# Patient Record
Sex: MALE | Race: OTHER | HISPANIC OR LATINO
[De-identification: names, ages, dates, MRNs, and addresses within clinical notes are randomized per-mention and may not be internally consistent; named-entity substitution may affect disease eponyms.]

---

## 2020-01-29 ENCOUNTER — APPOINTMENT (OUTPATIENT)
Dept: ORTHOPEDIC SURGERY | Facility: CLINIC | Age: 80
End: 2020-01-29
Payer: MEDICARE

## 2020-01-29 ENCOUNTER — APPOINTMENT (OUTPATIENT)
Dept: ORTHOPEDIC SURGERY | Facility: CLINIC | Age: 80
End: 2020-01-29

## 2020-01-29 VITALS
BODY MASS INDEX: 29.35 KG/M2 | HEIGHT: 67 IN | DIASTOLIC BLOOD PRESSURE: 72 MMHG | WEIGHT: 187 LBS | SYSTOLIC BLOOD PRESSURE: 159 MMHG | HEART RATE: 59 BPM | OXYGEN SATURATION: 98 %

## 2020-01-29 DIAGNOSIS — Z86.79 PERSONAL HISTORY OF OTHER DISEASES OF THE CIRCULATORY SYSTEM: ICD-10-CM

## 2020-01-29 DIAGNOSIS — Z86.69 PERSONAL HISTORY OF OTHER DISEASES OF THE NERVOUS SYSTEM AND SENSE ORGANS: ICD-10-CM

## 2020-01-29 DIAGNOSIS — Z96.652 PRESENCE OF LEFT ARTIFICIAL KNEE JOINT: ICD-10-CM

## 2020-01-29 DIAGNOSIS — Z72.3 LACK OF PHYSICAL EXERCISE: ICD-10-CM

## 2020-01-29 DIAGNOSIS — Z83.3 FAMILY HISTORY OF DIABETES MELLITUS: ICD-10-CM

## 2020-01-29 DIAGNOSIS — Z86.39 PERSONAL HISTORY OF OTHER ENDOCRINE, NUTRITIONAL AND METABOLIC DISEASE: ICD-10-CM

## 2020-01-29 PROCEDURE — 99204 OFFICE O/P NEW MOD 45 MIN: CPT | Mod: 25

## 2020-01-29 PROCEDURE — 20610 DRAIN/INJ JOINT/BURSA W/O US: CPT | Mod: RT

## 2020-01-29 RX ORDER — LOSARTAN POTASSIUM 100 MG/1
TABLET, FILM COATED ORAL
Refills: 0 | Status: ACTIVE | COMMUNITY

## 2020-01-29 RX ORDER — ASCORBIC ACID 500 MG
TABLET ORAL
Refills: 0 | Status: ACTIVE | COMMUNITY

## 2020-01-29 RX ORDER — METFORMIN HYDROCHLORIDE 625 MG/1
TABLET ORAL
Refills: 0 | Status: ACTIVE | COMMUNITY

## 2020-01-29 RX ORDER — AMLODIPINE BESYLATE 5 MG/1
TABLET ORAL
Refills: 0 | Status: ACTIVE | COMMUNITY

## 2020-01-29 RX ORDER — UBIDECARENONE/VIT E ACET 100MG-5
CAPSULE ORAL
Refills: 0 | Status: ACTIVE | COMMUNITY

## 2020-01-29 RX ORDER — FERROUS SULFATE 325(65) MG
325 (65 FE) TABLET ORAL
Refills: 0 | Status: ACTIVE | COMMUNITY

## 2020-01-29 RX ORDER — METOPROLOL TARTRATE 75 MG/1
TABLET, FILM COATED ORAL
Refills: 0 | Status: ACTIVE | COMMUNITY

## 2020-01-29 RX ORDER — OMEPRAZOLE 20 MG/1
TABLET, DELAYED RELEASE ORAL
Refills: 0 | Status: ACTIVE | COMMUNITY

## 2020-01-29 NOTE — CONSULT LETTER
[Dear  ___] : Dear  [unfilled], [Consult Letter:] : I had the pleasure of evaluating your patient, [unfilled]. [Please see my note below.] : Please see my note below. [Consult Closing:] : Thank you very much for allowing me to participate in the care of this patient.  If you have any questions, please do not hesitate to contact me. [Sincerely,] : Sincerely, [FreeTextEntry3] : Nathan Arrington MD\par Orthopaedic Surgery - Adult Reconstruction\par [facility] [place]\par [address] [place] [address]\par p. [phone]\par f. [phone]\par

## 2020-01-29 NOTE — PROCEDURE
[Aspiration] : Aspiration [Right] : of the right [Knee Joint] : knee joint [Diagnostic] : Diagnostic [Patient] : patient [Alcohol] : Alcohol [Betadine] : Betadine [Ethyl Chloride Spray] : ethyl chloride spray was used as a topical anesthetic [Lateral] : lateral [18] : an 18-gauge [___ mL Fluid] : [unfilled] mL of [Cloudy] : cloudy [Yellow] : yellow [Bandage Applied] : a bandage [Culture] : culture [Cell Count] : cell count [Gram Stain] : gram stain [Crystal Analysis] : crystal analysis [Tolerated Well] : The patient tolerated the procedure well [None] : none

## 2020-01-29 NOTE — HISTORY OF PRESENT ILLNESS
[Worsening] : worsening [___ yrs] : [unfilled] year(s) ago [Standing] : standing [Constant] : ~He/She~ states the symptoms seem to be constant [Bending] : worsened by bending [Sitting] : worsened by sitting [Walking] : worsened by walking [None] : No relieving factors are noted [de-identified] : 78y/o male presenting with pain and swelling of right total knee replacement since 1/5/20. TKA were performed by Dr. Diaz (R 2002, L 2006) and were previously extremely well-functioning. He feels that the symptoms arose as a result of sleeping in an uncomfortable position for several nights while visiting his ailing mother-in-law at a hospital around that time. The right knee pain was concurrent with new right lateral hip pain and right shoulder pain. Whereas he has been accustomed to walking unlimited distances without any assistive device, he has been using a cane these past few days. Pain is activity-related and he denies any pain at rest. When it occurs, he rates the pain as 10/10 intensity, localized, sharp in nature, alleviated by Aleve and rest, and aggravated by ambulation and knee flexion. He denies any recent illness or infection. No fever/chills at home. He has no complaints regarding the left side.  [Acetaminophen] : not relieved by acetaminophen [NSAIDs] : not relieved by nonsteroidal anti-inflammatory drugs [Physical Therapy] : not relieved by physical therapy

## 2020-01-29 NOTE — PHYSICAL EXAM
[de-identified] : General appearance: well nourished and hydrated, pleasant, alert and oriented x 3, cooperative.  \par HEENT: normocephalic, EOM intact, nasal septum midline, oral cavity clear, external auditory canal clear.  \par Cardiovascular: no lower leg edema, no varicosities, dorsalis pedis pulses palpable and symmetric.  \par Lymphatics: no palpable lymphadenopathy, no lymphedema.  \par Neurologic: sensation is normal, no muscle weakness in upper or lower extremities, patella tendon reflexes present and symmetric.  \par Dermatologic: skin moist, warm, no rash.  \par Spine: cervical spine with normal lordosis and painless range of motion, thoracic spine with normal kyphosis and painless range of motion, lumbosacral spine with normal lordosis and painless range of motion.  No tenderness to palpation along midline spine and paraspinal musculature.  Sacroiliac joints nontender bilaterally. Negative SLR and crossed SLR tests bilaterally.\par Gait: antalgic right.\par \par Left knee:\par - Inspection: negative swelling, ecchymosis, and erythema.  \par - Wounds: healed midline incision.\par - Alignment: normal.  \par - Palpation: no specific tenderness on palpation.  \par - ROM active: 0-125, no pain on extremes of motion\par - Ligamentous laxity: between 5-10mm A/P translation on flexion drawers, stable to varus stress, stable to valgus stress.  \par - Popliteal angle: 70 degrees\par - Muscle Test: 5/5 quad strength.  \par \par Right knee:\par - Inspection: moderate effusion, negative for swelling, ecchymosis, and erythema.  \par - Wounds: healed midline incision.\par - Alignment: normal.  \par - Palpation: circumferential tenderness on palpation.  \par - ROM active: 0-125, pain on both extremes of motion\par - Ligamentous laxity: between 5-10mm A/P translation on flexion drawers, stable to varus stress, stable to valgus stress.  \par - Popliteal angle: 70 degrees\par - Muscle Test: 5/5 quad strength.   [de-identified] : 3 views of the bilateral knees (weightbearing AP, weightbearing lateral, and Sunrise) were obtained today and interpreted by me.\par \par There is a cemented PS Nexgen with a resurfaced patella present in the left knee. Normal alignment in the coronal and sagittal planes. The components appear to be well fixed without radiolucent lines or any change in position as compared to prior available exams (dating back to 2006). No radiographic evidence of osteolysis, loosening, or fracture. The patella sits at appropriate height and tracks centrally.\par \par There is a cemented PS Nexgen with a resurfaced patella present in the right knee. Normal alignment in the coronal and sagittal planes. The components appear to be well fixed without radiolucent lines or any change in position as compared to prior available exams (dating back to 2006). No radiographic evidence of osteolysis, loosening, or fracture. The patella sits at appropriate height and tracks centrally.

## 2020-01-29 NOTE — DISCUSSION/SUMMARY
[de-identified] : 80y/o male with 3.5 weeks of right knee pain and swelling in setting of previously well-functioning right TKA; also with stable well-functioning left TKA\par - Periprosthetic infection must be ruled out. Right knee synovial labs were taken today. Also sending for serum labs.\par - CT noncontrast right knee to assess for osteolysis\par - Callback with results of above.

## 2020-02-03 LAB
B PERT IGG+IGM PNL SER: ABNORMAL
BASOPHILS # BLD AUTO: 0.05 K/UL
BASOPHILS NFR BLD AUTO: 0.4 %
COLOR FLD: YELLOW
CRP SERPL-MCNC: 2.16 MG/DL
DEPRECATED D DIMER PPP IA-ACNC: 276 NG/ML DDU
EOSINOPHIL # BLD AUTO: 0.36 K/UL
EOSINOPHIL # FLD MANUAL: 0 %
EOSINOPHIL NFR BLD AUTO: 3.1 %
ERYTHROCYTE [SEDIMENTATION RATE] IN BLOOD BY WESTERGREN METHOD: 100 MM/HR
FLUID INTAKE SUBSTANCE CLASS: NORMAL
HCT VFR BLD CALC: 39.5 %
HGB BLD-MCNC: 12.2 G/DL
IMM GRANULOCYTES NFR BLD AUTO: 0.3 %
LYMPHOCYTES # BLD AUTO: 1.63 K/UL
LYMPHOCYTES # FLD MANUAL: 29 %
LYMPHOCYTES NFR BLD AUTO: 13.8 %
MAN DIFF?: NORMAL
MCHC RBC-ENTMCNC: 30.6 PG
MCHC RBC-ENTMCNC: 30.9 GM/DL
MCV RBC AUTO: 99 FL
MESOTHL CELL NFR FLD: 0 %
MONOCYTES # BLD AUTO: 0.91 K/UL
MONOCYTES NFR BLD AUTO: 7.7 %
MONOS+MACROS NFR FLD MANUAL: 68 %
NEUTROPHILS # BLD AUTO: 8.81 K/UL
NEUTROPHILS NFR BLD AUTO: 74.7 %
NEUTS SEG # FLD MANUAL: 3 %
NRBC # FLD: 0
PLATELET # BLD AUTO: 318 K/UL
RBC # BLD: 3.99 M/UL
RBC # FLD MANUAL: 3000 /UL
RBC # FLD: 12.4 %
SYCRY CLARITY: ABNORMAL
SYCRY COLOR: YELLOW
SYCRY ID: NORMAL
SYCRY TUBE: NORMAL
TOTAL CELLS COUNTED FLD: 727 /UL
TUBE TYPE: NORMAL
UNIDENT CELLS NFR FLD MANUAL: 0 %
VARIANT LYMPHS # FLD MANUAL: 0 %
WBC # FLD AUTO: 11.8 K/UL

## 2020-02-13 LAB — BACTERIA FLD CULT: NORMAL

## 2020-02-17 ENCOUNTER — FORM ENCOUNTER (OUTPATIENT)
Age: 80
End: 2020-02-17

## 2020-02-18 ENCOUNTER — OUTPATIENT (OUTPATIENT)
Dept: OUTPATIENT SERVICES | Facility: HOSPITAL | Age: 80
LOS: 1 days | End: 2020-02-18
Payer: MEDICARE

## 2020-02-18 DIAGNOSIS — Z01.818 ENCOUNTER FOR OTHER PREPROCEDURAL EXAMINATION: ICD-10-CM

## 2020-02-18 LAB
ALBUMIN SERPL ELPH-MCNC: 4.4 G/DL — SIGNIFICANT CHANGE UP (ref 3.3–5)
ALP SERPL-CCNC: 66 U/L — SIGNIFICANT CHANGE UP (ref 40–120)
ALT FLD-CCNC: 10 U/L — SIGNIFICANT CHANGE UP (ref 10–45)
ANION GAP SERPL CALC-SCNC: 13 MMOL/L — SIGNIFICANT CHANGE UP (ref 5–17)
APPEARANCE UR: CLEAR — SIGNIFICANT CHANGE UP
APTT BLD: 31.7 SEC — SIGNIFICANT CHANGE UP (ref 27.5–36.3)
AST SERPL-CCNC: 12 U/L — SIGNIFICANT CHANGE UP (ref 10–40)
BASOPHILS # BLD AUTO: 0.06 K/UL — SIGNIFICANT CHANGE UP (ref 0–0.2)
BASOPHILS NFR BLD AUTO: 0.6 % — SIGNIFICANT CHANGE UP (ref 0–2)
BILIRUB SERPL-MCNC: 0.2 MG/DL — SIGNIFICANT CHANGE UP (ref 0.2–1.2)
BILIRUB UR-MCNC: NEGATIVE — SIGNIFICANT CHANGE UP
BUN SERPL-MCNC: 23 MG/DL — SIGNIFICANT CHANGE UP (ref 7–23)
CALCIUM SERPL-MCNC: 9.6 MG/DL — SIGNIFICANT CHANGE UP (ref 8.4–10.5)
CHLORIDE SERPL-SCNC: 101 MMOL/L — SIGNIFICANT CHANGE UP (ref 96–108)
CO2 SERPL-SCNC: 25 MMOL/L — SIGNIFICANT CHANGE UP (ref 22–31)
COLOR SPEC: YELLOW — SIGNIFICANT CHANGE UP
CREAT SERPL-MCNC: 0.98 MG/DL — SIGNIFICANT CHANGE UP (ref 0.5–1.3)
DIFF PNL FLD: NEGATIVE — SIGNIFICANT CHANGE UP
EOSINOPHIL # BLD AUTO: 0.18 K/UL — SIGNIFICANT CHANGE UP (ref 0–0.5)
EOSINOPHIL NFR BLD AUTO: 1.8 % — SIGNIFICANT CHANGE UP (ref 0–6)
GLUCOSE SERPL-MCNC: 128 MG/DL — HIGH (ref 70–99)
GLUCOSE UR QL: NEGATIVE — SIGNIFICANT CHANGE UP
HBA1C BLD-MCNC: 7.1 % — HIGH (ref 4–5.6)
HCT VFR BLD CALC: 36.8 % — LOW (ref 39–50)
HGB BLD-MCNC: 11.6 G/DL — LOW (ref 13–17)
IMM GRANULOCYTES NFR BLD AUTO: 0.4 % — SIGNIFICANT CHANGE UP (ref 0–1.5)
INR BLD: 1 — SIGNIFICANT CHANGE UP (ref 0.88–1.16)
KETONES UR-MCNC: NEGATIVE — SIGNIFICANT CHANGE UP
LEUKOCYTE ESTERASE UR-ACNC: NEGATIVE — SIGNIFICANT CHANGE UP
LYMPHOCYTES # BLD AUTO: 1.71 K/UL — SIGNIFICANT CHANGE UP (ref 1–3.3)
LYMPHOCYTES # BLD AUTO: 16.8 % — SIGNIFICANT CHANGE UP (ref 13–44)
MCHC RBC-ENTMCNC: 30.9 PG — SIGNIFICANT CHANGE UP (ref 27–34)
MCHC RBC-ENTMCNC: 31.5 GM/DL — LOW (ref 32–36)
MCV RBC AUTO: 98.1 FL — SIGNIFICANT CHANGE UP (ref 80–100)
MONOCYTES # BLD AUTO: 0.66 K/UL — SIGNIFICANT CHANGE UP (ref 0–0.9)
MONOCYTES NFR BLD AUTO: 6.5 % — SIGNIFICANT CHANGE UP (ref 2–14)
NEUTROPHILS # BLD AUTO: 7.51 K/UL — HIGH (ref 1.8–7.4)
NEUTROPHILS NFR BLD AUTO: 73.9 % — SIGNIFICANT CHANGE UP (ref 43–77)
NITRITE UR-MCNC: NEGATIVE — SIGNIFICANT CHANGE UP
NRBC # BLD: 0 /100 WBCS — SIGNIFICANT CHANGE UP (ref 0–0)
PH UR: 5.5 — SIGNIFICANT CHANGE UP (ref 5–8)
PLATELET # BLD AUTO: 322 K/UL — SIGNIFICANT CHANGE UP (ref 150–400)
POTASSIUM SERPL-MCNC: 5.2 MMOL/L — SIGNIFICANT CHANGE UP (ref 3.5–5.3)
POTASSIUM SERPL-SCNC: 5.2 MMOL/L — SIGNIFICANT CHANGE UP (ref 3.5–5.3)
PROT SERPL-MCNC: 6.7 G/DL — SIGNIFICANT CHANGE UP (ref 6–8.3)
PROT UR-MCNC: NEGATIVE MG/DL — SIGNIFICANT CHANGE UP
PROTHROM AB SERPL-ACNC: 11.4 SEC — SIGNIFICANT CHANGE UP (ref 10–12.9)
RBC # BLD: 3.75 M/UL — LOW (ref 4.2–5.8)
RBC # FLD: 12 % — SIGNIFICANT CHANGE UP (ref 10.3–14.5)
SODIUM SERPL-SCNC: 139 MMOL/L — SIGNIFICANT CHANGE UP (ref 135–145)
SP GR SPEC: 1.01 — SIGNIFICANT CHANGE UP (ref 1–1.03)
UROBILINOGEN FLD QL: 0.2 E.U./DL — SIGNIFICANT CHANGE UP
WBC # BLD: 10.16 K/UL — SIGNIFICANT CHANGE UP (ref 3.8–10.5)
WBC # FLD AUTO: 10.16 K/UL — SIGNIFICANT CHANGE UP (ref 3.8–10.5)

## 2020-02-18 PROCEDURE — 80053 COMPREHEN METABOLIC PANEL: CPT

## 2020-02-18 PROCEDURE — 85610 PROTHROMBIN TIME: CPT

## 2020-02-18 PROCEDURE — 93005 ELECTROCARDIOGRAM TRACING: CPT

## 2020-02-18 PROCEDURE — 93010 ELECTROCARDIOGRAM REPORT: CPT

## 2020-02-18 PROCEDURE — 87086 URINE CULTURE/COLONY COUNT: CPT

## 2020-02-18 PROCEDURE — 71046 X-RAY EXAM CHEST 2 VIEWS: CPT | Mod: 26

## 2020-02-18 PROCEDURE — 85025 COMPLETE CBC W/AUTO DIFF WBC: CPT

## 2020-02-18 PROCEDURE — 71046 X-RAY EXAM CHEST 2 VIEWS: CPT

## 2020-02-18 PROCEDURE — 85730 THROMBOPLASTIN TIME PARTIAL: CPT

## 2020-02-18 PROCEDURE — 81003 URINALYSIS AUTO W/O SCOPE: CPT

## 2020-02-18 PROCEDURE — 83036 HEMOGLOBIN GLYCOSYLATED A1C: CPT

## 2020-02-19 LAB
CULTURE RESULTS: SIGNIFICANT CHANGE UP
SPECIMEN SOURCE: SIGNIFICANT CHANGE UP

## 2020-02-26 ENCOUNTER — OUTPATIENT (OUTPATIENT)
Dept: OUTPATIENT SERVICES | Facility: HOSPITAL | Age: 80
LOS: 1 days | End: 2020-02-26
Payer: COMMERCIAL

## 2020-02-26 DIAGNOSIS — Z22.321 CARRIER OR SUSPECTED CARRIER OF METHICILLIN SUSCEPTIBLE STAPHYLOCOCCUS AUREUS: ICD-10-CM

## 2020-02-26 PROCEDURE — 87641 MR-STAPH DNA AMP PROBE: CPT

## 2020-02-28 RX ORDER — MUPIROCIN 20 MG/G
1 OINTMENT TOPICAL
Qty: 1 | Refills: 0
Start: 2020-02-28 | End: 2020-03-03

## 2020-03-03 LAB — FUNGUS FLD CULT: NORMAL

## 2020-03-05 ENCOUNTER — TRANSCRIPTION ENCOUNTER (OUTPATIENT)
Age: 80
End: 2020-03-05

## 2020-03-05 VITALS
DIASTOLIC BLOOD PRESSURE: 61 MMHG | RESPIRATION RATE: 16 BRPM | OXYGEN SATURATION: 97 % | HEIGHT: 66.5 IN | SYSTOLIC BLOOD PRESSURE: 135 MMHG | TEMPERATURE: 98 F | WEIGHT: 186.29 LBS | HEART RATE: 70 BPM

## 2020-03-05 NOTE — PATIENT PROFILE ADULT - FUNCTIONAL SCREEN CURRENT LEVEL: SWALLOWING (IF SCORE 2 OR MORE FOR ANY ITEM, CONSULT REHAB SERVICES), MLM)
Received report from ALE Glavan. Care assumed of Level 2 infant on room air. Will continue to monitor.   0 = swallows foods/liquids without difficulty

## 2020-03-06 ENCOUNTER — INPATIENT (INPATIENT)
Facility: HOSPITAL | Age: 80
LOS: 2 days | Discharge: HOME HEALTH | DRG: 465 | End: 2020-03-09
Attending: ORTHOPAEDIC SURGERY | Admitting: ORTHOPAEDIC SURGERY
Payer: MEDICARE

## 2020-03-06 ENCOUNTER — APPOINTMENT (OUTPATIENT)
Dept: ORTHOPEDIC SURGERY | Facility: HOSPITAL | Age: 80
End: 2020-03-06

## 2020-03-06 ENCOUNTER — RESULT REVIEW (OUTPATIENT)
Age: 80
End: 2020-03-06

## 2020-03-06 DIAGNOSIS — E78.5 HYPERLIPIDEMIA, UNSPECIFIED: ICD-10-CM

## 2020-03-06 DIAGNOSIS — Z98.890 OTHER SPECIFIED POSTPROCEDURAL STATES: Chronic | ICD-10-CM

## 2020-03-06 DIAGNOSIS — Z96.653 PRESENCE OF ARTIFICIAL KNEE JOINT, BILATERAL: Chronic | ICD-10-CM

## 2020-03-06 DIAGNOSIS — M25.561 PAIN IN RIGHT KNEE: ICD-10-CM

## 2020-03-06 DIAGNOSIS — E11.9 TYPE 2 DIABETES MELLITUS WITHOUT COMPLICATIONS: ICD-10-CM

## 2020-03-06 DIAGNOSIS — D64.9 ANEMIA, UNSPECIFIED: ICD-10-CM

## 2020-03-06 DIAGNOSIS — I25.10 ATHEROSCLEROTIC HEART DISEASE OF NATIVE CORONARY ARTERY WITHOUT ANGINA PECTORIS: Chronic | ICD-10-CM

## 2020-03-06 DIAGNOSIS — I25.10 ATHEROSCLEROTIC HEART DISEASE OF NATIVE CORONARY ARTERY WITHOUT ANGINA PECTORIS: ICD-10-CM

## 2020-03-06 DIAGNOSIS — K42.9 UMBILICAL HERNIA WITHOUT OBSTRUCTION OR GANGRENE: Chronic | ICD-10-CM

## 2020-03-06 DIAGNOSIS — I10 ESSENTIAL (PRIMARY) HYPERTENSION: ICD-10-CM

## 2020-03-06 PROCEDURE — 88305 TISSUE EXAM BY PATHOLOGIST: CPT | Mod: 26

## 2020-03-06 PROCEDURE — 73560 X-RAY EXAM OF KNEE 1 OR 2: CPT | Mod: 26,RT

## 2020-03-06 PROCEDURE — 27486 REVISE/REPLACE KNEE JOINT: CPT | Mod: 52,RT

## 2020-03-06 PROCEDURE — 88331 PATH CONSLTJ SURG 1 BLK 1SPC: CPT | Mod: 26

## 2020-03-06 RX ORDER — ACETAMINOPHEN 500 MG
975 TABLET ORAL EVERY 8 HOURS
Refills: 0 | Status: DISCONTINUED | OUTPATIENT
Start: 2020-03-06 | End: 2020-03-09

## 2020-03-06 RX ORDER — ATORVASTATIN CALCIUM 80 MG/1
20 TABLET, FILM COATED ORAL AT BEDTIME
Refills: 0 | Status: DISCONTINUED | OUTPATIENT
Start: 2020-03-07 | End: 2020-03-09

## 2020-03-06 RX ORDER — ACETAMINOPHEN 500 MG
1000 TABLET ORAL ONCE
Refills: 0 | Status: COMPLETED | OUTPATIENT
Start: 2020-03-06 | End: 2020-03-06

## 2020-03-06 RX ORDER — OXYCODONE HYDROCHLORIDE 5 MG/1
5 TABLET ORAL EVERY 4 HOURS
Refills: 0 | Status: DISCONTINUED | OUTPATIENT
Start: 2020-03-06 | End: 2020-03-09

## 2020-03-06 RX ORDER — AMLODIPINE BESYLATE 2.5 MG/1
2.5 TABLET ORAL DAILY
Refills: 0 | Status: DISCONTINUED | OUTPATIENT
Start: 2020-03-07 | End: 2020-03-09

## 2020-03-06 RX ORDER — ASPIRIN/CALCIUM CARB/MAGNESIUM 324 MG
81 TABLET ORAL
Refills: 0 | Status: DISCONTINUED | OUTPATIENT
Start: 2020-03-06 | End: 2020-03-09

## 2020-03-06 RX ORDER — MORPHINE SULFATE 50 MG/1
4 CAPSULE, EXTENDED RELEASE ORAL EVERY 4 HOURS
Refills: 0 | Status: DISCONTINUED | OUTPATIENT
Start: 2020-03-06 | End: 2020-03-09

## 2020-03-06 RX ORDER — APREPITANT 80 MG/1
40 CAPSULE ORAL ONCE
Refills: 0 | Status: COMPLETED | OUTPATIENT
Start: 2020-03-06 | End: 2020-03-06

## 2020-03-06 RX ORDER — SODIUM CHLORIDE 9 MG/ML
1000 INJECTION, SOLUTION INTRAVENOUS
Refills: 0 | Status: DISCONTINUED | OUTPATIENT
Start: 2020-03-06 | End: 2020-03-09

## 2020-03-06 RX ORDER — TRAMADOL HYDROCHLORIDE 50 MG/1
50 TABLET ORAL EVERY 4 HOURS
Refills: 0 | Status: DISCONTINUED | OUTPATIENT
Start: 2020-03-06 | End: 2020-03-09

## 2020-03-06 RX ORDER — CELECOXIB 200 MG/1
400 CAPSULE ORAL ONCE
Refills: 0 | Status: COMPLETED | OUTPATIENT
Start: 2020-03-06 | End: 2020-03-06

## 2020-03-06 RX ORDER — CEFAZOLIN SODIUM 1 G
2000 VIAL (EA) INJECTION EVERY 8 HOURS
Refills: 0 | Status: COMPLETED | OUTPATIENT
Start: 2020-03-07 | End: 2020-03-07

## 2020-03-06 RX ORDER — POVIDONE-IODINE 5 %
1 AEROSOL (ML) TOPICAL ONCE
Refills: 0 | Status: COMPLETED | OUTPATIENT
Start: 2020-03-06 | End: 2020-03-06

## 2020-03-06 RX ORDER — MORPHINE SULFATE 50 MG/1
4 CAPSULE, EXTENDED RELEASE ORAL
Refills: 0 | Status: DISCONTINUED | OUTPATIENT
Start: 2020-03-06 | End: 2020-03-09

## 2020-03-06 RX ORDER — DEXTROSE 50 % IN WATER 50 %
25 SYRINGE (ML) INTRAVENOUS ONCE
Refills: 0 | Status: DISCONTINUED | OUTPATIENT
Start: 2020-03-06 | End: 2020-03-09

## 2020-03-06 RX ORDER — PANTOPRAZOLE 40 MG/1
40 TABLET, DELAYED RELEASE ORAL DAILY
Qty: 30 | Refills: 1 | Status: ACTIVE | COMMUNITY
Start: 2020-03-06 | End: 1900-01-01

## 2020-03-06 RX ORDER — FERROUS SULFATE 325(65) MG
325 TABLET ORAL THREE TIMES A DAY
Refills: 0 | Status: DISCONTINUED | OUTPATIENT
Start: 2020-03-07 | End: 2020-03-09

## 2020-03-06 RX ORDER — POLYETHYLENE GLYCOL 3350 17 G/17G
17 POWDER, FOR SOLUTION ORAL DAILY
Refills: 0 | Status: DISCONTINUED | OUTPATIENT
Start: 2020-03-06 | End: 2020-03-09

## 2020-03-06 RX ORDER — DEXTROSE 50 % IN WATER 50 %
15 SYRINGE (ML) INTRAVENOUS ONCE
Refills: 0 | Status: DISCONTINUED | OUTPATIENT
Start: 2020-03-06 | End: 2020-03-09

## 2020-03-06 RX ORDER — KETOROLAC TROMETHAMINE 30 MG/ML
15 SYRINGE (ML) INJECTION EVERY 6 HOURS
Refills: 0 | Status: DISCONTINUED | OUTPATIENT
Start: 2020-03-06 | End: 2020-03-08

## 2020-03-06 RX ORDER — PANTOPRAZOLE SODIUM 20 MG/1
40 TABLET, DELAYED RELEASE ORAL
Refills: 0 | Status: DISCONTINUED | OUTPATIENT
Start: 2020-03-07 | End: 2020-03-09

## 2020-03-06 RX ORDER — LOSARTAN POTASSIUM 100 MG/1
100 TABLET, FILM COATED ORAL DAILY
Refills: 0 | Status: DISCONTINUED | OUTPATIENT
Start: 2020-03-07 | End: 2020-03-09

## 2020-03-06 RX ORDER — CLOPIDOGREL BISULFATE 75 MG/1
75 TABLET, FILM COATED ORAL DAILY
Refills: 0 | Status: DISCONTINUED | OUTPATIENT
Start: 2020-03-07 | End: 2020-03-09

## 2020-03-06 RX ORDER — CELECOXIB 200 MG/1
200 CAPSULE ORAL
Refills: 0 | Status: DISCONTINUED | OUTPATIENT
Start: 2020-03-08 | End: 2020-03-09

## 2020-03-06 RX ORDER — SENNA PLUS 8.6 MG/1
2 TABLET ORAL AT BEDTIME
Refills: 0 | Status: DISCONTINUED | OUTPATIENT
Start: 2020-03-06 | End: 2020-03-09

## 2020-03-06 RX ORDER — TAMSULOSIN HYDROCHLORIDE 0.4 MG/1
0.4 CAPSULE ORAL AT BEDTIME
Refills: 0 | Status: DISCONTINUED | OUTPATIENT
Start: 2020-03-07 | End: 2020-03-09

## 2020-03-06 RX ORDER — HYDROMORPHONE HYDROCHLORIDE 2 MG/ML
0.5 INJECTION INTRAMUSCULAR; INTRAVENOUS; SUBCUTANEOUS
Refills: 0 | Status: DISCONTINUED | OUTPATIENT
Start: 2020-03-06 | End: 2020-03-09

## 2020-03-06 RX ORDER — CHLORHEXIDINE GLUCONATE 213 G/1000ML
1 SOLUTION TOPICAL ONCE
Refills: 0 | Status: COMPLETED | OUTPATIENT
Start: 2020-03-06 | End: 2020-03-06

## 2020-03-06 RX ORDER — GLUCAGON INJECTION, SOLUTION 0.5 MG/.1ML
1 INJECTION, SOLUTION SUBCUTANEOUS ONCE
Refills: 0 | Status: DISCONTINUED | OUTPATIENT
Start: 2020-03-06 | End: 2020-03-09

## 2020-03-06 RX ORDER — INSULIN LISPRO 100/ML
VIAL (ML) SUBCUTANEOUS
Refills: 0 | Status: DISCONTINUED | OUTPATIENT
Start: 2020-03-06 | End: 2020-03-09

## 2020-03-06 RX ORDER — DEXTROSE 50 % IN WATER 50 %
12.5 SYRINGE (ML) INTRAVENOUS ONCE
Refills: 0 | Status: DISCONTINUED | OUTPATIENT
Start: 2020-03-06 | End: 2020-03-09

## 2020-03-06 RX ORDER — ONDANSETRON 8 MG/1
4 TABLET, FILM COATED ORAL EVERY 4 HOURS
Refills: 0 | Status: DISCONTINUED | OUTPATIENT
Start: 2020-03-06 | End: 2020-03-09

## 2020-03-06 RX ORDER — INSULIN LISPRO 100/ML
VIAL (ML) SUBCUTANEOUS AT BEDTIME
Refills: 0 | Status: DISCONTINUED | OUTPATIENT
Start: 2020-03-06 | End: 2020-03-09

## 2020-03-06 RX ORDER — BUPIVACAINE 13.3 MG/ML
20 INJECTION, SUSPENSION, LIPOSOMAL INFILTRATION ONCE
Refills: 0 | Status: DISCONTINUED | OUTPATIENT
Start: 2020-03-06 | End: 2020-03-09

## 2020-03-06 RX ORDER — MAGNESIUM HYDROXIDE 400 MG/1
30 TABLET, CHEWABLE ORAL DAILY
Refills: 0 | Status: DISCONTINUED | OUTPATIENT
Start: 2020-03-06 | End: 2020-03-09

## 2020-03-06 RX ADMIN — Medication 1000 MILLIGRAM(S): at 13:40

## 2020-03-06 RX ADMIN — CELECOXIB 400 MILLIGRAM(S): 200 CAPSULE ORAL at 13:41

## 2020-03-06 RX ADMIN — APREPITANT 40 MILLIGRAM(S): 80 CAPSULE ORAL at 13:41

## 2020-03-06 RX ADMIN — CHLORHEXIDINE GLUCONATE 1 APPLICATION(S): 213 SOLUTION TOPICAL at 12:30

## 2020-03-06 RX ADMIN — Medication 1000 MILLIGRAM(S): at 13:41

## 2020-03-06 RX ADMIN — Medication 975 MILLIGRAM(S): at 23:05

## 2020-03-06 RX ADMIN — Medication 1 APPLICATION(S): at 13:44

## 2020-03-06 RX ADMIN — CELECOXIB 400 MILLIGRAM(S): 200 CAPSULE ORAL at 13:39

## 2020-03-06 NOTE — BRIEF OPERATIVE NOTE - NSICDXBRIEFPREOP_GEN_ALL_CORE_FT
PRE-OP DIAGNOSIS:  Traumatic hematoma of right knee 06-Mar-2020 17:01:55  Flynn Manning PRE-OP DIAGNOSIS:  Recurrent pain of right knee 06-Mar-2020 17:03:44  Flynn Manning

## 2020-03-06 NOTE — H&P ADULT - NSHPPHYSICALEXAM_GEN_ALL_CORE
MSK: + decreased ROM 2/2 pain, right knee       Remainder of exam per medical clearance note MSK: + decreased ROM 2/2 pain, right knee   Skin warm and well perfused, no visible wounds/erythema/ecchymoses  Right TKR and Left TKR incisions well healed   EHL/FHL/TA/GS 5/5 motor strength bilaterally   SLT in tact to distal bilateral lower extremities   DP/PT pulses 2+      Remainder of exam per medical clearance note

## 2020-03-06 NOTE — BRIEF OPERATIVE NOTE - NSICDXBRIEFPOSTOP_GEN_ALL_CORE_FT
POST-OP DIAGNOSIS:  Traumatic hematoma of right knee 06-Mar-2020 17:02:12  Flynn Manning POST-OP DIAGNOSIS:  Right knee pain 06-Mar-2020 17:03:59  Flynn Manning

## 2020-03-06 NOTE — H&P ADULT - NSHPOUTPATIENTPROVIDERS_GEN_ALL_CORE
Medical clearance Medical clearance per Dr. Anders; Cardiac clearance per Dr. Chavez, Heme clearance per Dr. Perez

## 2020-03-06 NOTE — H&P ADULT - NSHPLABSRESULTS_GEN_ALL_CORE
Preop CBC, BMP, PT/PTT/INR, UA - WNL per medical clearance  Hgb A1C 7.1  Preop EKG - NSR - WNL per medical clearance   Preop CXR - WNL per medical clearance   Nasal swab + MSSA - Preop CBC, BMP, PT/PTT/INR, UA - WNL per medical clearance  Hgb A1C 7.1  Preop EKG - NSR - WNL per medical clearance   Preop CXR - WNL per medical clearance   ECHO EF 65% 2/29 reviewed by cardiac clearance   Nasal swab + MSSA -3 M DOS as patient completed 4 days treatment with mupirocin

## 2020-03-06 NOTE — BRIEF OPERATIVE NOTE - NSICDXBRIEFPROCEDURE_GEN_ALL_CORE_FT
PROCEDURES:  Evacuation, hematoma, knee 06-Mar-2020 17:01:37 Left knee Flynn Manning PROCEDURES:  Total knee revision 06-Mar-2020 17:03:08 right knee Flynn Manning PROCEDURES:  Incision and drainage of right knee with polyethylene liner exchange 06-Mar-2020 19:37:58  Maximo Joseph  Total knee revision 06-Mar-2020 17:03:08 right knee Flynn Manning  Evacuation, hematoma, knee 06-Mar-2020 17:01:37 Left knee Flynn Manning

## 2020-03-06 NOTE — H&P ADULT - NSICDXPASTSURGICALHX_GEN_ALL_CORE_FT
PAST SURGICAL HISTORY:  CAD (coronary artery disease) stents placement    H/O total knee replacement, bilateral     Umbilical hernia Umbilical and inguinal hernia

## 2020-03-06 NOTE — H&P ADULT - NSICDXPASTMEDICALHX_GEN_ALL_CORE_FT
PAST MEDICAL HISTORY:  Anemia     CAD (coronary artery disease)     DM (diabetes mellitus)     Hyperlipidemia     Hypertension

## 2020-03-06 NOTE — H&P ADULT - HISTORY OF PRESENT ILLNESS
79M c/o right knee pain x     Present for elective revision right total knee replacement 79M c/o right knee pain x 2 months. Pt has hx of right TKR in 2002. Pt denies preceding trauma/injury. Pt states his right knee pain is localized. Pt takes ibuprofen, tylenol, or aleve as needed for pain control. Pt ambulates with a cane since January. Pt denies DVT hx. Pt takes ASA/Plavix due to hx of stents x 6 - last dose plavix on Saturday 2/29, last dose ASA 81 yesterday. Pt denies CP, SOB, N/ V, tactile fevers, calf pain.     Present for elective revision right total knee replacement

## 2020-03-06 NOTE — H&P ADULT - PROBLEM SELECTOR PLAN 1
Admit to Orthopaedic Service.  Presents today for elective revision right total knee replacement   Pt medically stable and cleared for procedure today by  Admit to Orthopaedic Service.  Presents today for elective revision right total knee replacement   Pt medically stable and cleared for procedure today by Dr. Anders, Dr. Chavez. Dr. Perez

## 2020-03-07 ENCOUNTER — TRANSCRIPTION ENCOUNTER (OUTPATIENT)
Age: 80
End: 2020-03-07

## 2020-03-07 LAB
ANION GAP SERPL CALC-SCNC: 9 MMOL/L — SIGNIFICANT CHANGE UP (ref 5–17)
BUN SERPL-MCNC: 18 MG/DL — SIGNIFICANT CHANGE UP (ref 7–23)
CALCIUM SERPL-MCNC: 9 MG/DL — SIGNIFICANT CHANGE UP (ref 8.4–10.5)
CHLORIDE SERPL-SCNC: 100 MMOL/L — SIGNIFICANT CHANGE UP (ref 96–108)
CO2 SERPL-SCNC: 29 MMOL/L — SIGNIFICANT CHANGE UP (ref 22–31)
CREAT SERPL-MCNC: 0.96 MG/DL — SIGNIFICANT CHANGE UP (ref 0.5–1.3)
GLUCOSE SERPL-MCNC: 127 MG/DL — HIGH (ref 70–99)
HCT VFR BLD CALC: 32.4 % — LOW (ref 39–50)
HGB BLD-MCNC: 10.4 G/DL — LOW (ref 13–17)
MCHC RBC-ENTMCNC: 30.7 PG — SIGNIFICANT CHANGE UP (ref 27–34)
MCHC RBC-ENTMCNC: 32.1 GM/DL — SIGNIFICANT CHANGE UP (ref 32–36)
MCV RBC AUTO: 95.6 FL — SIGNIFICANT CHANGE UP (ref 80–100)
NRBC # BLD: 0 /100 WBCS — SIGNIFICANT CHANGE UP (ref 0–0)
PLATELET # BLD AUTO: 263 K/UL — SIGNIFICANT CHANGE UP (ref 150–400)
POTASSIUM SERPL-MCNC: 5.1 MMOL/L — SIGNIFICANT CHANGE UP (ref 3.5–5.3)
POTASSIUM SERPL-SCNC: 5.1 MMOL/L — SIGNIFICANT CHANGE UP (ref 3.5–5.3)
RBC # BLD: 3.39 M/UL — LOW (ref 4.2–5.8)
RBC # FLD: 12 % — SIGNIFICANT CHANGE UP (ref 10.3–14.5)
SODIUM SERPL-SCNC: 138 MMOL/L — SIGNIFICANT CHANGE UP (ref 135–145)
WBC # BLD: 12.85 K/UL — HIGH (ref 3.8–10.5)
WBC # FLD AUTO: 12.85 K/UL — HIGH (ref 3.8–10.5)

## 2020-03-07 RX ADMIN — AMLODIPINE BESYLATE 2.5 MILLIGRAM(S): 2.5 TABLET ORAL at 06:02

## 2020-03-07 RX ADMIN — Medication 100 MILLIGRAM(S): at 00:46

## 2020-03-07 RX ADMIN — Medication 100 MILLIGRAM(S): at 12:19

## 2020-03-07 RX ADMIN — Medication 975 MILLIGRAM(S): at 06:04

## 2020-03-07 RX ADMIN — Medication 975 MILLIGRAM(S): at 13:34

## 2020-03-07 RX ADMIN — Medication 975 MILLIGRAM(S): at 00:17

## 2020-03-07 RX ADMIN — Medication 325 MILLIGRAM(S): at 13:04

## 2020-03-07 RX ADMIN — Medication 15 MILLIGRAM(S): at 00:18

## 2020-03-07 RX ADMIN — Medication 975 MILLIGRAM(S): at 07:04

## 2020-03-07 RX ADMIN — TAMSULOSIN HYDROCHLORIDE 0.4 MILLIGRAM(S): 0.4 CAPSULE ORAL at 21:11

## 2020-03-07 RX ADMIN — TRAMADOL HYDROCHLORIDE 50 MILLIGRAM(S): 50 TABLET ORAL at 22:29

## 2020-03-07 RX ADMIN — TRAMADOL HYDROCHLORIDE 50 MILLIGRAM(S): 50 TABLET ORAL at 12:50

## 2020-03-07 RX ADMIN — TRAMADOL HYDROCHLORIDE 50 MILLIGRAM(S): 50 TABLET ORAL at 12:20

## 2020-03-07 RX ADMIN — Medication 15 MILLIGRAM(S): at 23:33

## 2020-03-07 RX ADMIN — SODIUM CHLORIDE 120 MILLILITER(S): 9 INJECTION, SOLUTION INTRAVENOUS at 00:23

## 2020-03-07 RX ADMIN — CLOPIDOGREL BISULFATE 75 MILLIGRAM(S): 75 TABLET, FILM COATED ORAL at 12:19

## 2020-03-07 RX ADMIN — Medication 325 MILLIGRAM(S): at 06:02

## 2020-03-07 RX ADMIN — Medication 975 MILLIGRAM(S): at 13:04

## 2020-03-07 RX ADMIN — Medication 975 MILLIGRAM(S): at 21:14

## 2020-03-07 RX ADMIN — Medication 325 MILLIGRAM(S): at 21:11

## 2020-03-07 RX ADMIN — Medication 975 MILLIGRAM(S): at 21:11

## 2020-03-07 RX ADMIN — Medication 15 MILLIGRAM(S): at 23:24

## 2020-03-07 RX ADMIN — Medication 15 MILLIGRAM(S): at 06:04

## 2020-03-07 RX ADMIN — Medication 15 MILLIGRAM(S): at 00:46

## 2020-03-07 RX ADMIN — ATORVASTATIN CALCIUM 20 MILLIGRAM(S): 80 TABLET, FILM COATED ORAL at 21:11

## 2020-03-07 RX ADMIN — Medication 15 MILLIGRAM(S): at 12:49

## 2020-03-07 RX ADMIN — Medication 15 MILLIGRAM(S): at 07:04

## 2020-03-07 RX ADMIN — Medication 2: at 13:03

## 2020-03-07 RX ADMIN — Medication 81 MILLIGRAM(S): at 06:01

## 2020-03-07 RX ADMIN — Medication 15 MILLIGRAM(S): at 18:31

## 2020-03-07 RX ADMIN — PANTOPRAZOLE SODIUM 40 MILLIGRAM(S): 20 TABLET, DELAYED RELEASE ORAL at 06:01

## 2020-03-07 RX ADMIN — LOSARTAN POTASSIUM 100 MILLIGRAM(S): 100 TABLET, FILM COATED ORAL at 06:02

## 2020-03-07 RX ADMIN — TRAMADOL HYDROCHLORIDE 50 MILLIGRAM(S): 50 TABLET ORAL at 23:20

## 2020-03-07 RX ADMIN — TRAMADOL HYDROCHLORIDE 50 MILLIGRAM(S): 50 TABLET ORAL at 07:52

## 2020-03-07 RX ADMIN — Medication 15 MILLIGRAM(S): at 12:19

## 2020-03-07 RX ADMIN — Medication 15 MILLIGRAM(S): at 18:30

## 2020-03-07 RX ADMIN — Medication 81 MILLIGRAM(S): at 18:34

## 2020-03-07 NOTE — PHYSICAL THERAPY INITIAL EVALUATION ADULT - ACTIVE RANGE OF MOTION EXAMINATION, REHAB EVAL
R knee flexion/extension/bilateral upper extremity Active ROM was WFL (within functional limits)/bilateral  lower extremity Active ROM was WFL (within functional limits)/deficits as listed below

## 2020-03-07 NOTE — PROGRESS NOTE ADULT - SUBJECTIVE AND OBJECTIVE BOX
Ortho Note    Pt comfortable without complaints, pain controlled  Denies CP, SOB, N/V, numbness/tingling     Vital Signs Last 24 Hrs  T(C): 36.3 (03-07-20 @ 08:49), Max: 36.3 (03-07-20 @ 08:49)  T(F): 97.4 (03-07-20 @ 08:49), Max: 97.4 (03-07-20 @ 08:49)  HR: 52 (03-07-20 @ 08:49) (52 - 52)  BP: 156/91 (03-07-20 @ 08:49) (145/70 - 156/91)  BP(mean): --  RR: 18 (03-07-20 @ 08:49) (14 - 18)  SpO2: 96% (03-07-20 @ 08:49) (96% - 96%)      VSS  General: A&Ox3, NAD  RLE: Aquacell DSG C/D/I  Pulses: Foot WWP; DP pulse 2+; Cap refill < 2 sec  Sensation: SILT distally and symmetric to contralateral extremity  Motor: TA/EHL/FHL/GS 5/5 and symmetric to contralateral extremity                          10.4   12.85 )-----------( 263      ( 07 Mar 2020 07:30 )             32.4     07 Mar 2020 07:30    138    |  100    |  18     ----------------------------<  127    5.1     |  29     |  0.96     Ca    9.0        07 Mar 2020 07:30

## 2020-03-07 NOTE — DISCHARGE NOTE NURSING/CASE MANAGEMENT/SOCIAL WORK - PATIENT PORTAL LINK FT
You can access the FollowMyHealth Patient Portal offered by [facility] by registering at the following website: [email or web address]. By joining [employer]’s FollowMyHealth portal, you will also be able to view your health information using other applications (apps) compatible with our system.

## 2020-03-07 NOTE — PHYSICAL THERAPY INITIAL EVALUATION ADULT - PERTINENT HX OF CURRENT PROBLEM, REHAB EVAL
79M c/o right knee pain x 2 months. Pt has hx of right TKR in 2002. Pt denies preceding trauma/injury.

## 2020-03-07 NOTE — PROGRESS NOTE ADULT - SUBJECTIVE AND OBJECTIVE BOX
Ortho Post Op Check    Procedure: R TKA revision   Surgeon: Oh    Pt comfortable without complaints, pain controlled  Denies CP, SOB, N/V, numbness/tingling     Vital Signs Last 24 Hrs  T(C): 36.3 (03-07-20 @ 05:29), Max: 36.3 (03-07-20 @ 05:29)  T(F): 97.4 (03-07-20 @ 05:29), Max: 97.4 (03-07-20 @ 05:29)  HR: 50 (03-07-20 @ 05:29) (50 - 50)  BP: 145/70 (03-07-20 @ 05:56) (145/70 - 153/80)  BP(mean): --  RR: 14 (03-07-20 @ 05:56) (14 - 14)  SpO2: 96% (03-07-20 @ 05:56) (96% - 96%)  AVSS    General: Pt Alert and oriented, NAD  R knee DSG C/D/I  Pulses: 2+ DP/PT  Sensation intact s/s/s/p/dp/t and symmetric   Motor: EHL/FHL/TA/GS 5/5 and symmetric   Toes WWP      Post-op X-Ray: Hardware in place with good alignment     A/P: 79yMale POD#0 s/p revision R TKA - poly exchange and synovectomy   - Stable  - Pain Control  - DVT ppx: ASA + plavix  - Post op abx: ancef periop  - PT, WBS: WBAT  - f/u cx  - dispo pending PT eval     Ortho Pager [phone]

## 2020-03-07 NOTE — PHYSICAL THERAPY INITIAL EVALUATION ADULT - GAIT DEVIATIONS NOTED, PT EVAL
decreased weight-shifting ability/decreased step length/decreased stride length/decreased velocity of limb motion/decreased gait speed

## 2020-03-07 NOTE — PHYSICAL THERAPY INITIAL EVALUATION ADULT - ADDITIONAL COMMENTS
Patient lives in elevator apartment, no steps to enter (ramp). Patient denies home health assistance or history of falls.

## 2020-03-07 NOTE — PROGRESS NOTE ADULT - ASSESSMENT
A/P: 79yMale s/p R TKA poly exchange and synovectomy  - Stable  - Pain/Nausea Control adequate  - Home meds  - AM labs unremarkable  - DVT ppx: ASA 81 BID, Plavix 75 QD  - WBS: WBAT RLE  - PT: pending eval  - Will f/u OR cultures -- no need for extended ABx coverage at this time      Ortho Pager [phone]

## 2020-03-08 LAB
ANION GAP SERPL CALC-SCNC: 9 MMOL/L — SIGNIFICANT CHANGE UP (ref 5–17)
BUN SERPL-MCNC: 26 MG/DL — HIGH (ref 7–23)
CALCIUM SERPL-MCNC: 8.8 MG/DL — SIGNIFICANT CHANGE UP (ref 8.4–10.5)
CHLORIDE SERPL-SCNC: 99 MMOL/L — SIGNIFICANT CHANGE UP (ref 96–108)
CO2 SERPL-SCNC: 27 MMOL/L — SIGNIFICANT CHANGE UP (ref 22–31)
CREAT SERPL-MCNC: 1.18 MG/DL — SIGNIFICANT CHANGE UP (ref 0.5–1.3)
GLUCOSE SERPL-MCNC: 157 MG/DL — HIGH (ref 70–99)
HCT VFR BLD CALC: 29.7 % — LOW (ref 39–50)
HGB BLD-MCNC: 9.6 G/DL — LOW (ref 13–17)
MCHC RBC-ENTMCNC: 30.8 PG — SIGNIFICANT CHANGE UP (ref 27–34)
MCHC RBC-ENTMCNC: 32.3 GM/DL — SIGNIFICANT CHANGE UP (ref 32–36)
MCV RBC AUTO: 95.2 FL — SIGNIFICANT CHANGE UP (ref 80–100)
NRBC # BLD: 0 /100 WBCS — SIGNIFICANT CHANGE UP (ref 0–0)
PLATELET # BLD AUTO: 249 K/UL — SIGNIFICANT CHANGE UP (ref 150–400)
POTASSIUM SERPL-MCNC: 4.8 MMOL/L — SIGNIFICANT CHANGE UP (ref 3.5–5.3)
POTASSIUM SERPL-SCNC: 4.8 MMOL/L — SIGNIFICANT CHANGE UP (ref 3.5–5.3)
RBC # BLD: 3.12 M/UL — LOW (ref 4.2–5.8)
RBC # FLD: 12 % — SIGNIFICANT CHANGE UP (ref 10.3–14.5)
SODIUM SERPL-SCNC: 135 MMOL/L — SIGNIFICANT CHANGE UP (ref 135–145)
WBC # BLD: 8.93 K/UL — SIGNIFICANT CHANGE UP (ref 3.8–10.5)
WBC # FLD AUTO: 8.93 K/UL — SIGNIFICANT CHANGE UP (ref 3.8–10.5)

## 2020-03-08 RX ADMIN — Medication 975 MILLIGRAM(S): at 13:16

## 2020-03-08 RX ADMIN — Medication 975 MILLIGRAM(S): at 06:53

## 2020-03-08 RX ADMIN — CELECOXIB 200 MILLIGRAM(S): 200 CAPSULE ORAL at 18:30

## 2020-03-08 RX ADMIN — CELECOXIB 200 MILLIGRAM(S): 200 CAPSULE ORAL at 06:48

## 2020-03-08 RX ADMIN — LOSARTAN POTASSIUM 100 MILLIGRAM(S): 100 TABLET, FILM COATED ORAL at 06:48

## 2020-03-08 RX ADMIN — Medication 325 MILLIGRAM(S): at 21:53

## 2020-03-08 RX ADMIN — Medication 15 MILLIGRAM(S): at 06:53

## 2020-03-08 RX ADMIN — Medication 975 MILLIGRAM(S): at 13:46

## 2020-03-08 RX ADMIN — OXYCODONE HYDROCHLORIDE 5 MILLIGRAM(S): 5 TABLET ORAL at 18:04

## 2020-03-08 RX ADMIN — Medication 81 MILLIGRAM(S): at 18:02

## 2020-03-08 RX ADMIN — OXYCODONE HYDROCHLORIDE 5 MILLIGRAM(S): 5 TABLET ORAL at 08:10

## 2020-03-08 RX ADMIN — Medication 15 MILLIGRAM(S): at 06:48

## 2020-03-08 RX ADMIN — CELECOXIB 200 MILLIGRAM(S): 200 CAPSULE ORAL at 06:53

## 2020-03-08 RX ADMIN — Medication 2: at 18:02

## 2020-03-08 RX ADMIN — CLOPIDOGREL BISULFATE 75 MILLIGRAM(S): 75 TABLET, FILM COATED ORAL at 12:14

## 2020-03-08 RX ADMIN — ATORVASTATIN CALCIUM 20 MILLIGRAM(S): 80 TABLET, FILM COATED ORAL at 21:53

## 2020-03-08 RX ADMIN — OXYCODONE HYDROCHLORIDE 5 MILLIGRAM(S): 5 TABLET ORAL at 13:56

## 2020-03-08 RX ADMIN — Medication 2: at 07:44

## 2020-03-08 RX ADMIN — PANTOPRAZOLE SODIUM 40 MILLIGRAM(S): 20 TABLET, DELAYED RELEASE ORAL at 06:49

## 2020-03-08 RX ADMIN — AMLODIPINE BESYLATE 2.5 MILLIGRAM(S): 2.5 TABLET ORAL at 06:48

## 2020-03-08 RX ADMIN — Medication 975 MILLIGRAM(S): at 21:53

## 2020-03-08 RX ADMIN — Medication 325 MILLIGRAM(S): at 13:16

## 2020-03-08 RX ADMIN — OXYCODONE HYDROCHLORIDE 5 MILLIGRAM(S): 5 TABLET ORAL at 13:23

## 2020-03-08 RX ADMIN — OXYCODONE HYDROCHLORIDE 5 MILLIGRAM(S): 5 TABLET ORAL at 18:34

## 2020-03-08 RX ADMIN — Medication 81 MILLIGRAM(S): at 06:49

## 2020-03-08 RX ADMIN — Medication 975 MILLIGRAM(S): at 06:48

## 2020-03-08 RX ADMIN — OXYCODONE HYDROCHLORIDE 5 MILLIGRAM(S): 5 TABLET ORAL at 00:14

## 2020-03-08 RX ADMIN — OXYCODONE HYDROCHLORIDE 5 MILLIGRAM(S): 5 TABLET ORAL at 00:54

## 2020-03-08 RX ADMIN — OXYCODONE HYDROCHLORIDE 5 MILLIGRAM(S): 5 TABLET ORAL at 07:44

## 2020-03-08 RX ADMIN — Medication 325 MILLIGRAM(S): at 06:48

## 2020-03-08 RX ADMIN — TAMSULOSIN HYDROCHLORIDE 0.4 MILLIGRAM(S): 0.4 CAPSULE ORAL at 21:53

## 2020-03-08 RX ADMIN — CELECOXIB 200 MILLIGRAM(S): 200 CAPSULE ORAL at 18:02

## 2020-03-08 NOTE — PROGRESS NOTE ADULT - ASSESSMENT
79y m s/p R TKA poly exchange + synovectomy 3/6 POD2     -PT WBAT  -DM/ISS  -Pain control  -Cultures - NGTD  -DVT PPX: ASA/Plavix (CAD, stents)   -Dispo Planning: Home

## 2020-03-09 ENCOUNTER — TRANSCRIPTION ENCOUNTER (OUTPATIENT)
Age: 80
End: 2020-03-09

## 2020-03-09 VITALS
SYSTOLIC BLOOD PRESSURE: 143 MMHG | DIASTOLIC BLOOD PRESSURE: 71 MMHG | RESPIRATION RATE: 16 BRPM | TEMPERATURE: 98 F | OXYGEN SATURATION: 97 % | HEART RATE: 78 BPM

## 2020-03-09 RX ORDER — ASPIRIN/CALCIUM CARB/MAGNESIUM 324 MG
1 TABLET ORAL
Qty: 60 | Refills: 0
Start: 2020-03-09

## 2020-03-09 RX ORDER — OXYCODONE HYDROCHLORIDE 5 MG/1
1 TABLET ORAL
Qty: 30 | Refills: 0
Start: 2020-03-09

## 2020-03-09 RX ORDER — SENNA PLUS 8.6 MG/1
2 TABLET ORAL
Qty: 0 | Refills: 0 | DISCHARGE
Start: 2020-03-09

## 2020-03-09 RX ORDER — ASPIRIN/CALCIUM CARB/MAGNESIUM 324 MG
1 TABLET ORAL
Qty: 0 | Refills: 0 | DISCHARGE

## 2020-03-09 RX ORDER — FERROUS SULFATE 325(65) MG
1 TABLET ORAL
Qty: 0 | Refills: 0 | DISCHARGE

## 2020-03-09 RX ADMIN — PANTOPRAZOLE SODIUM 40 MILLIGRAM(S): 20 TABLET, DELAYED RELEASE ORAL at 05:55

## 2020-03-09 RX ADMIN — Medication 975 MILLIGRAM(S): at 05:55

## 2020-03-09 RX ADMIN — LOSARTAN POTASSIUM 100 MILLIGRAM(S): 100 TABLET, FILM COATED ORAL at 05:55

## 2020-03-09 RX ADMIN — Medication 975 MILLIGRAM(S): at 12:14

## 2020-03-09 RX ADMIN — OXYCODONE HYDROCHLORIDE 5 MILLIGRAM(S): 5 TABLET ORAL at 06:37

## 2020-03-09 RX ADMIN — Medication 975 MILLIGRAM(S): at 06:03

## 2020-03-09 RX ADMIN — Medication 81 MILLIGRAM(S): at 05:55

## 2020-03-09 RX ADMIN — Medication 325 MILLIGRAM(S): at 12:13

## 2020-03-09 RX ADMIN — CELECOXIB 200 MILLIGRAM(S): 200 CAPSULE ORAL at 05:55

## 2020-03-09 RX ADMIN — Medication 2: at 07:41

## 2020-03-09 RX ADMIN — OXYCODONE HYDROCHLORIDE 5 MILLIGRAM(S): 5 TABLET ORAL at 05:55

## 2020-03-09 RX ADMIN — CLOPIDOGREL BISULFATE 75 MILLIGRAM(S): 75 TABLET, FILM COATED ORAL at 12:13

## 2020-03-09 RX ADMIN — Medication 975 MILLIGRAM(S): at 13:00

## 2020-03-09 RX ADMIN — Medication 2: at 12:13

## 2020-03-09 RX ADMIN — CELECOXIB 200 MILLIGRAM(S): 200 CAPSULE ORAL at 06:03

## 2020-03-09 RX ADMIN — AMLODIPINE BESYLATE 2.5 MILLIGRAM(S): 2.5 TABLET ORAL at 05:55

## 2020-03-09 RX ADMIN — Medication 325 MILLIGRAM(S): at 05:55

## 2020-03-09 NOTE — DISCHARGE NOTE PROVIDER - NSDCHHNEEDSERVICE_GEN_ALL_CORE
Medication teaching and assessment/Rehabilitation services/Teaching and training/Observation and assessment

## 2020-03-09 NOTE — PROGRESS NOTE ADULT - SUBJECTIVE AND OBJECTIVE BOX
S: No events overnight.  Pt doing well this AM.  Wants to home today.    O:   Vital Signs Last 24 Hrs  T(C): 36.4 (09 Mar 2020 08:30), Max: 36.6 (08 Mar 2020 17:00)  T(F): 97.5 (09 Mar 2020 08:30), Max: 97.8 (08 Mar 2020 17:00)  HR: 78 (09 Mar 2020 08:30) (60 - 81)  BP: 143/71 (09 Mar 2020 08:30) (123/58 - 158/71)  BP(mean): --  RR: 16 (09 Mar 2020 08:30) (15 - 17)  SpO2: 97% (09 Mar 2020 08:30) (95% - 98%)      PE:  RLE DSG CDI  SILT  Motor EHL/FHL/TA/GS intact  Ext WWP                          9.6    8.93  )-----------( 249      ( 08 Mar 2020 06:53 )             29.7       79y m s/p R TKA poly exchange + synovectomy 3/6   -Pain control  -PT WBAT  -DM/ISS  -Cultures - NGTD  -DVT PPX: ASA/Plavix (CAD, stents)   -Dispo Planning: Home today

## 2020-03-09 NOTE — DISCHARGE NOTE PROVIDER - NSDCCPTREATMENT_GEN_ALL_CORE_FT
PRINCIPAL PROCEDURE  Procedure: Total knee revision  Findings and Treatment: right knee      SECONDARY PROCEDURE  Procedure: Total knee revision  Findings and Treatment: right knee

## 2020-03-09 NOTE — DISCHARGE NOTE PROVIDER - NSDCFUADDINST_GEN_ALL_CORE_FT
No strenuous activity, heavy lifting, driving or returning to work until cleared by MD.  You may shower - dressing is water-resistant, no soaking in bathtubs.  Remove dressing after post op day 5-7, then leave incision open to air. Keep incision clean and dry.  Try to have regular bowel movements, take stool softener or laxative if necessary.  May take Pepcid or Zantac for upset stomach.  May take Aleve or Naproxen instead of Meloxicam.  Swelling may travel all the way down leg to foot, this is normal and will subside in a few weeks.  Call to schedule an appt with Dr. Arrington for follow up, if you have staples or sutures they will be removed in office.  Contact your doctor if you experience: fever greater than 101.5, chills, chest pain, difficulty breathing, redness or excessive drainage around the incision, other concerns.

## 2020-03-09 NOTE — DISCHARGE NOTE PROVIDER - NSDCCPCAREPLAN_GEN_ALL_CORE_FT
PRINCIPAL DISCHARGE DIAGNOSIS  Diagnosis: Right knee pain  Assessment and Plan of Treatment: Right knee pain

## 2020-03-09 NOTE — DISCHARGE NOTE PROVIDER - HOSPITAL COURSE
Admit    OR for revision right TKA    Periop Antibx Ancef for 24 hours    DVT ppx ASA 81 BID, Plavix daily     PT, OOB, WBAT - cleared for discharge home     Pain mgt

## 2020-03-09 NOTE — DISCHARGE NOTE PROVIDER - NSDCMRMEDTOKEN_GEN_ALL_CORE_FT
ascorbic acid: unknown dose  aspirin 81 mg oral tablet: 1 tab(s) orally once a day  Co Q-10 100 mg oral capsule: 1 cap(s) orally once a day  ferrous sulfate 325 mg (65 mg elemental iron) oral tablet: 1 tab(s) orally 3 times a day  Flomax 0.4 mg oral capsule: 1 cap(s) orally once a day  Lantus: 35 unit(s) subcutaneous  Lipitor 20 mg oral tablet: 1 tab(s) orally once a day  losartan 100 mg oral tablet: 1 tab(s) orally once a day  metFORMIN 500 mg oral tablet: 1 tab(s) orally 2 times a day  mupirocin 2% topical ointment: Apply topically to both nostrils 2 times a day for 5 days  Norvasc 2.5 mg oral tablet: 1 tab(s) orally once a day  omeprazole 20 mg oral delayed release capsule: 1 cap(s) orally once a day  Plavix 75 mg oral tablet: 1 tab(s) orally once a day  Trulicity Pen 0.75 mg/0.5 mL subcutaneous solution:   Vitamin B-100 oral tablet: 1 tab(s) orally once a day  Vitamin D3 5000 intl units (125 mcg)/mL oral liquid: 1 milliliter(s) orally once a day ascorbic acid: unknown dose  aspirin 81 mg oral tablet: 1 tab(s) orally 2 times a day   Co Q-10 100 mg oral capsule: 1 cap(s) orally once a day  Flomax 0.4 mg oral capsule: 1 cap(s) orally once a day  Lantus: 35 unit(s) subcutaneous  Lipitor 20 mg oral tablet: 1 tab(s) orally once a day  losartan 100 mg oral tablet: 1 tab(s) orally once a day  metFORMIN 500 mg oral tablet: 1 tab(s) orally 2 times a day  Norvasc 2.5 mg oral tablet: 1 tab(s) orally once a day  omeprazole 20 mg oral delayed release capsule: 1 cap(s) orally once a day  oxyCODONE 5 mg oral tablet: 1 tab(s) orally every 4 hours, As Needed -Severe Pain (7 - 10) - for severe pain MDD:6  Plavix 75 mg oral tablet: 1 tab(s) orally once a day  senna oral tablet: 2 tab(s) orally once a day (at bedtime), As needed, Constipation  Trulicity Pen 0.75 mg/0.5 mL subcutaneous solution:   Vitamin B-100 oral tablet: 1 tab(s) orally once a day  Vitamin D3 5000 intl units (125 mcg)/mL oral liquid: 1 milliliter(s) orally once a day ascorbic acid: unknown dose  Co Q-10 100 mg oral capsule: 1 cap(s) orally once a day  Flomax 0.4 mg oral capsule: 1 cap(s) orally once a day  Lantus: 35 unit(s) subcutaneous  Lipitor 20 mg oral tablet: 1 tab(s) orally once a day  losartan 100 mg oral tablet: 1 tab(s) orally once a day  metFORMIN 500 mg oral tablet: 1 tab(s) orally 2 times a day  Norvasc 2.5 mg oral tablet: 1 tab(s) orally once a day  omeprazole 20 mg oral delayed release capsule: 1 cap(s) orally once a day  Plavix 75 mg oral tablet: 1 tab(s) orally once a day  Trulicity Pen 0.75 mg/0.5 mL subcutaneous solution:   Vitamin B-100 oral tablet: 1 tab(s) orally once a day  Vitamin D3 5000 intl units (125 mcg)/mL oral liquid: 1 milliliter(s) orally once a day

## 2020-03-09 NOTE — DISCHARGE NOTE PROVIDER - CARE PROVIDER_API CALL
Nathan Arrington)  Orthopedics  [address] [place] [address]  Phone: [phone]  Fax: [phone]  Follow Up Time: 2 weeks

## 2020-03-10 PROBLEM — E11.9 TYPE 2 DIABETES MELLITUS WITHOUT COMPLICATIONS: Chronic | Status: ACTIVE | Noted: 2020-03-06

## 2020-03-10 PROBLEM — I10 ESSENTIAL (PRIMARY) HYPERTENSION: Chronic | Status: ACTIVE | Noted: 2020-03-06

## 2020-03-10 PROBLEM — D64.9 ANEMIA, UNSPECIFIED: Chronic | Status: ACTIVE | Noted: 2020-03-06

## 2020-03-10 PROBLEM — E78.5 HYPERLIPIDEMIA, UNSPECIFIED: Chronic | Status: ACTIVE | Noted: 2020-03-06

## 2020-03-13 DIAGNOSIS — T84.062A WEAR OF ARTICULAR BEARING SURFACE OF INTERNAL PROSTHETIC RIGHT KNEE JOINT, INITIAL ENCOUNTER: ICD-10-CM

## 2020-03-13 DIAGNOSIS — Z79.02 LONG TERM (CURRENT) USE OF ANTITHROMBOTICS/ANTIPLATELETS: ICD-10-CM

## 2020-03-13 DIAGNOSIS — I25.10 ATHEROSCLEROTIC HEART DISEASE OF NATIVE CORONARY ARTERY WITHOUT ANGINA PECTORIS: ICD-10-CM
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
## 2020-03-13 DIAGNOSIS — E11.42 TYPE 2 DIABETES MELLITUS WITH DIABETIC POLYNEUROPATHY: ICD-10-CM

## 2020-03-13 DIAGNOSIS — I10 ESSENTIAL (PRIMARY) HYPERTENSION: ICD-10-CM

## 2020-03-13 DIAGNOSIS — Z79.4 LONG TERM (CURRENT) USE OF INSULIN: ICD-10-CM

## 2020-03-13 DIAGNOSIS — D64.9 ANEMIA, UNSPECIFIED: ICD-10-CM

## 2020-03-13 DIAGNOSIS — Z79.82 LONG TERM (CURRENT) USE OF ASPIRIN: ICD-10-CM

## 2020-03-13 DIAGNOSIS — N40.0 BENIGN PROSTATIC HYPERPLASIA WITHOUT LOWER URINARY TRACT SYMPTOMS: ICD-10-CM

## 2020-03-13 DIAGNOSIS — Z95.5 PRESENCE OF CORONARY ANGIOPLASTY IMPLANT AND GRAFT: ICD-10-CM

## 2020-03-13 DIAGNOSIS — E78.5 HYPERLIPIDEMIA, UNSPECIFIED: ICD-10-CM
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
## 2020-03-13 DIAGNOSIS — T84.84XA PAIN DUE TO INTERNAL ORTHOPEDIC PROSTHETIC DEVICES, IMPLANTS AND GRAFTS, INITIAL ENCOUNTER: ICD-10-CM

## 2020-03-13 DIAGNOSIS — M65.9 SYNOVITIS AND TENOSYNOVITIS, UNSPECIFIED: ICD-10-CM

## 2020-03-13 DIAGNOSIS — Z87.891 PERSONAL HISTORY OF NICOTINE DEPENDENCE: ICD-10-CM

## 2020-03-13 DIAGNOSIS — K21.9 GASTRO-ESOPHAGEAL REFLUX DISEASE WITHOUT ESOPHAGITIS: ICD-10-CM

## 2020-03-13 DIAGNOSIS — E66.9 OBESITY, UNSPECIFIED: ICD-10-CM

## 2020-03-13 PROCEDURE — 86850 RBC ANTIBODY SCREEN: CPT

## 2020-03-13 PROCEDURE — 88305 TISSUE EXAM BY PATHOLOGIST: CPT

## 2020-03-13 PROCEDURE — 87205 SMEAR GRAM STAIN: CPT

## 2020-03-13 PROCEDURE — 73560 X-RAY EXAM OF KNEE 1 OR 2: CPT

## 2020-03-13 PROCEDURE — 36415 COLL VENOUS BLD VENIPUNCTURE: CPT

## 2020-03-13 PROCEDURE — 88331 PATH CONSLTJ SURG 1 BLK 1SPC: CPT

## 2020-03-13 PROCEDURE — 85027 COMPLETE CBC AUTOMATED: CPT

## 2020-03-13 PROCEDURE — 97116 GAIT TRAINING THERAPY: CPT

## 2020-03-13 PROCEDURE — 82962 GLUCOSE BLOOD TEST: CPT

## 2020-03-13 PROCEDURE — C1776: CPT

## 2020-03-13 PROCEDURE — 87070 CULTURE OTHR SPECIMN AEROBIC: CPT

## 2020-03-13 PROCEDURE — 87075 CULTR BACTERIA EXCEPT BLOOD: CPT

## 2020-03-13 PROCEDURE — 80048 BASIC METABOLIC PNL TOTAL CA: CPT

## 2020-03-13 PROCEDURE — 97530 THERAPEUTIC ACTIVITIES: CPT

## 2020-03-13 PROCEDURE — 86901 BLOOD TYPING SEROLOGIC RH(D): CPT

## 2020-03-13 PROCEDURE — 87102 FUNGUS ISOLATION CULTURE: CPT

## 2020-03-19 ENCOUNTER — APPOINTMENT (OUTPATIENT)
Dept: ORTHOPEDIC SURGERY | Facility: CLINIC | Age: 80
End: 2020-03-19
Payer: MEDICARE

## 2020-03-19 VITALS — OXYGEN SATURATION: 97 % | HEART RATE: 77 BPM | SYSTOLIC BLOOD PRESSURE: 120 MMHG | DIASTOLIC BLOOD PRESSURE: 60 MMHG

## 2020-03-19 PROCEDURE — 99024 POSTOP FOLLOW-UP VISIT: CPT

## 2020-03-19 NOTE — HISTORY OF PRESENT ILLNESS
[de-identified] : First post op s/p right knee synovectomy and polyethylene exchange, surgical date 03/06/2020 [de-identified] : Doing well at home. Takes 1-2 oxycodone daily, mostly for nighttime use. No incision problems. Walking with a cane. Doing home PT. [de-identified] : Incision well healed without drainage/fluctuance. No erythema. ROM 0-100, stable V/V/A/P.  [de-identified] : No new imaging. OR cultures negative. [de-identified] : 78y/o male 2 wk s/p right knee synovectomy and poly exchange for poly wear synovitis, doing well [de-identified] : Cont PT/HEP & daily ambulation, discouraged from outpt PT due to COVID-19\par Wean cane as tolerated\par Oxycodone refilled, precautions reviewed\par RTC 4wk with new R knee XRs

## 2020-04-05 ENCOUNTER — TRANSCRIPTION ENCOUNTER (OUTPATIENT)
Age: 80
End: 2020-04-05

## 2020-04-06 ENCOUNTER — OUTPATIENT (OUTPATIENT)
Dept: OUTPATIENT SERVICES | Facility: HOSPITAL | Age: 80
LOS: 1 days | End: 2020-04-06
Payer: MEDICARE

## 2020-04-06 ENCOUNTER — TRANSCRIPTION ENCOUNTER (OUTPATIENT)
Age: 80
End: 2020-04-06

## 2020-04-06 ENCOUNTER — RESULT REVIEW (OUTPATIENT)
Age: 80
End: 2020-04-06

## 2020-04-06 ENCOUNTER — APPOINTMENT (OUTPATIENT)
Dept: ORTHOPEDIC SURGERY | Facility: CLINIC | Age: 80
End: 2020-04-06
Payer: MEDICARE

## 2020-04-06 ENCOUNTER — INPATIENT (INPATIENT)
Facility: HOSPITAL | Age: 80
LOS: 6 days | Discharge: HOME HEALTH | DRG: 856 | End: 2020-04-13
Attending: STUDENT IN AN ORGANIZED HEALTH CARE EDUCATION/TRAINING PROGRAM | Admitting: STUDENT IN AN ORGANIZED HEALTH CARE EDUCATION/TRAINING PROGRAM
Payer: MEDICARE

## 2020-04-06 VITALS
WEIGHT: 187 LBS | BODY MASS INDEX: 29.35 KG/M2 | SYSTOLIC BLOOD PRESSURE: 110 MMHG | DIASTOLIC BLOOD PRESSURE: 60 MMHG | OXYGEN SATURATION: 97 % | HEIGHT: 67 IN | HEART RATE: 80 BPM

## 2020-04-06 VITALS
SYSTOLIC BLOOD PRESSURE: 146 MMHG | TEMPERATURE: 98 F | WEIGHT: 179.9 LBS | DIASTOLIC BLOOD PRESSURE: 76 MMHG | RESPIRATION RATE: 18 BRPM | HEART RATE: 77 BPM | HEIGHT: 66 IN | OXYGEN SATURATION: 97 %

## 2020-04-06 DIAGNOSIS — T81.49XA INFECTION FOLLOWING A PROCEDURE, OTHER SURGICAL SITE, INITIAL ENCOUNTER: ICD-10-CM

## 2020-04-06 DIAGNOSIS — I25.10 ATHEROSCLEROTIC HEART DISEASE OF NATIVE CORONARY ARTERY WITHOUT ANGINA PECTORIS: Chronic | ICD-10-CM

## 2020-04-06 DIAGNOSIS — Z96.653 PRESENCE OF ARTIFICIAL KNEE JOINT, BILATERAL: Chronic | ICD-10-CM

## 2020-04-06 DIAGNOSIS — N40.0 BENIGN PROSTATIC HYPERPLASIA WITHOUT LOWER URINARY TRACT SYMPTOMS: ICD-10-CM

## 2020-04-06 DIAGNOSIS — R63.8 OTHER SYMPTOMS AND SIGNS CONCERNING FOOD AND FLUID INTAKE: ICD-10-CM

## 2020-04-06 DIAGNOSIS — K42.9 UMBILICAL HERNIA WITHOUT OBSTRUCTION OR GANGRENE: Chronic | ICD-10-CM

## 2020-04-06 DIAGNOSIS — Z98.890 OTHER SPECIFIED POSTPROCEDURAL STATES: Chronic | ICD-10-CM

## 2020-04-06 DIAGNOSIS — I25.10 ATHEROSCLEROTIC HEART DISEASE OF NATIVE CORONARY ARTERY WITHOUT ANGINA PECTORIS: ICD-10-CM

## 2020-04-06 DIAGNOSIS — E11.9 TYPE 2 DIABETES MELLITUS WITHOUT COMPLICATIONS: ICD-10-CM

## 2020-04-06 DIAGNOSIS — I10 ESSENTIAL (PRIMARY) HYPERTENSION: ICD-10-CM

## 2020-04-06 DIAGNOSIS — E78.5 HYPERLIPIDEMIA, UNSPECIFIED: ICD-10-CM

## 2020-04-06 DIAGNOSIS — U07.1 COVID-19: ICD-10-CM

## 2020-04-06 LAB
ALBUMIN SERPL ELPH-MCNC: 4.2 G/DL — SIGNIFICANT CHANGE UP (ref 3.3–5)
ALP SERPL-CCNC: 68 U/L — SIGNIFICANT CHANGE UP (ref 40–120)
ALT FLD-CCNC: 11 U/L — SIGNIFICANT CHANGE UP (ref 10–45)
ANION GAP SERPL CALC-SCNC: 13 MMOL/L — SIGNIFICANT CHANGE UP (ref 5–17)
APPEARANCE UR: CLEAR — SIGNIFICANT CHANGE UP
APTT BLD: 28.4 SEC — SIGNIFICANT CHANGE UP (ref 27.5–36.3)
AST SERPL-CCNC: 16 U/L — SIGNIFICANT CHANGE UP (ref 10–40)
BASOPHILS # BLD AUTO: 0.04 K/UL — SIGNIFICANT CHANGE UP (ref 0–0.2)
BASOPHILS NFR BLD AUTO: 0.6 % — SIGNIFICANT CHANGE UP (ref 0–2)
BILIRUB SERPL-MCNC: 0.3 MG/DL — SIGNIFICANT CHANGE UP (ref 0.2–1.2)
BILIRUB UR-MCNC: NEGATIVE — SIGNIFICANT CHANGE UP
BUN SERPL-MCNC: 17 MG/DL — SIGNIFICANT CHANGE UP (ref 7–23)
CALCIUM SERPL-MCNC: 9.4 MG/DL — SIGNIFICANT CHANGE UP (ref 8.4–10.5)
CHLORIDE SERPL-SCNC: 98 MMOL/L — SIGNIFICANT CHANGE UP (ref 96–108)
CO2 SERPL-SCNC: 21 MMOL/L — LOW (ref 22–31)
COLOR SPEC: YELLOW — SIGNIFICANT CHANGE UP
CREAT SERPL-MCNC: 0.82 MG/DL — SIGNIFICANT CHANGE UP (ref 0.5–1.3)
CRP SERPL-MCNC: 0.41 MG/DL — HIGH (ref 0–0.4)
D DIMER BLD IA.RAPID-MCNC: 824 NG/ML DDU — HIGH
DIFF PNL FLD: NEGATIVE — SIGNIFICANT CHANGE UP
EOSINOPHIL # BLD AUTO: 0.66 K/UL — HIGH (ref 0–0.5)
EOSINOPHIL NFR BLD AUTO: 9.3 % — HIGH (ref 0–6)
ERYTHROCYTE [SEDIMENTATION RATE] IN BLOOD: 55 MM/HR — HIGH
FERRITIN SERPL-MCNC: 305 NG/ML — SIGNIFICANT CHANGE UP (ref 30–400)
GLUCOSE BLDC GLUCOMTR-MCNC: 113 MG/DL — HIGH (ref 70–99)
GLUCOSE BLDC GLUCOMTR-MCNC: 99 MG/DL — SIGNIFICANT CHANGE UP (ref 70–99)
GLUCOSE SERPL-MCNC: 108 MG/DL — HIGH (ref 70–99)
GLUCOSE UR QL: NEGATIVE — SIGNIFICANT CHANGE UP
HCT VFR BLD CALC: 32.3 % — LOW (ref 39–50)
HGB BLD-MCNC: 10.4 G/DL — LOW (ref 13–17)
IMM GRANULOCYTES NFR BLD AUTO: 0.3 % — SIGNIFICANT CHANGE UP (ref 0–1.5)
INR BLD: 1.01 — SIGNIFICANT CHANGE UP (ref 0.88–1.16)
KETONES UR-MCNC: NEGATIVE — SIGNIFICANT CHANGE UP
LEUKOCYTE ESTERASE UR-ACNC: NEGATIVE — SIGNIFICANT CHANGE UP
LYMPHOCYTES # BLD AUTO: 1.93 K/UL — SIGNIFICANT CHANGE UP (ref 1–3.3)
LYMPHOCYTES # BLD AUTO: 27.2 % — SIGNIFICANT CHANGE UP (ref 13–44)
MCHC RBC-ENTMCNC: 30.1 PG — SIGNIFICANT CHANGE UP (ref 27–34)
MCHC RBC-ENTMCNC: 32.2 GM/DL — SIGNIFICANT CHANGE UP (ref 32–36)
MCV RBC AUTO: 93.6 FL — SIGNIFICANT CHANGE UP (ref 80–100)
MONOCYTES # BLD AUTO: 0.77 K/UL — SIGNIFICANT CHANGE UP (ref 0–0.9)
MONOCYTES NFR BLD AUTO: 10.9 % — SIGNIFICANT CHANGE UP (ref 2–14)
NEUTROPHILS # BLD AUTO: 3.67 K/UL — SIGNIFICANT CHANGE UP (ref 1.8–7.4)
NEUTROPHILS NFR BLD AUTO: 51.7 % — SIGNIFICANT CHANGE UP (ref 43–77)
NITRITE UR-MCNC: NEGATIVE — SIGNIFICANT CHANGE UP
NRBC # BLD: 0 /100 WBCS — SIGNIFICANT CHANGE UP (ref 0–0)
PH UR: 6 — SIGNIFICANT CHANGE UP (ref 5–8)
PLATELET # BLD AUTO: 256 K/UL — SIGNIFICANT CHANGE UP (ref 150–400)
POTASSIUM SERPL-MCNC: 4.3 MMOL/L — SIGNIFICANT CHANGE UP (ref 3.5–5.3)
POTASSIUM SERPL-SCNC: 4.3 MMOL/L — SIGNIFICANT CHANGE UP (ref 3.5–5.3)
PROT SERPL-MCNC: 7.1 G/DL — SIGNIFICANT CHANGE UP (ref 6–8.3)
PROT UR-MCNC: NEGATIVE MG/DL — SIGNIFICANT CHANGE UP
PROTHROM AB SERPL-ACNC: 11.5 SEC — SIGNIFICANT CHANGE UP (ref 10–12.9)
RBC # BLD: 3.45 M/UL — LOW (ref 4.2–5.8)
RBC # FLD: 13.1 % — SIGNIFICANT CHANGE UP (ref 10.3–14.5)
SARS-COV-2 RNA SPEC QL NAA+PROBE: DETECTED
SODIUM SERPL-SCNC: 132 MMOL/L — LOW (ref 135–145)
SP GR SPEC: 1.01 — SIGNIFICANT CHANGE UP (ref 1–1.03)
UROBILINOGEN FLD QL: 0.2 E.U./DL — SIGNIFICANT CHANGE UP
WBC # BLD: 7.09 K/UL — SIGNIFICANT CHANGE UP (ref 3.8–10.5)
WBC # FLD AUTO: 7.09 K/UL — SIGNIFICANT CHANGE UP (ref 3.8–10.5)

## 2020-04-06 PROCEDURE — 93010 ELECTROCARDIOGRAM REPORT: CPT

## 2020-04-06 PROCEDURE — 99233 SBSQ HOSP IP/OBS HIGH 50: CPT | Mod: GC

## 2020-04-06 PROCEDURE — 99223 1ST HOSP IP/OBS HIGH 75: CPT

## 2020-04-06 PROCEDURE — 71046 X-RAY EXAM CHEST 2 VIEWS: CPT | Mod: 26

## 2020-04-06 PROCEDURE — 99024 POSTOP FOLLOW-UP VISIT: CPT

## 2020-04-06 PROCEDURE — 99285 EMERGENCY DEPT VISIT HI MDM: CPT

## 2020-04-06 PROCEDURE — 73562 X-RAY EXAM OF KNEE 3: CPT | Mod: 26,RT

## 2020-04-06 PROCEDURE — 73560 X-RAY EXAM OF KNEE 1 OR 2: CPT | Mod: 26,59,RT

## 2020-04-06 RX ORDER — PANTOPRAZOLE SODIUM 20 MG/1
40 TABLET, DELAYED RELEASE ORAL
Refills: 0 | Status: DISCONTINUED | OUTPATIENT
Start: 2020-04-06 | End: 2020-04-07

## 2020-04-06 RX ORDER — SODIUM CHLORIDE 9 MG/ML
1000 INJECTION, SOLUTION INTRAVENOUS
Refills: 0 | Status: DISCONTINUED | OUTPATIENT
Start: 2020-04-06 | End: 2020-04-07

## 2020-04-06 RX ORDER — HYDROMORPHONE HYDROCHLORIDE 2 MG/ML
0.5 INJECTION INTRAMUSCULAR; INTRAVENOUS; SUBCUTANEOUS EVERY 4 HOURS
Refills: 0 | Status: DISCONTINUED | OUTPATIENT
Start: 2020-04-06 | End: 2020-04-07

## 2020-04-06 RX ORDER — DEXTROSE 50 % IN WATER 50 %
25 SYRINGE (ML) INTRAVENOUS ONCE
Refills: 0 | Status: DISCONTINUED | OUTPATIENT
Start: 2020-04-06 | End: 2020-04-07

## 2020-04-06 RX ORDER — LOSARTAN POTASSIUM 100 MG/1
100 TABLET, FILM COATED ORAL DAILY
Refills: 0 | Status: DISCONTINUED | OUTPATIENT
Start: 2020-04-06 | End: 2020-04-06

## 2020-04-06 RX ORDER — DEXTROSE 50 % IN WATER 50 %
15 SYRINGE (ML) INTRAVENOUS ONCE
Refills: 0 | Status: DISCONTINUED | OUTPATIENT
Start: 2020-04-06 | End: 2020-04-07

## 2020-04-06 RX ORDER — ACETAMINOPHEN 500 MG
650 TABLET ORAL EVERY 6 HOURS
Refills: 0 | Status: DISCONTINUED | OUTPATIENT
Start: 2020-04-06 | End: 2020-04-07

## 2020-04-06 RX ORDER — HYDRALAZINE HCL 50 MG
10 TABLET ORAL ONCE
Refills: 0 | Status: COMPLETED | OUTPATIENT
Start: 2020-04-06 | End: 2020-04-06

## 2020-04-06 RX ORDER — POVIDONE-IODINE 5 %
1 AEROSOL (ML) TOPICAL ONCE
Refills: 0 | Status: DISCONTINUED | OUTPATIENT
Start: 2020-04-06 | End: 2020-04-07

## 2020-04-06 RX ORDER — AMLODIPINE BESYLATE 2.5 MG/1
5 TABLET ORAL AT BEDTIME
Refills: 0 | Status: DISCONTINUED | OUTPATIENT
Start: 2020-04-06 | End: 2020-04-07

## 2020-04-06 RX ORDER — HYDROXYCHLOROQUINE SULFATE 200 MG
400 TABLET ORAL EVERY 12 HOURS
Refills: 0 | Status: DISCONTINUED | OUTPATIENT
Start: 2020-04-06 | End: 2020-04-06

## 2020-04-06 RX ORDER — TAMSULOSIN HYDROCHLORIDE 0.4 MG/1
0.4 CAPSULE ORAL AT BEDTIME
Refills: 0 | Status: DISCONTINUED | OUTPATIENT
Start: 2020-04-06 | End: 2020-04-07

## 2020-04-06 RX ORDER — INSULIN LISPRO 100/ML
VIAL (ML) SUBCUTANEOUS
Refills: 0 | Status: DISCONTINUED | OUTPATIENT
Start: 2020-04-06 | End: 2020-04-07

## 2020-04-06 RX ORDER — AZITHROMYCIN 500 MG/1
250 TABLET, FILM COATED ORAL EVERY 24 HOURS
Refills: 0 | Status: DISCONTINUED | OUTPATIENT
Start: 2020-04-06 | End: 2020-04-06

## 2020-04-06 RX ORDER — AMLODIPINE BESYLATE 2.5 MG/1
10 TABLET ORAL EVERY 24 HOURS
Refills: 0 | Status: DISCONTINUED | OUTPATIENT
Start: 2020-04-06 | End: 2020-04-06

## 2020-04-06 RX ORDER — AMLODIPINE BESYLATE 2.5 MG/1
2.5 TABLET ORAL DAILY
Refills: 0 | Status: DISCONTINUED | OUTPATIENT
Start: 2020-04-06 | End: 2020-04-06

## 2020-04-06 RX ORDER — DEXTROSE 50 % IN WATER 50 %
12.5 SYRINGE (ML) INTRAVENOUS ONCE
Refills: 0 | Status: DISCONTINUED | OUTPATIENT
Start: 2020-04-06 | End: 2020-04-07

## 2020-04-06 RX ORDER — ATORVASTATIN CALCIUM 80 MG/1
20 TABLET, FILM COATED ORAL AT BEDTIME
Refills: 0 | Status: DISCONTINUED | OUTPATIENT
Start: 2020-04-06 | End: 2020-04-07

## 2020-04-06 RX ORDER — HYDROXYCHLOROQUINE SULFATE 200 MG
TABLET ORAL
Refills: 0 | Status: DISCONTINUED | OUTPATIENT
Start: 2020-04-06 | End: 2020-04-06

## 2020-04-06 RX ORDER — OXYCODONE HYDROCHLORIDE 5 MG/1
5 TABLET ORAL EVERY 4 HOURS
Refills: 0 | Status: DISCONTINUED | OUTPATIENT
Start: 2020-04-06 | End: 2020-04-07

## 2020-04-06 RX ORDER — OXYCODONE HYDROCHLORIDE 5 MG/1
10 TABLET ORAL EVERY 4 HOURS
Refills: 0 | Status: DISCONTINUED | OUTPATIENT
Start: 2020-04-06 | End: 2020-04-07

## 2020-04-06 RX ORDER — GLUCAGON INJECTION, SOLUTION 0.5 MG/.1ML
1 INJECTION, SOLUTION SUBCUTANEOUS ONCE
Refills: 0 | Status: DISCONTINUED | OUTPATIENT
Start: 2020-04-06 | End: 2020-04-07

## 2020-04-06 RX ORDER — INSULIN GLARGINE 100 [IU]/ML
17 INJECTION, SOLUTION SUBCUTANEOUS AT BEDTIME
Refills: 0 | Status: DISCONTINUED | OUTPATIENT
Start: 2020-04-06 | End: 2020-04-07

## 2020-04-06 RX ORDER — AMLODIPINE BESYLATE 2.5 MG/1
5 TABLET ORAL ONCE
Refills: 0 | Status: COMPLETED | OUTPATIENT
Start: 2020-04-06 | End: 2020-04-06

## 2020-04-06 RX ADMIN — INSULIN GLARGINE 17 UNIT(S): 100 INJECTION, SOLUTION SUBCUTANEOUS at 22:24

## 2020-04-06 RX ADMIN — Medication 650 MILLIGRAM(S): at 22:21

## 2020-04-06 RX ADMIN — Medication 400 MILLIGRAM(S): at 18:42

## 2020-04-06 RX ADMIN — Medication 10 MILLIGRAM(S): at 15:31

## 2020-04-06 RX ADMIN — AZITHROMYCIN 250 MILLIGRAM(S): 500 TABLET, FILM COATED ORAL at 22:52

## 2020-04-06 RX ADMIN — TAMSULOSIN HYDROCHLORIDE 0.4 MILLIGRAM(S): 0.4 CAPSULE ORAL at 22:01

## 2020-04-06 RX ADMIN — ATORVASTATIN CALCIUM 20 MILLIGRAM(S): 80 TABLET, FILM COATED ORAL at 22:01

## 2020-04-06 RX ADMIN — AMLODIPINE BESYLATE 2.5 MILLIGRAM(S): 2.5 TABLET ORAL at 15:30

## 2020-04-06 RX ADMIN — LOSARTAN POTASSIUM 100 MILLIGRAM(S): 100 TABLET, FILM COATED ORAL at 15:30

## 2020-04-06 RX ADMIN — AMLODIPINE BESYLATE 5 MILLIGRAM(S): 2.5 TABLET ORAL at 23:58

## 2020-04-06 NOTE — CONSULT NOTE ADULT - ASSESSMENT
80 yo male with hx CAD, DM, R TKR 2002, revision 3/6/20, now p/w wound dehiscence and drainage from incision c/f SSI.  - plan for OR tomorrow; to f/u findings and culture data  - once cultures obtained in OR, can start empiric vancomycin 1.25g IV q24h (assuming normal CrCl) + ceftriaxone 2g IV q24h  - to f/u baseline labs including CBC, CMP, CRP    ID Team 2 will follow

## 2020-04-06 NOTE — PROGRESS NOTE ADULT - PROBLEM SELECTOR PLAN 6
PMH of CAD holding home plavix for OR. PMH of CAD holding home plavix as per preop medicine consult assessment.

## 2020-04-06 NOTE — DISCHARGE NOTE PROVIDER - NSDCMRMEDTOKEN_GEN_ALL_CORE_FT
ascorbic acid: unknown dose  Co Q-10 100 mg oral capsule: 1 cap(s) orally once a day  Flomax 0.4 mg oral capsule: 1 cap(s) orally once a day  Lantus: 35 unit(s) subcutaneous  Lipitor 20 mg oral tablet: 1 tab(s) orally once a day  losartan 100 mg oral tablet: 1 tab(s) orally once a day  metFORMIN 500 mg oral tablet: 1 tab(s) orally 2 times a day  Norvasc 2.5 mg oral tablet: 1 tab(s) orally once a day  omeprazole 20 mg oral delayed release capsule: 1 cap(s) orally once a day  Plavix 75 mg oral tablet: 1 tab(s) orally once a day  Trulicity Pen 0.75 mg/0.5 mL subcutaneous solution:   Vitamin B-100 oral tablet: 1 tab(s) orally once a day  Vitamin D3 5000 intl units (125 mcg)/mL oral liquid: 1 milliliter(s) orally once a day ascorbic acid: unknown dose  ceFAZolin 2 g intravenous injection: 2 gram(s) intravenously every 8 hours until 5/18/20  Co Q-10 100 mg oral capsule: 1 cap(s) orally once a day  Flomax 0.4 mg oral capsule: 1 cap(s) orally once a day  Lantus: 35 unit(s) subcutaneous  Lipitor 20 mg oral tablet: 1 tab(s) orally once a day  losartan 100 mg oral tablet: 1 tab(s) orally once a day  metFORMIN 500 mg oral tablet: 1 tab(s) orally 2 times a day  NaCl 0.9% PICC saline flush 10ml after each use: 1 flush 3 times a day after each use  Norvasc 2.5 mg oral tablet: 1 tab(s) orally once a day  omeprazole 20 mg oral delayed release capsule: 1 cap(s) orally once a day  Plavix 75 mg oral tablet: 1 tab(s) orally once a day  Trulicity Pen 0.75 mg/0.5 mL subcutaneous solution:   Vitamin B-100 oral tablet: 1 tab(s) orally once a day  Vitamin D3 5000 intl units (125 mcg)/mL oral liquid: 1 milliliter(s) orally once a day  Weekly CBC, CMP, ESR, CRP to be faxed to [phone] Dr. Lorenzana: 1 application intravenous once a week ascorbic acid: unknown dose  ceFAZolin 2 g intravenous injection: 2 gram(s) intravenously every 8 hours until 5/18/20  Co Q-10 100 mg oral capsule: 1 cap(s) orally once a day  Flomax 0.4 mg oral capsule: 1 cap(s) orally once a day  Lantus: 35 unit(s) subcutaneous  Lipitor 20 mg oral tablet: 1 tab(s) orally once a day  losartan 100 mg oral tablet: 1 tab(s) orally once a day  metFORMIN 500 mg oral tablet: 1 tab(s) orally 2 times a day  NaCl 0.9% PICC saline flush 10ml after each use: 1 flush 3 times a day after each use  Norvasc 2.5 mg oral tablet: 1 tab(s) orally once a day  omeprazole 20 mg oral delayed release capsule: 1 cap(s) orally once a day  Plavix 75 mg oral tablet: 1 tab(s) orally once a day  Trulicity Pen 0.75 mg/0.5 mL subcutaneous solution:   Vitamin B-100 oral tablet: 1 tab(s) orally once a day  Vitamin D3 5000 intl units (125 mcg)/mL oral liquid: 1 milliliter(s) orally once a day  Weekly CBC, CMP, ESR, CRP to be faxed to [phone] Dr. Lorenzana: 1 application intravenous once a week ascorbic acid: unknown dose  ceFAZolin 2 g intravenous injection: 2 gram(s) intravenously every 8 hours until 5/18/20  Co Q-10 100 mg oral capsule: 1 cap(s) orally once a day  Flomax 0.4 mg oral capsule: 1 cap(s) orally once a day  Lantus: 35 unit(s) subcutaneous  Lipitor 20 mg oral tablet: 1 tab(s) orally once a day  losartan 100 mg oral tablet: 1 tab(s) orally once a day  metFORMIN 500 mg oral tablet: 1 tab(s) orally 2 times a day  NaCl 0.9% PICC saline flush 10ml after each use: 1 flush 3 times a day after each use  Norvasc 2.5 mg oral tablet: 1 tab(s) orally once a day  omeprazole 20 mg oral delayed release capsule: 1 cap(s) orally once a day  Plavix 75 mg oral tablet: 1 tab(s) orally once a day  Trulicity Pen 0.75 mg/0.5 mL subcutaneous solution:   Vitamin B-100 oral tablet: 1 tab(s) orally once a day  Vitamin D3 5000 intl units (125 mcg)/mL oral liquid: 1 milliliter(s) orally once a day  Weekly CBC, CMP, ESR, CRP to be faxed to [phone] Dr. Lorenzana: 1 application intravenous once a week acetaminophen 325 mg oral tablet: 2 tab(s) orally every 8 hours   ascorbic acid: unknown dose  ceFAZolin 2 g intravenous injection: 2 gram(s) intravenously every 8 hours until 5/18/20  Co Q-10 100 mg oral capsule: 1 cap(s) orally once a day  Flomax 0.4 mg oral capsule: 1 cap(s) orally once a day  Lantus: 35 unit(s) subcutaneous  Lipitor 20 mg oral tablet: 1 tab(s) orally once a day  losartan 100 mg oral tablet: 1 tab(s) orally once a day  metFORMIN 500 mg oral tablet: 1 tab(s) orally 2 times a day  NaCl 0.9% PICC saline flush 10ml after each use: 1 flush 3 times a day after each use  Norvasc 2.5 mg oral tablet: 1 tab(s) orally once a day  omeprazole 20 mg oral delayed release capsule: 1 cap(s) orally once a day  Plavix 75 mg oral tablet: 1 tab(s) orally once a day  Trulicity Pen 0.75 mg/0.5 mL subcutaneous solution:   Vitamin B-100 oral tablet: 1 tab(s) orally once a day  Vitamin D3 5000 intl units (125 mcg)/mL oral liquid: 1 milliliter(s) orally once a day  Weekly CBC, CMP, ESR, CRP to be faxed to [phone] Dr. Lorenzana: 1 application intravenous once a week acetaminophen 325 mg oral tablet: 2 tab(s) orally every 8 hours   ascorbic acid: unknown dose  ceFAZolin 2 g intravenous injection: 2 gram(s) intravenously every 8 hours until 5/18/20  Co Q-10 100 mg oral capsule: 1 cap(s) orally once a day  Flomax 0.4 mg oral capsule: 1 cap(s) orally once a day  Lantus: 35 unit(s) subcutaneous  Lipitor 20 mg oral tablet: 1 tab(s) orally once a day  losartan 100 mg oral tablet: 1 tab(s) orally once a day  metFORMIN 500 mg oral tablet: 1 tab(s) orally 2 times a day  NaCl 0.9% PICC saline flush 10ml after each use: 1 flush 3 times a day after each use  Norvasc 2.5 mg oral tablet: 1 tab(s) orally once a day  omeprazole 20 mg oral delayed release capsule: 1 cap(s) orally once a day  Plavix 75 mg oral tablet: 1 tab(s) orally once a day  Trulicity Pen 0.75 mg/0.5 mL subcutaneous solution:   Vitamin B-100 oral tablet: 1 tab(s) orally once a day  Vitamin D3 5000 intl units (125 mcg)/mL oral liquid: 1 milliliter(s) orally once a day  Weekly CBC, CMP, ESR, CRP to be faxed to [phone] Dr. Lorenzana: 1 application intravenous once a week acetaminophen 325 mg oral tablet: 2 tab(s) orally every 8 hours   ascorbic acid: unknown dose  ceFAZolin 2 g intravenous injection: 2 gram(s) intravenously every 8 hours until 5/18/20  clopidogrel 75 mg oral tablet: 1 tab(s) orally once a day  Co Q-10 100 mg oral capsule: 1 cap(s) orally once a day  Flomax 0.4 mg oral capsule: 1 cap(s) orally once a day  Lantus: 35 unit(s) subcutaneous  Lipitor 20 mg oral tablet: 1 tab(s) orally once a day  losartan 100 mg oral tablet: 1 tab(s) orally once a day  metFORMIN 500 mg oral tablet: 1 tab(s) orally 2 times a day  NaCl 0.9% PICC saline flush 10ml after each use: 1 flush 3 times a day after each use  Norvasc 2.5 mg oral tablet: 1 tab(s) orally once a day  omeprazole 20 mg oral delayed release capsule: 1 cap(s) orally once a day  Trulicity Pen 0.75 mg/0.5 mL subcutaneous solution:   Vitamin B-100 oral tablet: 1 tab(s) orally once a day  Vitamin D3 5000 intl units (125 mcg)/mL oral liquid: 1 milliliter(s) orally once a day  Weekly CBC, CMP, ESR, CRP to be faxed to [phone] Dr. Lorenzana: 1 application intravenous once a week acetaminophen 325 mg oral tablet: 2 tab(s) orally every 8 hours   ascorbic acid: unknown dose  ceFAZolin 2 g intravenous injection: 2 gram(s) intravenously every 8 hours until 5/18/20  clopidogrel 75 mg oral tablet: 1 tab(s) orally once a day  Co Q-10 100 mg oral capsule: 1 cap(s) orally once a day  Doculase 100 mg oral capsule: 1 cap(s) orally 2 times a day   Flomax 0.4 mg oral capsule: 1 cap(s) orally once a day  Lantus: 35 unit(s) subcutaneous  Lipitor 20 mg oral tablet: 1 tab(s) orally once a day  losartan 100 mg oral tablet: 1 tab(s) orally once a day  metFORMIN 500 mg oral tablet: 1 tab(s) orally 2 times a day  NaCl 0.9% PICC saline flush 10ml after each use: 1 flush 3 times a day after each use  Norvasc 2.5 mg oral tablet: 1 tab(s) orally once a day  omeprazole 20 mg oral delayed release capsule: 1 cap(s) orally once a day  oxycodone-acetaminophen 5 mg-325 mg oral tablet: 1 tab(s) orally every 6 hours  -for moderate pain MDD:4 tabs   oxycodone-acetaminophen 5 mg-325 mg oral tablet: 1 tab(s) orally every 6 hours  -for moderate pain MDD:4 tabs   rifAMPin 300 mg oral capsule: 2 cap(s) orally once a day   Trulicity Pen 0.75 mg/0.5 mL subcutaneous solution:   Vitamin B-100 oral tablet: 1 tab(s) orally once a day  Vitamin D3 5000 intl units (125 mcg)/mL oral liquid: 1 milliliter(s) orally once a day  Weekly CBC, CMP, ESR, CRP to be faxed to [phone] Dr. Lorenzana: 1 application intravenous once a week

## 2020-04-06 NOTE — H&P ADULT - NSHPPHYSICALEXAM_GEN_ALL_CORE
Physical Exam:  General: Resting comfortably in bed. AAOx3. NAD.  Extremities:        LLE: No gross deformity. SILT L2-S1 distribution, symmetric. TA/EHL/FHL/GS motor intact. WWP, DP 2+       RLE: Incision from distal thigh to proximal lower leg with 2-3 cm of dehiscence at proximal aspect. Minimal amount of purulent drainage expressible. Surrounding erythema present but localized to surrounding skin edges. Knee ROM 5- 100. SILT L2-S1 distribution, symmetric. TA/EHL/FHL/GS motor intact. WWP, DP 2+ Physical Exam:  General: Resting comfortably in bed. AAOx3. NAD.  Extremities:        LLE: No gross deformity. SILT L2-S1 distribution, symmetric. TA/EHL/FHL/GS motor intact. WWP, DP 2+       RLE: Incision from distal thigh to proximal lower leg with 4cm of dehiscence at proximal aspect. Minimal amount of purulent drainage expressible. Surrounding erythema present but localized to surrounding skin edges. Knee ROM 0 - 110. SILT L2-S1 distribution, symmetric. TA/EHL/FHL/GS motor intact. WWP, DP 2+

## 2020-04-06 NOTE — ED PROVIDER NOTE - ATTENDING CONTRIBUTION TO CARE
79 post op 1 mo from TKR revision, increased pain and swelling.  No fever, HD stable, well appearing.  Good ROM.  Ortho consulted and rec admit drainage.

## 2020-04-06 NOTE — CONSULT NOTE ADULT - SUBJECTIVE AND OBJECTIVE BOX
Consult note:     Pt is 79M PMH CAD s/p stents 6-7 years ago, DM II, HTN, HLD, L TKA (2006) R TKA (2002, followed by revision with poly exchange on 3/6/20 for knee pain) who presents with one week of purulent incision site drainage and wound dehiscence. Patient recalls one transient fever last week and states he developed a dry cough two days ago without sinus congestion or diarrhea. Medicine service consulted for pre-op clearance for I/D +/- poly exchange of R TKA.     Patient denyies any recent chest pain, dyspnea on exertion or at rest, palpitations, lightheadedness, dizziness. He has a remote smoking history (1/2 ppd for 20 years), quit thirty years ago, denies lingering cough or lung disease. Patient walks one mile regularly with his wife, without becoming dyspnic (METS >4). Follows with a cardiologist (Dr. Valeriy Loyd) and an endocrinologist (Dr. Steve Anders). No recent changes to medications. 12 system ROS negative.     VITAL SIGNS:  Vital Signs Last 24 Hrs  T(C): 36.8 (06 Apr 2020 12:49), Max: 36.8 (06 Apr 2020 12:49)  T(F): 98.3 (06 Apr 2020 12:49), Max: 98.3 (06 Apr 2020 12:49)  HR: 76 (06 Apr 2020 15:30) (74 - 77)  BP: 198/99 (06 Apr 2020 15:30) (146/76 - 219/98)  RR: 18 (06 Apr 2020 12:49) (18 - 18)  SpO2: 97% (06 Apr 2020 12:49) (97% - 97%)    PHYSICAL EXAM:  General: WDWN male in NAD  HEENT: NC/AT; PERRL, anicteric sclera; MMM  Neck: supple, no JVD  Cardiovascular: +S1/S2; RRR  Respiratory: CTA B/L; no W/R/R  Gastrointestinal: soft, NT/ND; +BSx4 normoactive  Extremities: WWP; no edema, clubbing or cyanosis, Ace wrap on right knee c/d/i  Vascular: 2+ radial, DP/PT pulses B/L  Neurological: AAOx3; no focal deficits    MEDICATIONS  (STANDING):  amLODIPine Tablet 2.5 milliGRAM(s) Oral daily  atorvastatin 20 milliGRAM(s) Oral at bedtime  dextrose 5%. 1000 milliLiter(s) (50 mL/Hr) IV Continuous <Continuous>  dextrose 50% Injectable 12.5 Gram(s) IV Push once  dextrose 50% Injectable 25 Gram(s) IV Push once  dextrose 50% Injectable 25 Gram(s) IV Push once  insulin glargine Injectable (LANTUS) 17 Unit(s) SubCutaneous at bedtime  insulin lispro (HumaLOG) corrective regimen sliding scale   SubCutaneous Before meals and at bedtime  lactated ringers. 1000 milliLiter(s) (100 mL/Hr) IV Continuous <Continuous>  losartan 100 milliGRAM(s) Oral daily  pantoprazole    Tablet 40 milliGRAM(s) Oral before breakfast  povidone iodine 5% Nasal Swab 1 Application(s) Both Nostrils once  tamsulosin 0.4 milliGRAM(s) Oral at bedtime    MEDICATIONS  (PRN):  dextrose 40% Gel 15 Gram(s) Oral once PRN Blood Glucose LESS THAN 70 milliGRAM(s)/deciliter  glucagon  Injectable 1 milliGRAM(s) IntraMuscular once PRN Glucose LESS THAN 70 milligrams/deciliter  HYDROmorphone  Injectable 0.5 milliGRAM(s) IV Push every 4 hours PRN Breakthrough  oxyCODONE    IR 10 milliGRAM(s) Oral every 4 hours PRN Severe Pain (7 - 10)  oxyCODONE    IR 5 milliGRAM(s) Oral every 4 hours PRN Moderate Pain (4 - 6)      ALLERGIES:  No Known Allergies or intolerances    LABS:                        10.4   7.09  )-----------( 256      ( 06 Apr 2020 13:31 )             32.3     04-06    132<L>  |  98  |  17  ----------------------------<  108<H>  4.3   |  21<L>  |  0.82    Ca    9.4      06 Apr 2020 13:31    TPro  7.1  /  Alb  4.2  /  TBili  0.3  /  DBili  x   /  AST  16  /  ALT  11  /  AlkPhos  68  04-06    PT/INR - ( 06 Apr 2020 13:31 )   PT: 11.5 sec;   INR: 1.01     PTT - ( 06 Apr 2020 13:31 )  PTT:28.4 sec    CAPILLARY BLOOD GLUCOSE  POCT Blood Glucose.: 99 mg/dL (06 Apr 2020 15:23)    RADIOLOGY & ADDITIONAL TESTS: Reviewed.  No EKG, CXR indicated. Consult note:     Pt is 79M PMH CAD s/p stents 6-7 years ago, DM II, HTN, HLD, L TKA (2006) R TKA (2002, followed by revision with poly exchange on 3/6/20 for knee pain) who presents with one week of purulent incision site drainage and wound dehiscence. Patient recalls one transient fever last week and states he developed a dry cough two days ago without sinus congestion or diarrhea. Medicine service consulted for pre-op clearance for I/D +/- poly exchange of R TKA.     Patient denyies any recent chest pain, dyspnea on exertion or at rest, palpitations, lightheadedness, dizziness. He has a remote smoking history (1/2 ppd for 20 years), quit thirty years ago, denies lingering cough or lung disease. Patient walks one mile regularly with his wife, without becoming dyspnic (METS >4). Follows with a cardiologist (Dr. Valeriy Loyd) and an endocrinologist (Dr. Steve Anders). No recent changes to medications. 12 system ROS negative.     PMHx: as above  FHx:  noncontributory  PSurge Hx: TKA L, R with revision, cardiac stents x 6, umbilical hernia repair  Meds: ascorbic acid, Co-Q10, flomax 0.4mg qD, Lantus 35 qHS, lipitor 20mg qD, losartan 100mg qD, metformin 1000mg BID, norvasc 2.5mg qD, omeprazole 20mg qD, plavix 75mg qD, trulicity, yadira B, yadira D  Allergies: NKDA  SocialHx: smoked for 20 years 1/2 ppd, quit 30 years ago. Minimal EtOH use, no recreational drug use. Lives with wife, stays active.   ROS: as above    VITAL SIGNS:  Vital Signs Last 24 Hrs  T(C): 36.8 (06 Apr 2020 12:49), Max: 36.8 (06 Apr 2020 12:49)  T(F): 98.3 (06 Apr 2020 12:49), Max: 98.3 (06 Apr 2020 12:49)  HR: 76 (06 Apr 2020 15:30) (74 - 77)  BP: 198/99 (06 Apr 2020 15:30) (146/76 - 219/98)  RR: 18 (06 Apr 2020 12:49) (18 - 18)  SpO2: 97% (06 Apr 2020 12:49) (97% - 97%)    PHYSICAL EXAM:  General: WDWN male in NAD  HEENT: NC/AT; PERRL, anicteric sclera; MMM  Neck: supple, no JVD  Cardiovascular: +S1/S2; RRR  Respiratory: CTA B/L; no W/R/R  Gastrointestinal: soft, NT/ND; +BSx4 normoactive  Extremities: WWP; no edema, clubbing or cyanosis, Ace wrap on right knee c/d/i  Vascular: 2+ radial, DP/PT pulses B/L  Neurological: AAOx3; no focal deficits    MEDICATIONS  (STANDING):  amLODIPine Tablet 2.5 milliGRAM(s) Oral daily  atorvastatin 20 milliGRAM(s) Oral at bedtime  dextrose 5%. 1000 milliLiter(s) (50 mL/Hr) IV Continuous <Continuous>  dextrose 50% Injectable 12.5 Gram(s) IV Push once  dextrose 50% Injectable 25 Gram(s) IV Push once  dextrose 50% Injectable 25 Gram(s) IV Push once  insulin glargine Injectable (LANTUS) 17 Unit(s) SubCutaneous at bedtime  insulin lispro (HumaLOG) corrective regimen sliding scale   SubCutaneous Before meals and at bedtime  lactated ringers. 1000 milliLiter(s) (100 mL/Hr) IV Continuous <Continuous>  losartan 100 milliGRAM(s) Oral daily  pantoprazole    Tablet 40 milliGRAM(s) Oral before breakfast  povidone iodine 5% Nasal Swab 1 Application(s) Both Nostrils once  tamsulosin 0.4 milliGRAM(s) Oral at bedtime    MEDICATIONS  (PRN):  dextrose 40% Gel 15 Gram(s) Oral once PRN Blood Glucose LESS THAN 70 milliGRAM(s)/deciliter  glucagon  Injectable 1 milliGRAM(s) IntraMuscular once PRN Glucose LESS THAN 70 milligrams/deciliter  HYDROmorphone  Injectable 0.5 milliGRAM(s) IV Push every 4 hours PRN Breakthrough  oxyCODONE    IR 10 milliGRAM(s) Oral every 4 hours PRN Severe Pain (7 - 10)  oxyCODONE    IR 5 milliGRAM(s) Oral every 4 hours PRN Moderate Pain (4 - 6)      ALLERGIES:  No Known Allergies or intolerances    LABS:                        10.4   7.09  )-----------( 256      ( 06 Apr 2020 13:31 )             32.3     04-06    132<L>  |  98  |  17  ----------------------------<  108<H>  4.3   |  21<L>  |  0.82    Ca    9.4      06 Apr 2020 13:31    TPro  7.1  /  Alb  4.2  /  TBili  0.3  /  DBili  x   /  AST  16  /  ALT  11  /  AlkPhos  68  04-06    PT/INR - ( 06 Apr 2020 13:31 )   PT: 11.5 sec;   INR: 1.01     PTT - ( 06 Apr 2020 13:31 )  PTT:28.4 sec    CAPILLARY BLOOD GLUCOSE  POCT Blood Glucose.: 99 mg/dL (06 Apr 2020 15:23)    RADIOLOGY & ADDITIONAL TESTS: Reviewed.  No EKG, CXR indicated.

## 2020-04-06 NOTE — PROGRESS NOTE ADULT - SUBJECTIVE AND OBJECTIVE BOX
TRANSFER NOTE ORTHO TO COVID REGIONAL:    79M PMH CAD s/p stents 6-7 years ago, DM II, HTN, HLD, L TKA () R TKA (, followed by revision with poly exchange on 3/6/20 for knee pain) who presents with one week of purulent incision site drainage and wound dehiscence. Patient is planned for OR 3/7 for  I/D +/- poly exchange of R TKA. Patient incidentally also found to have dry cough on exam, found to be covid positive and transferred to medicine team. He endorses likely 2 days of dry cough with 1 possible fever last week (but denies any sore throat,  ageusia, WOB/BRUNO, chills, abd pain, diarrhea, or urinary symptoms). He lives at home with his wife and mother in law who have been self-quarantining. Wife has had a couple of days of stomach upset, ageusia and anosmia, but otherwise no other symptoms. He denies any known covid positive exposures.     SUBJECTIVE / INTERVAL HPI: Patient seen and examined at bedside. Patient seen at bedside at 10PM endorsing feeling well except for slight pain or Right knee an intermittent dry cough at this time.     VITAL SIGNS:  Vital Signs Last 24 Hrs  T(C): 36.8 (2020 12:49), Max: 36.8 (2020 12:49)  T(F): 98.3 (2020 12:49), Max: 98.3 (2020 12:49)  HR: 59 (2020 18:45) (59 - 77)  BP: 155/73 (2020 18:45) (146/76 - 219/98)  BP(mean): --  RR: 18 (2020 12:49) (18 - 18)  SpO2: 97% (2020 12:49) (97% - 97%)    PHYSICAL EXAM:  General: Male looking younger than stated age, well appearing in NAD   HEENT: NC/AT;MMM  Neck: supple  Cardiovascular: +S1/S2, RRR  Respiratory: CTA B/L on RA   Gastrointestinal: soft, NT/ND; +BSx4  Extremities: WWP; no edema; Right knee within ace bandages (underneath knee midline scar with slight erythema and mild clear drainage from site)  Vascular: 2+ radial pulses B/L  Neurological: AAOx3    MEDICATIONS:  MEDICATIONS  (STANDING):  amLODIPine   Tablet 2.5 milliGRAM(s) Oral daily  atorvastatin 20 milliGRAM(s) Oral at bedtime  dextrose 5%. 1000 milliLiter(s) (50 mL/Hr) IV Continuous <Continuous>  dextrose 50% Injectable 12.5 Gram(s) IV Push once  dextrose 50% Injectable 25 Gram(s) IV Push once  dextrose 50% Injectable 25 Gram(s) IV Push once  hydroxychloroquine   Oral   hydroxychloroquine 400 milliGRAM(s) Oral every 12 hours  insulin glargine Injectable (LANTUS) 17 Unit(s) SubCutaneous at bedtime  insulin lispro (HumaLOG) corrective regimen sliding scale   SubCutaneous Before meals and at bedtime  lactated ringers. 1000 milliLiter(s) (100 mL/Hr) IV Continuous <Continuous>  pantoprazole    Tablet 40 milliGRAM(s) Oral before breakfast  povidone iodine 5% Nasal Swab 1 Application(s) Both Nostrils once  tamsulosin 0.4 milliGRAM(s) Oral at bedtime    MEDICATIONS  (PRN):  acetaminophen   Tablet .. 650 milliGRAM(s) Oral every 6 hours PRN Mild Pain (1 - 3)  dextrose 40% Gel 15 Gram(s) Oral once PRN Blood Glucose LESS THAN 70 milliGRAM(s)/deciliter  glucagon  Injectable 1 milliGRAM(s) IntraMuscular once PRN Glucose LESS THAN 70 milligrams/deciliter  HYDROmorphone  Injectable 0.5 milliGRAM(s) IV Push every 4 hours PRN Breakthrough  oxyCODONE    IR 10 milliGRAM(s) Oral every 4 hours PRN Severe Pain (7 - 10)  oxyCODONE    IR 5 milliGRAM(s) Oral every 4 hours PRN Moderate Pain (4 - 6)      ALLERGIES:  Allergies    No Known Allergies    Intolerances        LABS:                        10.4   7.09  )-----------( 256      ( 2020 13:31 )             32.3     04-06    132<L>  |  98  |  17  ----------------------------<  108<H>  4.3   |  21<L>  |  0.82    Ca    9.4      2020 13:31    TPro  7.1  /  Alb  4.2  /  TBili  0.3  /  DBili  x   /  AST  16  /  ALT  11  /  AlkPhos  68  04-06    PT/INR - ( 2020 13:31 )   PT: 11.5 sec;   INR: 1.01          PTT - ( 2020 13:31 )  PTT:28.4 sec  Urinalysis Basic - ( 2020 19:32 )    Color: Yellow / Appearance: Clear / S.010 / pH: x  Gluc: x / Ketone: NEGATIVE  / Bili: Negative / Urobili: 0.2 E.U./dL   Blood: x / Protein: NEGATIVE mg/dL / Nitrite: NEGATIVE   Leuk Esterase: NEGATIVE / RBC: x / WBC x   Sq Epi: x / Non Sq Epi: x / Bacteria: x      CAPILLARY BLOOD GLUCOSE      POCT Blood Glucose.: 113 mg/dL (2020 21:48)      RADIOLOGY & ADDITIONAL TESTS: Reviewed.

## 2020-04-06 NOTE — H&P ADULT - ASSESSMENT
79M PMH CAD s/p stents, DM, HTN, HLD w/ R knee wound infection s/p poly exchange in March 2020, plan for OR 4/7 for I+D, poss poly exchange.  - Pain control  - NPO/IVF  - Hold anticoagulation for OR  - Labs  - PT/INR, T+S  - F/u ESR, CRP  - CXR  - EKG  - UA  - Medical clearance (med consult)  - F/u COVID testing  - MRSA nasal swab  - Discussed with attending

## 2020-04-06 NOTE — CONSULT NOTE ADULT - ASSESSMENT
Pt is 79M PMH CAD s/p stents 6-7 years ago, DM II, HTN, HLD, L TKA (2006) R TKA (2002, followed by revision with poly exchange on 3/6/20 for knee pain) who presents for I/D of R TKA s/p polyexchange on 3/6.     #pre-operative clearance  Low risk procedure, METS >4, NSQIP with cardiac complication risk 0.2%, RCRI: 2 (IDDM, CAD) = 10.1%   Patient is low risk for low risk procedure. No need for additional imaging.    -Please hold patient's losartan 100mg qD prior to surgery  -continue with lipitor 20mg qD  -hold metformin 1000mg BID while in the hospital  -can continue with omeprazole  -can hold plavix (stenting > 1 year ago)  -Can give patient's qHS dose of Lantus 35, NPO at MN    #Hyponatremia  Mildly decreased, receiving LR, follow up in AM. Expect improvement to normal range. Pt is 79M PMH CAD s/p stents 6-7 years ago, DM II, HTN, HLD, L TKA (2006) R TKA (2002, followed by revision with poly exchange on 3/6/20 for knee pain) who presents for I/D of R TKA s/p polyexchange on 3/6.     #pre-operative clearance  Low risk procedure, METS >4, NSQIP with cardiac complication risk 0.2%, RCRI: 2 (IDDM, CAD) = 10.1%   Patient is low risk for low risk procedure. No need for additional imaging.    -Please hold patient's losartan 100mg qD prior to surgery  -continue with lipitor 20mg qD  -hold metformin 1000mg BID while in the hospital  -can continue with omeprazole  -can hold plavix (stenting > 1 year ago)  -Can give half patient's home qHS dose of Lantus 35, add SSI, hold any premeal insulin, NPO at MN    #Hyponatremia  Mildly decreased, receiving LR, follow up in AM. Expect improvement to normal range.

## 2020-04-06 NOTE — PROGRESS NOTE ADULT - PROBLEM SELECTOR PLAN 8
F: LR 100cc/h as per Ortho for OR  E: Replete for K<4 Mag<2  N: NPO   A: None for OR  Dispo: Covid regional

## 2020-04-06 NOTE — DISCHARGE NOTE PROVIDER - NSDCFUSCHEDAPPT_GEN_ALL_CORE_FT
ISAK SOLOMON ; 04/21/2020 ; [facility] [address] ISAK SOLOMON ; 04/27/2020 ; [facility] [address]

## 2020-04-06 NOTE — DISCHARGE NOTE PROVIDER - NSDCCPCAREPLAN_GEN_ALL_CORE_FT
PRINCIPAL DISCHARGE DIAGNOSIS  Diagnosis: Postoperative wound infection  Assessment and Plan of Treatment: PRINCIPAL DISCHARGE DIAGNOSIS  Diagnosis: Coronavirus infection  Assessment and Plan of Treatment: You have tested POSITIVE for the novel coronavirus (COVID-19). Please continue to rest and drink plenty of fluids.   Upon discharge, you must self-quarantine for 14 days, or until the Department of Health contacts you. Please wear a face mask if you are around other individuals. Try to avoid contact with house members, family, and friends for the duration of this quarantine. Please follow up with your primary care physician within 2-3 weeks of your discharge from the hospital. Please take all medications as prescribed. If you experience any worsening or recurrence of your symptoms, particularly worsening or high fever, shortness of breathe, extreme fatigue, or bloody cough please call 9-1-1 immediately or report to the nearest Emergency Department. If you have any questions or concerns, please do not hesitate to call the hospital at [phone].        SECONDARY DISCHARGE DIAGNOSES  Diagnosis: S/P knee surgery  Assessment and Plan of Treatment: PRINCIPAL DISCHARGE DIAGNOSIS  Diagnosis: Coronavirus infection  Assessment and Plan of Treatment: You have tested POSITIVE for the novel coronavirus (COVID-19). Please continue to rest and drink plenty of fluids.   Upon discharge, you must self-quarantine for 14 days, or until the Department of Health contacts you. Please wear a face mask if you are around other individuals. Try to avoid contact with house members, family, and friends for the duration of this quarantine. Please follow up with your primary care physician within 2-3 weeks of your discharge from the hospital. Please take all medications as prescribed. If you experience any worsening or recurrence of your symptoms, particularly worsening or high fever, shortness of breathe, extreme fatigue, or bloody cough please call 9-1-1 immediately or report to the nearest Emergency Department. If you have any questions or concerns, please do not hesitate to call the hospital at [phone].        SECONDARY DISCHARGE DIAGNOSES  Diagnosis: S/P knee surgery  Assessment and Plan of Treatment: You underwent a surgical procedure for knee pain. Please follow-up with your orthopedic surgeon in 2 weeks

## 2020-04-06 NOTE — DISCHARGE NOTE PROVIDER - HOSPITAL COURSE
Admitted 4/6/20 with R knee wound infection following I+D, poly exchange 3/6/20    Infectious Disease consult    Medicine consult for preop clearance    Surgeries: 4/7/20-     Christine-operative antibiotics    Pain control    DVT prophylaxis    OOB/Physical Therapy Pt is 79M PMH CAD s/p stents 6-7 years ago, DM, HTN, HLD now presenting with R knee surgical site infection. Pt had R TKA done in 2002, followed by revision with poly exchange last month 3/6/20 for knee pain. Pt states that incision initially had some yellow crusting but gradually became more painful and opened up slightly and has been draining some yellowish fluid. Pt denies fever, chills, SOB. Denies any numbness or tingling in R leg and has been able to continue walking on affected leg. Patient underwent a repeat polyp exchange with washout, I&D of right knee on 4/7. On admission patient reported minor dry cough for several days, found to be COVID (+). Cough has susbsided, patient asymptomatic otherwise and therefore was not treated for COVID.

## 2020-04-06 NOTE — CONSULT NOTE ADULT - SUBJECTIVE AND OBJECTIVE BOX
HPI: 78 yo male with hx CAD, DM, R TKR 2002, revision 3/6/20, now p/w wound dehiscence and drainage from incision. Denies fever or chills. Has been doing PT and has been able to bear weight on extremity.       PAST MEDICAL & SURGICAL HISTORY:  Anemia  Hyperlipidemia  CAD (coronary artery disease)  DM (diabetes mellitus)  Hypertension  H/O shoulder surgery: Right  Umbilical hernia: Umbilical and inguinal hernia  H/O total knee replacement, bilateral  CAD (coronary artery disease): stents placement        REVIEW OF SYSTEMS:    General:	 no weakness; no fevers, no chills  Skin/Breast: no rash  Respiratory and Thorax: no SOB, no cough  Cardiovascular:	No chest pain  Gastrointestinal:	 no nausea, vomiting , diarrhea  Genitourinary:	no dysuria, no difficulty urinating, no hematuria  Musculoskeletal:	no weakness, no joint swelling/pain  Neurological:	no focal weakness/numbness  Endocrine:	no polyuria, no polydipsia      ANTIBIOTICS:  MEDICATIONS  (STANDING):    MEDICATIONS  (PRN):      Allergies    No Known Allergies    Intolerances        SOCIAL HISTORY:    FAMILY HISTORY:      Vital Signs Last 24 Hrs  T(C): 36.8 (06 Apr 2020 12:49), Max: 36.8 (06 Apr 2020 12:49)  T(F): 98.3 (06 Apr 2020 12:49), Max: 98.3 (06 Apr 2020 12:49)  HR: 77 (06 Apr 2020 12:49) (77 - 77)  BP: 146/76 (06 Apr 2020 12:49) (146/76 - 146/76)  BP(mean): --  RR: 18 (06 Apr 2020 12:49) (18 - 18)  SpO2: 97% (06 Apr 2020 12:49) (97% - 97%)    PHYSICAL EXAM:  Constitutional:Well-developed, well nourished  Eyes:DANA, EOMI  Ear/Nose/Throat: no oral lesion, no sinus tenderness on percussion	  Neck:no JVD, no lymphadenopathy, supple  Respiratory: CTA kris  Cardiovascular: S1S2 RRR, no murmurs  Gastrointestinal:soft, (+) BS, no HSM  Extremities: R knee incision with small amount of drainage and surrounding erythema   Vascular: DP Pulse:	right normal; left normal            LABS:                        10.4   7.09  )-----------( 256      ( 06 Apr 2020 13:31 )             32.3     04-06    132<L>  |  98  |  17  ----------------------------<  108<H>  4.3   |  21<L>  |  0.82    Ca    9.4      06 Apr 2020 13:31    TPro  7.1  /  Alb  4.2  /  TBili  0.3  /  DBili  x   /  AST  16  /  ALT  11  /  AlkPhos  68  04-06    PT/INR - ( 06 Apr 2020 13:31 )   PT: 11.5 sec;   INR: 1.01          PTT - ( 06 Apr 2020 13:31 )  PTT:28.4 sec      MICROBIOLOGY: pending   RADIOLOGY & ADDITIONAL STUDIES: reviewed

## 2020-04-06 NOTE — PROGRESS NOTE ADULT - PROBLEM SELECTOR PLAN 1
R TKA (2002, followed by revision with poly exchange on 3/6/20 for knee pain) who presents with one week of purulent incision site drainage and wound dehiscence, with plan for OR 3/7 for  I/D +/- poly exchange of R TKA.   - C/w ortho recs  - C/w pain control   - C/w ID recs- f/u OR cultures then empiric Vanc 1.25qd/ CFTX 2g qd R TKA (2002, followed by revision with poly exchange on 3/6/20 for knee pain) who presents with one week of purulent incision site drainage and wound dehiscence, with plan for OR 3/7 for  I/D +/- poly exchange of R TKA.   - C/w ortho recs  - C/w pain control   - C/w ID recs- f/u OR cultures. Empiric Vanc 1.25qd/ CFTX 2g qd AFTER operative cultures obtained.

## 2020-04-06 NOTE — PROGRESS NOTE ADULT - PROBLEM SELECTOR PLAN 2
Pt with dry cough and possibly 1 episode of fever, currently afebrile, slight elevation of inflammatory markers, CXR found to be covid positive.   - Isolation precautions; contact and isolation  - Monitor O2 saturation, monitor for respiratory distress  - nasal cannula as needed, avoid HFNC/BiPAP- RA   - started hydroxychloroquine 400mg IV BID x2 doses, then 200mg BID for 4 days  - started azithromycin 250mg QDx5 days   - EKG qd   - Daily inflammatory markers Pt with dry cough and possibly 1 episode of fever, currently afebrile, slight elevation of inflammatory markers, CXR found to be covid positive.   - Isolation precautions; contact and isolation  - Holding continuing treatment for now as CXR clear, on RA breathing comfortably    - Daily inflammatory markers

## 2020-04-06 NOTE — H&P ADULT - ATTENDING COMMENTS
Patient who underwent right knee synovectomy, debridement, and tibial polyethylene exchange by me on 3/6/20 for polyethylene wear with synovitis and osteolysis. He has had a week of progressive proximal wound breakdown and presented to me today with marck dehiscence and purulence. He does admit to having had a dry cough for the past one or two days. Otherwise no fever/chills at home.     Impression/plan as above. In addition, upon admission screening, COVID-19 is positive. Will still plan to move forward with operative debridement, possible polyethylene exchange, and revision wound closure tomorrow morning - in COVID-19 isolation OR. Likely for ongoing monitoring postoperatively to ensure no respiratory decompensation. Patient has been informed and I left a voice message for his wife.

## 2020-04-06 NOTE — ED ADULT NURSE NOTE - PSH
CAD (coronary artery disease)  stents placement  H/O shoulder surgery  Right  H/O total knee replacement, bilateral    Umbilical hernia  Umbilical and inguinal hernia

## 2020-04-06 NOTE — ED ADULT TRIAGE NOTE - RESPIRATORY RATE (BREATHS/MIN)
Refill requested for:       Amlodipine 5 mg - 10/7/16 #90 +1  Loratadine 10 mg - 12/2/16 #20 +0 (Zyrtec)    Last office visit:     2/7/17      Please advise on refills.   
18

## 2020-04-06 NOTE — ED ADULT NURSE NOTE - NSIMPLEMENTINTERV_GEN_ALL_ED
Implemented All Fall Risk Interventions:  Lucinda to call system. Call bell, personal items and telephone within reach. Instruct patient to call for assistance. Room bathroom lighting operational. Non-slip footwear when patient is off stretcher. Physically safe environment: no spills, clutter or unnecessary equipment. Stretcher in lowest position, wheels locked, appropriate side rails in place. Provide visual cue, wrist band, yellow gown, etc. Monitor gait and stability. Monitor for mental status changes and reorient to person, place, and time. Review medications for side effects contributing to fall risk. Reinforce activity limits and safety measures with patient and family.

## 2020-04-06 NOTE — PROGRESS NOTE ADULT - ASSESSMENT
79M PMH CAD s/p stents 6-7 years ago, DM II, HTN, HLD, L TKA (2006) R TKA (2002, followed by revision with poly exchange on 3/6/20 for knee pain) who presents with one week of purulent incision site drainage and wound dehiscence, with plan for OR 3/7 for  I/D +/- poly exchange of R TKA with course s/b incidental dry cough, found to be covid positive, admitted to [facility].

## 2020-04-06 NOTE — CONSULT NOTE ADULT - ATTENDING COMMENTS
The patient is a 79 year old man with history of CAD s/p PCI with stents, DMII on Insulin, HTN, BPH, Remote Left TKA, Remote Right TKA s/p recent revision on 3/6/2020 who presents with surgical site infection with wound dehiscence.    PE:  Gen: NAD, AAOx3, comfortable  HEENT: conjunctival injection, MMM, no cervical OLIVER  CVS: S1, S2, no m/r/g  Lungs: CTA b/l, no wheezes/rhonchi/crackles  Ab: Soft, NT/ND  MSK: Right suprapatellar surgical scar with dehiscence and mild serous discharge  Ext: 2+ pedal pulses, no MATILDE  Neuro: CNII-XII grossly intact, no focal deficits    A/P  1)Preoperative Assessment/Clearance  2)HTN  3) Hyponatremia - likely hypovolemic  4) COVID +  5) CAD s/p PCI  6) DMII  7)BPH    -Patient is low risk for low-intermediate risk procedure.  No indication for advanced cardiac workup at this point, though COVID+ may preclude perioperative planning  -METS>4, RCRI (risk factors = 2) - 6.6% estimated rate of MI/Pulmonary Edema/VFib/Cardiac Arrest/Complete Heart Block, NSQIP 0.2% cardiac complication, 7.4% risk serious complication  -Resume home BP meds except for Losartan  -Continue IV fluid hydration.  Check UA.  Trend BMP  -Continue supportive care for COVID.  Will have to assess perisurgical plan given positive COVID test  -OK to hold ASA/Plavix.  Continue statin  -POCT Glucose checks.  Consider 0.5 TDD Basal Bolus insulin at bedtime if plan for procedure in AM.  Start ISS  -Hold metformin  -Continue Flomax  -Patient is medically optimized to proceed with surgery barring decision for OR given COVID+ testing

## 2020-04-06 NOTE — ED ADULT NURSE NOTE - CHPI ED NUR SYMPTOMS NEG
no abrasion/no deformity/no fever/no back pain/no bruising/no numbness/no tingling/no stiffness/no weakness/no difficulty bearing weight

## 2020-04-06 NOTE — H&P ADULT - HISTORY OF PRESENT ILLNESS
Pt is 79M PMH CAD s/p stents 6-7 years ago, DM, HTN, HLD now presenting with R knee surgical site infection. Pt had R TKA done in 2002, followed by revision with poly exchange last month 3/6/20 for knee pain. Pt states that incision initially had some yellow crusting but gradually became more painful and opened up slightly and has been draining some yellowish fluid. Pt denies fever, chills, SOB. Denies any numbness or tingling in R leg and has been able to continue walking on affected leg

## 2020-04-06 NOTE — ED PROVIDER NOTE - CLINICAL SUMMARY MEDICAL DECISION MAKING FREE TEXT BOX
78 y/o male here in the ED c/o increasing pain to the right knee with swelling and difficulty ambulating. In addition reports of dry cough. Discussed with ortho. Plan for pre-op and OR tomorrow. Pt otherwise well-appearing without signs of sepsis. Will r/o covid. Labs wnml. CXR negative and Xray of right knee unchanged from previous. Offered tylenol for pain, however pt refused. Will cont. to monitor and plan for admission.

## 2020-04-06 NOTE — ED PROVIDER NOTE - CARE PLAN
Principal Discharge DX:	Knee pain Principal Discharge DX:	Coronavirus infection  Secondary Diagnosis:	Knee pain

## 2020-04-06 NOTE — HISTORY OF PRESENT ILLNESS
[de-identified] : Second post op s/p right knee synovectomy and polyethylene exchange, surgical date 03/06/2020 [de-identified] : Reports that the superior aspect of the incision began to dehisce and drain serous, then purulent, fluid. He first noticed this on 3/31/20. He did not want to seek medical treatment so self-treated with gauze dressings and mupirocin ointment. He added PO Keflex that he obtained from his wife for three doses yesterday. This morning he finally decided to seek medical attention so made an appointment to come in. No fever/chills at home, no cough or respiratory distress. He has been keeping himself at home for COVID precautions and is not aware of any positive COVID contacts within the last month. He is still able to ambulate and doesn't have significant weightbearing pain, only pain directly at the dehisced area. Reports that his sugars have been well controlled at home and that he has been taking all of his regular medications as usual. [de-identified] : Midline incision dehisced at the proximal 4cm with wet fibrinous bed, purulent drainage noted with spindle-shaped surrounding erythema and induration. Distal incision well healed without fluctuance or erythema. Tender about indurated area proximally. ROM 0-110, ligamentously stable varus/valgus and anterior/posterior. [de-identified] : XRs demonstrate stable position of the components from prior exam with no evidence of loosening, fracture, or additional osteolysis. [de-identified] : 78y/o male with 1 week of superior wound dehiscence and superficial infection, now about 4 weeks s/p synovectomy and polyethylene exchange for polyethylene wear and synovitis [de-identified] : Admit for surgical debridement, joint aspiration, possible repeat polyethylene exchange, revision wound closure tomorrow\par Hold further antibiotics until operative cultures\par Importance of timely communication was stressed to him. He verbalized understanding that he ought to have called as soon as he noted wound issues.

## 2020-04-06 NOTE — ED ADULT NURSE NOTE - OBJECTIVE STATEMENT
79y M, A&ox3, presents to ed s/p right knee surgery last month. PT reports "I got infection to my knee" Pt sent in for evaluation and possible admission for infection to right knee. PT denies fevers nor chills, no cp, no sob, no n/v/d. PT reports mild paint to right knee, with some painful ROM.

## 2020-04-06 NOTE — PROGRESS NOTE ADULT - PROBLEM SELECTOR PLAN 5
PMH of IDDM holding metformin, and truliicity.  - 1/2 home lantus dose to 17 U in setting of NPO status   - C/w mISS and FSG monitoring PMH of IDDM holding metformin, and trulicity.  - 1/2 home lantus dose to 17 U in setting of NPO status   - C/w mISS and FSG monitoring

## 2020-04-06 NOTE — ED PROVIDER NOTE - OBJECTIVE STATEMENT
78 y/o male with a PMHx of HTN, HLD, CAD, DM, Anemia with a total right knee replacement in 2002 with a revision last month is present in the ER c/o right knee pain since Saturday. Pt reports pain and swelling located on the right knee. He self-medicated with keflex x4 pills without improvement. Pain is constant and increases with standing/ambulating causing it more difficulty to walk. In addition pt reports of having a mild dry cough that started yesterday. He denies the following: fever, chills, sick contacts, HA, dizziness, sob, abdominal pain, urinary symptoms, sore throat.

## 2020-04-06 NOTE — H&P ADULT - NSHPLABSRESULTS_GEN_ALL_CORE
10.4   7.09  )-----------( 256      ( 06 Apr 2020 13:31 )             32.3     04-06    132<L>  |  98  |  17  ----------------------------<  108<H>  4.3   |  21<L>  |  0.82    Ca    9.4      06 Apr 2020 13:31    TPro  7.1  /  Alb  4.2  /  TBili  0.3  /  DBili  x   /  AST  16  /  ALT  11  /  AlkPhos  68  04-06

## 2020-04-06 NOTE — DISCHARGE NOTE PROVIDER - NSDCFUADDINST_GEN_ALL_CORE_FT
- Please follow up with your surgeon in the office in 1-2 weeks. If you have staples or sutures they will be removed in office.  -Weight-bearing as tolerated on R leg  -No strenuous activity, heavy lifting, driving or returning to work until cleared by MD.  -Keep all dressings clean, dry, and intact.   -Pain medication: take as prescribed  -Try to have regular bowel movements, take stool softener or laxative if necessary.  -May take Pepcid or Zantac for upset stomach.  - Wound care:  - Please notify doctor or return to ER for fever, chills, increasing pain, excessive bleeding or drainage, redness or swelling at incision site, chest pain, shortness of breath

## 2020-04-06 NOTE — DISCHARGE NOTE PROVIDER - CARE PROVIDER_API CALL
Nathan Arrington)  Orthopedics  [address] [facility] [place] [address]  Phone: [phone]  Fax: [phone]  Follow Up Time:

## 2020-04-07 DIAGNOSIS — M00.9 PYOGENIC ARTHRITIS, UNSPECIFIED: ICD-10-CM

## 2020-04-07 DIAGNOSIS — E87.1 HYPO-OSMOLALITY AND HYPONATREMIA: ICD-10-CM

## 2020-04-07 DIAGNOSIS — U07.1 COVID-19: ICD-10-CM

## 2020-04-07 LAB
ALBUMIN SERPL ELPH-MCNC: 3.9 G/DL — SIGNIFICANT CHANGE UP (ref 3.3–5)
ALP SERPL-CCNC: 64 U/L — SIGNIFICANT CHANGE UP (ref 40–120)
ALT FLD-CCNC: 10 U/L — SIGNIFICANT CHANGE UP (ref 10–45)
ANION GAP SERPL CALC-SCNC: 11 MMOL/L — SIGNIFICANT CHANGE UP (ref 5–17)
AST SERPL-CCNC: 14 U/L — SIGNIFICANT CHANGE UP (ref 10–40)
BILIRUB SERPL-MCNC: 0.3 MG/DL — SIGNIFICANT CHANGE UP (ref 0.2–1.2)
BUN SERPL-MCNC: 17 MG/DL — SIGNIFICANT CHANGE UP (ref 7–23)
CALCIUM SERPL-MCNC: 9.6 MG/DL — SIGNIFICANT CHANGE UP (ref 8.4–10.5)
CHLORIDE SERPL-SCNC: 104 MMOL/L — SIGNIFICANT CHANGE UP (ref 96–108)
CO2 SERPL-SCNC: 25 MMOL/L — SIGNIFICANT CHANGE UP (ref 22–31)
CREAT SERPL-MCNC: 0.91 MG/DL — SIGNIFICANT CHANGE UP (ref 0.5–1.3)
CRP SERPL-MCNC: 0.38 MG/DL — SIGNIFICANT CHANGE UP (ref 0–0.4)
D DIMER BLD IA.RAPID-MCNC: 824 NG/ML DDU — HIGH
FERRITIN SERPL-MCNC: 302 NG/ML — SIGNIFICANT CHANGE UP (ref 30–400)
GLUCOSE BLDC GLUCOMTR-MCNC: 102 MG/DL — HIGH (ref 70–99)
GLUCOSE BLDC GLUCOMTR-MCNC: 187 MG/DL — HIGH (ref 70–99)
GLUCOSE BLDC GLUCOMTR-MCNC: 86 MG/DL — SIGNIFICANT CHANGE UP (ref 70–99)
GLUCOSE SERPL-MCNC: 78 MG/DL — SIGNIFICANT CHANGE UP (ref 70–99)
GRAM STN FLD: SIGNIFICANT CHANGE UP
POTASSIUM SERPL-MCNC: 4.5 MMOL/L — SIGNIFICANT CHANGE UP (ref 3.5–5.3)
POTASSIUM SERPL-SCNC: 4.5 MMOL/L — SIGNIFICANT CHANGE UP (ref 3.5–5.3)
PROT SERPL-MCNC: 7.1 G/DL — SIGNIFICANT CHANGE UP (ref 6–8.3)
SODIUM SERPL-SCNC: 140 MMOL/L — SIGNIFICANT CHANGE UP (ref 135–145)
SPECIMEN SOURCE: SIGNIFICANT CHANGE UP

## 2020-04-07 PROCEDURE — 99232 SBSQ HOSP IP/OBS MODERATE 35: CPT

## 2020-04-07 PROCEDURE — 73560 X-RAY EXAM OF KNEE 1 OR 2: CPT | Mod: 26,RT

## 2020-04-07 PROCEDURE — 27486 REVISE/REPLACE KNEE JOINT: CPT | Mod: 78,52,RT

## 2020-04-07 PROCEDURE — 99232 SBSQ HOSP IP/OBS MODERATE 35: CPT | Mod: CS,GC

## 2020-04-07 RX ORDER — SODIUM CHLORIDE 9 MG/ML
1000 INJECTION, SOLUTION INTRAVENOUS
Refills: 0 | Status: DISCONTINUED | OUTPATIENT
Start: 2020-04-07 | End: 2020-04-13

## 2020-04-07 RX ORDER — SENNA PLUS 8.6 MG/1
2 TABLET ORAL AT BEDTIME
Refills: 0 | Status: DISCONTINUED | OUTPATIENT
Start: 2020-04-07 | End: 2020-04-10

## 2020-04-07 RX ORDER — DEXTROSE 50 % IN WATER 50 %
15 SYRINGE (ML) INTRAVENOUS ONCE
Refills: 0 | Status: DISCONTINUED | OUTPATIENT
Start: 2020-04-07 | End: 2020-04-13

## 2020-04-07 RX ORDER — DEXTROSE 50 % IN WATER 50 %
12.5 SYRINGE (ML) INTRAVENOUS ONCE
Refills: 0 | Status: DISCONTINUED | OUTPATIENT
Start: 2020-04-07 | End: 2020-04-13

## 2020-04-07 RX ORDER — ACETAMINOPHEN 500 MG
975 TABLET ORAL EVERY 8 HOURS
Refills: 0 | Status: DISCONTINUED | OUTPATIENT
Start: 2020-04-07 | End: 2020-04-13

## 2020-04-07 RX ORDER — BUPIVACAINE 13.3 MG/ML
20 INJECTION, SUSPENSION, LIPOSOMAL INFILTRATION ONCE
Refills: 0 | Status: DISCONTINUED | OUTPATIENT
Start: 2020-04-07 | End: 2020-04-07

## 2020-04-07 RX ORDER — OXYCODONE HYDROCHLORIDE 5 MG/1
5 TABLET ORAL EVERY 4 HOURS
Refills: 0 | Status: DISCONTINUED | OUTPATIENT
Start: 2020-04-07 | End: 2020-04-08

## 2020-04-07 RX ORDER — INSULIN LISPRO 100/ML
VIAL (ML) SUBCUTANEOUS
Refills: 0 | Status: DISCONTINUED | OUTPATIENT
Start: 2020-04-07 | End: 2020-04-13

## 2020-04-07 RX ORDER — GLUCAGON INJECTION, SOLUTION 0.5 MG/.1ML
1 INJECTION, SOLUTION SUBCUTANEOUS ONCE
Refills: 0 | Status: DISCONTINUED | OUTPATIENT
Start: 2020-04-07 | End: 2020-04-13

## 2020-04-07 RX ORDER — HYDROMORPHONE HYDROCHLORIDE 2 MG/ML
0.5 INJECTION INTRAMUSCULAR; INTRAVENOUS; SUBCUTANEOUS EVERY 4 HOURS
Refills: 0 | Status: DISCONTINUED | OUTPATIENT
Start: 2020-04-07 | End: 2020-04-10

## 2020-04-07 RX ORDER — INSULIN GLARGINE 100 [IU]/ML
17 INJECTION, SOLUTION SUBCUTANEOUS AT BEDTIME
Refills: 0 | Status: DISCONTINUED | OUTPATIENT
Start: 2020-04-07 | End: 2020-04-13

## 2020-04-07 RX ORDER — ONDANSETRON 8 MG/1
4 TABLET, FILM COATED ORAL EVERY 6 HOURS
Refills: 0 | Status: DISCONTINUED | OUTPATIENT
Start: 2020-04-07 | End: 2020-04-13

## 2020-04-07 RX ORDER — CEFTRIAXONE 500 MG/1
2000 INJECTION, POWDER, FOR SOLUTION INTRAMUSCULAR; INTRAVENOUS ONCE
Refills: 0 | Status: DISCONTINUED | OUTPATIENT
Start: 2020-04-07 | End: 2020-04-07

## 2020-04-07 RX ORDER — HYDROMORPHONE HYDROCHLORIDE 2 MG/ML
0.5 INJECTION INTRAMUSCULAR; INTRAVENOUS; SUBCUTANEOUS
Refills: 0 | Status: DISCONTINUED | OUTPATIENT
Start: 2020-04-07 | End: 2020-04-08

## 2020-04-07 RX ORDER — CEFAZOLIN SODIUM 1 G
2000 VIAL (EA) INJECTION EVERY 8 HOURS
Refills: 0 | Status: COMPLETED | OUTPATIENT
Start: 2020-04-07 | End: 2020-04-08

## 2020-04-07 RX ORDER — VANCOMYCIN HCL 1 G
1250 VIAL (EA) INTRAVENOUS EVERY 24 HOURS
Refills: 0 | Status: DISCONTINUED | OUTPATIENT
Start: 2020-04-08 | End: 2020-04-08

## 2020-04-07 RX ORDER — DEXTROSE 50 % IN WATER 50 %
25 SYRINGE (ML) INTRAVENOUS ONCE
Refills: 0 | Status: DISCONTINUED | OUTPATIENT
Start: 2020-04-07 | End: 2020-04-13

## 2020-04-07 RX ORDER — FAMOTIDINE 10 MG/ML
20 INJECTION INTRAVENOUS
Refills: 0 | Status: DISCONTINUED | OUTPATIENT
Start: 2020-04-07 | End: 2020-04-07

## 2020-04-07 RX ORDER — POLYETHYLENE GLYCOL 3350 17 G/17G
17 POWDER, FOR SOLUTION ORAL DAILY
Refills: 0 | Status: DISCONTINUED | OUTPATIENT
Start: 2020-04-07 | End: 2020-04-10

## 2020-04-07 RX ORDER — OXYCODONE HYDROCHLORIDE 5 MG/1
10 TABLET ORAL EVERY 4 HOURS
Refills: 0 | Status: DISCONTINUED | OUTPATIENT
Start: 2020-04-07 | End: 2020-04-08

## 2020-04-07 RX ORDER — CEFTRIAXONE 500 MG/1
2000 INJECTION, POWDER, FOR SOLUTION INTRAMUSCULAR; INTRAVENOUS EVERY 24 HOURS
Refills: 0 | Status: DISCONTINUED | OUTPATIENT
Start: 2020-04-08 | End: 2020-04-08

## 2020-04-07 RX ORDER — SODIUM CHLORIDE 9 MG/ML
1000 INJECTION, SOLUTION INTRAVENOUS
Refills: 0 | Status: DISCONTINUED | OUTPATIENT
Start: 2020-04-07 | End: 2020-04-07

## 2020-04-07 RX ORDER — MAGNESIUM HYDROXIDE 400 MG/1
30 TABLET, CHEWABLE ORAL DAILY
Refills: 0 | Status: DISCONTINUED | OUTPATIENT
Start: 2020-04-07 | End: 2020-04-13

## 2020-04-07 RX ADMIN — HYDROMORPHONE HYDROCHLORIDE 0.5 MILLIGRAM(S): 2 INJECTION INTRAMUSCULAR; INTRAVENOUS; SUBCUTANEOUS at 18:55

## 2020-04-07 RX ADMIN — Medication 975 MILLIGRAM(S): at 13:40

## 2020-04-07 RX ADMIN — Medication 100 MILLIGRAM(S): at 16:11

## 2020-04-07 RX ADMIN — SODIUM CHLORIDE 100 MILLILITER(S): 9 INJECTION, SOLUTION INTRAVENOUS at 00:00

## 2020-04-07 RX ADMIN — Medication 975 MILLIGRAM(S): at 22:51

## 2020-04-07 RX ADMIN — INSULIN GLARGINE 17 UNIT(S): 100 INJECTION, SOLUTION SUBCUTANEOUS at 22:51

## 2020-04-07 RX ADMIN — OXYCODONE HYDROCHLORIDE 10 MILLIGRAM(S): 5 TABLET ORAL at 21:48

## 2020-04-07 RX ADMIN — Medication 2: at 22:45

## 2020-04-07 NOTE — PROGRESS NOTE ADULT - ASSESSMENT
80 yo male with hx CAD, DM, R TKR 2002, revision 3/6/20, now p/w wound dehiscence and drainage from incision c/f SSI. Also found to be COVID-19+.  - given absence of hypoxia, pneumonia, elevated inflammatory markers, may defer treatment with hydroxychloroquine at this time  - to f/u OR findings as well as gram stain and culture results  - once cultures obtained in OR, can start empiric vancomycin 1.25g IV q24h + ceftriaxone 2g IV q24h    ID Team 2 will follow

## 2020-04-07 NOTE — PROGRESS NOTE ADULT - SUBJECTIVE AND OBJECTIVE BOX
INTERVAL HPI/OVERNIGHT EVENTS: No fevers. Found to be COVID-19+. In OR for R knee exploration/debridement.    ROS: UTO      ANTIBIOTICS/RELEVANT:    MEDICATIONS  (STANDING):    MEDICATIONS  (PRN):        Vital Signs Last 24 Hrs  T(C): 36.9 (2020 07:13), Max: 37 (2020 22:46)  T(F): 98.5 (2020 07:13), Max: 98.6 (2020 22:46)  HR: 65 (2020 07:13) (59 - 80)  BP: 126/78 (2020 07:13) (126/78 - 219/98)  BP(mean): --  RR: 20 (2020 07:13) (18 - 20)  SpO2: 98% (2020 07:13) (97% - 99%)    PHYSICAL EXAM:      LABS:                        10.4   7.09  )-----------( 256      ( 2020 13:31 )             32.3     04-07    140  |  104  |  17  ----------------------------<  78  4.5   |  25  |  0.91    Ca    9.6      2020 09:25    TPro  7.1  /  Alb  3.9  /  TBili  0.3  /  DBili  x   /  AST  14  /  ALT  10  /  AlkPhos  64  04-07    PT/INR - ( 2020 13:31 )   PT: 11.5 sec;   INR: 1.01          PTT - ( 2020 13:31 )  PTT:28.4 sec  Urinalysis Basic - ( 2020 19:32 )    Color: Yellow / Appearance: Clear / S.010 / pH: x  Gluc: x / Ketone: NEGATIVE  / Bili: Negative / Urobili: 0.2 E.U./dL   Blood: x / Protein: NEGATIVE mg/dL / Nitrite: NEGATIVE   Leuk Esterase: NEGATIVE / RBC: x / WBC x   Sq Epi: x / Non Sq Epi: x / Bacteria: x        MICROBIOLOGY: pending    RADIOLOGY & ADDITIONAL STUDIES: reviewed

## 2020-04-07 NOTE — PROGRESS NOTE ADULT - ASSESSMENT
Pt is 79M PMH CAD s/p stents 6-7 years ago, DM II, HTN, HLD, L TKA (2006) R TKA (2002, followed by revision with poly exchange on 3/6/20 for knee pain) who presents for I/D of R TKA s/p polyexchange on 3/6.

## 2020-04-07 NOTE — PROGRESS NOTE ADULT - PROBLEM SELECTOR PLAN 1
#pre-operative clearance  Low risk procedure, METS >4, NSQIP with cardiac complication risk 0.2%, RCRI: 2 (IDDM, CAD) = 10.1%   Patient is low risk for low risk procedure. No need for additional imaging.    -OR with orthopedics for Right knee washout today 4/7

## 2020-04-07 NOTE — PROGRESS NOTE ADULT - PROBLEM SELECTOR PLAN 5
-On ISS  -holding metformin 1000mg BID while in the hospital  -Will give half patient's home qHS dose of Lantus 35, add SSI, holding any premeal insulin, NPO at MN

## 2020-04-07 NOTE — PROGRESS NOTE ADULT - PROBLEM SELECTOR PLAN 4
-On room air, satting 97%  -CRP and Ferritin normal, D-Dimer mildly elevated to 824  -Not on therapy  -Will trend markers daily

## 2020-04-07 NOTE — PROGRESS NOTE ADULT - SUBJECTIVE AND OBJECTIVE BOX
Ortho Post Op Check    Procedure: R Knee I&D w poly exchange  Surgeon: Oh    Pt comfortable without complaints, pain controlled  Denies CP, SOB, N/V, numbness/tingling     Vital Signs Last 24 Hrs  T(C): 36.9 (04-07-20 @ 15:28), Max: 37 (04-07-20 @ 13:00)  T(F): 98.4 (04-07-20 @ 15:28), Max: 98.6 (04-07-20 @ 13:00)  HR: 74 (04-07-20 @ 15:28) (74 - 77)  BP: 121/71 (04-07-20 @ 15:28) (121/71 - 124/74)  BP(mean): --  RR: 14 (04-07-20 @ 15:28) (14 - 18)  SpO2: 98% (04-07-20 @ 15:28) (98% - 100%)    AVSS  General: Pt Alert and oriented, NAD  RLE: KI in place; Ace wrap DSG C/D/I; Prevena holding suction  Pulses: Foot WWP; DP pulse 2+; Cap refill < 2 sec  Sensation: SILT and symmetric to contralateral extremity  Motor: EHL/FHL/TA/GS 5/5 and symmetric to contralateral extremity                          10.4   7.09  )-----------( 256      ( 06 Apr 2020 13:31 )             32.3     07 Apr 2020 09:25    140    |  104    |  17     ----------------------------<  78     4.5     |  25     |  0.91       TPro  7.1    /  Alb  3.9    /  TBili  0.3    /  DBili  x      /  AST  14     /  ALT  10     /  AlkPhos  64     07 Apr 2020 09:25      Post-op X-Ray: R TKA w components in place

## 2020-04-07 NOTE — BRIEF OPERATIVE NOTE - NSICDXBRIEFPROCEDURE_GEN_ALL_CORE_FT
PROCEDURES:  Replacement of polyethylene liner of right knee 07-Apr-2020 11:52:05  Harry Donnelly  Arthroscopic irrigation of right knee 07-Apr-2020 11:51:51  Harry Donnelly

## 2020-04-07 NOTE — PROGRESS NOTE ADULT - ASSESSMENT
A/P: 79yMale s/p I&D R TKA w poly exchange  - Stable  - Pain Control  - DVT ppx: starting home ASA/Plavix POD1  - Post op abx: Vanc/Zosyn  - f/u intra-op cultures  - WBS: WBAT RLE  - PT: pending PT eval    Ortho Pager [phone]

## 2020-04-07 NOTE — PROGRESS NOTE ADULT - SUBJECTIVE AND OBJECTIVE BOX
OVERNIGHT EVENTS: SIMONE    SUBJECTIVE: Patient seen and examined at the bedside in full PPE. Resting in bed comfortably on room air @98%    Vital Signs Last 12 Hrs  T(F): 98.5 (20 @ 07:13), Max: 98.6 (20 @ 22:46)  HR: 65 (20 @ 07:13) (64 - 80)  BP: 126/78 (20 @ 07:13) (126/78 - 137/69)  BP(mean): --  RR: 20 (20 @ 07:13) (18 - 20)  SpO2: 98% (20 @ 07:13) (97% - 99%)  I&O's Summary      PHYSICAL EXAM:  General: In no acute distress, resting comfortably in bed  HEENT: NCAT, PERRL, EOMI, MMM  Neck: No JVD  Respiratory: Clear to auscultation bilaterally with no wheezes, rales, or rhonchi  Cardiovascular: RRR, normal S1 and S2  Vascular: 2+ radial and DP pulses  Abdomen: Soft, NT/ND.  Extremities: Right knee: incision with dehiscence of wound with purulent drainage and surrounding erythema and warmth. Warm and well perfused.  Skin: No gross skin abnormalities or rashes  Neuro: AAOx3. Strength and sensation intact throughout.    LABS:                        10.4   7.09  )-----------( 256      ( 2020 13:31 )             32.3     04-06    132<L>  |  98  |  17  ----------------------------<  108<H>  4.3   |  21<L>  |  0.82    Ca    9.4      2020 13:31    TPro  7.1  /  Alb  4.2  /  TBili  0.3  /  DBili  x   /  AST  16  /  ALT  11  /  AlkPhos  68  04-06    PT/INR - ( 2020 13:31 )   PT: 11.5 sec;   INR: 1.01          PTT - ( 2020 13:31 )  PTT:28.4 sec  Urinalysis Basic - ( 2020 19:32 )    Color: Yellow / Appearance: Clear / S.010 / pH: x  Gluc: x / Ketone: NEGATIVE  / Bili: Negative / Urobili: 0.2 E.U./dL   Blood: x / Protein: NEGATIVE mg/dL / Nitrite: NEGATIVE   Leuk Esterase: NEGATIVE / RBC: x / WBC x   Sq Epi: x / Non Sq Epi: x / Bacteria: x        RADIOLOGY & ADDITIONAL TESTS: Reviewed.    MEDICATIONS  (STANDING):  amLODIPine   Tablet 5 milliGRAM(s) Oral at bedtime  atorvastatin 20 milliGRAM(s) Oral at bedtime  BUpivacaine liposome 1.3% Injectable (no eMAR) 20 milliLiter(s) Local Injection once  dextrose 5%. 1000 milliLiter(s) (50 mL/Hr) IV Continuous <Continuous>  dextrose 50% Injectable 12.5 Gram(s) IV Push once  dextrose 50% Injectable 25 Gram(s) IV Push once  dextrose 50% Injectable 25 Gram(s) IV Push once  famotidine    Tablet 20 milliGRAM(s) Oral two times a day  insulin glargine Injectable (LANTUS) 17 Unit(s) SubCutaneous at bedtime  insulin lispro (HumaLOG) corrective regimen sliding scale   SubCutaneous Before meals and at bedtime  lactated ringers. 1000 milliLiter(s) (100 mL/Hr) IV Continuous <Continuous>  pantoprazole    Tablet 40 milliGRAM(s) Oral before breakfast  povidone iodine 5% Nasal Swab 1 Application(s) Both Nostrils once  tamsulosin 0.4 milliGRAM(s) Oral at bedtime    MEDICATIONS  (PRN):  acetaminophen   Tablet .. 650 milliGRAM(s) Oral every 6 hours PRN Mild Pain (1 - 3)  dextrose 40% Gel 15 Gram(s) Oral once PRN Blood Glucose LESS THAN 70 milliGRAM(s)/deciliter  glucagon  Injectable 1 milliGRAM(s) IntraMuscular once PRN Glucose LESS THAN 70 milligrams/deciliter  HYDROmorphone  Injectable 0.5 milliGRAM(s) IV Push every 4 hours PRN Breakthrough  oxyCODONE    IR 10 milliGRAM(s) Oral every 4 hours PRN Severe Pain (7 - 10)  oxyCODONE    IR 5 milliGRAM(s) Oral every 4 hours PRN Moderate Pain (4 - 6)      Allergies    No Known Allergies    Intolerances

## 2020-04-08 DIAGNOSIS — R33.9 RETENTION OF URINE, UNSPECIFIED: ICD-10-CM

## 2020-04-08 LAB
ALBUMIN SERPL ELPH-MCNC: 3.5 G/DL — SIGNIFICANT CHANGE UP (ref 3.3–5)
ALP SERPL-CCNC: 56 U/L — SIGNIFICANT CHANGE UP (ref 40–120)
ALT FLD-CCNC: 8 U/L — LOW (ref 10–45)
ANION GAP SERPL CALC-SCNC: 13 MMOL/L — SIGNIFICANT CHANGE UP (ref 5–17)
AST SERPL-CCNC: 15 U/L — SIGNIFICANT CHANGE UP (ref 10–40)
BASOPHILS # BLD AUTO: 0.02 K/UL — SIGNIFICANT CHANGE UP (ref 0–0.2)
BASOPHILS NFR BLD AUTO: 0.2 % — SIGNIFICANT CHANGE UP (ref 0–2)
BILIRUB SERPL-MCNC: 0.2 MG/DL — SIGNIFICANT CHANGE UP (ref 0.2–1.2)
BUN SERPL-MCNC: 18 MG/DL — SIGNIFICANT CHANGE UP (ref 7–23)
CALCIUM SERPL-MCNC: 8.9 MG/DL — SIGNIFICANT CHANGE UP (ref 8.4–10.5)
CHLORIDE SERPL-SCNC: 101 MMOL/L — SIGNIFICANT CHANGE UP (ref 96–108)
CO2 SERPL-SCNC: 25 MMOL/L — SIGNIFICANT CHANGE UP (ref 22–31)
CREAT SERPL-MCNC: 1.13 MG/DL — SIGNIFICANT CHANGE UP (ref 0.5–1.3)
CRP SERPL-MCNC: 6.15 MG/DL — HIGH (ref 0–0.4)
EOSINOPHIL # BLD AUTO: 1 K/UL — HIGH (ref 0–0.5)
EOSINOPHIL NFR BLD AUTO: 12.2 % — HIGH (ref 0–6)
ERYTHROCYTE [SEDIMENTATION RATE] IN BLOOD: 45 MM/HR — HIGH
GLUCOSE BLDC GLUCOMTR-MCNC: 139 MG/DL — HIGH (ref 70–99)
GLUCOSE BLDC GLUCOMTR-MCNC: 163 MG/DL — HIGH (ref 70–99)
GLUCOSE BLDC GLUCOMTR-MCNC: 174 MG/DL — HIGH (ref 70–99)
GLUCOSE BLDC GLUCOMTR-MCNC: 206 MG/DL — HIGH (ref 70–99)
GLUCOSE SERPL-MCNC: 108 MG/DL — HIGH (ref 70–99)
HCT VFR BLD CALC: 28.7 % — LOW (ref 39–50)
HGB BLD-MCNC: 9.1 G/DL — LOW (ref 13–17)
IMM GRANULOCYTES NFR BLD AUTO: 0.4 % — SIGNIFICANT CHANGE UP (ref 0–1.5)
LYMPHOCYTES # BLD AUTO: 1.01 K/UL — SIGNIFICANT CHANGE UP (ref 1–3.3)
LYMPHOCYTES # BLD AUTO: 12.4 % — LOW (ref 13–44)
MAGNESIUM SERPL-MCNC: 1.8 MG/DL — SIGNIFICANT CHANGE UP (ref 1.6–2.6)
MCHC RBC-ENTMCNC: 30.2 PG — SIGNIFICANT CHANGE UP (ref 27–34)
MCHC RBC-ENTMCNC: 31.7 GM/DL — LOW (ref 32–36)
MCV RBC AUTO: 95.3 FL — SIGNIFICANT CHANGE UP (ref 80–100)
MONOCYTES # BLD AUTO: 0.93 K/UL — HIGH (ref 0–0.9)
MONOCYTES NFR BLD AUTO: 11.4 % — SIGNIFICANT CHANGE UP (ref 2–14)
NEUTROPHILS # BLD AUTO: 5.18 K/UL — SIGNIFICANT CHANGE UP (ref 1.8–7.4)
NEUTROPHILS NFR BLD AUTO: 63.4 % — SIGNIFICANT CHANGE UP (ref 43–77)
NRBC # BLD: 0 /100 WBCS — SIGNIFICANT CHANGE UP (ref 0–0)
PHOSPHATE SERPL-MCNC: 4 MG/DL — SIGNIFICANT CHANGE UP (ref 2.5–4.5)
PLATELET # BLD AUTO: 234 K/UL — SIGNIFICANT CHANGE UP (ref 150–400)
POTASSIUM SERPL-MCNC: 4.2 MMOL/L — SIGNIFICANT CHANGE UP (ref 3.5–5.3)
POTASSIUM SERPL-SCNC: 4.2 MMOL/L — SIGNIFICANT CHANGE UP (ref 3.5–5.3)
PROT SERPL-MCNC: 6.2 G/DL — SIGNIFICANT CHANGE UP (ref 6–8.3)
RBC # BLD: 3.01 M/UL — LOW (ref 4.2–5.8)
RBC # FLD: 13.3 % — SIGNIFICANT CHANGE UP (ref 10.3–14.5)
SODIUM SERPL-SCNC: 139 MMOL/L — SIGNIFICANT CHANGE UP (ref 135–145)
WBC # BLD: 8.17 K/UL — SIGNIFICANT CHANGE UP (ref 3.8–10.5)
WBC # FLD AUTO: 8.17 K/UL — SIGNIFICANT CHANGE UP (ref 3.8–10.5)

## 2020-04-08 PROCEDURE — 99232 SBSQ HOSP IP/OBS MODERATE 35: CPT | Mod: CS,GC

## 2020-04-08 PROCEDURE — 99232 SBSQ HOSP IP/OBS MODERATE 35: CPT

## 2020-04-08 PROCEDURE — 71045 X-RAY EXAM CHEST 1 VIEW: CPT | Mod: 26

## 2020-04-08 RX ORDER — CEFAZOLIN SODIUM 1 G
2 VIAL (EA) INJECTION
Qty: 252 | Refills: 0
Start: 2020-04-08 | End: 2020-05-19

## 2020-04-08 RX ORDER — CEFAZOLIN SODIUM 1 G
2000 VIAL (EA) INJECTION EVERY 8 HOURS
Refills: 0 | Status: DISCONTINUED | OUTPATIENT
Start: 2020-04-08 | End: 2020-04-13

## 2020-04-08 RX ORDER — TAMSULOSIN HYDROCHLORIDE 0.4 MG/1
0.4 CAPSULE ORAL ONCE
Refills: 0 | Status: COMPLETED | OUTPATIENT
Start: 2020-04-08 | End: 2020-04-08

## 2020-04-08 RX ORDER — SODIUM CHLORIDE 9 MG/ML
10 INJECTION INTRAMUSCULAR; INTRAVENOUS; SUBCUTANEOUS
Refills: 0 | Status: DISCONTINUED | OUTPATIENT
Start: 2020-04-08 | End: 2020-04-13

## 2020-04-08 RX ORDER — ACETAMINOPHEN 500 MG
650 TABLET ORAL ONCE
Refills: 0 | Status: DISCONTINUED | OUTPATIENT
Start: 2020-04-08 | End: 2020-04-08

## 2020-04-08 RX ORDER — ASPIRIN/CALCIUM CARB/MAGNESIUM 324 MG
81 TABLET ORAL
Refills: 0 | Status: DISCONTINUED | OUTPATIENT
Start: 2020-04-08 | End: 2020-04-09

## 2020-04-08 RX ORDER — HYDROMORPHONE HYDROCHLORIDE 2 MG/ML
2 INJECTION INTRAMUSCULAR; INTRAVENOUS; SUBCUTANEOUS EVERY 6 HOURS
Refills: 0 | Status: DISCONTINUED | OUTPATIENT
Start: 2020-04-08 | End: 2020-04-13

## 2020-04-08 RX ORDER — TAMSULOSIN HYDROCHLORIDE 0.4 MG/1
0.4 CAPSULE ORAL EVERY 12 HOURS
Refills: 0 | Status: DISCONTINUED | OUTPATIENT
Start: 2020-04-08 | End: 2020-04-13

## 2020-04-08 RX ORDER — CLOPIDOGREL BISULFATE 75 MG/1
75 TABLET, FILM COATED ORAL DAILY
Refills: 0 | Status: DISCONTINUED | OUTPATIENT
Start: 2020-04-08 | End: 2020-04-13

## 2020-04-08 RX ORDER — MAGNESIUM OXIDE 400 MG ORAL TABLET 241.3 MG
400 TABLET ORAL ONCE
Refills: 0 | Status: COMPLETED | OUTPATIENT
Start: 2020-04-08 | End: 2020-04-08

## 2020-04-08 RX ORDER — CHLORHEXIDINE GLUCONATE 213 G/1000ML
1 SOLUTION TOPICAL
Refills: 0 | Status: DISCONTINUED | OUTPATIENT
Start: 2020-04-08 | End: 2020-04-13

## 2020-04-08 RX ADMIN — Medication 100 MILLIGRAM(S): at 22:49

## 2020-04-08 RX ADMIN — Medication 100 MILLIGRAM(S): at 13:25

## 2020-04-08 RX ADMIN — Medication 81 MILLIGRAM(S): at 19:01

## 2020-04-08 RX ADMIN — ONDANSETRON 4 MILLIGRAM(S): 8 TABLET, FILM COATED ORAL at 13:34

## 2020-04-08 RX ADMIN — POLYETHYLENE GLYCOL 3350 17 GRAM(S): 17 POWDER, FOR SOLUTION ORAL at 10:47

## 2020-04-08 RX ADMIN — Medication 975 MILLIGRAM(S): at 13:28

## 2020-04-08 RX ADMIN — CLOPIDOGREL BISULFATE 75 MILLIGRAM(S): 75 TABLET, FILM COATED ORAL at 10:47

## 2020-04-08 RX ADMIN — CEFTRIAXONE 100 MILLIGRAM(S): 500 INJECTION, POWDER, FOR SOLUTION INTRAMUSCULAR; INTRAVENOUS at 06:12

## 2020-04-08 RX ADMIN — Medication 4: at 09:25

## 2020-04-08 RX ADMIN — TAMSULOSIN HYDROCHLORIDE 0.4 MILLIGRAM(S): 0.4 CAPSULE ORAL at 04:37

## 2020-04-08 RX ADMIN — Medication 166.67 MILLIGRAM(S): at 09:25

## 2020-04-08 RX ADMIN — Medication 2: at 22:48

## 2020-04-08 RX ADMIN — Medication 975 MILLIGRAM(S): at 06:12

## 2020-04-08 RX ADMIN — INSULIN GLARGINE 17 UNIT(S): 100 INJECTION, SOLUTION SUBCUTANEOUS at 22:48

## 2020-04-08 RX ADMIN — Medication 100 MILLIGRAM(S): at 00:05

## 2020-04-08 RX ADMIN — Medication 2: at 13:26

## 2020-04-08 RX ADMIN — MAGNESIUM OXIDE 400 MG ORAL TABLET 400 MILLIGRAM(S): 241.3 TABLET ORAL at 13:26

## 2020-04-08 RX ADMIN — TAMSULOSIN HYDROCHLORIDE 0.4 MILLIGRAM(S): 0.4 CAPSULE ORAL at 19:01

## 2020-04-08 RX ADMIN — Medication 975 MILLIGRAM(S): at 22:49

## 2020-04-08 NOTE — PHYSICAL THERAPY INITIAL EVALUATION ADULT - CRITERIA FOR SKILLED THERAPEUTIC INTERVENTIONS
impairments found/risk reduction/prevention/therapy frequency/anticipated discharge recommendation/functional limitations in following categories/rehab potential

## 2020-04-08 NOTE — PHYSICAL THERAPY INITIAL EVALUATION ADULT - ADDITIONAL COMMENTS
pt lives w/ his wife (a retired RN) in an elevator access apt building w/ no stairs to enter. States that he owns a cane and RW from previous surgeries. States that he was independent in all ADLs prior to this admission

## 2020-04-08 NOTE — PHYSICAL THERAPY INITIAL EVALUATION ADULT - IMPAIRMENTS FOUND, PT EVAL
aerobic capacity/endurance/integumentary integrity/muscle strength/posture/gross motor/gait, locomotion, and balance/joint integrity and mobility

## 2020-04-08 NOTE — PROCEDURE NOTE - NSPROCDETAILS_GEN_ALL_CORE
location identified, draped/prepped, sterile technique used/sterile technique, catheter placed/supine position/ultrasound guidance/ultrasound assessment/sterile dressing applied

## 2020-04-08 NOTE — PHYSICAL THERAPY INITIAL EVALUATION ADULT - MD ORDER
Replacement of polyethylene liner of right knee 07-Apr-2020 11:52:05  Harry Donnelly  Arthroscopic irrigation of right knee 07-Apr-2020 11:51:51  Harry Donnelly.

## 2020-04-08 NOTE — PHYSICAL THERAPY INITIAL EVALUATION ADULT - GAIT DEVIATIONS NOTED, PT EVAL
decreased weight-shifting ability/decreased step length/increased time in double stance/decreased ana

## 2020-04-08 NOTE — CONSULT NOTE ADULT - SUBJECTIVE AND OBJECTIVE BOX
V[facility] Service Consult Note    [facility] - [facility]OBLEM Dx:  Urinary retention: Urinary retention  COVID-19 virus detected: COVID-19 virus detected  Hyponatremia: Hyponatremia  Joint infection: Joint infection  Nutrition, metabolism, and development symptoms: Nutrition, metabolism, and development symptoms  BPH (benign prostatic hyperplasia): BPH (benign prostatic hyperplasia)  CAD (coronary artery disease): CAD (coronary artery disease)  DM (diabetes mellitus): DM (diabetes mellitus)  Hyperlipidemia: Hyperlipidemia  Hypertension: Hypertension  Pneumonia due to 2019 novel coronavirus: Pneumonia due to 2019 novel coronavirus  Surgical site infection: Surgical site infection             Diagnosis: knee infection    Indications for Vascular Access (Check all that apply)  [x  ]  Antibiotic Therapy       Antibiotic Prescribed:  ancef x 6 weeks                                                          [  ]  IV Hydration  [  ]  Total Parenteral Nutrition  [  ]  Chemotherapy  [  ]  Difficult Venous Access  [  ]  CVP monitoring  [  ]  Medications with high potential for tissue necrosis on extravasation  [  ]  Other    Screening (Check all that apply)  Previous Radiation to chest  [  ] Yes      [ x ]  No  Breast Cancer                          [  ] Left     [  ]  Right    [ x ]  No  Lymph Node Dissection         [  ] Left     [  ]  Right    [ x ]  No  Pacemaker or ICD                   [  ] Left     [  ]  Right    [x  ]  No  Upper Extremity DVT             [  ] Left     [  ]  Right    [x  ]  No  Chronic Kidney Disease         [  ]  Yes     [ x ]  No  Hemodialysis                           [  ]  Yes     [x  ]  No  AV Fistula/ Graft                     [  ]  Left    [  ]  Right    [x  ]  No  Temp>101F in past 24 H       [  ]  Yes     [ x ]  No  H/O PICC/Midline                   [  ]  Yes     [x  ]  No    Lab data:                        9.1    8.17  )-----------( 234      ( 08 Apr 2020 08:26 )             28.7     04-08    139  |  101  |  18  ----------------------------<  108<H>  4.2   |  25  |  1.13    Ca    8.9      08 Apr 2020 08:26  Phos  4.0     04-08  Mg     1.8     04-08    TPro  6.2  /  Alb  3.5  /  TBili  0.2  /  DBili  x   /  AST  15  /  ALT  8<L>  /  AlkPhos  56  04-08    PT/INR - ( 06 Apr 2020 13:31 )   PT: 11.5 sec;   INR: 1.01          PTT - ( 06 Apr 2020 13:31 )  PTT:28.4 sec  Culture Results:   Testing in progress (04-07 @ 22:24)  Culture Results:   Testing in progress (04-07 @ 22:22)  Culture Results:   Testing in progress (04-07 @ 22:20)  Culture Results:   Testing in progress (04-07 @ 22:17)  Culture Results:   Rare Staphylococcus aureus  Susceptibility to follow. (04-07 @ 12:17)  Culture Results:   Rare Staphylococcus aureus  Susceptibility to follow. (04-07 @ 12:03)  Culture Results:   Rare Staphylococcus aureus  Susceptibility to follow. (04-07 @ 11:57)  Culture Results:   Rare Staphylococcus aureus  Susceptibility to follow. (04-07 @ 11:50)          I have reviewed the chart, interviewed and examined the patient and determined that this patient:  [ x ] Is a candidate for a PICC line  [  ] Is a candidate for a Midline  [  ] Is not a candidate for vascular access device (reason)    Lumens:    [ x ] Single  [  ] Double

## 2020-04-08 NOTE — CONSULT NOTE ADULT - ASSESSMENT
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     Problem/Plan - 1:  ·  Problem: Joint infection.  Plan: #s/pR Knee I&D with polymer exchange 4/7/20  -Pain control  -F/u OR joint fluid cx  -F/u Ortho recs  -Continue Vanco and Ceftriaxone.     Problem/Plan - 2:  ·  Problem: Hyponatremia.  Plan: Mildly decreased, received LR  -Repeat Na 140.     Problem/Plan - 3:  ·  Problem: Hypertension.  Plan: Holding patient's losartan 100mg qD prior to surgery  Increased home norvasc from 2.5mg to 5mg.     Problem/Plan - 4:  ·  Problem: COVID-19 virus detected.  Plan: -On room air, satting 97%  -CRP and Ferritin normal, D-Dimer mildly elevated to 824  -No therapy indicated   -No need to trend markers.     Problem/Plan - 5:  ·  Problem: DM (diabetes mellitus).  Plan: -On ISS  -holding metformin 1000mg BID while in the hospital  -Will give half patient's home qHS dose of Lantus 35, will trend FS  -On moderate ISS.     Problem/Plan - 6:  Problem: Hyperlipidemia. Plan: -continue with lipitor 20mg qD.    Problem/Plan - 7:  ·  Problem: Urinary retention.  Plan: -s/p straight cath  -follow up Trial of void.

## 2020-04-08 NOTE — PROGRESS NOTE ADULT - PROBLEM SELECTOR PLAN 1
#s/pR Knee I&D with polymer exchange 4/7/20  -Pain control  -F/u OR joint fluid cx  -f/u PT eval  -F/u Ortho recs  -Continue Vanco and Ceftriaxone

## 2020-04-08 NOTE — PROGRESS NOTE ADULT - SUBJECTIVE AND OBJECTIVE BOX
OVERNIGHT EVENTS: retaining, so straight cathed and 1L removed. Given tamsulosin 0.4mg x1 (patient states he takes 0.4 mg BID) and tylenol for fever. plan to send cultures in AM.     SUBJECTIVE: Patient seen and examined at the bedside in full PPE. Sitting in bed comfortably, NAD, on 3L    Vital Signs Last 12 Hrs  T(F): 100.6 (20 @ 04:48), Max: 100.6 (20 @ 04:48)  HR: 93 (20 @ 04:48) (82 - 93)  BP: 136/67 (20 @ 04:48) (128/86 - 146/73)  BP(mean): --  RR: 19 (20 @ 04:48) (16 - 19)  SpO2: 98% (20 @ 04:48) (94% - 98%)  I&O's Summary    2020 07:01  -  2020 07:00  --------------------------------------------------------  IN: 0 mL / OUT: 1350 mL / NET: -1350 mL      PHYSICAL EXAM:  General: In no acute distress, resting comfortably in bed  Respiratory: No respiratory distress, no using accessory muscles  Cardiovascular: RRR, normal S1 and S2  Abdomen: Soft, NT/ND.  Extremities: Right knee: in immobilizer, dressing C/D/I, 2+DP/PT pulses  Neuro: AAOx3. Strength and sensation intact throughout.    LABS:                        10.4   7.09  )-----------( 256      ( 2020 13:31 )             32.3     04-07    140  |  104  |  17  ----------------------------<  78  4.5   |  25  |  0.91    Ca    9.6      2020 09:25    TPro  7.1  /  Alb  3.9  /  TBili  0.3  /  DBili  x   /  AST  14  /  ALT  10  /  AlkPhos  64  04-07    PT/INR - ( 2020 13:31 )   PT: 11.5 sec;   INR: 1.01          PTT - ( 2020 13:31 )  PTT:28.4 sec  Urinalysis Basic - ( 2020 19:32 )    Color: Yellow / Appearance: Clear / S.010 / pH: x  Gluc: x / Ketone: NEGATIVE  / Bili: Negative / Urobili: 0.2 E.U./dL   Blood: x / Protein: NEGATIVE mg/dL / Nitrite: NEGATIVE   Leuk Esterase: NEGATIVE / RBC: x / WBC x   Sq Epi: x / Non Sq Epi: x / Bacteria: x        RADIOLOGY & ADDITIONAL TESTS: Reviewed.    MEDICATIONS  (STANDING):  acetaminophen   Tablet .. 975 milliGRAM(s) Oral every 8 hours  aspirin enteric coated 81 milliGRAM(s) Oral two times a day  cefTRIAXone   IVPB 2000 milliGRAM(s) IV Intermittent every 24 hours  clopidogrel Tablet 75 milliGRAM(s) Oral daily  dextrose 5%. 1000 milliLiter(s) (50 mL/Hr) IV Continuous <Continuous>  dextrose 50% Injectable 12.5 Gram(s) IV Push once  dextrose 50% Injectable 25 Gram(s) IV Push once  dextrose 50% Injectable 25 Gram(s) IV Push once  insulin glargine Injectable (LANTUS) 17 Unit(s) SubCutaneous at bedtime  insulin lispro (HumaLOG) corrective regimen sliding scale   SubCutaneous Before meals and at bedtime  polyethylene glycol 3350 17 Gram(s) Oral daily  tamsulosin 0.4 milliGRAM(s) Oral every 12 hours  vancomycin  IVPB 1250 milliGRAM(s) IV Intermittent every 24 hours    MEDICATIONS  (PRN):  acetaminophen   Tablet .. 650 milliGRAM(s) Oral once PRN Temp greater or equal to 38C (100.4F)  aluminum hydroxide/magnesium hydroxide/simethicone Suspension 30 milliLiter(s) Oral four times a day PRN Indigestion  dextrose 40% Gel 15 Gram(s) Oral once PRN Blood Glucose LESS THAN 70 milliGRAM(s)/deciliter  glucagon  Injectable 1 milliGRAM(s) IntraMuscular once PRN Glucose LESS THAN 70 milligrams/deciliter  HYDROmorphone  Injectable 0.5 milliGRAM(s) IV Push every 4 hours PRN breakthrough pain  HYDROmorphone  Injectable 0.5 milliGRAM(s) IV Push every 15 minutes PRN Severe Pain (7 - 10)  magnesium hydroxide Suspension 30 milliLiter(s) Oral daily PRN Constipation  ondansetron Injectable 4 milliGRAM(s) IV Push every 6 hours PRN Nausea and/or Vomiting  oxyCODONE    IR 5 milliGRAM(s) Oral every 4 hours PRN Moderate Pain (4 - 6)  oxyCODONE    IR 10 milliGRAM(s) Oral every 4 hours PRN Severe Pain (7 - 10)  senna 2 Tablet(s) Oral at bedtime PRN Constipation      Allergies    No Known Allergies    Intolerances

## 2020-04-08 NOTE — PROGRESS NOTE ADULT - SUBJECTIVE AND OBJECTIVE BOX
INTERVAL HPI/OVERNIGHT EVENTS: Denies R knee pain. Dry cough. Denies SOB.    CONSTITUTIONAL:  Negative fever or chills, feels well, good appetite  EYES:  Negative  blurry vision or double vision  CARDIOVASCULAR:  Negative for chest pain or palpitations  RESPIRATORY:  Negative for cough, wheezing, or SOB   GASTROINTESTINAL:  Negative for nausea, vomiting, diarrhea, constipation, or abdominal pain  GENITOURINARY:  Negative frequency, urgency or dysuria  NEUROLOGIC:  No headache, confusion, dizziness, lightheadedness      ANTIBIOTICS/RELEVANT:    MEDICATIONS  (STANDING):  acetaminophen   Tablet .. 975 milliGRAM(s) Oral every 8 hours  aspirin enteric coated 81 milliGRAM(s) Oral two times a day  cefTRIAXone   IVPB 2000 milliGRAM(s) IV Intermittent every 24 hours  clopidogrel Tablet 75 milliGRAM(s) Oral daily  dextrose 5%. 1000 milliLiter(s) (50 mL/Hr) IV Continuous <Continuous>  dextrose 50% Injectable 12.5 Gram(s) IV Push once  dextrose 50% Injectable 25 Gram(s) IV Push once  dextrose 50% Injectable 25 Gram(s) IV Push once  insulin glargine Injectable (LANTUS) 17 Unit(s) SubCutaneous at bedtime  insulin lispro (HumaLOG) corrective regimen sliding scale   SubCutaneous Before meals and at bedtime  magnesium oxide 400 milliGRAM(s) Oral once  polyethylene glycol 3350 17 Gram(s) Oral daily  tamsulosin 0.4 milliGRAM(s) Oral every 12 hours  vancomycin  IVPB 1250 milliGRAM(s) IV Intermittent every 24 hours    MEDICATIONS  (PRN):  acetaminophen   Tablet .. 650 milliGRAM(s) Oral once PRN Temp greater or equal to 38C (100.4F)  aluminum hydroxide/magnesium hydroxide/simethicone Suspension 30 milliLiter(s) Oral four times a day PRN Indigestion  dextrose 40% Gel 15 Gram(s) Oral once PRN Blood Glucose LESS THAN 70 milliGRAM(s)/deciliter  glucagon  Injectable 1 milliGRAM(s) IntraMuscular once PRN Glucose LESS THAN 70 milligrams/deciliter  HYDROmorphone  Injectable 0.5 milliGRAM(s) IV Push every 4 hours PRN breakthrough pain  HYDROmorphone  Injectable 0.5 milliGRAM(s) IV Push every 15 minutes PRN Severe Pain (7 - 10)  magnesium hydroxide Suspension 30 milliLiter(s) Oral daily PRN Constipation  ondansetron Injectable 4 milliGRAM(s) IV Push every 6 hours PRN Nausea and/or Vomiting  oxyCODONE    IR 5 milliGRAM(s) Oral every 4 hours PRN Moderate Pain (4 - 6)  oxyCODONE    IR 10 milliGRAM(s) Oral every 4 hours PRN Severe Pain (7 - 10)  senna 2 Tablet(s) Oral at bedtime PRN Constipation        Vital Signs Last 24 Hrs  T(C): 38.1 (2020 04:48), Max: 38.1 (2020 04:48)  T(F): 100.6 (2020 04:48), Max: 100.6 (2020 04:48)  HR: 93 (2020 04:48) (74 - 93)  BP: 136/67 (2020 04:48) (121/71 - 146/73)  BP(mean): --  RR: 19 (2020 04:48) (14 - 19)  SpO2: 98% (2020 04:48) (94% - 100%)    PHYSICAL EXAM:  Constitutional:Well-developed, well nourished  Eyes:DANA, EOMI  Ear/Nose/Throat: no oral lesion, no sinus tenderness on percussion	  Neck:no JVD, no lymphadenopathy, supple  Respiratory: CTA kris  Cardiovascular: S1S2 RRR, no murmurs  Gastrointestinal:soft, (+) BS, no HSM  Extremities:no e/e/c; RLE dressed  Vascular: DP Pulse:	right normal; left normal      LABS:                        9.1    8.17  )-----------( 234      ( 2020 08:26 )             28.7     04-08    139  |  101  |  18  ----------------------------<  108<H>  4.2   |  25  |  1.13    Ca    8.9      2020 08:26  Phos  4.0     04-08  Mg     1.8     04-08    TPro  6.2  /  Alb  3.5  /  TBili  0.2  /  DBili  x   /  AST  15  /  ALT  8<L>  /  AlkPhos  56  04-08    PT/INR - ( 2020 13:31 )   PT: 11.5 sec;   INR: 1.01          PTT - ( 2020 13:31 )  PTT:28.4 sec  Urinalysis Basic - ( 2020 19:32 )    Color: Yellow / Appearance: Clear / S.010 / pH: x  Gluc: x / Ketone: NEGATIVE  / Bili: Negative / Urobili: 0.2 E.U./dL   Blood: x / Protein: NEGATIVE mg/dL / Nitrite: NEGATIVE   Leuk Esterase: NEGATIVE / RBC: x / WBC x   Sq Epi: x / Non Sq Epi: x / Bacteria: x        MICROBIOLOGY: Culture - Body Fluid with Gram Stain (20 @ 12:17)    Gram Stain:   No organisms seen  Few WBC's    Specimen Source: .Body Fluid 1-right knee synovium fluid culture    Culture Results:   Rare Staphylococcus aureus  Susceptibility to follow.        RADIOLOGY & ADDITIONAL STUDIES: reviewed

## 2020-04-08 NOTE — PHYSICAL THERAPY INITIAL EVALUATION ADULT - GENERAL OBSERVATIONS, REHAB EVAL
pt received/returned semi-supine in bed +IV, +R knee immobilizer and vac C/D/I, +LLE SCD, c/o R knee pain, SpO2 >97% throughout on room air

## 2020-04-08 NOTE — PROGRESS NOTE ADULT - PROBLEM SELECTOR PLAN 5
-On ISS  -holding metformin 1000mg BID while in the hospital  -Will give half patient's home qHS dose of Lantus 35, will trend FS  -On moderate ISS

## 2020-04-08 NOTE — PROGRESS NOTE ADULT - ASSESSMENT
78 yo male with hx CAD, DM, R TKR 2002, revision 3/6/20, now p/w wound dehiscence and drainage from incision c/f SSI s/p washout 4/7 with purulence noted in joint space and concern for hardware involvement--preliminary OR cultures growing MSSA. Also found to be COVID-19+.  - d/c vancomycin and ceftriaxone and start cefazolin 2g IV q8h + rifampin 600mg PO q24h (for biofilm penetration); anticipate 6 week course of above through 5/18 followed by transition to chronic suppressive therapy with PO cephalexin X 3 mos  - PICC  - weekly CBC, CMP, ESR, CRP to be faxed to [phone]  - check Strongyloides Ab given unexplained peripheral eosinophilia  - re: COVID-19 currently with minimal symptoms; if develops significant hypoxia (< 94%/RA) can commence COVID bundle    Will arrange post hospital f/u with me in 3 weeks    ID Team 2

## 2020-04-08 NOTE — PHYSICAL THERAPY INITIAL EVALUATION ADULT - PLANNED THERAPY INTERVENTIONS, PT EVAL
balance training/transfer training/gait training/postural re-education/strengthening/bed mobility training

## 2020-04-08 NOTE — CONSULT NOTE ADULT - SUBJECTIVE AND OBJECTIVE BOX
Patient is a 79y old  Male who presents with a chief complaint of R knee postop infection (2020 08:33)       HPI:  Pt is 79M PMH CAD s/p stents 6-7 years ago, DM, HTN, HLD now presenting with R knee surgical site infection. Pt had R TKA done in , followed by revision with poly exchange last month 3/6/20 for knee pain. Pt states that incision initially had some yellow crusting but gradually became more painful and opened up slightly and has been draining some yellowish fluid. Pt denies fever, chills, SOB. Denies any numbness or tingling in R leg and has been able to continue walking on affected leg (2020 14:28)      PAST MEDICAL & SURGICAL HISTORY:  Anemia  Hyperlipidemia  CAD (coronary artery disease)  DM (diabetes mellitus)  Hypertension  H/O shoulder surgery: Right  Umbilical hernia: Umbilical and inguinal hernia  H/O total knee replacement, bilateral  CAD (coronary artery disease): stents placement      MEDICATIONS  (STANDING):  acetaminophen   Tablet .. 975 milliGRAM(s) Oral every 8 hours  aspirin enteric coated 81 milliGRAM(s) Oral two times a day  cefTRIAXone   IVPB 2000 milliGRAM(s) IV Intermittent every 24 hours  clopidogrel Tablet 75 milliGRAM(s) Oral daily  dextrose 5%. 1000 milliLiter(s) (50 mL/Hr) IV Continuous <Continuous>  dextrose 50% Injectable 12.5 Gram(s) IV Push once  dextrose 50% Injectable 25 Gram(s) IV Push once  dextrose 50% Injectable 25 Gram(s) IV Push once  insulin glargine Injectable (LANTUS) 17 Unit(s) SubCutaneous at bedtime  insulin lispro (HumaLOG) corrective regimen sliding scale   SubCutaneous Before meals and at bedtime  polyethylene glycol 3350 17 Gram(s) Oral daily  tamsulosin 0.4 milliGRAM(s) Oral every 12 hours  vancomycin  IVPB 1250 milliGRAM(s) IV Intermittent every 24 hours    MEDICATIONS  (PRN):  acetaminophen   Tablet .. 650 milliGRAM(s) Oral once PRN Temp greater or equal to 38C (100.4F)  aluminum hydroxide/magnesium hydroxide/simethicone Suspension 30 milliLiter(s) Oral four times a day PRN Indigestion  dextrose 40% Gel 15 Gram(s) Oral once PRN Blood Glucose LESS THAN 70 milliGRAM(s)/deciliter  glucagon  Injectable 1 milliGRAM(s) IntraMuscular once PRN Glucose LESS THAN 70 milligrams/deciliter  HYDROmorphone  Injectable 0.5 milliGRAM(s) IV Push every 4 hours PRN breakthrough pain  HYDROmorphone  Injectable 0.5 milliGRAM(s) IV Push every 15 minutes PRN Severe Pain (7 - 10)  magnesium hydroxide Suspension 30 milliLiter(s) Oral daily PRN Constipation  ondansetron Injectable 4 milliGRAM(s) IV Push every 6 hours PRN Nausea and/or Vomiting  oxyCODONE    IR 5 milliGRAM(s) Oral every 4 hours PRN Moderate Pain (4 - 6)  oxyCODONE    IR 10 milliGRAM(s) Oral every 4 hours PRN Severe Pain (7 - 10)  senna 2 Tablet(s) Oral at bedtime PRN Constipation      FAMILY HISTORY:      CBC Full  -  ( 2020 08:26 )  WBC Count : 8.17 K/uL  RBC Count : 3.01 M/uL  Hemoglobin : 9.1 g/dL  Hematocrit : 28.7 %  Platelet Count - Automated : 234 K/uL  Mean Cell Volume : 95.3 fl  Mean Cell Hemoglobin : 30.2 pg  Mean Cell Hemoglobin Concentration : 31.7 gm/dL  Auto Neutrophil # : 5.18 K/uL  Auto Lymphocyte # : 1.01 K/uL  Auto Monocyte # : 0.93 K/uL  Auto Eosinophil # : 1.00 K/uL  Auto Basophil # : 0.02 K/uL  Auto Neutrophil % : 63.4 %  Auto Lymphocyte % : 12.4 %  Auto Monocyte % : 11.4 %  Auto Eosinophil % : 12.2 %  Auto Basophil % : 0.2 %          140  |  104  |  17  ----------------------------<  78  4.5   |  25  |  0.91    Ca    9.6      2020 09:25    TPro  7.1  /  Alb  3.9  /  TBili  0.3  /  DBili  x   /  AST  14  /  ALT  10  /  AlkPhos  64  04-07      Urinalysis Basic - ( 2020 19:32 )    Color: Yellow / Appearance: Clear / S.010 / pH: x  Gluc: x / Ketone: NEGATIVE  / Bili: Negative / Urobili: 0.2 E.U./dL   Blood: x / Protein: NEGATIVE mg/dL / Nitrite: NEGATIVE   Leuk Esterase: NEGATIVE / RBC: x / WBC x   Sq Epi: x / Non Sq Epi: x / Bacteria: x          Radiology:    < from: Xray Knee 1 or 2 Views, Right (20 @ 11:45) >    EXAM:  XR KNEE-RIGHT-1 OR 2 VIEWS                          PROCEDURE DATE:  2020          INTERPRETATION:  INDICATION: post op    2 views of the right knee have been submitted. A prior study dated 2020 is retrieved for correlation. Gas is now present in the soft tissues. There are anterior cutaneous staples. Overlying drain is present. No fracture is identified.      IMPRESSION: Right total knee arthroplasty with evidence of recent manipulation.          Vital Signs Last 24 Hrs  T(C): 38.1 (2020 04:48), Max: 38.1 (2020 04:48)  T(F): 100.6 (2020 04:48), Max: 100.6 (2020 04:48)  HR: 93 (2020 04:48) (74 - 93)  BP: 136/67 (2020 04:48) (121/71 - 146/73)  BP(mean): --  RR: 19 (2020 04:48) (14 - 19)  SpO2: 98% (2020 04:48) (94% - 100%)    REVIEW OF SYSTEMS:    CONSTITUTIONAL: No fever, weight loss, or fatigue  EYES: No eye pain, visual disturbances, or discharge  ENMT:  No difficulty hearing, tinnitus, vertigo; No sinus or throat pain  NECK: No pain or stiffness  BREASTS: No pain, masses, or nipple discharge  RESPIRATORY: No cough, wheezing, chills or hemoptysis; No shortness of breath  CARDIOVASCULAR: No chest pain, palpitations, dizziness, or leg swelling  GASTROINTESTINAL: No abdominal or epigastric pain. No nausea, vomiting, or hematemesis; No diarrhea or constipation. No melena or hematochezia.  GENITOURINARY: No dysuria, frequency, hematuria, or incontinence  NEUROLOGICAL: No headaches, memory loss, loss of strength, numbness, or tremors  SKIN: No itching, burning, rashes, or lesions   LYMPH NODES: No enlarged glands  ENDOCRINE: No heat or cold intolerance; No hair loss  MUSCULOSKELETAL: right knee pain  PSYCHIATRIC: No depression, anxiety, mood swings, or difficulty sleeping  HEME/LYMPH: No easy bruising, or bleeding gums  ALLERGY AND IMMUNOLOGIC: No hives or eczema  VASCULAR: no swelling, erythema        PE : on Covid isolation, per protocol PE refer to 2020 progress note    General: In no acute distress, resting comfortably in bed  Respiratory: No respiratory distress, no using accessory muscles  Cardiovascular: RRR, normal S1 and S2  Abdomen: Soft, NT/ND.  Extremities: Right knee: in immobilizer, dressing C/D/I, 2+DP/PT pulses  Neuro: AAOx3. Strength and sensation intact throughout.  Physical Exam: on COVID isolation, per protocol refer to PE on 2020      PM&R Impression:    1)  s/p I&D R TKA w poly exchange  2) weight bearing as tolerated    Plan:    1) Physical therapy focusing on therapeutic exercises, bed mobility/transfer out of bed evaluation, progressive ambulation with assistive devices prn.    2) Anticipated Disposition Plan/Recs: pending functional progress

## 2020-04-08 NOTE — PROGRESS NOTE ADULT - SUBJECTIVE AND OBJECTIVE BOX
Ortho Progress Note    Pt comfortable without complaints, pain controlled. Lowgrade fever to 100.6F this AM    Denies CP, SOB, N/V, numbness/tingling     Vital Signs Last 24 Hrs  T(C): 38.1 (08 Apr 2020 04:48), Max: 38.1 (08 Apr 2020 04:48)  T(F): 100.6 (08 Apr 2020 04:48), Max: 100.6 (08 Apr 2020 04:48)  HR: 93 (08 Apr 2020 04:48) (65 - 93)  BP: 136/67 (08 Apr 2020 04:48) (121/71 - 146/73)  BP(mean): --  RR: 19 (08 Apr 2020 04:48) (14 - 20)  SpO2: 98% (08 Apr 2020 04:48) (94% - 100%)    AVSS  General: Pt Alert and oriented, NAD  RLE: KI in place; Ace wrap DSG C/D/I; Prevena holding suction  Pulses: Foot WWP; DP pulse 2+; Cap refill < 2 sec  Sensation: SILT and symmetric to contralateral extremity  Motor: EHL/FHL/TA/GS 5/5 and symmetric to contralateral extremity                            10.4   7.09  )-----------( 256      ( 06 Apr 2020 13:31 )             32.3     Culture - Body Fluid with Gram Stain (04.07.20 @ 12:17)    Gram Stain:   No organisms seen  Few WBC's    Specimen Source: .Body Fluid 1-right knee synovium fluid culture    Culture - Tissue with Gram Stain (04.07.20 @ 12:03)    Gram Stain:   No organisms seen  No WBC's seen.    Specimen Source: .Tissue Right Knee Subcutaneous Tissue Ortho Progress Note    Pt comfortable without complaints, pain controlled. Lowgrade fever to 100.6F this AM    Denies CP, SOB, N/V, numbness/tingling     Vital Signs Last 24 Hrs  T(C): 38.1 (08 Apr 2020 04:48), Max: 38.1 (08 Apr 2020 04:48)  T(F): 100.6 (08 Apr 2020 04:48), Max: 100.6 (08 Apr 2020 04:48)  HR: 93 (08 Apr 2020 04:48) (65 - 93)  BP: 136/67 (08 Apr 2020 04:48) (121/71 - 146/73)  BP(mean): --  RR: 19 (08 Apr 2020 04:48) (14 - 20)  SpO2: 98% (08 Apr 2020 04:48) (94% - 100%)    AVSS  General: Pt Alert and oriented, NAD  RLE: KI in place; Ace wrap DSG C/D/I; Prevena holding suction  Pulses: Foot WWP; DP pulse 2+; Cap refill < 2 sec  Sensation: SILT and symmetric to contralateral extremity  Motor: EHL/FHL/TA/GS 5/5 and symmetric to contralateral extremity                            10.4   7.09  )-----------( 256      ( 06 Apr 2020 13:31 )             32.3     Culture - Body Fluid with Gram Stain (04.07.20 @ 12:17)    Gram Stain:   No organisms seen  Few WBC's    Specimen Source: .Body Fluid 1-right knee synovium fluid culture    Culture - Tissue with Gram Stain (04.07.20 @ 12:03)    Gram Stain:   No organisms seen  No WBC's seen.    Specimen Source: .Tissue Right Knee Subcutaneous Tissue    ATTENDING ADDENDUM: right knee synovial tissue and fluid cultures are now growing MSSA

## 2020-04-08 NOTE — PROGRESS NOTE ADULT - PROBLEM/PLAN-2
Visit with patient to build rapport with . Patient is calm. Receptive to  presence. Encouraged and assured patient of our continued care.     Salina Wood,  Staff   C: [phone]  /  [email or web address] DISPLAY PLAN FREE TEXT

## 2020-04-08 NOTE — PROGRESS NOTE ADULT - ASSESSMENT
A/P: 79yMale s/p I&D R TKA w poly exchange  - Stable  - Pain Control  - DVT ppx: home ASA/Plavix  - Post op abx: Vanc/Ceftriaxone per ID recs  - f/u intra-op cultures  - WBS: WBAT RLE  - PT  - Dispo: pending PT clearance and final ID plan    Ortho Pager [phone] A/P: 79y Male s/p I&D R TKA w poly exchange for acute periprosthetic joint infection; also newly diagnosed with COVID-19  - Pain Control  - DVT ppx: home ASA/Plavix  - Ongoing definitive antibiotic management: ceftriaxone 2g IV q8hr and rifampin 600mg PO daily x 6 weeks, continue rifampin x 3 additional months as per ID  - Place PICC line  - Obtain Stringyloides ab as per ID  - f/u final intra-op cultures  - WBS: WBAT RLE  - Maintain knee immobilizer x 2 weeks, ok to ambulate in immobilizer with assistive device  - OOBTC, advance as tolerated  - PT for mobilization  - Dispo: pending PT clearance, PICC, home infusion services    Ortho Pager [phone]

## 2020-04-08 NOTE — PROGRESS NOTE ADULT - PROBLEM SELECTOR PLAN 4
-On room air, satting 97%  -CRP and Ferritin normal, D-Dimer mildly elevated to 824  -No therapy indicated   -No need to trend markers

## 2020-04-09 LAB
-  CEFAZOLIN: SIGNIFICANT CHANGE UP
-  CLINDAMYCIN: SIGNIFICANT CHANGE UP
-  ERYTHROMYCIN: SIGNIFICANT CHANGE UP
-  LINEZOLID: SIGNIFICANT CHANGE UP
-  OXACILLIN: SIGNIFICANT CHANGE UP
-  RIFAMPIN: SIGNIFICANT CHANGE UP
-  TRIMETHOPRIM/SULFAMETHOXAZOLE: SIGNIFICANT CHANGE UP
-  VANCOMYCIN: SIGNIFICANT CHANGE UP
ANION GAP SERPL CALC-SCNC: 13 MMOL/L — SIGNIFICANT CHANGE UP (ref 5–17)
BUN SERPL-MCNC: 15 MG/DL — SIGNIFICANT CHANGE UP (ref 7–23)
CALCIUM SERPL-MCNC: 9.8 MG/DL — SIGNIFICANT CHANGE UP (ref 8.4–10.5)
CHLORIDE SERPL-SCNC: 103 MMOL/L — SIGNIFICANT CHANGE UP (ref 96–108)
CO2 SERPL-SCNC: 25 MMOL/L — SIGNIFICANT CHANGE UP (ref 22–31)
CREAT SERPL-MCNC: 1.03 MG/DL — SIGNIFICANT CHANGE UP (ref 0.5–1.3)
CRP SERPL-MCNC: 16.8 MG/DL — HIGH (ref 0–0.4)
CULTURE RESULTS: SIGNIFICANT CHANGE UP
ERYTHROCYTE [SEDIMENTATION RATE] IN BLOOD: 104 MM/HR — HIGH
GLUCOSE BLDC GLUCOMTR-MCNC: 128 MG/DL — HIGH (ref 70–99)
GLUCOSE BLDC GLUCOMTR-MCNC: 136 MG/DL — HIGH (ref 70–99)
GLUCOSE BLDC GLUCOMTR-MCNC: 137 MG/DL — HIGH (ref 70–99)
GLUCOSE BLDC GLUCOMTR-MCNC: 292 MG/DL — HIGH (ref 70–99)
GLUCOSE SERPL-MCNC: 144 MG/DL — HIGH (ref 70–99)
HCT VFR BLD CALC: 30.9 % — LOW (ref 39–50)
HGB BLD-MCNC: 9.7 G/DL — LOW (ref 13–17)
MAGNESIUM SERPL-MCNC: 2.2 MG/DL — SIGNIFICANT CHANGE UP (ref 1.6–2.6)
MCHC RBC-ENTMCNC: 30 PG — SIGNIFICANT CHANGE UP (ref 27–34)
MCHC RBC-ENTMCNC: 31.4 GM/DL — LOW (ref 32–36)
MCV RBC AUTO: 95.7 FL — SIGNIFICANT CHANGE UP (ref 80–100)
METHOD TYPE: SIGNIFICANT CHANGE UP
NRBC # BLD: 0 /100 WBCS — SIGNIFICANT CHANGE UP (ref 0–0)
ORGANISM # SPEC MICROSCOPIC CNT: SIGNIFICANT CHANGE UP
PHOSPHATE SERPL-MCNC: 3.3 MG/DL — SIGNIFICANT CHANGE UP (ref 2.5–4.5)
PLATELET # BLD AUTO: 265 K/UL — SIGNIFICANT CHANGE UP (ref 150–400)
POTASSIUM SERPL-MCNC: 4.5 MMOL/L — SIGNIFICANT CHANGE UP (ref 3.5–5.3)
POTASSIUM SERPL-SCNC: 4.5 MMOL/L — SIGNIFICANT CHANGE UP (ref 3.5–5.3)
RBC # BLD: 3.23 M/UL — LOW (ref 4.2–5.8)
RBC # FLD: 13.3 % — SIGNIFICANT CHANGE UP (ref 10.3–14.5)
SODIUM SERPL-SCNC: 141 MMOL/L — SIGNIFICANT CHANGE UP (ref 135–145)
SPECIMEN SOURCE: SIGNIFICANT CHANGE UP
WBC # BLD: 10.72 K/UL — HIGH (ref 3.8–10.5)
WBC # FLD AUTO: 10.72 K/UL — HIGH (ref 3.8–10.5)

## 2020-04-09 PROCEDURE — 99232 SBSQ HOSP IP/OBS MODERATE 35: CPT | Mod: CS,GC

## 2020-04-09 RX ORDER — LANOLIN ALCOHOL/MO/W.PET/CERES
5 CREAM (GRAM) TOPICAL AT BEDTIME
Refills: 0 | Status: DISCONTINUED | OUTPATIENT
Start: 2020-04-09 | End: 2020-04-13

## 2020-04-09 RX ORDER — AMLODIPINE BESYLATE 2.5 MG/1
5 TABLET ORAL AT BEDTIME
Refills: 0 | Status: DISCONTINUED | OUTPATIENT
Start: 2020-04-09 | End: 2020-04-11

## 2020-04-09 RX ORDER — LOSARTAN POTASSIUM 100 MG/1
100 TABLET, FILM COATED ORAL DAILY
Refills: 0 | Status: DISCONTINUED | OUTPATIENT
Start: 2020-04-09 | End: 2020-04-13

## 2020-04-09 RX ORDER — ENOXAPARIN SODIUM 100 MG/ML
40 INJECTION SUBCUTANEOUS EVERY 24 HOURS
Refills: 0 | Status: DISCONTINUED | OUTPATIENT
Start: 2020-04-09 | End: 2020-04-13

## 2020-04-09 RX ORDER — AMLODIPINE BESYLATE 2.5 MG/1
5 TABLET ORAL ONCE
Refills: 0 | Status: DISCONTINUED | OUTPATIENT
Start: 2020-04-09 | End: 2020-04-09

## 2020-04-09 RX ORDER — ATORVASTATIN CALCIUM 80 MG/1
20 TABLET, FILM COATED ORAL AT BEDTIME
Refills: 0 | Status: DISCONTINUED | OUTPATIENT
Start: 2020-04-09 | End: 2020-04-13

## 2020-04-09 RX ADMIN — Medication 81 MILLIGRAM(S): at 06:32

## 2020-04-09 RX ADMIN — Medication 100 MILLIGRAM(S): at 22:08

## 2020-04-09 RX ADMIN — POLYETHYLENE GLYCOL 3350 17 GRAM(S): 17 POWDER, FOR SOLUTION ORAL at 12:47

## 2020-04-09 RX ADMIN — CLOPIDOGREL BISULFATE 75 MILLIGRAM(S): 75 TABLET, FILM COATED ORAL at 12:49

## 2020-04-09 RX ADMIN — Medication 100 MILLIGRAM(S): at 13:14

## 2020-04-09 RX ADMIN — AMLODIPINE BESYLATE 5 MILLIGRAM(S): 2.5 TABLET ORAL at 22:08

## 2020-04-09 RX ADMIN — LOSARTAN POTASSIUM 100 MILLIGRAM(S): 100 TABLET, FILM COATED ORAL at 18:15

## 2020-04-09 RX ADMIN — ATORVASTATIN CALCIUM 20 MILLIGRAM(S): 80 TABLET, FILM COATED ORAL at 22:08

## 2020-04-09 RX ADMIN — Medication 975 MILLIGRAM(S): at 12:48

## 2020-04-09 RX ADMIN — Medication 975 MILLIGRAM(S): at 06:32

## 2020-04-09 RX ADMIN — INSULIN GLARGINE 17 UNIT(S): 100 INJECTION, SOLUTION SUBCUTANEOUS at 22:08

## 2020-04-09 RX ADMIN — TAMSULOSIN HYDROCHLORIDE 0.4 MILLIGRAM(S): 0.4 CAPSULE ORAL at 06:32

## 2020-04-09 RX ADMIN — ENOXAPARIN SODIUM 40 MILLIGRAM(S): 100 INJECTION SUBCUTANEOUS at 13:14

## 2020-04-09 RX ADMIN — TAMSULOSIN HYDROCHLORIDE 0.4 MILLIGRAM(S): 0.4 CAPSULE ORAL at 17:36

## 2020-04-09 RX ADMIN — Medication 5 MILLIGRAM(S): at 22:11

## 2020-04-09 RX ADMIN — Medication 100 MILLIGRAM(S): at 06:32

## 2020-04-09 RX ADMIN — Medication 6: at 22:09

## 2020-04-09 RX ADMIN — CHLORHEXIDINE GLUCONATE 1 APPLICATION(S): 213 SOLUTION TOPICAL at 06:32

## 2020-04-09 RX ADMIN — Medication 975 MILLIGRAM(S): at 22:07

## 2020-04-09 NOTE — PROGRESS NOTE ADULT - PROBLEM SELECTOR PLAN 1
#s/pR Knee I&D with polymer exchange 4/7/20  -Pain control  -F/u OR joint fluid cx  -f/u PT eval  -F/u Ortho recs  -Continue Vanco and Ceftriaxone #s/p R Knee I&D with polymer exchange 4/7/20  -F/u Ortho and ID recs  -F/u OR joint fluid cx, still in process  -Slight increase in WBC, ESR and CRP today likely 2/2 to recent procedure and not COVID. Will continue to monitor, trend fevers, trend labs  -Continue cefazolin IV and Rifampin PO for 6 weeks total followed by suppressive therapy w/ PO cephalexin x3 months  -PICC placed 4/8  -Pain controlled w/ PRN dialudid  -DVT ppx: home ASA/Plavix per Ortho, will check if heparin subq or Lovenox ok  -F/u PT eval/recs, keep knee immobilizer 2 weeks, ok to ambulate in immobilizer w/ assistive device  -Dispo: CAP unit while pending home PT and home infusion clearance. Antibiotics as above and weekly blood work to Dr Lorenzana

## 2020-04-09 NOTE — PROGRESS NOTE ADULT - PROBLEM SELECTOR PLAN 4
-On room air, satting 97%  -CRP and Ferritin normal, D-Dimer mildly elevated to 824  -No therapy indicated   -No need to trend markers -On moderate ISS with overall fingersticks stable, goal <180s  -Pt on half home qHS dose of Lantus 35, will trend FS and increase as needed  -Holding home metformin while in the hospital

## 2020-04-09 NOTE — PROGRESS NOTE ADULT - ASSESSMENT
Pt is 79M w/ PMH CAD s/p stents 6-7 years ago, T2DM, HTN, HLD, L TKA (2006) and R TKA (2002, followed by revision with poly exchange on 3/6/20 for knee pain) who presented for I/D of R TKA s/p polyexchange on 3/6. Pt is 79M w/ PMH CAD s/p stents 6-7 years ago, T2DM, HTN, HLD, L TKA (2006) and R TKA (2002, followed by revision with poly exchange on 3/6/20 for knee pain) who presented for I/D of R TKA s/p polyexchange on 3/6. I/D was completed on 4/7 by Ortho. Pt also with +COVID.      HOSPITAL  COURSE:  4/7: s/p R knee polymer liner exchange and knee washout with Ortho, Got Vanc and ceftriaxone in OR. Full weight bearing PT ordered, inflam marker neg except DDimer 824, EKG Qtc 386 ms, lantus 17 units qnight  O/N: retaining -> so straight cathed and 1L removed. Given tamsulosin 0.4mg x1 (patient states he takes 0.4 mg BID) and tylenol for fever. Plan to send cultures.   4/8: CBC stable - restarted on Plavix/ASA started per Ortho as dvt ppx. PICC placed R basilic, RX sent for IV home infusion, PT recs home no need, passed TOV voided 600cc  O/N: SIMONE  4/9: Home lipitor restarted. Voiding. Slight increase in WBC/ESR/CRP likely 2/2 knee infectious process. On RA stable w/ no pulmonary symptoms. Pt is 79M w/ PMH CAD s/p stents 6-7 years ago, T2DM, HTN, HLD, L TKA (2006) and R TKA (2002, followed by revision with poly exchange on 3/6/20 for knee pain) who presented for I/D of R TKA s/p polyexchange on 3/6. I/D was completed on 4/7 by Ortho. Pt also with +COVID.

## 2020-04-09 NOTE — PROGRESS NOTE ADULT - ASSESSMENT
A/P: 79y Male s/p I&D R TKA w poly exchange for acute periprosthetic joint infection; also newly diagnosed with COVID-19  - Pain Control  - DVT ppx: home ASA/Plavix  - Ongoing definitive antibiotic management: ceftriaxone 2g IV q8hr and rifampin 600mg PO daily x 6 weeks, continue rifampin x 3 additional months as per ID  - f/u Strongyloides ab as per ID  - f/u final intra-op cultures  - WBS: WBAT RLE  - Maintain knee immobilizer x 2 weeks, ok to ambulate in immobilizer with assistive device  - Dispo: pending PT clearance, home infusion services    Ortho Pager [phone] A/P: 79y Male s/p I&D R TKA w poly exchange for acute periprosthetic joint infection; also newly diagnosed with COVID-19  - Pain Control  - DVT ppx: home ASA/Plavix  - Ongoing definitive antibiotic management: cefazolin IV and rifampin PO x 6 weeks, continue cephalexin x 3 additional months as per ID  - f/u Strongyloides ab as per ID  - f/u final intra-op cultures  - WBS: WBAT RLE  - Maintain knee immobilizer x 2 weeks, ok to ambulate in immobilizer with assistive device  - Dispo: pending PT clearance, home infusion services    Ortho Pager [phone]

## 2020-04-09 NOTE — CHART NOTE - NSCHARTNOTEFT_GEN_A_CORE
Admitting Diagnosis:   Patient is a 79y old  Male who presents with a chief complaint of R knee postop infection (09 Apr 2020 09:06)      Consult: Yes [   ]  No [ x  ]    Reason for Initial Nutrition Assessment:LOS      PAST MEDICAL & SURGICAL HISTORY:  Anemia  Hyperlipidemia  CAD (coronary artery disease)  DM (diabetes mellitus)  Hypertension  H/O shoulder surgery: Right  Umbilical hernia: Umbilical and inguinal hernia  H/O total knee replacement, bilateral  CAD (coronary artery disease): stents placement      Current Nutrition Order :C-CHO no snack    PO Intake: Excellent (%) [ x  ]  Good (50-75%) [   ]  Fair (25-50%) [   ]  Poor (<25%) [   ]    GI Issues: denied    Pain: some but bare able    Skin Integrity: surgical incision    Labs:   04-09    141  |  103  |  15  ----------------------------<  144<H>  4.5   |  25  |  1.03    Ca    9.8      09 Apr 2020 07:37  Phos  3.3     04-09  Mg     2.2     04-09    TPro  6.2  /  Alb  3.5  /  TBili  0.2  /  DBili  x   /  AST  15  /  ALT  8<L>  /  AlkPhos  56  04-08    CAPILLARY BLOOD GLUCOSE      POCT Blood Glucose.: 137 mg/dL (09 Apr 2020 08:14)  POCT Blood Glucose.: 174 mg/dL (08 Apr 2020 22:27)  POCT Blood Glucose.: 139 mg/dL (08 Apr 2020 18:04)  POCT Blood Glucose.: 163 mg/dL (08 Apr 2020 12:52)    Nutritionally Pertinent Lab Values:    Medications:  MEDICATIONS  (STANDING):  acetaminophen   Tablet .. 975 milliGRAM(s) Oral every 8 hours  aspirin enteric coated 81 milliGRAM(s) Oral two times a day  atorvastatin 20 milliGRAM(s) Oral at bedtime  ceFAZolin   IVPB 2000 milliGRAM(s) IV Intermittent every 8 hours  chlorhexidine 2% Cloths 1 Application(s) Topical <User Schedule>  clopidogrel Tablet 75 milliGRAM(s) Oral daily  dextrose 5%. 1000 milliLiter(s) (50 mL/Hr) IV Continuous <Continuous>  dextrose 50% Injectable 12.5 Gram(s) IV Push once  dextrose 50% Injectable 25 Gram(s) IV Push once  dextrose 50% Injectable 25 Gram(s) IV Push once  insulin glargine Injectable (LANTUS) 17 Unit(s) SubCutaneous at bedtime  insulin lispro (HumaLOG) corrective regimen sliding scale   SubCutaneous Before meals and at bedtime  polyethylene glycol 3350 17 Gram(s) Oral daily  rifAMPin 600 milliGRAM(s) Oral daily  tamsulosin 0.4 milliGRAM(s) Oral every 12 hours    MEDICATIONS  (PRN):  aluminum hydroxide/magnesium hydroxide/simethicone Suspension 30 milliLiter(s) Oral four times a day PRN Indigestion  bisacodyl Suppository 10 milliGRAM(s) Rectal daily PRN If no bowel movement by postoperative day #2  dextrose 40% Gel 15 Gram(s) Oral once PRN Blood Glucose LESS THAN 70 milliGRAM(s)/deciliter  glucagon  Injectable 1 milliGRAM(s) IntraMuscular once PRN Glucose LESS THAN 70 milligrams/deciliter  HYDROmorphone   Tablet 2 milliGRAM(s) Oral every 6 hours PRN Severe Pain (7 - 10)  HYDROmorphone  Injectable 0.5 milliGRAM(s) IV Push every 4 hours PRN breakthrough pain  magnesium hydroxide Suspension 30 milliLiter(s) Oral daily PRN Constipation  ondansetron Injectable 4 milliGRAM(s) IV Push every 6 hours PRN Nausea and/or Vomiting  senna 2 Tablet(s) Oral at bedtime PRN Constipation  sodium chloride 0.9% lock flush 10 milliLiter(s) IV Push every 1 hour PRN Pre/post blood products, medications, blood draw, and to maintain line patency      Admitted Anthropometrics:    Weight:73kg  Daily     Daily no updated weights     Weight Change: no weight changes    Nutrition Focused Physical Exam: Completed [   ]  Unable to complete [ x  ]Unable to complete due to isolation  Muscle Wasting:  Subcutaneous Fat Wasting:    Estimated energy needs: Based on ABW of 73kg(due to between % of IBW)[id number]:1825-2190kcal and 1.2-1.4gmprotein:88-102gm(increased needs due to COVID-19+ and surgical demands.Fluids per team  Subjective: 80y/o male with history of CAD s/p stents ,Type 2 DM, HTN, HLD ,Left TKA /Right TKA who presents for  replacement of polyethylene liner of right knee/S/P Poly exchange 3/6.Patient reported he was eating fine and had no specific issues. Anxious to go home. RD spoke with patient on phone. Patient aware of C-CHO and salt restrictions and generally follows similar diet at home. Question consistent compliance due to noted HGBA1C of 7.0     Nutrition Diagnosis :increased nutrient needs r/t increased demand for protein and nutrients AEB: COVID and Surgical demands    [  ] No active nutrition diagnosis at this time  [  ] Current medical condition precludes nutrition intervention    Goal: Meet 80% of needs consistently    Recommendations Add DASH restriction to diet order due to history of HTN    Education: completed and reviewed with patient    Risk Level: High [   ] Moderate [ x  ] Low [   ]

## 2020-04-09 NOTE — PROGRESS NOTE ADULT - SUBJECTIVE AND OBJECTIVE BOX
Physical Medicine and Rehabilitation Progress Note:    Patient is a 79y old  Male who presents with a chief complaint of R knee postop infection (09 Apr 2020 09:06)      HPI:  Pt is 79M PMH CAD s/p stents 6-7 years ago, DM, HTN, HLD now presenting with R knee surgical site infection. Pt had R TKA done in 2002, followed by revision with poly exchange last month 3/6/20 for knee pain. Pt states that incision initially had some yellow crusting but gradually became more painful and opened up slightly and has been draining some yellowish fluid. Pt denies fever, chills, SOB. Denies any numbness or tingling in R leg and has been able to continue walking on affected leg (06 Apr 2020 14:28)                            9.7    10.72 )-----------( 265      ( 09 Apr 2020 07:37 )             30.9       04-09    141  |  103  |  15  ----------------------------<  144<H>  4.5   |  25  |  1.03    Ca    9.8      09 Apr 2020 07:37  Phos  3.3     04-09  Mg     2.2     04-09    TPro  6.2  /  Alb  3.5  /  TBili  0.2  /  DBili  x   /  AST  15  /  ALT  8<L>  /  AlkPhos  56  04-08    Vital Signs Last 24 Hrs  T(C): 36.8 (09 Apr 2020 11:54), Max: 37.2 (08 Apr 2020 18:31)  T(F): 98.3 (09 Apr 2020 11:54), Max: 99 (08 Apr 2020 18:31)  HR: 85 (09 Apr 2020 11:54) (82 - 95)  BP: 148/80 (09 Apr 2020 11:54) (133/88 - 154/79)  BP(mean): --  RR: 20 (09 Apr 2020 11:54) (20 - 20)  SpO2: 97% (09 Apr 2020 11:54) (94% - 98%)    MEDICATIONS  (STANDING):  acetaminophen   Tablet .. 975 milliGRAM(s) Oral every 8 hours  atorvastatin 20 milliGRAM(s) Oral at bedtime  ceFAZolin   IVPB 2000 milliGRAM(s) IV Intermittent every 8 hours  chlorhexidine 2% Cloths 1 Application(s) Topical <User Schedule>  clopidogrel Tablet 75 milliGRAM(s) Oral daily  dextrose 5%. 1000 milliLiter(s) (50 mL/Hr) IV Continuous <Continuous>  dextrose 50% Injectable 12.5 Gram(s) IV Push once  dextrose 50% Injectable 25 Gram(s) IV Push once  dextrose 50% Injectable 25 Gram(s) IV Push once  enoxaparin Injectable 40 milliGRAM(s) SubCutaneous every 24 hours  insulin glargine Injectable (LANTUS) 17 Unit(s) SubCutaneous at bedtime  insulin lispro (HumaLOG) corrective regimen sliding scale   SubCutaneous Before meals and at bedtime  polyethylene glycol 3350 17 Gram(s) Oral daily  rifAMPin 600 milliGRAM(s) Oral daily  tamsulosin 0.4 milliGRAM(s) Oral every 12 hours    MEDICATIONS  (PRN):  aluminum hydroxide/magnesium hydroxide/simethicone Suspension 30 milliLiter(s) Oral four times a day PRN Indigestion  bisacodyl Suppository 10 milliGRAM(s) Rectal daily PRN If no bowel movement by postoperative day #2  dextrose 40% Gel 15 Gram(s) Oral once PRN Blood Glucose LESS THAN 70 milliGRAM(s)/deciliter  glucagon  Injectable 1 milliGRAM(s) IntraMuscular once PRN Glucose LESS THAN 70 milligrams/deciliter  HYDROmorphone   Tablet 2 milliGRAM(s) Oral every 6 hours PRN Severe Pain (7 - 10)  HYDROmorphone  Injectable 0.5 milliGRAM(s) IV Push every 4 hours PRN breakthrough pain  magnesium hydroxide Suspension 30 milliLiter(s) Oral daily PRN Constipation  ondansetron Injectable 4 milliGRAM(s) IV Push every 6 hours PRN Nausea and/or Vomiting  senna 2 Tablet(s) Oral at bedtime PRN Constipation  sodium chloride 0.9% lock flush 10 milliLiter(s) IV Push every 1 hour PRN Pre/post blood products, medications, blood draw, and to maintain line patency    Currently Undergoing Physical Therapy at bedside.    Functional Status Assessment:      Therapeutic Interventions      Bed Mobility  Bed Mobility Training Sit-to-Supine: independent  Bed Mobility Training Supine-to-Sit: independent  Bed Mobility Training Limitations: decreased ROM;  decreased ability to use legs for bridging/pushing    Sit-Stand Transfer Training  Transfer Training Sit-to-Stand Transfer: independent;  weight-bearing as tolerated   rolling walker  Transfer Training Stand-to-Sit Transfer: independent;  weight-bearing as tolerated   rolling walker  Sit-to-Stand Transfer Training Transfer Safety Analysis: decreased weight-shifting ability;  decreased ROM;  rolling walker    Gait Training  Gait Training: independent;  weight-bearing as tolerated   rolling walker;  75 feet  Gait Analysis: 3-point gait   decreased ana;  decreased step length;  decreased stride length;  decreased ROM;  75 feet;  rolling walker          PM&R Impression: as above    Current Disposition Plan Recommendations:  d/c home with no post discharge rehab needs

## 2020-04-09 NOTE — PROGRESS NOTE ADULT - PROBLEM SELECTOR PLAN 6
-continue with lipitor 20mg qD -s/p straight cath on 4/7. Passed trial of void.   -F/u I&Os, discussed with RN importance of documenting

## 2020-04-09 NOTE — PROGRESS NOTE ADULT - SUBJECTIVE AND OBJECTIVE BOX
Ortho Progress Note    Pt comfortable without complaints, pain controlled. PICC Placed yeterday, abx chaged to Ancef based on MSSA intra op cultures. Denies CP, SOB, N/V, numbness/tingling     Vital Signs Last 24 Hrs  T(C): 36.7 (09 Apr 2020 05:38), Max: 37.2 (08 Apr 2020 18:31)  T(F): 98 (09 Apr 2020 05:38), Max: 99 (08 Apr 2020 18:31)  HR: 82 (09 Apr 2020 05:38) (82 - 95)  BP: 154/79 (09 Apr 2020 05:38) (133/88 - 154/79)  BP(mean): --  RR: 20 (09 Apr 2020 05:38) (20 - 20)  SpO2: 98% (09 Apr 2020 05:38) (94% - 98%)    AVSS  General: Pt Alert and oriented, NAD  RLE: KI in place; DSG C/D/I; Prevena holding suction  Pulses: Foot WWP; DP pulse 2+; Cap refill < 2 sec  Sensation: SILT and symmetric to contralateral extremity  Motor: EHL/FHL/TA/GS 5/5 and symmetric to contralateral extremity      Culture - Blood (04.08.20 @ 10:57)    Specimen Source: .Blood Blood    Culture Results:   No growth at 12 hours    Culture - Body Fluid with Gram Stain (04.07.20 @ 12:17)    Gram Stain:   No organisms seen  Few WBC's    Specimen Source: .Body Fluid 1-right knee synovium fluid culture    Culture Results:   Rare Staphylococcus aureus  Susceptibility to follow.      Culture - Tissue with Gram Stain (04.07.20 @ 11:50)    Gram Stain:   No organisms seen  No WBC's seen.    Specimen Source: .Tissue Right Knee deep 1 (synovium tissue)    Culture Results:   Rare Staphylococcus aureus  Susceptibility to follow.

## 2020-04-09 NOTE — PROGRESS NOTE ADULT - PROBLEM SELECTOR PLAN 2
Mildly decreased, received LR  -Repeat Na 140 - +swab on 4/6. Repeat swab on 4/13 for home infusion therapy to start.   -Stable on RA, O2sat 94-98%  -Admission Ferritin normal, D-Dimer mildly elevated to 824 (no repeats). CRP this AM with slight increase to 16.8 from 6.15. See previous problem  -No therapy indicated

## 2020-04-09 NOTE — PROGRESS NOTE ADULT - SUBJECTIVE AND OBJECTIVE BOX
OVERNIGHT EVENTS: No acute events overnight.     SUBJECTIVE: Patient seen and examined at the bedside in full PPE. Laying in bed, appears comfortable in RA.     Vital Signs Last 12 Hrs  T(F): 98 (04-09-20 @ 05:38), Max: 98 (04-09-20 @ 05:38)  HR: 82 (04-09-20 @ 05:38) (82 - 82)  BP: 154/79 (04-09-20 @ 05:38) (154/79 - 154/79)  BP(mean): --  RR: 20 (04-09-20 @ 05:38) (20 - 20)  SpO2: 98% (04-09-20 @ 05:38) (98% - 98%)    PHYSICAL EXAM:  General: NAD, resting comfortably in bed  HEENT: NCAT  Neck: no JVD  Respiratory: CTA b/l, no WRR  Cardiovascular: normal S1/S2  Vascular: 2+ radial/DP pulses b/l  Abdomen: +BS x4 quadrants, soft, NTND  Extremities: WWP, no clubbing, no cyanosis, no edema  Neuro: AAOx3, no focal neurological deficits    LABS:                        9.7    10.72 )-----------( 265      ( 09 Apr 2020 07:37 )             30.9     04-09    141  |  103  |  15  ----------------------------<  144<H>  4.5   |  25  |  1.03    Ca    9.8      09 Apr 2020 07:37  Phos  3.3     04-09  Mg     2.2     04-09    TPro  6.2  /  Alb  3.5  /  TBili  0.2  /  DBili  x   /  AST  15  /  ALT  8<L>  /  AlkPhos  56  04-08          RADIOLOGY & ADDITIONAL TESTS: Reviewed    MEDICATIONS  (STANDING):  acetaminophen   Tablet .. 975 milliGRAM(s) Oral every 8 hours  aspirin enteric coated 81 milliGRAM(s) Oral two times a day  ceFAZolin   IVPB 2000 milliGRAM(s) IV Intermittent every 8 hours  chlorhexidine 2% Cloths 1 Application(s) Topical <User Schedule>  clopidogrel Tablet 75 milliGRAM(s) Oral daily  dextrose 5%. 1000 milliLiter(s) (50 mL/Hr) IV Continuous <Continuous>  dextrose 50% Injectable 12.5 Gram(s) IV Push once  dextrose 50% Injectable 25 Gram(s) IV Push once  dextrose 50% Injectable 25 Gram(s) IV Push once  insulin glargine Injectable (LANTUS) 17 Unit(s) SubCutaneous at bedtime  insulin lispro (HumaLOG) corrective regimen sliding scale   SubCutaneous Before meals and at bedtime  polyethylene glycol 3350 17 Gram(s) Oral daily  rifAMPin 600 milliGRAM(s) Oral daily  tamsulosin 0.4 milliGRAM(s) Oral every 12 hours    MEDICATIONS  (PRN):  aluminum hydroxide/magnesium hydroxide/simethicone Suspension 30 milliLiter(s) Oral four times a day PRN Indigestion  bisacodyl Suppository 10 milliGRAM(s) Rectal daily PRN If no bowel movement by postoperative day #2  dextrose 40% Gel 15 Gram(s) Oral once PRN Blood Glucose LESS THAN 70 milliGRAM(s)/deciliter  glucagon  Injectable 1 milliGRAM(s) IntraMuscular once PRN Glucose LESS THAN 70 milligrams/deciliter  HYDROmorphone   Tablet 2 milliGRAM(s) Oral every 6 hours PRN Severe Pain (7 - 10)  HYDROmorphone  Injectable 0.5 milliGRAM(s) IV Push every 4 hours PRN breakthrough pain  magnesium hydroxide Suspension 30 milliLiter(s) Oral daily PRN Constipation  ondansetron Injectable 4 milliGRAM(s) IV Push every 6 hours PRN Nausea and/or Vomiting  senna 2 Tablet(s) Oral at bedtime PRN Constipation  sodium chloride 0.9% lock flush 10 milliLiter(s) IV Push every 1 hour PRN Pre/post blood products, medications, blood draw, and to maintain line patency OVERNIGHT EVENTS: No acute events overnight.     SUBJECTIVE: Patient seen and examined at the bedside in full PPE. Laying in bed, appears comfortable in RA. Denies any SOB, CP, cough or sputum/mucus production.     Vital Signs Last 12 Hrs  T(F): 98 (04-09-20 @ 05:38), Max: 98 (04-09-20 @ 05:38)  HR: 82 (04-09-20 @ 05:38) (82 - 82)  BP: 154/79 (04-09-20 @ 05:38) (154/79 - 154/79)  BP(mean): --  RR: 20 (04-09-20 @ 05:38) (20 - 20)  SpO2: 98% (04-09-20 @ 05:38) (98% - 98%)    PHYSICAL EXAM:  General: NAD, resting comfortably in bed  HEENT: NCAT  Neck: no JVD  Respiratory: CTA b/l, no WRR  Cardiovascular: normal S1/S2  Vascular: 2+ radial/DP pulses b/l  Abdomen: +BS x4 quadrants, soft, NTND  Extremities: WWP, no clubbing, no cyanosis, no edema  Neuro: AAOx3, no focal neurological deficits    LABS:                        9.7    10.72 )-----------( 265      ( 09 Apr 2020 07:37 )             30.9     04-09    141  |  103  |  15  ----------------------------<  144<H>  4.5   |  25  |  1.03    Ca    9.8      09 Apr 2020 07:37  Phos  3.3     04-09  Mg     2.2     04-09    TPro  6.2  /  Alb  3.5  /  TBili  0.2  /  DBili  x   /  AST  15  /  ALT  8<L>  /  AlkPhos  56  04-08          RADIOLOGY & ADDITIONAL TESTS: Reviewed    MEDICATIONS  (STANDING):  acetaminophen   Tablet .. 975 milliGRAM(s) Oral every 8 hours  aspirin enteric coated 81 milliGRAM(s) Oral two times a day  ceFAZolin   IVPB 2000 milliGRAM(s) IV Intermittent every 8 hours  chlorhexidine 2% Cloths 1 Application(s) Topical <User Schedule>  clopidogrel Tablet 75 milliGRAM(s) Oral daily  dextrose 5%. 1000 milliLiter(s) (50 mL/Hr) IV Continuous <Continuous>  dextrose 50% Injectable 12.5 Gram(s) IV Push once  dextrose 50% Injectable 25 Gram(s) IV Push once  dextrose 50% Injectable 25 Gram(s) IV Push once  insulin glargine Injectable (LANTUS) 17 Unit(s) SubCutaneous at bedtime  insulin lispro (HumaLOG) corrective regimen sliding scale   SubCutaneous Before meals and at bedtime  polyethylene glycol 3350 17 Gram(s) Oral daily  rifAMPin 600 milliGRAM(s) Oral daily  tamsulosin 0.4 milliGRAM(s) Oral every 12 hours    MEDICATIONS  (PRN):  aluminum hydroxide/magnesium hydroxide/simethicone Suspension 30 milliLiter(s) Oral four times a day PRN Indigestion  bisacodyl Suppository 10 milliGRAM(s) Rectal daily PRN If no bowel movement by postoperative day #2  dextrose 40% Gel 15 Gram(s) Oral once PRN Blood Glucose LESS THAN 70 milliGRAM(s)/deciliter  glucagon  Injectable 1 milliGRAM(s) IntraMuscular once PRN Glucose LESS THAN 70 milligrams/deciliter  HYDROmorphone   Tablet 2 milliGRAM(s) Oral every 6 hours PRN Severe Pain (7 - 10)  HYDROmorphone  Injectable 0.5 milliGRAM(s) IV Push every 4 hours PRN breakthrough pain  magnesium hydroxide Suspension 30 milliLiter(s) Oral daily PRN Constipation  ondansetron Injectable 4 milliGRAM(s) IV Push every 6 hours PRN Nausea and/or Vomiting  senna 2 Tablet(s) Oral at bedtime PRN Constipation  sodium chloride 0.9% lock flush 10 milliLiter(s) IV Push every 1 hour PRN Pre/post blood products, medications, blood draw, and to maintain line patency TRANSFER NOTE    HOSPITAL  COURSE:  4/7: s/p R knee polymer liner exchange and knee washout with Ortho, Got Vanc and ceftriaxone in OR. Full weight bearing PT ordered, inflam marker neg except DDimer 824, EKG Qtc 386 ms, lantus 17 units qnight  O/N: retaining -> so straight cathed and 1L removed. Given tamsulosin 0.4mg x1 (patient states he takes 0.4 mg BID) and tylenol for fever. Plan to send cultures.   4/8: CBC stable - restarted on Plavix/ASA started per Ortho as dvt ppx. PICC placed R basilic, RX sent for IV home infusion, PT recs home no need, passed TOV voided 600cc  O/N: SIMONE  4/9: Home lipitor restarted. Voiding. Slight increase in WBC/ESR/CRP likely 2/2 knee infectious process. On RA stable w/ no pulmonary symptoms.         OVERNIGHT EVENTS: No acute events overnight.     SUBJECTIVE: Patient seen and examined at the bedside in full PPE. Laying in bed, appears comfortable in RA. Denies any SOB, CP, cough or sputum/mucus production.     Vital Signs Last 12 Hrs  T(F): 98 (04-09-20 @ 05:38), Max: 98 (04-09-20 @ 05:38)  HR: 82 (04-09-20 @ 05:38) (82 - 82)  BP: 154/79 (04-09-20 @ 05:38) (154/79 - 154/79)  BP(mean): --  RR: 20 (04-09-20 @ 05:38) (20 - 20)  SpO2: 98% (04-09-20 @ 05:38) (98% - 98%)    PHYSICAL EXAM:  General: NAD, resting comfortably in bed  HEENT: NCAT  Neck: no JVD  Respiratory: CTA b/l, no WRR  Cardiovascular: normal S1/S2  Vascular: 2+ radial/DP pulses b/l  Abdomen: +BS x4 quadrants, soft, NTND  Extremities: WWP, no clubbing, no cyanosis, no edema  Neuro: AAOx3, no focal neurological deficits    LABS:                        9.7    10.72 )-----------( 265      ( 09 Apr 2020 07:37 )             30.9     04-09    141  |  103  |  15  ----------------------------<  144<H>  4.5   |  25  |  1.03    Ca    9.8      09 Apr 2020 07:37  Phos  3.3     04-09  Mg     2.2     04-09    TPro  6.2  /  Alb  3.5  /  TBili  0.2  /  DBili  x   /  AST  15  /  ALT  8<L>  /  AlkPhos  56  04-08          RADIOLOGY & ADDITIONAL TESTS: Reviewed    MEDICATIONS  (STANDING):  acetaminophen   Tablet .. 975 milliGRAM(s) Oral every 8 hours  aspirin enteric coated 81 milliGRAM(s) Oral two times a day  ceFAZolin   IVPB 2000 milliGRAM(s) IV Intermittent every 8 hours  chlorhexidine 2% Cloths 1 Application(s) Topical <User Schedule>  clopidogrel Tablet 75 milliGRAM(s) Oral daily  dextrose 5%. 1000 milliLiter(s) (50 mL/Hr) IV Continuous <Continuous>  dextrose 50% Injectable 12.5 Gram(s) IV Push once  dextrose 50% Injectable 25 Gram(s) IV Push once  dextrose 50% Injectable 25 Gram(s) IV Push once  insulin glargine Injectable (LANTUS) 17 Unit(s) SubCutaneous at bedtime  insulin lispro (HumaLOG) corrective regimen sliding scale   SubCutaneous Before meals and at bedtime  polyethylene glycol 3350 17 Gram(s) Oral daily  rifAMPin 600 milliGRAM(s) Oral daily  tamsulosin 0.4 milliGRAM(s) Oral every 12 hours    MEDICATIONS  (PRN):  aluminum hydroxide/magnesium hydroxide/simethicone Suspension 30 milliLiter(s) Oral four times a day PRN Indigestion  bisacodyl Suppository 10 milliGRAM(s) Rectal daily PRN If no bowel movement by postoperative day #2  dextrose 40% Gel 15 Gram(s) Oral once PRN Blood Glucose LESS THAN 70 milliGRAM(s)/deciliter  glucagon  Injectable 1 milliGRAM(s) IntraMuscular once PRN Glucose LESS THAN 70 milligrams/deciliter  HYDROmorphone   Tablet 2 milliGRAM(s) Oral every 6 hours PRN Severe Pain (7 - 10)  HYDROmorphone  Injectable 0.5 milliGRAM(s) IV Push every 4 hours PRN breakthrough pain  magnesium hydroxide Suspension 30 milliLiter(s) Oral daily PRN Constipation  ondansetron Injectable 4 milliGRAM(s) IV Push every 6 hours PRN Nausea and/or Vomiting  senna 2 Tablet(s) Oral at bedtime PRN Constipation  sodium chloride 0.9% lock flush 10 milliLiter(s) IV Push every 1 hour PRN Pre/post blood products, medications, blood draw, and to maintain line patency

## 2020-04-09 NOTE — PROGRESS NOTE ADULT - PROBLEM SELECTOR PLAN 3
Holding patient's losartan 100mg qD prior to surgery  Increased home norvasc from 2.5mg to 5mg -Currently holding patient's losartan and Norvasc  -BP has been stable 130-150s/60-80s w/ HR 80-90s

## 2020-04-09 NOTE — PROGRESS NOTE ADULT - ASSESSMENT
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 I/D was completed on 4/7 by Ortho. Pt also with +COVID.        Problem/Plan - 1:  ·  Problem: Joint infection.  Plan: #s/p R Knee I&D with polymer exchange 4/7/20  -F/u Ortho and ID recs  -F/u OR joint fluid cx, still in process  -Slight increase in WBC, ESR and CRP today likely 2/2 to recent procedure and not COVID. Will continue to monitor, trend fevers, trend labs  -Continue cefazolin IV and Rifampin PO for 6 weeks total followed by suppressive therapy w/ PO cephalexin x3 months  -PICC placed 4/8  -Pain controlled w/ PRN dialudid  -DVT ppx: home ASA/Plavix per Ortho, will check if heparin subq or Lovenox ok  -F/u PT eval/recs, keep knee immobilizer 2 weeks, ok to ambulate in immobilizer w/ assistive device  -Dispo: CAP unit while pending home PT and home infusion clearance. Antibiotics as above and weekly blood work to Dr Lorenzana.     Problem/Plan - 2:  ·  Problem: R/O COVID-19 virus detected.  Plan: - +swab on 4/6. Repeat swab on 4/13 for home infusion therapy to start.   -Stable on RA, O2sat 94-98%  -Admission Ferritin normal, D-Dimer mildly elevated to 824 (no repeats). CRP this AM with slight increase to 16.8 from 6.15. See previous problem  -No therapy indicated.     Problem/Plan - 3:  ·  Problem: Hypertension.  Plan: -Currently holding patient's losartan and Norvasc  -BP has been stable 130-150s/60-80s w/ HR 80-90s.     Problem/Plan - 4:  ·  Problem: DM (diabetes mellitus).  Plan: -On moderate ISS with overall fingersticks stable, goal <180s  -Pt on half home qHS dose of Lantus 35, will trend FS and increase as needed  -Holding home metformin while in the hospital.     Problem/Plan - 5:  ·  Problem: Hyperlipidemia.  Plan: -Continue home lipitor 20mg qD.     Problem/Plan - 6:  Problem: Urinary retention. Plan: -s/p straight cath on 4/7. Passed trial of void.   -F/u I&Os, discussed with RN importance of documenting.

## 2020-04-09 NOTE — PROGRESS NOTE ADULT - PROBLEM SELECTOR PLAN 5
-On ISS  -holding metformin 1000mg BID while in the hospital  -Will give half patient's home qHS dose of Lantus 35, will trend FS  -On moderate ISS -Continue home lipitor 20mg qD

## 2020-04-10 LAB
ALBUMIN SERPL ELPH-MCNC: 3.3 G/DL — SIGNIFICANT CHANGE UP (ref 3.3–5)
ALP SERPL-CCNC: 55 U/L — SIGNIFICANT CHANGE UP (ref 40–120)
ALT FLD-CCNC: 12 U/L — SIGNIFICANT CHANGE UP (ref 10–45)
ANION GAP SERPL CALC-SCNC: 11 MMOL/L — SIGNIFICANT CHANGE UP (ref 5–17)
AST SERPL-CCNC: 22 U/L — SIGNIFICANT CHANGE UP (ref 10–40)
BASOPHILS # BLD AUTO: 0.01 K/UL — SIGNIFICANT CHANGE UP (ref 0–0.2)
BASOPHILS NFR BLD AUTO: 0.1 % — SIGNIFICANT CHANGE UP (ref 0–2)
BILIRUB SERPL-MCNC: 0.4 MG/DL — SIGNIFICANT CHANGE UP (ref 0.2–1.2)
BUN SERPL-MCNC: 15 MG/DL — SIGNIFICANT CHANGE UP (ref 7–23)
CALCIUM SERPL-MCNC: 9 MG/DL — SIGNIFICANT CHANGE UP (ref 8.4–10.5)
CHLORIDE SERPL-SCNC: 103 MMOL/L — SIGNIFICANT CHANGE UP (ref 96–108)
CO2 SERPL-SCNC: 25 MMOL/L — SIGNIFICANT CHANGE UP (ref 22–31)
CREAT SERPL-MCNC: 0.86 MG/DL — SIGNIFICANT CHANGE UP (ref 0.5–1.3)
CRP SERPL-MCNC: 9.34 MG/DL — HIGH (ref 0–0.4)
EOSINOPHIL # BLD AUTO: 1.42 K/UL — HIGH (ref 0–0.5)
EOSINOPHIL NFR BLD AUTO: 17.1 % — HIGH (ref 0–6)
ERYTHROCYTE [SEDIMENTATION RATE] IN BLOOD: 88 MM/HR — HIGH
GLUCOSE BLDC GLUCOMTR-MCNC: 128 MG/DL — HIGH (ref 70–99)
GLUCOSE BLDC GLUCOMTR-MCNC: 137 MG/DL — HIGH (ref 70–99)
GLUCOSE BLDC GLUCOMTR-MCNC: 173 MG/DL — HIGH (ref 70–99)
GLUCOSE BLDC GLUCOMTR-MCNC: 99 MG/DL — SIGNIFICANT CHANGE UP (ref 70–99)
GLUCOSE SERPL-MCNC: 108 MG/DL — HIGH (ref 70–99)
HCT VFR BLD CALC: 24.3 % — LOW (ref 39–50)
HCT VFR BLD CALC: 27.8 % — LOW (ref 39–50)
HGB BLD-MCNC: 7.8 G/DL — LOW (ref 13–17)
HGB BLD-MCNC: 8.5 G/DL — LOW (ref 13–17)
IMM GRANULOCYTES NFR BLD AUTO: 0.6 % — SIGNIFICANT CHANGE UP (ref 0–1.5)
LYMPHOCYTES # BLD AUTO: 1.21 K/UL — SIGNIFICANT CHANGE UP (ref 1–3.3)
LYMPHOCYTES # BLD AUTO: 14.5 % — SIGNIFICANT CHANGE UP (ref 13–44)
MAGNESIUM SERPL-MCNC: 2 MG/DL — SIGNIFICANT CHANGE UP (ref 1.6–2.6)
MCHC RBC-ENTMCNC: 29.4 PG — SIGNIFICANT CHANGE UP (ref 27–34)
MCHC RBC-ENTMCNC: 30.4 PG — SIGNIFICANT CHANGE UP (ref 27–34)
MCHC RBC-ENTMCNC: 30.6 GM/DL — LOW (ref 32–36)
MCHC RBC-ENTMCNC: 32.1 GM/DL — SIGNIFICANT CHANGE UP (ref 32–36)
MCV RBC AUTO: 94.6 FL — SIGNIFICANT CHANGE UP (ref 80–100)
MCV RBC AUTO: 96.2 FL — SIGNIFICANT CHANGE UP (ref 80–100)
MONOCYTES # BLD AUTO: 0.86 K/UL — SIGNIFICANT CHANGE UP (ref 0–0.9)
MONOCYTES NFR BLD AUTO: 10.3 % — SIGNIFICANT CHANGE UP (ref 2–14)
NEUTROPHILS # BLD AUTO: 4.77 K/UL — SIGNIFICANT CHANGE UP (ref 1.8–7.4)
NEUTROPHILS NFR BLD AUTO: 57.4 % — SIGNIFICANT CHANGE UP (ref 43–77)
NRBC # BLD: 0 /100 WBCS — SIGNIFICANT CHANGE UP (ref 0–0)
NRBC # BLD: 0 /100 WBCS — SIGNIFICANT CHANGE UP (ref 0–0)
PHOSPHATE SERPL-MCNC: 3.2 MG/DL — SIGNIFICANT CHANGE UP (ref 2.5–4.5)
PLATELET # BLD AUTO: 219 K/UL — SIGNIFICANT CHANGE UP (ref 150–400)
PLATELET # BLD AUTO: 264 K/UL — SIGNIFICANT CHANGE UP (ref 150–400)
POTASSIUM SERPL-MCNC: 4.1 MMOL/L — SIGNIFICANT CHANGE UP (ref 3.5–5.3)
POTASSIUM SERPL-SCNC: 4.1 MMOL/L — SIGNIFICANT CHANGE UP (ref 3.5–5.3)
PROT SERPL-MCNC: 6.1 G/DL — SIGNIFICANT CHANGE UP (ref 6–8.3)
RBC # BLD: 2.57 M/UL — LOW (ref 4.2–5.8)
RBC # BLD: 2.89 M/UL — LOW (ref 4.2–5.8)
RBC # FLD: 13.2 % — SIGNIFICANT CHANGE UP (ref 10.3–14.5)
RBC # FLD: 13.2 % — SIGNIFICANT CHANGE UP (ref 10.3–14.5)
SODIUM SERPL-SCNC: 139 MMOL/L — SIGNIFICANT CHANGE UP (ref 135–145)
STRONGYLOIDES AB SER-ACNC: NEGATIVE — SIGNIFICANT CHANGE UP
WBC # BLD: 8.32 K/UL — SIGNIFICANT CHANGE UP (ref 3.8–10.5)
WBC # BLD: 8.46 K/UL — SIGNIFICANT CHANGE UP (ref 3.8–10.5)
WBC # FLD AUTO: 8.32 K/UL — SIGNIFICANT CHANGE UP (ref 3.8–10.5)
WBC # FLD AUTO: 8.46 K/UL — SIGNIFICANT CHANGE UP (ref 3.8–10.5)

## 2020-04-10 PROCEDURE — 99232 SBSQ HOSP IP/OBS MODERATE 35: CPT

## 2020-04-10 PROCEDURE — 99232 SBSQ HOSP IP/OBS MODERATE 35: CPT | Mod: CS

## 2020-04-10 PROCEDURE — 99232 SBSQ HOSP IP/OBS MODERATE 35: CPT | Mod: CS,GC

## 2020-04-10 RX ORDER — POLYETHYLENE GLYCOL 3350 17 G/17G
17 POWDER, FOR SOLUTION ORAL DAILY
Refills: 0 | Status: DISCONTINUED | OUTPATIENT
Start: 2020-04-10 | End: 2020-04-13

## 2020-04-10 RX ADMIN — INSULIN GLARGINE 17 UNIT(S): 100 INJECTION, SOLUTION SUBCUTANEOUS at 22:41

## 2020-04-10 RX ADMIN — ATORVASTATIN CALCIUM 20 MILLIGRAM(S): 80 TABLET, FILM COATED ORAL at 22:15

## 2020-04-10 RX ADMIN — Medication 100 MILLIGRAM(S): at 05:05

## 2020-04-10 RX ADMIN — TAMSULOSIN HYDROCHLORIDE 0.4 MILLIGRAM(S): 0.4 CAPSULE ORAL at 17:17

## 2020-04-10 RX ADMIN — AMLODIPINE BESYLATE 5 MILLIGRAM(S): 2.5 TABLET ORAL at 22:14

## 2020-04-10 RX ADMIN — LOSARTAN POTASSIUM 100 MILLIGRAM(S): 100 TABLET, FILM COATED ORAL at 05:03

## 2020-04-10 RX ADMIN — Medication 100 MILLIGRAM(S): at 22:14

## 2020-04-10 RX ADMIN — Medication 100 MILLIGRAM(S): at 12:10

## 2020-04-10 RX ADMIN — Medication 975 MILLIGRAM(S): at 12:09

## 2020-04-10 RX ADMIN — TAMSULOSIN HYDROCHLORIDE 0.4 MILLIGRAM(S): 0.4 CAPSULE ORAL at 05:04

## 2020-04-10 RX ADMIN — Medication 100 MILLIGRAM(S): at 22:16

## 2020-04-10 RX ADMIN — Medication 975 MILLIGRAM(S): at 22:15

## 2020-04-10 RX ADMIN — ENOXAPARIN SODIUM 40 MILLIGRAM(S): 100 INJECTION SUBCUTANEOUS at 12:11

## 2020-04-10 RX ADMIN — CLOPIDOGREL BISULFATE 75 MILLIGRAM(S): 75 TABLET, FILM COATED ORAL at 12:11

## 2020-04-10 RX ADMIN — Medication 2: at 22:41

## 2020-04-10 RX ADMIN — Medication 975 MILLIGRAM(S): at 05:04

## 2020-04-10 RX ADMIN — Medication 5 MILLIGRAM(S): at 22:16

## 2020-04-10 NOTE — PROGRESS NOTE ADULT - SUBJECTIVE AND OBJECTIVE BOX
SUBJECTIVE:  pt seen sitting at bedside, denies chest pain, shortness of breath, cough, fevers, chills; states right knee is uncomfortable but denies pain, numbness, tingling    MEDICATIONS  (STANDING):  acetaminophen   Tablet .. 975 milliGRAM(s) Oral every 8 hours  amLODIPine   Tablet 5 milliGRAM(s) Oral at bedtime  atorvastatin 20 milliGRAM(s) Oral at bedtime  ceFAZolin   IVPB 2000 milliGRAM(s) IV Intermittent every 8 hours  chlorhexidine 2% Cloths 1 Application(s) Topical <User Schedule>  clopidogrel Tablet 75 milliGRAM(s) Oral daily  dextrose 5%. 1000 milliLiter(s) (50 mL/Hr) IV Continuous <Continuous>  dextrose 50% Injectable 12.5 Gram(s) IV Push once  dextrose 50% Injectable 25 Gram(s) IV Push once  dextrose 50% Injectable 25 Gram(s) IV Push once  enoxaparin Injectable 40 milliGRAM(s) SubCutaneous every 24 hours  insulin glargine Injectable (LANTUS) 17 Unit(s) SubCutaneous at bedtime  insulin lispro (HumaLOG) corrective regimen sliding scale   SubCutaneous Before meals and at bedtime  losartan 100 milliGRAM(s) Oral daily  melatonin 5 milliGRAM(s) Oral at bedtime  polyethylene glycol 3350 17 Gram(s) Oral daily  rifAMPin 600 milliGRAM(s) Oral daily  tamsulosin 0.4 milliGRAM(s) Oral every 12 hours    MEDICATIONS  (PRN):  aluminum hydroxide/magnesium hydroxide/simethicone Suspension 30 milliLiter(s) Oral four times a day PRN Indigestion  bisacodyl Suppository 10 milliGRAM(s) Rectal daily PRN If no bowel movement by postoperative day #2  dextrose 40% Gel 15 Gram(s) Oral once PRN Blood Glucose LESS THAN 70 milliGRAM(s)/deciliter  glucagon  Injectable 1 milliGRAM(s) IntraMuscular once PRN Glucose LESS THAN 70 milligrams/deciliter  HYDROmorphone   Tablet 2 milliGRAM(s) Oral every 6 hours PRN Severe Pain (7 - 10)  magnesium hydroxide Suspension 30 milliLiter(s) Oral daily PRN Constipation  ondansetron Injectable 4 milliGRAM(s) IV Push every 6 hours PRN Nausea and/or Vomiting  senna 2 Tablet(s) Oral at bedtime PRN Constipation  sodium chloride 0.9% lock flush 10 milliLiter(s) IV Push every 1 hour PRN Pre/post blood products, medications, blood draw, and to maintain line patency      Vital Signs Last 24 Hrs  T(C): 36.7 (10 Apr 2020 09:12), Max: 37.1 (09 Apr 2020 17:33)  T(F): 98 (10 Apr 2020 09:12), Max: 98.7 (09 Apr 2020 17:33)  HR: 90 (10 Apr 2020 09:12) (79 - 92)  BP: 151/73 (10 Apr 2020 09:12) (144/70 - 191/83)  BP(mean): --  RR: 16 (10 Apr 2020 09:12) (16 - 20)  SpO2: 97% (10 Apr 2020 09:12) (97% - 98%)    Physical Exam:  General: NAD, resting comfortably in bed  Pulmonary: Nonlabored breathing, no respiratory distress  Cardiovascular: NSR  Abdominal: soft, NT/ND  Extremities: WWP, normal strength, right leg with brace and prevena vac  Neuro: A/O x 3, CNs II-XII grossly intact, normal motor/sensation  Pulses: palpable distal pulses    I&O's Summary    09 Apr 2020 07:01  -  10 Apr 2020 07:00  --------------------------------------------------------  IN: 0 mL / OUT: 750 mL / NET: -750 mL        LABS:                        7.8    8.32  )-----------( 219      ( 10 Apr 2020 07:02 )             24.3     04-10    139  |  103  |  15  ----------------------------<  108<H>  4.1   |  25  |  0.86    Ca    9.0      10 Apr 2020 06:57  Phos  3.2     04-10  Mg     2.0     04-10    TPro  6.1  /  Alb  3.3  /  TBili  0.4  /  DBili  x   /  AST  22  /  ALT  12  /  AlkPhos  55  04-10        CAPILLARY BLOOD GLUCOSE      POCT Blood Glucose.: 99 mg/dL (10 Apr 2020 07:44)  POCT Blood Glucose.: 292 mg/dL (09 Apr 2020 20:46)  POCT Blood Glucose.: 136 mg/dL (09 Apr 2020 17:00)  POCT Blood Glucose.: 128 mg/dL (09 Apr 2020 12:31)    LIVER FUNCTIONS - ( 10 Apr 2020 06:57 )  Alb: 3.3 g/dL / Pro: 6.1 g/dL / ALK PHOS: 55 U/L / ALT: 12 U/L / AST: 22 U/L / GGT: x             RADIOLOGY & ADDITIONAL STUDIES:

## 2020-04-10 NOTE — PROGRESS NOTE ADULT - SUBJECTIVE AND OBJECTIVE BOX
INTERVAL HPI/OVERNIGHT EVENTS: No fevers. Not requiring supplemental oxygen.    ROS: UTO      ANTIBIOTICS/RELEVANT:    MEDICATIONS  (STANDING):  acetaminophen   Tablet .. 975 milliGRAM(s) Oral every 8 hours  amLODIPine   Tablet 5 milliGRAM(s) Oral at bedtime  atorvastatin 20 milliGRAM(s) Oral at bedtime  ceFAZolin   IVPB 2000 milliGRAM(s) IV Intermittent every 8 hours  chlorhexidine 2% Cloths 1 Application(s) Topical <User Schedule>  clopidogrel Tablet 75 milliGRAM(s) Oral daily  dextrose 5%. 1000 milliLiter(s) (50 mL/Hr) IV Continuous <Continuous>  dextrose 50% Injectable 12.5 Gram(s) IV Push once  dextrose 50% Injectable 25 Gram(s) IV Push once  dextrose 50% Injectable 25 Gram(s) IV Push once  enoxaparin Injectable 40 milliGRAM(s) SubCutaneous every 24 hours  insulin glargine Injectable (LANTUS) 17 Unit(s) SubCutaneous at bedtime  insulin lispro (HumaLOG) corrective regimen sliding scale   SubCutaneous Before meals and at bedtime  losartan 100 milliGRAM(s) Oral daily  melatonin 5 milliGRAM(s) Oral at bedtime  polyethylene glycol 3350 17 Gram(s) Oral daily  rifAMPin 600 milliGRAM(s) Oral daily  tamsulosin 0.4 milliGRAM(s) Oral every 12 hours    MEDICATIONS  (PRN):  aluminum hydroxide/magnesium hydroxide/simethicone Suspension 30 milliLiter(s) Oral four times a day PRN Indigestion  bisacodyl Suppository 10 milliGRAM(s) Rectal daily PRN If no bowel movement by postoperative day #2  dextrose 40% Gel 15 Gram(s) Oral once PRN Blood Glucose LESS THAN 70 milliGRAM(s)/deciliter  glucagon  Injectable 1 milliGRAM(s) IntraMuscular once PRN Glucose LESS THAN 70 milligrams/deciliter  HYDROmorphone   Tablet 2 milliGRAM(s) Oral every 6 hours PRN Severe Pain (7 - 10)  magnesium hydroxide Suspension 30 milliLiter(s) Oral daily PRN Constipation  ondansetron Injectable 4 milliGRAM(s) IV Push every 6 hours PRN Nausea and/or Vomiting  senna 2 Tablet(s) Oral at bedtime PRN Constipation  sodium chloride 0.9% lock flush 10 milliLiter(s) IV Push every 1 hour PRN Pre/post blood products, medications, blood draw, and to maintain line patency        Vital Signs Last 24 Hrs  T(C): 36.7 (10 Apr 2020 09:12), Max: 37.1 (09 Apr 2020 17:33)  T(F): 98 (10 Apr 2020 09:12), Max: 98.7 (09 Apr 2020 17:33)  HR: 90 (10 Apr 2020 09:12) (79 - 92)  BP: 151/73 (10 Apr 2020 09:12) (144/70 - 191/83)  BP(mean): --  RR: 16 (10 Apr 2020 09:12) (16 - 18)  SpO2: 97% (10 Apr 2020 09:12) (97% - 98%)    PHYSICAL EXAM:      LABS:                        7.8    8.32  )-----------( 219      ( 10 Apr 2020 07:02 )             24.3     04-10    139  |  103  |  15  ----------------------------<  108<H>  4.1   |  25  |  0.86    Ca    9.0      10 Apr 2020 06:57  Phos  3.2     04-10  Mg     2.0     04-10    TPro  6.1  /  Alb  3.3  /  TBili  0.4  /  DBili  x   /  AST  22  /  ALT  12  /  AlkPhos  55  04-10          MICROBIOLOGY: reviewed    RADIOLOGY & ADDITIONAL STUDIES: reviewed

## 2020-04-10 NOTE — PROGRESS NOTE ADULT - ASSESSMENT
80 yo male with hx CAD, DM, R TKR 2002, revision 3/6/20, now p/w wound dehiscence and drainage from incision c/f SSI s/p washout 4/7 with purulence noted in joint space and concern for hardware involvement--OR cultures growing MSSA. Also found to be COVID-19+.  - continue cefazolin 2g IV q8h + rifampin 600mg PO q24h (for biofilm penetration); anticipate 6 week course of above through 5/18/20 followed by transition to chronic suppressive therapy with PO cephalexin X 3 mos  - weekly CBC, CMP, ESR, CRP to be faxed to [phone]  - f/u Strongyloides Ab given unexplained peripheral eosinophilia; would avoid giving corticosteroids until result available to decrease probability of hyperinfestation syndrome   - re: COVID-19 currently with minimal symptoms; if develops significant hypoxia (< 94%/RA) can commence COVID bundle    Will arrange post hospital f/u with me in 3 weeks    Please reconsult with ?    ID Team 2

## 2020-04-10 NOTE — PROGRESS NOTE ADULT - ASSESSMENT
A/P: 79y Male s/p I&D R TKA w poly exchange for acute periprosthetic joint infection; also newly diagnosed with COVID-19  - Pain Control  - DVT ppx: home ASA/Plavix  - Ongoing definitive antibiotic management: cefazolin IV and rifampin PO x 6 weeks, continue cephalexin x 3 additional months as per ID  - f/u Strongyloides ab as per ID  - f/u final intra-op cultures  - WBS: WBAT RLE  - Maintain knee immobilizer x 2 weeks, ok to ambulate in immobilizer with assistive device  - Dispo: pending home infusion services    Ortho Pager [phone]

## 2020-04-10 NOTE — PROGRESS NOTE ADULT - ASSESSMENT
Pt is 79M w/ PMH CAD s/p stents 6-7 years ago, T2DM, HTN, HLD, L TKA (2006) and R TKA (2002, followed by revision with poly exchange on 3/6/20 for knee pain) who presented for I/D of R TKA s/p polyexchange on 3/6. I/D was completed on 4/7 by Ortho. Pt also with +COVID.         Problem/Plan - 1:  ·  Problem: Joint infection.  Plan: #s/p R Knee I&D with polymer exchange 4/7/20  -appreciate Ortho and ID recs  -F/u OR joint fluid cx, MSSA intraop cultures  -WBC, Hgb downtrending. Will continue to monitor, trend fevers, trend labs; repeat labs this afternoon  -Continue cefazolin IV and Rifampin PO for 6 weeks total followed by suppressive therapy w/ PO cephalexin x3 months  -PICC placed 4/8 for home infusions  -Pain controlled w/ PRN dialudid PO  -DVT ppx: home ASA/Plavix per Ortho, lovenox on board so holding ASA until dc  -F/u PT eval/recs, keep knee immobilizer 2 weeks, ok to ambulate in immobilizer w/ walker  -Dispo: CAP unit while pending home PT and home infusion clearance. Antibiotics as above and weekly blood work to Dr Lorenzana.      Problem/Plan - 2:  ·  Problem: R/O COVID-19 virus detected.  Plan: - +swab on 4/6.  -Stable on RA, O2sat 94-98%  -Admission Ferritin normal, D-Dimer mildly elevated to 824 (no repeats). CRP downtrending; See previous problem  -No therapy indicated.      Problem/Plan - 3:  ·  Problem: Hypertension.  Plan: -restarted home losartan and amlodipine  -BP has been stable 130-150s/60-80s w/ HR 80-90s.      Problem/Plan - 4:  ·  Problem: DM (diabetes mellitus).  Plan: -On moderate ISS with overall fingersticks stable, goal <180s  -Pt on half home qHS dose of Lantus 35, will trend FS and increase as needed  -Holding home metformin while in the hospital.      Problem/Plan - 5:  ·  Problem: Hyperlipidemia.  Plan: -Continue home lipitor 20mg qD.      Problem/Plan - 6:  Problem: Urinary retention. Plan: -s/p straight cath on 4/7. Passed trial of void.   -F/u I&Os, discussed with RN importance of documenting.  -home flomax 0.4mg BID    dispo: home with home PT and infusions, must be 7 days since positive test; for dispo on Monday 4/13 Pt is 79M w/ PMH CAD s/p stents 6-7 years ago, T2DM, HTN, HLD, L TKA (2006) and R TKA (2002, followed by revision with poly exchange on 3/6/20 for knee pain) who presented for I/D of R TKA s/p polyexchange on 3/6. I/D was completed on 4/7 by Ortho. Pt also with +COVID.         Problem/Plan - 1:  ·  Problem: Joint infection.  Plan: #s/p R Knee I&D with polymer exchange 4/7/20  -appreciate Ortho and ID recs  -F/u OR joint fluid cx, MSSA intraop cultures  -WBC, Hgb downtrending. Will continue to monitor, trend fevers, trend labs; repeat labs this afternoon  -Continue cefazolin IV and Rifampin PO for 6 weeks total followed by suppressive therapy w/ PO cephalexin x3 months  -PICC placed 4/8 for home infusions  -Pain controlled w/ PRN dialudid PO  -DVT ppx: home ASA/Plavix per Ortho, lovenox on board so holding ASA until dc  -F/u PT eval/recs, keep knee immobilizer 2 weeks, ok to ambulate in immobilizer w/ walker  -Dispo: CAP unit while pending home PT and home infusion clearance. Antibiotics as above and weekly blood work to Dr Lorenzana.      Problem/Plan - 2:  ·  Problem: R/O COVID-19 virus detected.  Plan: - +swab on 4/6.  -Stable on RA, O2sat 94-98%  -Admission Ferritin normal, D-Dimer mildly elevated to 824 (no repeats). CRP downtrending; See previous problem  -No therapy indicated.      Problem/Plan - 3:  ·  Problem: Hypertension.  Plan: -restarted home losartan and amlodipine  -BP has been stable 130-150s/60-80s w/ HR 80-90s.      Problem/Plan - 4:  ·  Problem: DM (diabetes mellitus).  Plan: -On moderate ISS with overall fingersticks stable, goal <180s  -Pt on half home qHS dose of Lantus 35, will trend FS and increase as needed  -Holding home metformin while in the hospital.      Problem/Plan - 5:  ·  Problem: Hyperlipidemia.  Plan: -Continue home lipitor 20mg qD.      Problem/Plan - 6:  Problem: Urinary retention. Plan: -s/p straight cath on 4/7. Passed trial of void.   -F/u I&Os, discussed with RN importance of documenting.  -home flomax 0.4mg BID    dispo: home with home PT and infusions, must be 7 days since positive test; for dispo on Monday 4/13; transfer to [facility]

## 2020-04-10 NOTE — PROGRESS NOTE ADULT - SUBJECTIVE AND OBJECTIVE BOX
Ortho Progress Note    Pt comfortable without complaints, pain controlled. PICC in placed. On Ancef and Rifampin based on MSSA intra op cultures. Denies CP, SOB, N/V, numbness/tingling     Vital Signs Last 24 Hrs  T(C): 36.9 (10 Apr 2020 05:34), Max: 37.1 (09 Apr 2020 17:33)  T(F): 98.5 (10 Apr 2020 05:34), Max: 98.7 (09 Apr 2020 17:33)  HR: 79 (10 Apr 2020 05:34) (79 - 92)  BP: 153/74 (10 Apr 2020 05:34) (144/70 - 191/83)  BP(mean): --  RR: 16 (10 Apr 2020 05:34) (16 - 20)  SpO2: 98% (10 Apr 2020 05:34) (97% - 98%)    AVSS  General: Pt Alert and oriented, NAD  RLE: KI in place; DSG C/D/I; Prevena holding suction  Pulses: Foot WWP; DP pulse 2+; Cap refill < 2 sec  Sensation: SILT and symmetric to contralateral extremity  Motor: EHL/FHL/TA/GS 5/5 and symmetric to contralateral extremity        Culture - Body Fluid with Gram Stain (04.07.20 @ 12:17)    -  Linezolid: S 2    -  Oxacillin: S <=0.25    -  RIF- Rifampin: S <=1 Should not be used as monotherapy    -  Trimethoprim/Sulfamethoxazole: S <=0.5/9.5    -  Vancomycin: S 1    Gram Stain:   No organisms seen  Few WBC's    -  Cefazolin: S <=4    -  Clindamycin: S 0.5    -  Erythromycin: S <=0.25    Specimen Source: .Body Fluid 1-right knee synovium fluid culture    Culture Results:   Rare Staphylococcus aureus    Organism Identification: Staphylococcus aureus    Organism: Staphylococcus aureus    Method Type: RACHEL

## 2020-04-11 LAB
ALBUMIN SERPL ELPH-MCNC: 3.9 G/DL — SIGNIFICANT CHANGE UP (ref 3.3–5)
ALP SERPL-CCNC: 67 U/L — SIGNIFICANT CHANGE UP (ref 40–120)
ALT FLD-CCNC: 11 U/L — SIGNIFICANT CHANGE UP (ref 10–45)
ANION GAP SERPL CALC-SCNC: 11 MMOL/L — SIGNIFICANT CHANGE UP (ref 5–17)
AST SERPL-CCNC: 20 U/L — SIGNIFICANT CHANGE UP (ref 10–40)
BASOPHILS # BLD AUTO: 0.03 K/UL — SIGNIFICANT CHANGE UP (ref 0–0.2)
BASOPHILS NFR BLD AUTO: 0.3 % — SIGNIFICANT CHANGE UP (ref 0–2)
BILIRUB SERPL-MCNC: 0.4 MG/DL — SIGNIFICANT CHANGE UP (ref 0.2–1.2)
BUN SERPL-MCNC: 11 MG/DL — SIGNIFICANT CHANGE UP (ref 7–23)
CALCIUM SERPL-MCNC: 10 MG/DL — SIGNIFICANT CHANGE UP (ref 8.4–10.5)
CHLORIDE SERPL-SCNC: 98 MMOL/L — SIGNIFICANT CHANGE UP (ref 96–108)
CO2 SERPL-SCNC: 26 MMOL/L — SIGNIFICANT CHANGE UP (ref 22–31)
CREAT SERPL-MCNC: 0.92 MG/DL — SIGNIFICANT CHANGE UP (ref 0.5–1.3)
CRP SERPL-MCNC: 5.18 MG/DL — HIGH (ref 0–0.4)
EOSINOPHIL # BLD AUTO: 1.41 K/UL — HIGH (ref 0–0.5)
EOSINOPHIL NFR BLD AUTO: 15.1 % — HIGH (ref 0–6)
ERYTHROCYTE [SEDIMENTATION RATE] IN BLOOD: 84 MM/HR — HIGH
GLUCOSE BLDC GLUCOMTR-MCNC: 107 MG/DL — HIGH (ref 70–99)
GLUCOSE BLDC GLUCOMTR-MCNC: 128 MG/DL — HIGH (ref 70–99)
GLUCOSE BLDC GLUCOMTR-MCNC: 136 MG/DL — HIGH (ref 70–99)
GLUCOSE BLDC GLUCOMTR-MCNC: 139 MG/DL — HIGH (ref 70–99)
GLUCOSE SERPL-MCNC: 115 MG/DL — HIGH (ref 70–99)
HCT VFR BLD CALC: 29.7 % — LOW (ref 39–50)
HGB BLD-MCNC: 9.2 G/DL — LOW (ref 13–17)
IMM GRANULOCYTES NFR BLD AUTO: 0.4 % — SIGNIFICANT CHANGE UP (ref 0–1.5)
LYMPHOCYTES # BLD AUTO: 1.85 K/UL — SIGNIFICANT CHANGE UP (ref 1–3.3)
LYMPHOCYTES # BLD AUTO: 19.8 % — SIGNIFICANT CHANGE UP (ref 13–44)
MAGNESIUM SERPL-MCNC: 2.1 MG/DL — SIGNIFICANT CHANGE UP (ref 1.6–2.6)
MCHC RBC-ENTMCNC: 30 PG — SIGNIFICANT CHANGE UP (ref 27–34)
MCHC RBC-ENTMCNC: 31 GM/DL — LOW (ref 32–36)
MCV RBC AUTO: 96.7 FL — SIGNIFICANT CHANGE UP (ref 80–100)
MONOCYTES # BLD AUTO: 0.75 K/UL — SIGNIFICANT CHANGE UP (ref 0–0.9)
MONOCYTES NFR BLD AUTO: 8 % — SIGNIFICANT CHANGE UP (ref 2–14)
NEUTROPHILS # BLD AUTO: 5.25 K/UL — SIGNIFICANT CHANGE UP (ref 1.8–7.4)
NEUTROPHILS NFR BLD AUTO: 56.4 % — SIGNIFICANT CHANGE UP (ref 43–77)
NRBC # BLD: 0 /100 WBCS — SIGNIFICANT CHANGE UP (ref 0–0)
PHOSPHATE SERPL-MCNC: 3.1 MG/DL — SIGNIFICANT CHANGE UP (ref 2.5–4.5)
PLATELET # BLD AUTO: 299 K/UL — SIGNIFICANT CHANGE UP (ref 150–400)
POTASSIUM SERPL-MCNC: 4.3 MMOL/L — SIGNIFICANT CHANGE UP (ref 3.5–5.3)
POTASSIUM SERPL-SCNC: 4.3 MMOL/L — SIGNIFICANT CHANGE UP (ref 3.5–5.3)
PROT SERPL-MCNC: 7.1 G/DL — SIGNIFICANT CHANGE UP (ref 6–8.3)
RBC # BLD: 3.07 M/UL — LOW (ref 4.2–5.8)
RBC # FLD: 13.2 % — SIGNIFICANT CHANGE UP (ref 10.3–14.5)
SODIUM SERPL-SCNC: 135 MMOL/L — SIGNIFICANT CHANGE UP (ref 135–145)
WBC # BLD: 9.33 K/UL — SIGNIFICANT CHANGE UP (ref 3.8–10.5)
WBC # FLD AUTO: 9.33 K/UL — SIGNIFICANT CHANGE UP (ref 3.8–10.5)

## 2020-04-11 PROCEDURE — 99232 SBSQ HOSP IP/OBS MODERATE 35: CPT | Mod: GC

## 2020-04-11 RX ORDER — AMLODIPINE BESYLATE 2.5 MG/1
10 TABLET ORAL EVERY 24 HOURS
Refills: 0 | Status: DISCONTINUED | OUTPATIENT
Start: 2020-04-11 | End: 2020-04-13

## 2020-04-11 RX ADMIN — Medication 975 MILLIGRAM(S): at 06:42

## 2020-04-11 RX ADMIN — AMLODIPINE BESYLATE 10 MILLIGRAM(S): 2.5 TABLET ORAL at 14:04

## 2020-04-11 RX ADMIN — LOSARTAN POTASSIUM 100 MILLIGRAM(S): 100 TABLET, FILM COATED ORAL at 06:36

## 2020-04-11 RX ADMIN — Medication 975 MILLIGRAM(S): at 23:17

## 2020-04-11 RX ADMIN — Medication 100 MILLIGRAM(S): at 15:16

## 2020-04-11 RX ADMIN — INSULIN GLARGINE 17 UNIT(S): 100 INJECTION, SOLUTION SUBCUTANEOUS at 22:31

## 2020-04-11 RX ADMIN — CLOPIDOGREL BISULFATE 75 MILLIGRAM(S): 75 TABLET, FILM COATED ORAL at 14:04

## 2020-04-11 RX ADMIN — ATORVASTATIN CALCIUM 20 MILLIGRAM(S): 80 TABLET, FILM COATED ORAL at 23:17

## 2020-04-11 RX ADMIN — ENOXAPARIN SODIUM 40 MILLIGRAM(S): 100 INJECTION SUBCUTANEOUS at 14:04

## 2020-04-11 RX ADMIN — TAMSULOSIN HYDROCHLORIDE 0.4 MILLIGRAM(S): 0.4 CAPSULE ORAL at 06:35

## 2020-04-11 RX ADMIN — Medication 100 MILLIGRAM(S): at 14:07

## 2020-04-11 RX ADMIN — TAMSULOSIN HYDROCHLORIDE 0.4 MILLIGRAM(S): 0.4 CAPSULE ORAL at 18:53

## 2020-04-11 RX ADMIN — Medication 975 MILLIGRAM(S): at 14:05

## 2020-04-11 RX ADMIN — Medication 100 MILLIGRAM(S): at 23:17

## 2020-04-11 RX ADMIN — Medication 100 MILLIGRAM(S): at 06:36

## 2020-04-11 RX ADMIN — Medication 5 MILLIGRAM(S): at 23:18

## 2020-04-11 NOTE — PROGRESS NOTE ADULT - PROBLEM SELECTOR PLAN 4
-On moderate ISS with overall fingersticks stable, goal <180s  -Pt on half home qHS dose of Lantus 35, will trend FS and increase as needed  -Holding home metformin while in the hospital

## 2020-04-11 NOTE — PROGRESS NOTE ADULT - PROBLEM SELECTOR PLAN 6
-s/p straight cath on 4/7. Passed trial of void.   -F/u I&Os, discussed with RN importance of documenting

## 2020-04-11 NOTE — PROGRESS NOTE ADULT - SUBJECTIVE AND OBJECTIVE BOX
Medicine Progress Note    Patient is a 79y old  Male who presents with a chief complaint of R knee postop infection (11 Apr 2020 08:10) with Positive Covid 19 positve dry cough.       SUBJECTIVE / OVERNIGHT EVENTS: stable, no cough, no fevers, no SOB. Doing well on RA at 97%. Ambulating frequently.     ADDITIONAL REVIEW OF SYSTEMS:    MEDICATIONS  (STANDING):  acetaminophen   Tablet .. 975 milliGRAM(s) Oral every 8 hours  amLODIPine   Tablet 10 milliGRAM(s) Oral every 24 hours  atorvastatin 20 milliGRAM(s) Oral at bedtime  ceFAZolin   IVPB 2000 milliGRAM(s) IV Intermittent every 8 hours  chlorhexidine 2% Cloths 1 Application(s) Topical <User Schedule>  clopidogrel Tablet 75 milliGRAM(s) Oral daily  dextrose 5%. 1000 milliLiter(s) (50 mL/Hr) IV Continuous <Continuous>  dextrose 50% Injectable 12.5 Gram(s) IV Push once  dextrose 50% Injectable 25 Gram(s) IV Push once  dextrose 50% Injectable 25 Gram(s) IV Push once  enoxaparin Injectable 40 milliGRAM(s) SubCutaneous every 24 hours  insulin glargine Injectable (LANTUS) 17 Unit(s) SubCutaneous at bedtime  insulin lispro (HumaLOG) corrective regimen sliding scale   SubCutaneous Before meals and at bedtime  losartan 100 milliGRAM(s) Oral daily  melatonin 5 milliGRAM(s) Oral at bedtime  rifAMPin 600 milliGRAM(s) Oral daily  tamsulosin 0.4 milliGRAM(s) Oral every 12 hours    MEDICATIONS  (PRN):  aluminum hydroxide/magnesium hydroxide/simethicone Suspension 30 milliLiter(s) Oral four times a day PRN Indigestion  benzonatate 100 milliGRAM(s) Oral three times a day PRN Cough  bisacodyl Suppository 10 milliGRAM(s) Rectal daily PRN If no bowel movement by postoperative day #2  dextrose 40% Gel 15 Gram(s) Oral once PRN Blood Glucose LESS THAN 70 milliGRAM(s)/deciliter  glucagon  Injectable 1 milliGRAM(s) IntraMuscular once PRN Glucose LESS THAN 70 milligrams/deciliter  HYDROmorphone   Tablet 2 milliGRAM(s) Oral every 6 hours PRN Severe Pain (7 - 10)  magnesium hydroxide Suspension 30 milliLiter(s) Oral daily PRN Constipation  ondansetron Injectable 4 milliGRAM(s) IV Push every 6 hours PRN Nausea and/or Vomiting  polyethylene glycol 3350 17 Gram(s) Oral daily PRN Constipation  sodium chloride 0.9% lock flush 10 milliLiter(s) IV Push every 1 hour PRN Pre/post blood products, medications, blood draw, and to maintain line patency    CAPILLARY BLOOD GLUCOSE      POCT Blood Glucose.: 128 mg/dL (11 Apr 2020 12:56)  POCT Blood Glucose.: 107 mg/dL (11 Apr 2020 07:49)  POCT Blood Glucose.: 173 mg/dL (10 Apr 2020 22:12)  POCT Blood Glucose.: 128 mg/dL (10 Apr 2020 17:05)    I&O's Summary    10 Apr 2020 07:01  -  11 Apr 2020 07:00  --------------------------------------------------------  IN: 100 mL / OUT: 400 mL / NET: -300 mL        PHYSICAL EXAM:  Vital Signs Last 24 Hrs  T(C): 36.9 (11 Apr 2020 11:22), Max: 36.9 (11 Apr 2020 11:22)  T(F): 98.5 (11 Apr 2020 11:22), Max: 98.5 (11 Apr 2020 11:22)  HR: 81 (11 Apr 2020 11:22) (80 - 90)  BP: 152/68 (11 Apr 2020 11:22) (146/70 - 173/72)  BP(mean): --  RR: 16 (11 Apr 2020 11:22) (15 - 17)  SpO2: 98% (11 Apr 2020 11:22) (97% - 98%)  CONSTITUTIONAL: NAD, well-developed, well-groomed  ENMT: Moist oral mucosa, no pharyngeal injection or exudates; normal dentition  RESPIRATORY: Normal respiratory effort; lungs are clear to auscultation bilaterally  CARDIOVASCULAR: Regular rate and rhythm, normal S1 and S2, no murmur/rub/gallop; No lower extremity edema; Peripheral pulses are 2+ bilaterally  ABDOMEN: Nontender to palpation, normoactive bowel sounds, no rebound/guarding; No hepatosplenomegaly  PSYCH: A+O to person, place, and time; affect appropriate  NEUROLOGY: CN 2-12 are intact and symmetric; no gross sensory deficits   SKIN: No rashes; no palpable lesions    LABS:                        9.2    9.33  )-----------( 299      ( 11 Apr 2020 09:01 )             29.7     04-11    135  |  98  |  11  ----------------------------<  115<H>  4.3   |  26  |  0.92    Ca    10.0      11 Apr 2020 09:01  Phos  3.1     04-11  Mg     2.1     04-11    TPro  7.1  /  Alb  3.9  /  TBili  0.4  /  DBili  x   /  AST  20  /  ALT  11  /  AlkPhos  67  04-11              COVID-19 PCR: Detected (06 Apr 2020 15:23)      RADIOLOGY & ADDITIONAL TESTS:  Imaging from Last 24 Hours: n/a    Electrocardiogram/QTc Interval:    COORDINATION OF CARE:  Care Discussed with Consultants/Other Providers:

## 2020-04-11 NOTE — PROGRESS NOTE ADULT - PROBLEM SELECTOR PLAN 1
#s/p R Knee I&D with polymer exchange 4/7/20  -F/u Ortho and ID recs  -F/u OR joint fluid cx, still in process  -Slight increase in WBC, ESR and CRP today likely 2/2 to recent procedure and not COVID. Will continue to monitor, trend fevers, trend labs  -Continue cefazolin IV and Rifampin PO for 6 weeks total followed by suppressive therapy w/ PO cephalexin x3 months  -PICC placed 4/8  -Pain controlled w/ PRN dialudid  -DVT ppx: home ASA/Plavix per Ortho, will check if heparin subq or Lovenox ok  -F/u PT eval/recs, keep knee immobilizer 2 weeks, ok to ambulate in immobilizer w/ assistive device  -Dispo: CAP unit while pending home PT and home infusion clearance. Antibiotics as above and weekly blood work to Dr Lorenzana

## 2020-04-11 NOTE — PROGRESS NOTE ADULT - SUBJECTIVE AND OBJECTIVE BOX
Ortho Note    Pt comfortable without complaints, pain controlled  Denies CP, SOB, N/V, numbness/tingling     Vital Signs Last 24 Hrs  T(C): 36.7 (04-11-20 @ 05:33), Max: 36.7 (04-11-20 @ 05:33)  T(F): 98 (04-11-20 @ 05:33), Max: 98 (04-11-20 @ 05:33)  HR: 80 (04-11-20 @ 05:33) (80 - 80)  BP: 166/78 (04-11-20 @ 05:33) (166/78 - 166/78)  BP(mean): --  RR: 16 (04-11-20 @ 05:33) (16 - 16)  SpO2: 97% (04-11-20 @ 05:33) (97% - 97%)  AVSS    General: Pt Alert and oriented, NAD  R knee KI in place  Prevena DSG C/D/I, functional   Sensation intact s/s/sp/dp/t and symmetric   Motor: EHL/FHL/TA/GS 5/5 and symmetric   Toes WWP                          8.5    8.46  )-----------( 264      ( 10 Apr 2020 13:07 )             27.8   10 Apr 2020 06:57    139    |  103    |  15     ----------------------------<  108    4.1     |  25     |  0.86     Phos  3.2       10 Apr 2020 06:57  Mg     2.0       10 Apr 2020 06:57    TPro  6.1    /  Alb  3.3    /  TBili  0.4    /  DBili  x      /  AST  22     /  ALT  12     /  AlkPhos  55     10 Apr 2020 06:57      Culture - Blood (collected 08 Apr 2020 10:57)  Source: .Blood Blood  Preliminary Report (10 Apr 2020 11:01):    No growth at 2 days.    Culture - Blood (collected 08 Apr 2020 10:56)  Source: .Blood Blood  Preliminary Report (10 Apr 2020 11:01):    No growth at 2 days.      A/P: 79yMale s/p R TKA ID and poly exchange for acute PPJI, covid -19 + and +MSSA   - Stable  - PICC in place  - Appreciate ID recs. Cont cefazolin 2g IV q8h and rifampin 600mg PO q24h x 6wks  - Strongyloides Ab negative   - Pain Control  - DVT ppx: lovenox/plavix while in house and ASA/plavix upon discharged    - PT, WBS: WBAT in KI x2 wks  - dispo: Awaiting home PT and home infusion clearance     Ortho Pager [phone]

## 2020-04-11 NOTE — PROGRESS NOTE ADULT - ASSESSMENT
Pt is 79M w/ PMH CAD s/p stents 6-7 years ago, T2DM, HTN, HLD, L TKA (2006) and R TKA (2002, followed by revision with poly exchange on 3/6/20 for knee pain) who presented for I/D of R TKA s/p polyexchange on 3/6. I/D was completed on 4/7 by Ortho. Pt also with +COVID.

## 2020-04-11 NOTE — PROGRESS NOTE ADULT - PROBLEM SELECTOR PLAN 2
- +swab on 4/6. Repeat swab on 4/13 for home infusion therapy to start.   -Stable on RA, O2sat 94-98%  -Labs stable and trending down  -No therapy indicated

## 2020-04-12 LAB
ALBUMIN SERPL ELPH-MCNC: 3.5 G/DL — SIGNIFICANT CHANGE UP (ref 3.3–5)
ALP SERPL-CCNC: 61 U/L — SIGNIFICANT CHANGE UP (ref 40–120)
ALT FLD-CCNC: 12 U/L — SIGNIFICANT CHANGE UP (ref 10–45)
ANION GAP SERPL CALC-SCNC: 12 MMOL/L — SIGNIFICANT CHANGE UP (ref 5–17)
AST SERPL-CCNC: 23 U/L — SIGNIFICANT CHANGE UP (ref 10–40)
BASOPHILS # BLD AUTO: 0.04 K/UL — SIGNIFICANT CHANGE UP (ref 0–0.2)
BASOPHILS NFR BLD AUTO: 0.5 % — SIGNIFICANT CHANGE UP (ref 0–2)
BILIRUB SERPL-MCNC: 0.3 MG/DL — SIGNIFICANT CHANGE UP (ref 0.2–1.2)
BUN SERPL-MCNC: 10 MG/DL — SIGNIFICANT CHANGE UP (ref 7–23)
CALCIUM SERPL-MCNC: 9.4 MG/DL — SIGNIFICANT CHANGE UP (ref 8.4–10.5)
CHLORIDE SERPL-SCNC: 101 MMOL/L — SIGNIFICANT CHANGE UP (ref 96–108)
CO2 SERPL-SCNC: 24 MMOL/L — SIGNIFICANT CHANGE UP (ref 22–31)
CREAT SERPL-MCNC: 0.89 MG/DL — SIGNIFICANT CHANGE UP (ref 0.5–1.3)
CRP SERPL-MCNC: 2.41 MG/DL — HIGH (ref 0–0.4)
CULTURE RESULTS: NO GROWTH — SIGNIFICANT CHANGE UP
CULTURE RESULTS: NO GROWTH — SIGNIFICANT CHANGE UP
EOSINOPHIL # BLD AUTO: 1.27 K/UL — HIGH (ref 0–0.5)
EOSINOPHIL NFR BLD AUTO: 17 % — HIGH (ref 0–6)
ERYTHROCYTE [SEDIMENTATION RATE] IN BLOOD: 89 MM/HR — HIGH
GLUCOSE BLDC GLUCOMTR-MCNC: 114 MG/DL — HIGH (ref 70–99)
GLUCOSE BLDC GLUCOMTR-MCNC: 132 MG/DL — HIGH (ref 70–99)
GLUCOSE BLDC GLUCOMTR-MCNC: 146 MG/DL — HIGH (ref 70–99)
GLUCOSE BLDC GLUCOMTR-MCNC: 170 MG/DL — HIGH (ref 70–99)
GLUCOSE SERPL-MCNC: 105 MG/DL — HIGH (ref 70–99)
HCT VFR BLD CALC: 24.8 % — LOW (ref 39–50)
HGB BLD-MCNC: 7.8 G/DL — LOW (ref 13–17)
IMM GRANULOCYTES NFR BLD AUTO: 0.4 % — SIGNIFICANT CHANGE UP (ref 0–1.5)
LYMPHOCYTES # BLD AUTO: 1.5 K/UL — SIGNIFICANT CHANGE UP (ref 1–3.3)
LYMPHOCYTES # BLD AUTO: 20.1 % — SIGNIFICANT CHANGE UP (ref 13–44)
MAGNESIUM SERPL-MCNC: 1.9 MG/DL — SIGNIFICANT CHANGE UP (ref 1.6–2.6)
MCHC RBC-ENTMCNC: 30 PG — SIGNIFICANT CHANGE UP (ref 27–34)
MCHC RBC-ENTMCNC: 31.5 GM/DL — LOW (ref 32–36)
MCV RBC AUTO: 95.4 FL — SIGNIFICANT CHANGE UP (ref 80–100)
MONOCYTES # BLD AUTO: 0.69 K/UL — SIGNIFICANT CHANGE UP (ref 0–0.9)
MONOCYTES NFR BLD AUTO: 9.3 % — SIGNIFICANT CHANGE UP (ref 2–14)
NEUTROPHILS # BLD AUTO: 3.92 K/UL — SIGNIFICANT CHANGE UP (ref 1.8–7.4)
NEUTROPHILS NFR BLD AUTO: 52.7 % — SIGNIFICANT CHANGE UP (ref 43–77)
NRBC # BLD: 0 /100 WBCS — SIGNIFICANT CHANGE UP (ref 0–0)
PHOSPHATE SERPL-MCNC: 3.2 MG/DL — SIGNIFICANT CHANGE UP (ref 2.5–4.5)
PLATELET # BLD AUTO: 258 K/UL — SIGNIFICANT CHANGE UP (ref 150–400)
POTASSIUM SERPL-MCNC: 4.2 MMOL/L — SIGNIFICANT CHANGE UP (ref 3.5–5.3)
POTASSIUM SERPL-SCNC: 4.2 MMOL/L — SIGNIFICANT CHANGE UP (ref 3.5–5.3)
PROT SERPL-MCNC: 6.5 G/DL — SIGNIFICANT CHANGE UP (ref 6–8.3)
RBC # BLD: 2.6 M/UL — LOW (ref 4.2–5.8)
RBC # FLD: 13.1 % — SIGNIFICANT CHANGE UP (ref 10.3–14.5)
SODIUM SERPL-SCNC: 137 MMOL/L — SIGNIFICANT CHANGE UP (ref 135–145)
SPECIMEN SOURCE: SIGNIFICANT CHANGE UP
SPECIMEN SOURCE: SIGNIFICANT CHANGE UP
WBC # BLD: 7.45 K/UL — SIGNIFICANT CHANGE UP (ref 3.8–10.5)
WBC # FLD AUTO: 7.45 K/UL — SIGNIFICANT CHANGE UP (ref 3.8–10.5)

## 2020-04-12 RX ADMIN — ENOXAPARIN SODIUM 40 MILLIGRAM(S): 100 INJECTION SUBCUTANEOUS at 12:30

## 2020-04-12 RX ADMIN — Medication 975 MILLIGRAM(S): at 04:45

## 2020-04-12 RX ADMIN — ATORVASTATIN CALCIUM 20 MILLIGRAM(S): 80 TABLET, FILM COATED ORAL at 22:57

## 2020-04-12 RX ADMIN — TAMSULOSIN HYDROCHLORIDE 0.4 MILLIGRAM(S): 0.4 CAPSULE ORAL at 04:45

## 2020-04-12 RX ADMIN — LOSARTAN POTASSIUM 100 MILLIGRAM(S): 100 TABLET, FILM COATED ORAL at 04:46

## 2020-04-12 RX ADMIN — Medication 100 MILLIGRAM(S): at 23:00

## 2020-04-12 RX ADMIN — CLOPIDOGREL BISULFATE 75 MILLIGRAM(S): 75 TABLET, FILM COATED ORAL at 12:30

## 2020-04-12 RX ADMIN — CHLORHEXIDINE GLUCONATE 1 APPLICATION(S): 213 SOLUTION TOPICAL at 10:15

## 2020-04-12 RX ADMIN — TAMSULOSIN HYDROCHLORIDE 0.4 MILLIGRAM(S): 0.4 CAPSULE ORAL at 17:28

## 2020-04-12 RX ADMIN — Medication 5 MILLIGRAM(S): at 23:32

## 2020-04-12 RX ADMIN — Medication 975 MILLIGRAM(S): at 22:57

## 2020-04-12 RX ADMIN — Medication 975 MILLIGRAM(S): at 12:32

## 2020-04-12 RX ADMIN — Medication 2: at 23:22

## 2020-04-12 RX ADMIN — Medication 100 MILLIGRAM(S): at 12:43

## 2020-04-12 RX ADMIN — INSULIN GLARGINE 17 UNIT(S): 100 INJECTION, SOLUTION SUBCUTANEOUS at 23:27

## 2020-04-12 RX ADMIN — AMLODIPINE BESYLATE 10 MILLIGRAM(S): 2.5 TABLET ORAL at 12:32

## 2020-04-12 RX ADMIN — Medication 100 MILLIGRAM(S): at 04:45

## 2020-04-12 NOTE — PROGRESS NOTE ADULT - ASSESSMENT
Pt is 79M w/ PMH CAD s/p stents 6-7 years ago, T2DM, HTN, HLD, L TKA (2006) and R TKA (2002), followed by revision with poly exchange on 3/6/20 for knee pain) who presented for I/D of R TKA s/p polyexchange on 3/6. I/D was completed on 4/7 by Ortho. Pt also with +COVID. Patient is recovering well from orthopedic procedure with minimal pain or discomfort. Patient is saturating well on RA, remains on RMF until cleared for home services.

## 2020-04-12 NOTE — PROGRESS NOTE ADULT - SUBJECTIVE AND OBJECTIVE BOX
Ortho Note    Pt comfortable without complaints, pain controlled  Denies CP, SOB, N/V, numbness/tingling     Vital Signs Last 24 Hrs  T(C): 36.7 (12 Apr 2020 06:28), Max: 36.9 (11 Apr 2020 11:22)  T(F): 98 (12 Apr 2020 06:28), Max: 98.5 (11 Apr 2020 11:22)  HR: 78 (12 Apr 2020 06:28) (78 - 82)  BP: 160/78 (12 Apr 2020 06:28) (142/74 - 160/78)  BP(mean): --  RR: 16 (12 Apr 2020 06:28) (15 - 16)  SpO2: 96% (12 Apr 2020 06:28) (96% - 98%)    Afebrile; VSS  General: A&Ox3, NAD  RLE: Prevena DSG C/D/I holding suction; KI and Cryocuff in place  Pulses: Foot WWP; DP pulse 2+; Cap refill < 2 sec  Sensation: SILT distally and symmetric to contralateral extremity  Motor: TA/EHL/FHL/GS 5/5 and symmetric to contralateral extremity                          7.8    7.45  )-----------( 258      ( 12 Apr 2020 07:35 )             24.8     11 Apr 2020 09:01    135    |  98     |  11     ----------------------------<  115    4.3     |  26     |  0.92     Phos  3.1       11 Apr 2020 09:01  Mg     2.1       11 Apr 2020 09:01    TPro  7.1    /  Alb  3.9    /  TBili  0.4    /  DBili  x      /  AST  20     /  ALT  11     /  AlkPhos  67     11 Apr 2020 09:01

## 2020-04-12 NOTE — PROGRESS NOTE ADULT - PROBLEM SELECTOR PLAN 1
#s/p R Knee I&D with polymer exchange 4/7/20  -F/u Ortho and ID recs  -F/u OR joint fluid cx, still in process  -Slight increase in WBC, ESR and CRP today likely 2/2 to recent procedure and not COVID. Will continue to monitor, trend fevers, trend labs  -Continue cefazolin IV and Rifampin PO for 6 weeks total followed by suppressive therapy w/ PO cephalexin x3 months  -PICC placed 4/8  -Pain controlled w/ PRN dialudid  -DVT ppx: home Plavix and Lovenox per Ortho  -F/u PT eval/recs, keep knee immobilizer 2 weeks, ok to ambulate in immobilizer w/ assistive device  -pending home PT and home infusion clearance. Antibiotics as above and weekly blood work to Dr Lorenzana

## 2020-04-12 NOTE — PROGRESS NOTE ADULT - ASSESSMENT
A/P: 79yMale s/p R TKA c/b periprosthetic infection now s/p ID w spacer exchange  - Afebrile, VS Stable  - Pain/Nausea Control  - Home meds  - AM labs show Hgb drop to 7.8 -- continue to monitor, asymptomatic for now  - DVT ppx: LVX + Plavix  - WBS: WBAT RLE locked in KI x 2 weeks  - PT: home  - Cultures showing MSSA -- On Ancef  - Plan to d/c to home when home infusion set up  - Dispo as per primary team      Ortho Pager [phone]

## 2020-04-12 NOTE — PROGRESS NOTE ADULT - SUBJECTIVE AND OBJECTIVE BOX
Internal Medicine Adult Progress Note    Subjective Assessment: Patient seen and examined at bedside. Denies arthralgias/myalgias, fever, chills, light-headedness or dizziness, diarrhea. Reports mild dry cough which improves with the use of Tessalon Pearles. Saturating well on RA.   	  MEDICATIONS:  amLODIPine   Tablet 10 milliGRAM(s) Oral every 24 hours  losartan 100 milliGRAM(s) Oral daily  tamsulosin 0.4 milliGRAM(s) Oral every 12 hours    ceFAZolin   IVPB 2000 milliGRAM(s) IV Intermittent every 8 hours  rifAMPin 600 milliGRAM(s) Oral daily    benzonatate 100 milliGRAM(s) Oral three times a day PRN    acetaminophen   Tablet .. 975 milliGRAM(s) Oral every 8 hours  HYDROmorphone   Tablet 2 milliGRAM(s) Oral every 6 hours PRN  melatonin 5 milliGRAM(s) Oral at bedtime  ondansetron Injectable 4 milliGRAM(s) IV Push every 6 hours PRN    aluminum hydroxide/magnesium hydroxide/simethicone Suspension 30 milliLiter(s) Oral four times a day PRN  bisacodyl Suppository 10 milliGRAM(s) Rectal daily PRN  magnesium hydroxide Suspension 30 milliLiter(s) Oral daily PRN  polyethylene glycol 3350 17 Gram(s) Oral daily PRN    atorvastatin 20 milliGRAM(s) Oral at bedtime  dextrose 40% Gel 15 Gram(s) Oral once PRN  dextrose 50% Injectable 12.5 Gram(s) IV Push once  dextrose 50% Injectable 25 Gram(s) IV Push once  dextrose 50% Injectable 25 Gram(s) IV Push once  glucagon  Injectable 1 milliGRAM(s) IntraMuscular once PRN  insulin glargine Injectable (LANTUS) 17 Unit(s) SubCutaneous at bedtime  insulin lispro (HumaLOG) corrective regimen sliding scale   SubCutaneous Before meals and at bedtime    chlorhexidine 2% Cloths 1 Application(s) Topical <User Schedule>  clopidogrel Tablet 75 milliGRAM(s) Oral daily  dextrose 5%. 1000 milliLiter(s) IV Continuous <Continuous>  enoxaparin Injectable 40 milliGRAM(s) SubCutaneous every 24 hours  sodium chloride 0.9% lock flush 10 milliLiter(s) IV Push every 1 hour PRN      	    [PHYSICAL EXAM:  TELEMETRY:  T(C): 36.7 (04-12-20 @ 06:28), Max: 36.9 (04-11-20 @ 11:22)  HR: 78 (04-12-20 @ 06:28) (78 - 82)  BP: 160/78 (04-12-20 @ 06:28) (142/74 - 160/78)  RR: 16 (04-12-20 @ 06:28) (15 - 16)  SpO2: 96% (04-12-20 @ 06:28) (96% - 98%)  Wt(kg): --  I&O's Summary    11 Apr 2020 07:01  -  12 Apr 2020 07:00  --------------------------------------------------------  IN: 100 mL / OUT: 350 mL / NET: -250 mL                                       Appearance: Normal	  HEENT:   Normal oral mucosa, PERRL, EOMI	  Neck: Supple, + JVD/ - JVD; Carotid Bruit   Cardiovascular: Normal S1 S2, No JVD, No murmurs,   Respiratory: Lungs clear to auscultation/Decreased Breath Sounds/No Rales, Rhonchi, Wheezing	  Gastrointestinal:  Soft, Non-tender, + BS	  Skin: No rashes, No ecchymoses, No cyanosis  Extremities: Normal range of motion, No clubbing, cyanosis or edema  Vascular: Peripheral pulses palpable 2+ bilaterally  Neurologic: Non-focal  Psychiatry: A & O x 3, Mood & affect appropriate                            7.8    7.45  )-----------( 258      ( 12 Apr 2020 07:35 )             24.8     04-11    135  |  98  |  11  ----------------------------<  115<H>  4.3   |  26  |  0.92    Ca    10.0      11 Apr 2020 09:01  Phos  3.1     04-11  Mg     2.1     04-11    TPro  7.1  /  Alb  3.9  /  TBili  0.4  /  DBili  x   /  AST  20  /  ALT  11  /  AlkPhos  67  04-11        ASSESSMENT/PLAN: 	        DVT ppx: Enoxaprin 40 mg   Dispo: Internal Medicine Adult Progress Note    Subjective Assessment: Patient seen and examined at bedside. Denies arthralgias/myalgias, fever, chills, light-headedness or dizziness, diarrhea. Reports mild dry cough which improves with the use of Tessalon Pearles. Saturating well on RA.   	  MEDICATIONS:  amLODIPine   Tablet 10 milliGRAM(s) Oral every 24 hours  losartan 100 milliGRAM(s) Oral daily  tamsulosin 0.4 milliGRAM(s) Oral every 12 hours    ceFAZolin   IVPB 2000 milliGRAM(s) IV Intermittent every 8 hours  rifAMPin 600 milliGRAM(s) Oral daily    benzonatate 100 milliGRAM(s) Oral three times a day PRN    acetaminophen   Tablet .. 975 milliGRAM(s) Oral every 8 hours  HYDROmorphone   Tablet 2 milliGRAM(s) Oral every 6 hours PRN  melatonin 5 milliGRAM(s) Oral at bedtime  ondansetron Injectable 4 milliGRAM(s) IV Push every 6 hours PRN    aluminum hydroxide/magnesium hydroxide/simethicone Suspension 30 milliLiter(s) Oral four times a day PRN  bisacodyl Suppository 10 milliGRAM(s) Rectal daily PRN  magnesium hydroxide Suspension 30 milliLiter(s) Oral daily PRN  polyethylene glycol 3350 17 Gram(s) Oral daily PRN    atorvastatin 20 milliGRAM(s) Oral at bedtime  dextrose 40% Gel 15 Gram(s) Oral once PRN  dextrose 50% Injectable 12.5 Gram(s) IV Push once  dextrose 50% Injectable 25 Gram(s) IV Push once  dextrose 50% Injectable 25 Gram(s) IV Push once  glucagon  Injectable 1 milliGRAM(s) IntraMuscular once PRN  insulin glargine Injectable (LANTUS) 17 Unit(s) SubCutaneous at bedtime  insulin lispro (HumaLOG) corrective regimen sliding scale   SubCutaneous Before meals and at bedtime    chlorhexidine 2% Cloths 1 Application(s) Topical <User Schedule>  clopidogrel Tablet 75 milliGRAM(s) Oral daily  dextrose 5%. 1000 milliLiter(s) IV Continuous <Continuous>  enoxaparin Injectable 40 milliGRAM(s) SubCutaneous every 24 hours  sodium chloride 0.9% lock flush 10 milliLiter(s) IV Push every 1 hour PRN      	    [PHYSICAL EXAM:  TELEMETRY:  T(C): 36.7 (04-12-20 @ 06:28), Max: 36.9 (04-11-20 @ 11:22)  HR: 78 (04-12-20 @ 06:28) (78 - 82)  BP: 160/78 (04-12-20 @ 06:28) (142/74 - 160/78)  RR: 16 (04-12-20 @ 06:28) (15 - 16)  SpO2: 96% (04-12-20 @ 06:28) (96% - 98%)  Wt(kg): --  I&O's Summary    11 Apr 2020 07:01  -  12 Apr 2020 07:00  --------------------------------------------------------  IN: 100 mL / OUT: 350 mL / NET: -250 mL                                       Appearance: Normal	  HEENT:  EOMI	  Neck: - JVD  Cardiovascular: No stethoscope available   Respiratory: No accessory muscle use	  Gastrointestinal:  Soft, Non-tender	  Skin: No rashes, No ecchymoses, No cyanosis  Extremities: Normal range of motion, No clubbing, cyanosis or edema  Vascular: Peripheral pulses palpable 2+ bilaterally  Neurologic: Non-focal  Psychiatry: A & O x 3, Mood & affect appropriate                            7.8    7.45  )-----------( 258      ( 12 Apr 2020 07:35 )             24.8     04-11    135  |  98  |  11  ----------------------------<  115<H>  4.3   |  26  |  0.92    Ca    10.0      11 Apr 2020 09:01  Phos  3.1     04-11  Mg     2.1     04-11    TPro  7.1  /  Alb  3.9  /  TBili  0.4  /  DBili  x   /  AST  20  /  ALT  11  /  AlkPhos  67  04-11        ASSESSMENT/PLAN: 	        DVT ppx: Enoxaprin 40 mg   Dispo:

## 2020-04-13 ENCOUNTER — TRANSCRIPTION ENCOUNTER (OUTPATIENT)
Age: 80
End: 2020-04-13

## 2020-04-13 VITALS
OXYGEN SATURATION: 100 % | RESPIRATION RATE: 16 BRPM | TEMPERATURE: 98 F | SYSTOLIC BLOOD PRESSURE: 157 MMHG | HEART RATE: 78 BPM | DIASTOLIC BLOOD PRESSURE: 79 MMHG

## 2020-04-13 LAB
ALBUMIN SERPL ELPH-MCNC: 4 G/DL — SIGNIFICANT CHANGE UP (ref 3.3–5)
ALP SERPL-CCNC: 69 U/L — SIGNIFICANT CHANGE UP (ref 40–120)
ALT FLD-CCNC: 15 U/L — SIGNIFICANT CHANGE UP (ref 10–45)
ANION GAP SERPL CALC-SCNC: 14 MMOL/L — SIGNIFICANT CHANGE UP (ref 5–17)
AST SERPL-CCNC: 27 U/L — SIGNIFICANT CHANGE UP (ref 10–40)
BASOPHILS # BLD AUTO: 0.06 K/UL — SIGNIFICANT CHANGE UP (ref 0–0.2)
BASOPHILS NFR BLD AUTO: 0.6 % — SIGNIFICANT CHANGE UP (ref 0–2)
BILIRUB SERPL-MCNC: 0.3 MG/DL — SIGNIFICANT CHANGE UP (ref 0.2–1.2)
BUN SERPL-MCNC: 10 MG/DL — SIGNIFICANT CHANGE UP (ref 7–23)
CALCIUM SERPL-MCNC: 9.7 MG/DL — SIGNIFICANT CHANGE UP (ref 8.4–10.5)
CHLORIDE SERPL-SCNC: 97 MMOL/L — SIGNIFICANT CHANGE UP (ref 96–108)
CO2 SERPL-SCNC: 23 MMOL/L — SIGNIFICANT CHANGE UP (ref 22–31)
CREAT SERPL-MCNC: 0.84 MG/DL — SIGNIFICANT CHANGE UP (ref 0.5–1.3)
CRP SERPL-MCNC: 1.27 MG/DL — HIGH (ref 0–0.4)
D DIMER BLD IA.RAPID-MCNC: 926 NG/ML DDU — HIGH
EOSINOPHIL # BLD AUTO: 1.03 K/UL — HIGH (ref 0–0.5)
EOSINOPHIL NFR BLD AUTO: 11.1 % — HIGH (ref 0–6)
FERRITIN SERPL-MCNC: 528 NG/ML — HIGH (ref 30–400)
GLUCOSE BLDC GLUCOMTR-MCNC: 109 MG/DL — HIGH (ref 70–99)
GLUCOSE BLDC GLUCOMTR-MCNC: 135 MG/DL — HIGH (ref 70–99)
GLUCOSE SERPL-MCNC: 121 MG/DL — HIGH (ref 70–99)
HCT VFR BLD CALC: 29 % — LOW (ref 39–50)
HGB BLD-MCNC: 9.1 G/DL — LOW (ref 13–17)
IMM GRANULOCYTES NFR BLD AUTO: 0.6 % — SIGNIFICANT CHANGE UP (ref 0–1.5)
LYMPHOCYTES # BLD AUTO: 1.91 K/UL — SIGNIFICANT CHANGE UP (ref 1–3.3)
LYMPHOCYTES # BLD AUTO: 20.5 % — SIGNIFICANT CHANGE UP (ref 13–44)
MAGNESIUM SERPL-MCNC: 1.9 MG/DL — SIGNIFICANT CHANGE UP (ref 1.6–2.6)
MCHC RBC-ENTMCNC: 29.8 PG — SIGNIFICANT CHANGE UP (ref 27–34)
MCHC RBC-ENTMCNC: 31.4 GM/DL — LOW (ref 32–36)
MCV RBC AUTO: 95.1 FL — SIGNIFICANT CHANGE UP (ref 80–100)
MONOCYTES # BLD AUTO: 0.71 K/UL — SIGNIFICANT CHANGE UP (ref 0–0.9)
MONOCYTES NFR BLD AUTO: 7.6 % — SIGNIFICANT CHANGE UP (ref 2–14)
NEUTROPHILS # BLD AUTO: 5.53 K/UL — SIGNIFICANT CHANGE UP (ref 1.8–7.4)
NEUTROPHILS NFR BLD AUTO: 59.6 % — SIGNIFICANT CHANGE UP (ref 43–77)
NRBC # BLD: 0 /100 WBCS — SIGNIFICANT CHANGE UP (ref 0–0)
PLATELET # BLD AUTO: 335 K/UL — SIGNIFICANT CHANGE UP (ref 150–400)
POTASSIUM SERPL-MCNC: 4.1 MMOL/L — SIGNIFICANT CHANGE UP (ref 3.5–5.3)
POTASSIUM SERPL-SCNC: 4.1 MMOL/L — SIGNIFICANT CHANGE UP (ref 3.5–5.3)
PROT SERPL-MCNC: 7.5 G/DL — SIGNIFICANT CHANGE UP (ref 6–8.3)
RBC # BLD: 3.05 M/UL — LOW (ref 4.2–5.8)
RBC # FLD: 13.1 % — SIGNIFICANT CHANGE UP (ref 10.3–14.5)
SARS-COV-2 RNA SPEC QL NAA+PROBE: SIGNIFICANT CHANGE UP
SODIUM SERPL-SCNC: 134 MMOL/L — LOW (ref 135–145)
WBC # BLD: 9.3 K/UL — SIGNIFICANT CHANGE UP (ref 3.8–10.5)
WBC # FLD AUTO: 9.3 K/UL — SIGNIFICANT CHANGE UP (ref 3.8–10.5)

## 2020-04-13 PROCEDURE — 99238 HOSP IP/OBS DSCHRG MGMT 30/<: CPT | Mod: CS

## 2020-04-13 RX ORDER — ACETAMINOPHEN 500 MG
2 TABLET ORAL
Qty: 42 | Refills: 0
Start: 2020-04-13 | End: 2020-04-19

## 2020-04-13 RX ORDER — DOCUSATE SODIUM 100 MG
1 CAPSULE ORAL
Qty: 60 | Refills: 0
Start: 2020-04-13 | End: 2020-05-12

## 2020-04-13 RX ORDER — CLOPIDOGREL BISULFATE 75 MG/1
1 TABLET, FILM COATED ORAL
Qty: 0 | Refills: 0 | DISCHARGE

## 2020-04-13 RX ORDER — CLOPIDOGREL BISULFATE 75 MG/1
1 TABLET, FILM COATED ORAL
Qty: 0 | Refills: 0 | DISCHARGE
Start: 2020-04-13

## 2020-04-13 RX ADMIN — AMLODIPINE BESYLATE 10 MILLIGRAM(S): 2.5 TABLET ORAL at 12:47

## 2020-04-13 RX ADMIN — Medication 100 MILLIGRAM(S): at 12:48

## 2020-04-13 RX ADMIN — CHLORHEXIDINE GLUCONATE 1 APPLICATION(S): 213 SOLUTION TOPICAL at 07:58

## 2020-04-13 RX ADMIN — Medication 975 MILLIGRAM(S): at 12:47

## 2020-04-13 RX ADMIN — LOSARTAN POTASSIUM 100 MILLIGRAM(S): 100 TABLET, FILM COATED ORAL at 06:47

## 2020-04-13 RX ADMIN — TAMSULOSIN HYDROCHLORIDE 0.4 MILLIGRAM(S): 0.4 CAPSULE ORAL at 06:47

## 2020-04-13 RX ADMIN — Medication 975 MILLIGRAM(S): at 06:44

## 2020-04-13 RX ADMIN — Medication 100 MILLIGRAM(S): at 06:45

## 2020-04-13 RX ADMIN — ENOXAPARIN SODIUM 40 MILLIGRAM(S): 100 INJECTION SUBCUTANEOUS at 12:48

## 2020-04-13 RX ADMIN — CLOPIDOGREL BISULFATE 75 MILLIGRAM(S): 75 TABLET, FILM COATED ORAL at 12:47

## 2020-04-13 NOTE — PROGRESS NOTE ADULT - PROBLEM SELECTOR PLAN 1
#s/p R Knee I&D with polymer exchange 4/7/20  -F/u Ortho and ID recs  -F/u OR joint fluid cx, MSSA (+)  -Slight increase in WBC, ESR and CRP today likely 2/2 to recent procedure and not COVID. Will continue to monitor, trend fevers, trend labs  -Continue cefazolin IV and Rifampin PO for 6 weeks total followed by suppressive therapy w/ PO cephalexin x3 months  -PICC placed 4/8  -Pain controlled w/ PRN dialudid  -DVT ppx: home Plavix and Lovenox per Ortho  -F/u PT eval/recs, keep knee immobilizer 2 weeks, ok to ambulate in immobilizer w/ assistive device  -pending home PT and home infusion clearance. Antibiotics as above and weekly blood work to Dr Lorenzana

## 2020-04-13 NOTE — PROGRESS NOTE ADULT - ASSESSMENT
A/P: 79yMale s/p R TKA c/b periprosthetic infection now s/p ID w spacer exchange  - Afebrile, VS Stable  - Pain/Nausea Control  - Home meds  - f/u AM labs  - DVT ppx: LVX + Plavix  - WBS: WBAT RLE locked in KI x 2 weeks  - PT: home  - Cultures showing MSSA -- On Ancef  - Plan to d/c to home when home infusion set up  - Dispo as per primary team      Ortho Pager [phone]

## 2020-04-13 NOTE — DISCHARGE NOTE NURSING/CASE MANAGEMENT/SOCIAL WORK - NSSCNAMETXT_GEN_ALL_CORE
[facility] [place]   (Acceptance and SOC pending ) [facility]  [facility]

## 2020-04-13 NOTE — PROGRESS NOTE ADULT - ATTENDING COMMENTS
Patient examined by me this morning. States pain is very well controlled, mild occasional twinges but able to ambulate with minimal need of ambulatory aide. Tolerating KI well. Dry cough is about the same as prior, no chest discomfort, no dyspnea, no fevers in house over the weekend.    Right knee dressings were changed from Prevena to Aquacel. The staples are intact. The incision is well approximated without drainage. Appropriate postop swelling. Previously dehisced region at the superior aspect of the incision is demonstrating no evidence of necrosis or cellulitis. The knee is stable to varus/valgus stress in extension and relaxed extension; range of motion testing was deferred. Ankle and toe ROM is normal. Distal neurovascular status is preserved.    Labs reviewed; moderate postop anemia; continue to monitor while in-house but doubt any need for transfusion.    Impression: as above  Plan: as above. Patient can be transitioned to ASA 81mg daily + Plavix upon discharge. Knee immobilizer should be maintained until he sees me in the office. He has an appointment for 4/27/20 for staple removal. Antibiotics should be continued as per ID recommendations.     Please call me with any questions.     aNthan Oh  [phone]
I saw him this morning. Pain is improving and he has been able to ambulate around his room with the knee immobilizer and a walker. PICC line has been placed. The dressings are intact and the Prevena is holding suction. Distal neurovascular status is normal. He continues to have an occasional cough but does not endorse marck respiratory distress.    Tmax 100.6 yesterday 5am, SaO2 ~95-98 mostly on RA, other VSS    WBC count slightly elevated and ESR/CRP have uptrended since admission, which is not unexpected on POD 2 from this operation.    Plan as above. Regarding his disposition, I recommend keeping him in the hospital for two additional days to recheck inflammatory markers this Saturday and ensure downtrending prior to discharge; also to surveil for any worsening of respiratory symptoms. He should follow up with me on 4/16/20 for wound check and dressing change ([phone] for appointment).     Please call me with any questions.    Nathan Oh  [phone]
improved SUBHASH, restarted on home antihypertensives, ok if Cr increases by 30% tomorrow as this may be 2/2 restarting his ARB  s/p I&D for infected knee, MSSA + fluid cultures so on 6 weeks of abx tx afterwards will need chronic suppressive therapy
I discussed the case with Dr. Lorenzana from ID. Since the deep cultures are growing MSSA and the intraoperative findings were consistent with a deep periprosthetic infection, will continue to treat with definitive antibiotic management (see above). Stringyloides testing also to be done to assess for potential cause of eosinophilia. No further plans for surgical intervention at this time.
Overnight events, labs, and interval history discussed with NP/PA/Resident but not examined  #Post-operative state - ambulating in immobilizer  #R knee injection  #COVID - on RA, has been afebrile   #CAD - no chest pain  #DM2  #HTN  Dispo: anticipated dc Monday 4/13 once home infusion and home services set-up (needed 7d after +COVID test w no sxs)
Interval history, examination, labs reviewed and case discussed w resident/NP/PA but patient not examined.  Noted Hgb drop today. Would continue to trend as pt asymptomatic    Dispo: for home 4/13 once confirmed home infusion services confirmed

## 2020-04-13 NOTE — PROGRESS NOTE ADULT - REASON FOR ADMISSION
R knee postop infection
R knee postop infection, Covid 19.
R knee postop infection

## 2020-04-13 NOTE — PROGRESS NOTE ADULT - PROBLEM/PLAN-1
CHIEF COMPLAINT:    Chief Complaint   Patient presents with   • Post-op Follow-up     P.O. Exc. of Cyst from Left jaw and left back 7-19-19.       HISTORY OF PRESENT ILLNESS:    Keagan Devlin is a 29 y.o. male who underwent excision of cysts from the left jaw and left back on 7/19/2019.  He returns today for follow-up.  Both sites were epidermal inclusion cyst on pathology.  He has no complaints today.    EXAM:  Vitals:    07/26/19 1014   BP: 116/74   Pulse: 62   Temp: 97.8 °F (36.6 °C)         Well-healed incisions on left jaw and left lower back.  Sutures removed today.    ASSESSMENT:    Status post cyst excision    PLAN:    Overall appears to be well-healed.  Follow-up as needed.          This document has been electronically signed by Haider Bobo MD on July 26, 2019 10:26 AM       DISPLAY PLAN FREE TEXT

## 2020-04-13 NOTE — PROGRESS NOTE ADULT - SUBJECTIVE AND OBJECTIVE BOX
Ortho Note    Pt comfortable without complaints, pain controlled  Denies CP, SOB, N/V, numbness/tingling     Vital Signs Last 24 Hrs  T(C): 36.7 (13 Apr 2020 08:37), Max: 36.8 (12 Apr 2020 10:53)  T(F): 98.1 (13 Apr 2020 08:37), Max: 98.3 (12 Apr 2020 10:53)  HR: 72 (13 Apr 2020 08:37) (72 - 98)  BP: 167/73 (13 Apr 2020 08:37) (147/74 - 167/73)  BP(mean): --  RR: 18 (13 Apr 2020 08:37) (16 - 18)  SpO2: 99% (13 Apr 2020 08:37) (93% - 100%)    Afebrile; VSS  General: A&Ox3, NAD  RLE: Prevena DSG C/D/I holding suction; KI and Cryocuff in place  Pulses: Foot WWP; DP pulse 2+; Cap refill < 2 sec  Sensation: SILT distally and symmetric to contralateral extremity  Motor: TA/EHL/FHL/GS 5/5 and symmetric to contralateral extremity                            7.8    7.45  )-----------( 258      ( 12 Apr 2020 07:35 )             24.8     04-12    137  |  101  |  10  ----------------------------<  105<H>  4.2   |  24  |  0.89    Ca    9.4      12 Apr 2020 07:35  Phos  3.2     04-12  Mg     1.9     04-12    TPro  6.5  /  Alb  3.5  /  TBili  0.3  /  DBili  x   /  AST  23  /  ALT  12  /  AlkPhos  61  04-12

## 2020-04-17 DIAGNOSIS — U07.1 COVID-19: ICD-10-CM

## 2020-04-17 DIAGNOSIS — Z79.84 LONG TERM (CURRENT) USE OF ORAL HYPOGLYCEMIC DRUGS: ICD-10-CM
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
## 2020-04-17 DIAGNOSIS — J12.89 OTHER VIRAL PNEUMONIA: ICD-10-CM

## 2020-04-17 DIAGNOSIS — N17.9 ACUTE KIDNEY FAILURE, UNSPECIFIED: ICD-10-CM

## 2020-04-17 DIAGNOSIS — N40.1 BENIGN PROSTATIC HYPERPLASIA WITH LOWER URINARY TRACT SYMPTOMS: ICD-10-CM

## 2020-04-17 DIAGNOSIS — T81.43XA INFECTION FOLLOWING A PROCEDURE, ORGAN AND SPACE SURGICAL SITE, INITIAL ENCOUNTER: ICD-10-CM

## 2020-04-17 DIAGNOSIS — R33.8 OTHER RETENTION OF URINE: ICD-10-CM

## 2020-04-17 DIAGNOSIS — Z95.5 PRESENCE OF CORONARY ANGIOPLASTY IMPLANT AND GRAFT: ICD-10-CM

## 2020-04-17 DIAGNOSIS — E11.9 TYPE 2 DIABETES MELLITUS WITHOUT COMPLICATIONS: ICD-10-CM

## 2020-04-17 DIAGNOSIS — I25.10 ATHEROSCLEROTIC HEART DISEASE OF NATIVE CORONARY ARTERY WITHOUT ANGINA PECTORIS: ICD-10-CM

## 2020-04-17 DIAGNOSIS — E78.5 HYPERLIPIDEMIA, UNSPECIFIED: ICD-10-CM

## 2020-04-17 DIAGNOSIS — Z96.651 PRESENCE OF RIGHT ARTIFICIAL KNEE JOINT: ICD-10-CM

## 2020-04-17 DIAGNOSIS — E87.1 HYPO-OSMOLALITY AND HYPONATREMIA: ICD-10-CM

## 2020-04-17 DIAGNOSIS — T81.31XA DISRUPTION OF EXTERNAL OPERATION (SURGICAL) WOUND, NOT ELSEWHERE CLASSIFIED, INITIAL ENCOUNTER: ICD-10-CM

## 2020-04-17 DIAGNOSIS — B95.61 METHICILLIN SUSCEPTIBLE STAPHYLOCOCCUS AUREUS INFECTION AS THE CAUSE OF DISEASES CLASSIFIED ELSEWHERE: ICD-10-CM

## 2020-04-17 DIAGNOSIS — I10 ESSENTIAL (PRIMARY) HYPERTENSION: ICD-10-CM

## 2020-04-21 ENCOUNTER — APPOINTMENT (OUTPATIENT)
Dept: ORTHOPEDIC SURGERY | Facility: CLINIC | Age: 80
End: 2020-04-21

## 2020-04-27 ENCOUNTER — APPOINTMENT (OUTPATIENT)
Dept: ORTHOPEDIC SURGERY | Facility: CLINIC | Age: 80
End: 2020-04-27
Payer: MEDICARE

## 2020-04-27 VITALS — TEMPERATURE: 95 F

## 2020-04-27 PROCEDURE — 99024 POSTOP FOLLOW-UP VISIT: CPT

## 2020-04-27 RX ORDER — CELECOXIB 100 MG/1
100 CAPSULE ORAL TWICE DAILY
Qty: 60 | Refills: 1 | Status: DISCONTINUED | COMMUNITY
Start: 2020-03-06 | End: 2020-04-27

## 2020-04-27 NOTE — HISTORY OF PRESENT ILLNESS
[de-identified] : First post op poly exchange with wash out, I&D of right knee, surgical date 4/07/2020 [de-identified] : Has been doing well at home. Getting Ancef IV TID by [facility], taking rifampin as well as directed. Has been compliant with WBAT in . Pain improved significantly from prior. No further fevers/chills. Cough has improved as well. [de-identified] : Right knee: incision well approximated without dehiscence. Prior area of focal dehiscence still mildly erythematous but improved from prior. Staples removed. No expressible drainage; no purulence. ROM 0-90, stable jonathan/yaneli/ant/post. Good quad strength. [de-identified] : Final OR cultures reviewed: +MSSA [de-identified] : 78y/o male 3wk s/p R knee I&D and poly exchange for acute surgical site infection [de-identified] : Cont Ancef and rifampin; f/u with Dr. Lorenzana of ID as scheduled in 3 days; likely transition to Keflex PO on 5/18/20\par D/c KI, cont WBAT\par Steristrips applied to incision, instructed him to contact me immediately if ANY wound issues arise\par F/U 3wk with R knee XRs

## 2020-04-30 ENCOUNTER — APPOINTMENT (OUTPATIENT)
Dept: INFECTIOUS DISEASE | Facility: CLINIC | Age: 80
End: 2020-04-30

## 2020-04-30 PROCEDURE — 87205 SMEAR GRAM STAIN: CPT

## 2020-04-30 PROCEDURE — 86682 HELMINTH ANTIBODY: CPT

## 2020-04-30 PROCEDURE — 82962 GLUCOSE BLOOD TEST: CPT

## 2020-04-30 PROCEDURE — 87040 BLOOD CULTURE FOR BACTERIA: CPT

## 2020-04-30 PROCEDURE — 73560 X-RAY EXAM OF KNEE 1 OR 2: CPT

## 2020-04-30 PROCEDURE — 84100 ASSAY OF PHOSPHORUS: CPT

## 2020-04-30 PROCEDURE — 86140 C-REACTIVE PROTEIN: CPT

## 2020-04-30 PROCEDURE — C1713: CPT

## 2020-04-30 PROCEDURE — 81003 URINALYSIS AUTO W/O SCOPE: CPT

## 2020-04-30 PROCEDURE — 85652 RBC SED RATE AUTOMATED: CPT

## 2020-04-30 PROCEDURE — 71046 X-RAY EXAM CHEST 2 VIEWS: CPT

## 2020-04-30 PROCEDURE — 93005 ELECTROCARDIOGRAM TRACING: CPT

## 2020-04-30 PROCEDURE — 73562 X-RAY EXAM OF KNEE 3: CPT

## 2020-04-30 PROCEDURE — 87075 CULTR BACTERIA EXCEPT BLOOD: CPT

## 2020-04-30 PROCEDURE — 36415 COLL VENOUS BLD VENIPUNCTURE: CPT

## 2020-04-30 PROCEDURE — 80048 BASIC METABOLIC PNL TOTAL CA: CPT

## 2020-04-30 PROCEDURE — 85027 COMPLETE CBC AUTOMATED: CPT

## 2020-04-30 PROCEDURE — 87070 CULTURE OTHR SPECIMN AEROBIC: CPT

## 2020-04-30 PROCEDURE — 85610 PROTHROMBIN TIME: CPT

## 2020-04-30 PROCEDURE — 85025 COMPLETE CBC W/AUTO DIFF WBC: CPT

## 2020-04-30 PROCEDURE — C1776: CPT

## 2020-04-30 PROCEDURE — 85379 FIBRIN DEGRADATION QUANT: CPT

## 2020-04-30 PROCEDURE — 97110 THERAPEUTIC EXERCISES: CPT

## 2020-04-30 PROCEDURE — 80053 COMPREHEN METABOLIC PANEL: CPT

## 2020-04-30 PROCEDURE — 97161 PT EVAL LOW COMPLEX 20 MIN: CPT

## 2020-04-30 PROCEDURE — 86901 BLOOD TYPING SEROLOGIC RH(D): CPT

## 2020-04-30 PROCEDURE — 86850 RBC ANTIBODY SCREEN: CPT

## 2020-04-30 PROCEDURE — 71045 X-RAY EXAM CHEST 1 VIEW: CPT

## 2020-04-30 PROCEDURE — 87186 SC STD MICRODIL/AGAR DIL: CPT

## 2020-04-30 PROCEDURE — 83735 ASSAY OF MAGNESIUM: CPT

## 2020-04-30 PROCEDURE — 87635 SARS-COV-2 COVID-19 AMP PRB: CPT

## 2020-04-30 PROCEDURE — 85730 THROMBOPLASTIN TIME PARTIAL: CPT

## 2020-04-30 PROCEDURE — 36573 INSJ PICC RS&I 5 YR+: CPT

## 2020-04-30 PROCEDURE — 82728 ASSAY OF FERRITIN: CPT

## 2020-04-30 PROCEDURE — 99285 EMERGENCY DEPT VISIT HI MDM: CPT

## 2020-04-30 PROCEDURE — 87102 FUNGUS ISOLATION CULTURE: CPT

## 2020-05-06 LAB
CULTURE RESULTS: SIGNIFICANT CHANGE UP
SPECIMEN SOURCE: SIGNIFICANT CHANGE UP

## 2020-05-07 ENCOUNTER — APPOINTMENT (OUTPATIENT)
Dept: INFECTIOUS DISEASE | Facility: CLINIC | Age: 80
End: 2020-05-07
Payer: MEDICARE

## 2020-05-07 DIAGNOSIS — E87.6 HYPOKALEMIA: ICD-10-CM

## 2020-05-07 PROCEDURE — 99214 OFFICE O/P EST MOD 30 MIN: CPT | Mod: 95

## 2020-05-07 NOTE — PLAN
[FreeTextEntry1] : 80 yo male with hx CAD, DM, R TKR 2002, revision 3/6/20, now p/w wound dehiscence and drainage from incision c/f SSI s/p washout 4/7 with purulence noted in joint space and concern for hardware involvement--OR cultures growing MSSA. Also found to be COVID-19+. Was discharged on cefazolin via PICC as well as PO rifampin.\par - monitor weekly CBC, CMP, ESR, CRP--inflammatory markers improving\par - continue cefazolin 2g IV q8h + rifampin 300mg PO [id number] 6 week course through 5/18\par - following this, plan to transition to chronic suppressive therapy with cephalexin 500mg PO q8h X 3 months (rx sent)\par - will arrange in person visit and check labs next month once patient transitioned to suppressive therapy\par - f/u with ortho\par \par rtc 4-6 weeks

## 2020-05-07 NOTE — HISTORY OF PRESENT ILLNESS
[Verbal consent obtained from patient] : the patient, [unfilled] [Time Spent: ___ minutes] : I have spent [unfilled] minutes with the patient on the telephone [FreeTextEntry1] : 78 yo male with hx CAD, DM, R TKR 2002, revision 3/6/20, now p/w wound dehiscence and drainage from incision c/f SSI s/p washout 4/7 with purulence noted in joint space and concern for hardware involvement--OR cultures growing MSSA. Also found to be COVID-19+. Was discharged on cefazolin via PICC as well as PO rifampin--has been adherent to both--spaced out rifampin to 300mg q12h because of GI intolerance with high dose once daily dosing. Saw orthopedist last week. Doing well. Still notes some R knee pain and swelling; no drainage from incision. Able to bear weight; denies BRUNO. Tolerating and adherent to cefazolin--wife who is RN has been administering.

## 2020-05-19 ENCOUNTER — APPOINTMENT (OUTPATIENT)
Dept: ORTHOPEDIC SURGERY | Facility: CLINIC | Age: 80
End: 2020-05-19
Payer: MEDICARE

## 2020-05-19 ENCOUNTER — RESULT REVIEW (OUTPATIENT)
Age: 80
End: 2020-05-19

## 2020-05-19 ENCOUNTER — OUTPATIENT (OUTPATIENT)
Dept: OUTPATIENT SERVICES | Facility: HOSPITAL | Age: 80
LOS: 1 days | End: 2020-05-19
Payer: MEDICARE

## 2020-05-19 VITALS — HEIGHT: 66.5 IN | WEIGHT: 174 LBS | BODY MASS INDEX: 27.64 KG/M2

## 2020-05-19 VITALS — TEMPERATURE: 96.7 F

## 2020-05-19 DIAGNOSIS — Z98.890 OTHER SPECIFIED POSTPROCEDURAL STATES: Chronic | ICD-10-CM

## 2020-05-19 DIAGNOSIS — Z96.653 PRESENCE OF ARTIFICIAL KNEE JOINT, BILATERAL: Chronic | ICD-10-CM

## 2020-05-19 DIAGNOSIS — I25.10 ATHEROSCLEROTIC HEART DISEASE OF NATIVE CORONARY ARTERY WITHOUT ANGINA PECTORIS: Chronic | ICD-10-CM

## 2020-05-19 DIAGNOSIS — K42.9 UMBILICAL HERNIA WITHOUT OBSTRUCTION OR GANGRENE: Chronic | ICD-10-CM

## 2020-05-19 PROCEDURE — 99024 POSTOP FOLLOW-UP VISIT: CPT

## 2020-05-19 PROCEDURE — 73560 X-RAY EXAM OF KNEE 1 OR 2: CPT | Mod: 26,RT

## 2020-05-19 PROCEDURE — 73560 X-RAY EXAM OF KNEE 1 OR 2: CPT

## 2020-05-19 NOTE — HISTORY OF PRESENT ILLNESS
[de-identified] : Doing well overall. Started Keflex yesterday. No significant right knee pain, though he does have some mild-moderate swelling. No incisional issues. Exercise tolerance has fallen significantly over the past couple of months; he states he becomes fatigued much more quickly than previously. Also has lost 25-30lbs in the last 3 months; says he has very low appetite. [de-identified] : second post op poly exchange with wash out, I&D of right knee, surgical date 4/07/2020.  [de-identified] : Right knee midline incision healed with one prominent suture at middle portion; removed in office. No erythema/fluctuance, no drainage. ROM 0-100, stable jonathan/yaneli at relaxed extension and less than 5mm ant/post translation on flexion drawers. Good quad strength. Ambulating with cane in the left hand, minimal right antalgia. [de-identified] : - Cont abx as per ID; likely complete 3mo additional PO as previously discussed w/Dr. Lorenzana\par - Cont WBAT, advance activity as tolerated\par - Likely the fatigue is secondary to prior COVID-19 infection and the anorexia is secondary to antibiotics. I'm hopeful that his appetite will improve as he will soon be done with the rifampin. Will continue to monitor.\par - F/U 2mo [de-identified] : 78y/o male 6wk s/p right knee I&D, poly exchange for acute periprosthetic infection [de-identified] : Right knee imaging taken today demonstrates stable appearance of the TKA without evidence of fracture or loosening.

## 2020-06-01 ENCOUNTER — APPOINTMENT (OUTPATIENT)
Dept: ORTHOPEDIC SURGERY | Facility: CLINIC | Age: 80
End: 2020-06-01
Payer: MEDICARE

## 2020-06-01 ENCOUNTER — TRANSCRIPTION ENCOUNTER (OUTPATIENT)
Age: 80
End: 2020-06-01

## 2020-06-01 ENCOUNTER — INPATIENT (INPATIENT)
Facility: HOSPITAL | Age: 80
LOS: 5 days | Discharge: HOME | DRG: 464 | End: 2020-06-07
Attending: ORTHOPAEDIC SURGERY | Admitting: ORTHOPAEDIC SURGERY
Payer: MEDICARE

## 2020-06-01 VITALS
SYSTOLIC BLOOD PRESSURE: 154 MMHG | WEIGHT: 169.98 LBS | RESPIRATION RATE: 18 BRPM | OXYGEN SATURATION: 99 % | TEMPERATURE: 98 F | DIASTOLIC BLOOD PRESSURE: 95 MMHG | HEART RATE: 70 BPM | HEIGHT: 66 IN

## 2020-06-01 VITALS — DIASTOLIC BLOOD PRESSURE: 60 MMHG | SYSTOLIC BLOOD PRESSURE: 140 MMHG | TEMPERATURE: 96.4 F

## 2020-06-01 DIAGNOSIS — I25.10 ATHEROSCLEROTIC HEART DISEASE OF NATIVE CORONARY ARTERY WITHOUT ANGINA PECTORIS: Chronic | ICD-10-CM

## 2020-06-01 DIAGNOSIS — I25.10 ATHEROSCLEROTIC HEART DISEASE OF NATIVE CORONARY ARTERY WITHOUT ANGINA PECTORIS: ICD-10-CM

## 2020-06-01 DIAGNOSIS — M00.9 PYOGENIC ARTHRITIS, UNSPECIFIED: ICD-10-CM

## 2020-06-01 DIAGNOSIS — Z98.890 OTHER SPECIFIED POSTPROCEDURAL STATES: Chronic | ICD-10-CM

## 2020-06-01 DIAGNOSIS — D64.9 ANEMIA, UNSPECIFIED: ICD-10-CM

## 2020-06-01 DIAGNOSIS — I10 ESSENTIAL (PRIMARY) HYPERTENSION: ICD-10-CM

## 2020-06-01 DIAGNOSIS — E11.9 TYPE 2 DIABETES MELLITUS WITHOUT COMPLICATIONS: ICD-10-CM

## 2020-06-01 DIAGNOSIS — E78.5 HYPERLIPIDEMIA, UNSPECIFIED: ICD-10-CM

## 2020-06-01 DIAGNOSIS — Z96.653 PRESENCE OF ARTIFICIAL KNEE JOINT, BILATERAL: Chronic | ICD-10-CM

## 2020-06-01 DIAGNOSIS — K42.9 UMBILICAL HERNIA WITHOUT OBSTRUCTION OR GANGRENE: Chronic | ICD-10-CM

## 2020-06-01 LAB
ALBUMIN SERPL ELPH-MCNC: 4.1 G/DL — SIGNIFICANT CHANGE UP (ref 3.3–5)
ALP SERPL-CCNC: 53 U/L — SIGNIFICANT CHANGE UP (ref 40–120)
ALT FLD-CCNC: 10 U/L — SIGNIFICANT CHANGE UP (ref 10–45)
ANION GAP SERPL CALC-SCNC: 11 MMOL/L — SIGNIFICANT CHANGE UP (ref 5–17)
APTT BLD: 29.7 SEC — SIGNIFICANT CHANGE UP (ref 27.5–36.3)
AST SERPL-CCNC: 16 U/L — SIGNIFICANT CHANGE UP (ref 10–40)
BASOPHILS # BLD AUTO: 0.05 K/UL — SIGNIFICANT CHANGE UP (ref 0–0.2)
BASOPHILS NFR BLD AUTO: 0.7 % — SIGNIFICANT CHANGE UP (ref 0–2)
BILIRUB SERPL-MCNC: <0.2 MG/DL — SIGNIFICANT CHANGE UP (ref 0.2–1.2)
BLD GP AB SCN SERPL QL: NEGATIVE — SIGNIFICANT CHANGE UP
BUN SERPL-MCNC: 22 MG/DL — SIGNIFICANT CHANGE UP (ref 7–23)
CALCIUM SERPL-MCNC: 9.5 MG/DL — SIGNIFICANT CHANGE UP (ref 8.4–10.5)
CHLORIDE SERPL-SCNC: 104 MMOL/L — SIGNIFICANT CHANGE UP (ref 96–108)
CO2 SERPL-SCNC: 24 MMOL/L — SIGNIFICANT CHANGE UP (ref 22–31)
CREAT SERPL-MCNC: 0.86 MG/DL — SIGNIFICANT CHANGE UP (ref 0.5–1.3)
CRP SERPL-MCNC: 0.08 MG/DL — SIGNIFICANT CHANGE UP (ref 0–0.4)
EOSINOPHIL # BLD AUTO: 0.33 K/UL — SIGNIFICANT CHANGE UP (ref 0–0.5)
EOSINOPHIL NFR BLD AUTO: 4.5 % — SIGNIFICANT CHANGE UP (ref 0–6)
ERYTHROCYTE [SEDIMENTATION RATE] IN BLOOD: 30 MM/HR — HIGH
GLUCOSE BLDC GLUCOMTR-MCNC: 134 MG/DL — HIGH (ref 70–99)
GLUCOSE BLDC GLUCOMTR-MCNC: 182 MG/DL — HIGH (ref 70–99)
GLUCOSE SERPL-MCNC: 122 MG/DL — HIGH (ref 70–99)
HCT VFR BLD CALC: 37.9 % — LOW (ref 39–50)
HGB BLD-MCNC: 11.7 G/DL — LOW (ref 13–17)
IMM GRANULOCYTES NFR BLD AUTO: 0.4 % — SIGNIFICANT CHANGE UP (ref 0–1.5)
INR BLD: 1.06 — SIGNIFICANT CHANGE UP (ref 0.88–1.16)
LYMPHOCYTES # BLD AUTO: 1.54 K/UL — SIGNIFICANT CHANGE UP (ref 1–3.3)
LYMPHOCYTES # BLD AUTO: 20.8 % — SIGNIFICANT CHANGE UP (ref 13–44)
MCHC RBC-ENTMCNC: 29.8 PG — SIGNIFICANT CHANGE UP (ref 27–34)
MCHC RBC-ENTMCNC: 30.9 GM/DL — LOW (ref 32–36)
MCV RBC AUTO: 96.7 FL — SIGNIFICANT CHANGE UP (ref 80–100)
MONOCYTES # BLD AUTO: 0.56 K/UL — SIGNIFICANT CHANGE UP (ref 0–0.9)
MONOCYTES NFR BLD AUTO: 7.6 % — SIGNIFICANT CHANGE UP (ref 2–14)
NEUTROPHILS # BLD AUTO: 4.89 K/UL — SIGNIFICANT CHANGE UP (ref 1.8–7.4)
NEUTROPHILS NFR BLD AUTO: 66 % — SIGNIFICANT CHANGE UP (ref 43–77)
NRBC # BLD: 0 /100 WBCS — SIGNIFICANT CHANGE UP (ref 0–0)
PLATELET # BLD AUTO: 274 K/UL — SIGNIFICANT CHANGE UP (ref 150–400)
POTASSIUM SERPL-MCNC: 4.4 MMOL/L — SIGNIFICANT CHANGE UP (ref 3.5–5.3)
POTASSIUM SERPL-SCNC: 4.4 MMOL/L — SIGNIFICANT CHANGE UP (ref 3.5–5.3)
PROT SERPL-MCNC: 7.1 G/DL — SIGNIFICANT CHANGE UP (ref 6–8.3)
PROTHROM AB SERPL-ACNC: 12.1 SEC — SIGNIFICANT CHANGE UP (ref 10–12.9)
RBC # BLD: 3.92 M/UL — LOW (ref 4.2–5.8)
RBC # FLD: 14.4 % — SIGNIFICANT CHANGE UP (ref 10.3–14.5)
RH IG SCN BLD-IMP: POSITIVE — SIGNIFICANT CHANGE UP
SARS-COV-2 RNA SPEC QL NAA+PROBE: SIGNIFICANT CHANGE UP
SODIUM SERPL-SCNC: 139 MMOL/L — SIGNIFICANT CHANGE UP (ref 135–145)
WBC # BLD: 7.4 K/UL — SIGNIFICANT CHANGE UP (ref 3.8–10.5)
WBC # FLD AUTO: 7.4 K/UL — SIGNIFICANT CHANGE UP (ref 3.8–10.5)

## 2020-06-01 PROCEDURE — 73552 X-RAY EXAM OF FEMUR 2/>: CPT | Mod: 26,RT

## 2020-06-01 PROCEDURE — 71046 X-RAY EXAM CHEST 2 VIEWS: CPT | Mod: 26

## 2020-06-01 PROCEDURE — 73562 X-RAY EXAM OF KNEE 3: CPT | Mod: 26,RT

## 2020-06-01 PROCEDURE — 73590 X-RAY EXAM OF LOWER LEG: CPT | Mod: 26,RT

## 2020-06-01 PROCEDURE — 99285 EMERGENCY DEPT VISIT HI MDM: CPT

## 2020-06-01 PROCEDURE — 93010 ELECTROCARDIOGRAM REPORT: CPT

## 2020-06-01 PROCEDURE — 99233 SBSQ HOSP IP/OBS HIGH 50: CPT

## 2020-06-01 PROCEDURE — 99024 POSTOP FOLLOW-UP VISIT: CPT

## 2020-06-01 RX ORDER — MAGNESIUM HYDROXIDE 400 MG/1
30 TABLET, CHEWABLE ORAL DAILY
Refills: 0 | Status: DISCONTINUED | OUTPATIENT
Start: 2020-06-01 | End: 2020-06-07

## 2020-06-01 RX ORDER — SODIUM CHLORIDE 9 MG/ML
1000 INJECTION, SOLUTION INTRAVENOUS
Refills: 0 | Status: DISCONTINUED | OUTPATIENT
Start: 2020-06-01 | End: 2020-06-07

## 2020-06-01 RX ORDER — TAMSULOSIN HYDROCHLORIDE 0.4 MG/1
0.4 CAPSULE ORAL
Refills: 0 | Status: DISCONTINUED | OUTPATIENT
Start: 2020-06-01 | End: 2020-06-07

## 2020-06-01 RX ORDER — ACETAMINOPHEN 500 MG
650 TABLET ORAL EVERY 6 HOURS
Refills: 0 | Status: DISCONTINUED | OUTPATIENT
Start: 2020-06-01 | End: 2020-06-07

## 2020-06-01 RX ORDER — OXYCODONE HYDROCHLORIDE 5 MG/1
10 TABLET ORAL EVERY 4 HOURS
Refills: 0 | Status: DISCONTINUED | OUTPATIENT
Start: 2020-06-01 | End: 2020-06-07

## 2020-06-01 RX ORDER — ATORVASTATIN CALCIUM 80 MG/1
20 TABLET, FILM COATED ORAL AT BEDTIME
Refills: 0 | Status: DISCONTINUED | OUTPATIENT
Start: 2020-06-01 | End: 2020-06-07

## 2020-06-01 RX ORDER — TAMSULOSIN HYDROCHLORIDE 0.4 MG/1
0.4 CAPSULE ORAL AT BEDTIME
Refills: 0 | Status: DISCONTINUED | OUTPATIENT
Start: 2020-06-01 | End: 2020-06-01

## 2020-06-01 RX ORDER — INSULIN GLARGINE 100 [IU]/ML
5 INJECTION, SOLUTION SUBCUTANEOUS AT BEDTIME
Refills: 0 | Status: DISCONTINUED | OUTPATIENT
Start: 2020-06-01 | End: 2020-06-07

## 2020-06-01 RX ORDER — SODIUM CHLORIDE 9 MG/ML
1000 INJECTION, SOLUTION INTRAVENOUS
Refills: 0 | Status: DISCONTINUED | OUTPATIENT
Start: 2020-06-01 | End: 2020-06-02

## 2020-06-01 RX ORDER — OXYCODONE HYDROCHLORIDE 5 MG/1
5 TABLET ORAL EVERY 4 HOURS
Refills: 0 | Status: DISCONTINUED | OUTPATIENT
Start: 2020-06-01 | End: 2020-06-07

## 2020-06-01 RX ORDER — AMLODIPINE BESYLATE 2.5 MG/1
2.5 TABLET ORAL DAILY
Refills: 0 | Status: DISCONTINUED | OUTPATIENT
Start: 2020-06-01 | End: 2020-06-07

## 2020-06-01 RX ORDER — DEXTROSE 50 % IN WATER 50 %
25 SYRINGE (ML) INTRAVENOUS ONCE
Refills: 0 | Status: DISCONTINUED | OUTPATIENT
Start: 2020-06-01 | End: 2020-06-07

## 2020-06-01 RX ORDER — PANTOPRAZOLE SODIUM 20 MG/1
40 TABLET, DELAYED RELEASE ORAL
Refills: 0 | Status: DISCONTINUED | OUTPATIENT
Start: 2020-06-01 | End: 2020-06-07

## 2020-06-01 RX ORDER — DEXTROSE 50 % IN WATER 50 %
15 SYRINGE (ML) INTRAVENOUS ONCE
Refills: 0 | Status: DISCONTINUED | OUTPATIENT
Start: 2020-06-01 | End: 2020-06-07

## 2020-06-01 RX ORDER — DEXTROSE 50 % IN WATER 50 %
12.5 SYRINGE (ML) INTRAVENOUS ONCE
Refills: 0 | Status: DISCONTINUED | OUTPATIENT
Start: 2020-06-01 | End: 2020-06-07

## 2020-06-01 RX ORDER — LOSARTAN POTASSIUM 100 MG/1
100 TABLET, FILM COATED ORAL DAILY
Refills: 0 | Status: DISCONTINUED | OUTPATIENT
Start: 2020-06-01 | End: 2020-06-01

## 2020-06-01 RX ORDER — HYDROMORPHONE HYDROCHLORIDE 2 MG/ML
0.5 INJECTION INTRAMUSCULAR; INTRAVENOUS; SUBCUTANEOUS EVERY 4 HOURS
Refills: 0 | Status: DISCONTINUED | OUTPATIENT
Start: 2020-06-01 | End: 2020-06-07

## 2020-06-01 RX ORDER — INSULIN LISPRO 100/ML
VIAL (ML) SUBCUTANEOUS
Refills: 0 | Status: DISCONTINUED | OUTPATIENT
Start: 2020-06-01 | End: 2020-06-07

## 2020-06-01 RX ORDER — METOPROLOL TARTRATE 50 MG
25 TABLET ORAL DAILY
Refills: 0 | Status: DISCONTINUED | OUTPATIENT
Start: 2020-06-01 | End: 2020-06-07

## 2020-06-01 RX ORDER — LANOLIN ALCOHOL/MO/W.PET/CERES
3 CREAM (GRAM) TOPICAL AT BEDTIME
Refills: 0 | Status: DISCONTINUED | OUTPATIENT
Start: 2020-06-01 | End: 2020-06-07

## 2020-06-01 RX ORDER — GLUCAGON INJECTION, SOLUTION 0.5 MG/.1ML
1 INJECTION, SOLUTION SUBCUTANEOUS ONCE
Refills: 0 | Status: DISCONTINUED | OUTPATIENT
Start: 2020-06-01 | End: 2020-06-07

## 2020-06-01 RX ADMIN — PANTOPRAZOLE SODIUM 40 MILLIGRAM(S): 20 TABLET, DELAYED RELEASE ORAL at 18:16

## 2020-06-01 RX ADMIN — LOSARTAN POTASSIUM 100 MILLIGRAM(S): 100 TABLET, FILM COATED ORAL at 18:16

## 2020-06-01 RX ADMIN — Medication 3 MILLIGRAM(S): at 21:40

## 2020-06-01 RX ADMIN — Medication 1: at 21:39

## 2020-06-01 RX ADMIN — Medication 650 MILLIGRAM(S): at 21:19

## 2020-06-01 RX ADMIN — INSULIN GLARGINE 5 UNIT(S): 100 INJECTION, SOLUTION SUBCUTANEOUS at 21:39

## 2020-06-01 RX ADMIN — ATORVASTATIN CALCIUM 20 MILLIGRAM(S): 80 TABLET, FILM COATED ORAL at 21:19

## 2020-06-01 NOTE — CONSULT NOTE ADULT - SUBJECTIVE AND OBJECTIVE BOX
79M w h/o DM on insulin, CAD s/p PCIs, HTN, HLD, GERD, BPH, h/o TKRs c/b R prosthesis infection growing MSSA s/p I/D x2 in 3/6 and 4/2 2020 continued on IV ancef now on keflex p/w increased drainage from site for OR Washout, medicine called for pre-operative evaluation    CRP noted to be 0.08. 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  Pt states that two days ago he began to notice an erythematous lesion on his surgical incision which was draining fluid which prompted him to call Dr. Arrington who advised him to come to the ED immediately, pt elected to wait and follow up in Dr. Smith office 6/1/2020.  In office pt states Dr. Arrington manually expressed fluid from his right knee and did an aspiration of his right knee prior to sending him to the ER for admission for surgery tomorrow.  Pt states that his knee pain has not been worsening over the past several days and states that he has been ambulating with an walker which is consistent since his surgery.  Pt denies subjective fevers, chills, numbness or tingling down le b/l. (01 Jun 2020 11:55)      PAST MEDICAL & SURGICAL HISTORY:  Anemia  Hyperlipidemia  CAD (coronary artery disease)  DM (diabetes mellitus)  Hypertension  H/O shoulder surgery: Right  Umbilical hernia: Umbilical and inguinal hernia  H/O total knee replacement, bilateral  CAD (coronary artery disease): stents placement    Home Meds: Home Medications:  ascorbic acid: unknown dose (06 Mar 2020 13:23)  clopidogrel 75 mg oral tablet: 1 tab(s) orally once a day (13 Apr 2020 17:11)  Co Q-10 100 mg oral capsule: 1 cap(s) orally once a day (06 Mar 2020 13:23)  Flomax 0.4 mg oral capsule: 1 cap(s) orally once a day (06 Mar 2020 13:23)  Lantus: 35 unit(s) subcutaneous (06 Mar 2020 13:23)  Lipitor 20 mg oral tablet: 1 tab(s) orally once a day (06 Mar 2020 13:23)  losartan 100 mg oral tablet: 1 tab(s) orally once a day (06 Mar 2020 13:23)  metFORMIN 500 mg oral tablet: 1 tab(s) orally 2 times a day (06 Mar 2020 13:23)  metoprolol succinate 25 mg oral tablet, extended release: 1 tab(s) orally once a day (01 Jun 2020 13:07)  Norvasc 2.5 mg oral tablet: 1 tab(s) orally once a day (06 Mar 2020 13:23)  omeprazole 20 mg oral delayed release capsule: 1 cap(s) orally once a day (06 Mar 2020 13:23)  Trulicity Pen 0.75 mg/0.5 mL subcutaneous solution:  (06 Mar 2020 13:23)  Vitamin B-100 oral tablet: 1 tab(s) orally once a day (06 Mar 2020 13:23)  Vitamin D3 5000 intl units (125 mcg)/mL oral liquid: 1 milliliter(s) orally once a day (06 Mar 2020 13:23)    Allergies: Allergies    No Known Allergies    Intolerances      Soc:   Advanced Directives: Presumed Full Code     CURRENT MEDICATIONS:   --------------------------------------------------------------------------------------  Neurologic Medications  acetaminophen   Tablet .. 650 milliGRAM(s) Oral every 6 hours PRN Temp greater or equal to 38C (100.4F), Mild Pain (1 - 3)  HYDROmorphone  Injectable 0.5 milliGRAM(s) IV Push every 4 hours PRN breakthrough pain  oxyCODONE    IR 10 milliGRAM(s) Oral every 4 hours PRN Severe Pain (7 - 10)  oxyCODONE    IR 5 milliGRAM(s) Oral every 4 hours PRN Moderate Pain (4 - 6)    Respiratory Medications    Cardiovascular Medications  amLODIPine   Tablet 2.5 milliGRAM(s) Oral daily  losartan 100 milliGRAM(s) Oral daily  metoprolol succinate ER 25 milliGRAM(s) Oral daily  tamsulosin 0.4 milliGRAM(s) Oral at bedtime    Gastrointestinal Medications  dextrose 5%. 1000 milliLiter(s) IV Continuous <Continuous>  lactated ringers. 1000 milliLiter(s) IV Continuous <Continuous>  magnesium hydroxide Suspension 30 milliLiter(s) Oral daily PRN Constipation  pantoprazole    Tablet 40 milliGRAM(s) Oral before breakfast    Genitourinary Medications    Hematologic/Oncologic Medications    Antimicrobial/Immunologic Medications    Endocrine/Metabolic Medications  atorvastatin 20 milliGRAM(s) Oral at bedtime  dextrose 40% Gel 15 Gram(s) Oral once PRN Blood Glucose LESS THAN 70 milliGRAM(s)/deciliter  dextrose 50% Injectable 12.5 Gram(s) IV Push once  dextrose 50% Injectable 25 Gram(s) IV Push once  dextrose 50% Injectable 25 Gram(s) IV Push once  glucagon  Injectable 1 milliGRAM(s) IntraMuscular once PRN Glucose LESS THAN 70 milligrams/deciliter  insulin lispro (HumaLOG) corrective regimen sliding scale   SubCutaneous Before meals and at bedtime    Topical/Other Medications    --------------------------------------------------------------------------------------    VITAL SIGNS, INS/OUTS (last 24 hours):  --------------------------------------------------------------------------------------  ICU Vital Signs Last 24 Hrs  T(C): 37.1 (01 Jun 2020 15:15), Max: 37.1 (01 Jun 2020 15:15)  T(F): 98.8 (01 Jun 2020 15:15), Max: 98.8 (01 Jun 2020 15:15)  HR: 76 (01 Jun 2020 15:15) (56 - 76)  BP: 160/79 (01 Jun 2020 15:15) (133/77 - 160/79)  BP(mean): --  ABP: --  ABP(mean): --  RR: 18 (01 Jun 2020 15:15) (18 - 18)  SpO2: 99% (01 Jun 2020 15:15) (97% - 99%)    I&O's Summary    --------------------------------------------------------------------------------------    EXAM:  General/Neuro  GCS:   Exam: Normal, NAD, alert, oriented x 3, no focal deficits. PERRLA  ***    Respiratory  Exam: Lungs clear to auscultation, Normal expansion/effort.  ***    Cardiovascular  Exam: S1, S2.  Regular rate and rhythm.  Peripheral edema  ***  Cardiac Rhythm: Normal Sinus Rhythm    GI  Exam: Abdomen soft, Non-tender, Non-distended.    Wound:   ***    Extremities  Exam: Extremities warm, pink, well-perfused.      Derm:  Exam: Good skin turgor, no skin breakdown.      LABS  --------------------------------------------------------------------------------------  Labs:  CAPILLARY BLOOD GLUCOSE                              11.7   7.40  )-----------( 274      ( 01 Jun 2020 11:53 )             37.9       Auto Neutrophil %: 66.0 % (06-01-20 @ 11:53)  Auto Immature Granulocyte %: 0.4 % (06-01-20 @ 11:53)    06-01    139  |  104  |  22  ----------------------------<  122<H>  4.4   |  24  |  0.86      Calcium, Total Serum: 9.5 mg/dL (06-01-20 @ 11:53)      LFTs:             7.1  | <0.2 | 16       ------------------[53      ( 01 Jun 2020 11:53 )  4.1  | x    | 10          Lipase:x      Amylase:x             Coags:     12.1   ----< 1.06    ( 01 Jun 2020 11:53 )     29.7                      --------------------------------------------------------------------------------------    OTHER LABS    IMAGING RESULTS  **************** 79M w h/o DM on insulin, CAD s/p PCIs, HTN, HLD, GERD, BPH, h/o TKRs c/b R prosthesis infection growing MSSA s/p I/D x2 in 3/6 and 4/2 2020 continued on IV ancef now on keflex p/w increased drainage from site for OR Washout, medicine called for pre-operative evaluation    Pt had primary TKA in 2002 w 2x I/D with polyexchanges on 3/6/2020 and 4/2/2020 w. Dr. Arrington. Pt completed 6wk course of IV ancef followed by TID keflex. Several days ago, pt and wife (who is RN) noted a small lesion along prior surgical excision that appeared to be like a 'white head.' This was accompanied by some discomfort and warmth in the area. Pt denies any systemic symptoms such as fever, sweats, chills. He called Dr. Arrington's office and was instructed to go to the ED but pt presented to the outpatient office where pus was exuded and sample taken for culture. Pt instructed to go to ER for admission and OR tomorrow. CRP noted to be 0.08. Cultures from 04/2020 grew MSSA.    Currently patient reports pain is controlled. Using a cane to walk - approx 24 blocks wo chest pain, dyspnea, palpitations. Stents placed >1y ago. Has lost weight recently and attributes it to being on rifampin. A1C most recently was in 5s. Uses insulin nightly - approx 6-20u depending on BG levels. Last night used 10 units for . He has a list of current medications on his person. Denies h/o CVA, CHF, CKD.    Changes to med rec: pt on metformin 1000mg BID. Not taking rifampin. Flomax 0.4mg po BID.     FHx: DM in mother  Allergies: NKDA  SH: former smoker 30y ago. No EtOH. Lives w wife      PAST MEDICAL & SURGICAL HISTORY:  Anemia  Hyperlipidemia  CAD (coronary artery disease)  DM (diabetes mellitus)  Hypertension  H/O shoulder surgery: Right  Umbilical hernia: Umbilical and inguinal hernia  H/O total knee replacement, bilateral  CAD (coronary artery disease): stents placement    Home Meds: Home Medications:  ascorbic acid: unknown dose (06 Mar 2020 13:23)  clopidogrel 75 mg oral tablet: 1 tab(s) orally once a day (13 Apr 2020 17:11)  Co Q-10 100 mg oral capsule: 1 cap(s) orally once a day (06 Mar 2020 13:23)  Flomax 0.4 mg oral capsule: 1 cap(s) orally once a day (06 Mar 2020 13:23)  Lantus: 35 unit(s) subcutaneous (06 Mar 2020 13:23)  Lipitor 20 mg oral tablet: 1 tab(s) orally once a day (06 Mar 2020 13:23)  losartan 100 mg oral tablet: 1 tab(s) orally once a day (06 Mar 2020 13:23)  metFORMIN 500 mg oral tablet: 1 tab(s) orally 2 times a day (06 Mar 2020 13:23)  metoprolol succinate 25 mg oral tablet, extended release: 1 tab(s) orally once a day (01 Jun 2020 13:07)  Norvasc 2.5 mg oral tablet: 1 tab(s) orally once a day (06 Mar 2020 13:23)  omeprazole 20 mg oral delayed release capsule: 1 cap(s) orally once a day (06 Mar 2020 13:23)  Trulicity Pen 0.75 mg/0.5 mL subcutaneous solution:  (06 Mar 2020 13:23)  Vitamin B-100 oral tablet: 1 tab(s) orally once a day (06 Mar 2020 13:23)  Vitamin D3 5000 intl units (125 mcg)/mL oral liquid: 1 milliliter(s) orally once a day (06 Mar 2020 13:23)    Allergies: Allergies    No Known Allergies    Intolerances      Soc:   Advanced Directives: Presumed Full Code     CURRENT MEDICATIONS:   --------------------------------------------------------------------------------------  Neurologic Medications  acetaminophen   Tablet .. 650 milliGRAM(s) Oral every 6 hours PRN Temp greater or equal to 38C (100.4F), Mild Pain (1 - 3)  HYDROmorphone  Injectable 0.5 milliGRAM(s) IV Push every 4 hours PRN breakthrough pain  oxyCODONE    IR 10 milliGRAM(s) Oral every 4 hours PRN Severe Pain (7 - 10)  oxyCODONE    IR 5 milliGRAM(s) Oral every 4 hours PRN Moderate Pain (4 - 6)    Respiratory Medications    Cardiovascular Medications  amLODIPine   Tablet 2.5 milliGRAM(s) Oral daily  losartan 100 milliGRAM(s) Oral daily  metoprolol succinate ER 25 milliGRAM(s) Oral daily  tamsulosin 0.4 milliGRAM(s) Oral at bedtime    Gastrointestinal Medications  dextrose 5%. 1000 milliLiter(s) IV Continuous <Continuous>  lactated ringers. 1000 milliLiter(s) IV Continuous <Continuous>  magnesium hydroxide Suspension 30 milliLiter(s) Oral daily PRN Constipation  pantoprazole    Tablet 40 milliGRAM(s) Oral before breakfast    Genitourinary Medications    Hematologic/Oncologic Medications    Antimicrobial/Immunologic Medications    Endocrine/Metabolic Medications  atorvastatin 20 milliGRAM(s) Oral at bedtime  dextrose 40% Gel 15 Gram(s) Oral once PRN Blood Glucose LESS THAN 70 milliGRAM(s)/deciliter  dextrose 50% Injectable 12.5 Gram(s) IV Push once  dextrose 50% Injectable 25 Gram(s) IV Push once  dextrose 50% Injectable 25 Gram(s) IV Push once  glucagon  Injectable 1 milliGRAM(s) IntraMuscular once PRN Glucose LESS THAN 70 milligrams/deciliter  insulin lispro (HumaLOG) corrective regimen sliding scale   SubCutaneous Before meals and at bedtime    Topical/Other Medications    --------------------------------------------------------------------------------------    VITAL SIGNS, INS/OUTS (last 24 hours):  --------------------------------------------------------------------------------------  ICU Vital Signs Last 24 Hrs  T(C): 37.1 (01 Jun 2020 15:15), Max: 37.1 (01 Jun 2020 15:15)  T(F): 98.8 (01 Jun 2020 15:15), Max: 98.8 (01 Jun 2020 15:15)  HR: 76 (01 Jun 2020 15:15) (56 - 76)  BP: 160/79 (01 Jun 2020 15:15) (133/77 - 160/79)  BP(mean): --  ABP: --  ABP(mean): --  RR: 18 (01 Jun 2020 15:15) (18 - 18)  SpO2: 99% (01 Jun 2020 15:15) (97% - 99%)    I&O's Summary    --------------------------------------------------------------------------------------    EXAM:  GEN: Male in NAD on RA  HEENT: NC/AT, MMM  CV: RRR, no murmurs, no BLE edema  PULM: Nml effort, CTAB  ABD: Soft, NABS, non-tender  NEURO: Alert, moving all extremities  EXT: Dressing over knee. Knee slightly red along midline and warm to touch. Non-tender to palpation. No active drainage at site.   PSYCH: Appropriate, Conversant    LABS  --------------------------------------------------------------------------------------  Labs:  CAPILLARY BLOOD GLUCOSE                              11.7   7.40  )-----------( 274      ( 01 Jun 2020 11:53 )             37.9       Auto Neutrophil %: 66.0 % (06-01-20 @ 11:53)  Auto Immature Granulocyte %: 0.4 % (06-01-20 @ 11:53)    06-01    139  |  104  |  22  ----------------------------<  122<H>  4.4   |  24  |  0.86      Calcium, Total Serum: 9.5 mg/dL (06-01-20 @ 11:53)      LFTs:             7.1  | <0.2 | 16       ------------------[53      ( 01 Jun 2020 11:53 )  4.1  | x    | 10          Lipase:x      Amylase:x             Coags:     12.1   ----< 1.06    ( 01 Jun 2020 11:53 )     29.7                      --------------------------------------------------------------------------------------    OTHER LABS    IMAGING RESULTS  ****************

## 2020-06-01 NOTE — H&P ADULT - HISTORY OF PRESENT ILLNESS
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  Pt states that two days ago he began to notice an erythematous lesion on his surgical incision which was draining fluid which prompted him to call Dr. Arrington who advised him to come to the ED immediately, pt elected to wait and follow up in Dr. Smith office 6/1/2020.  In office pt states Dr. Arrington manually expressed fluid from his right knee and did an aspiration of his right knee prior to sending him to the ER for admission for surgery tomorrow.  Pt states that his knee pain has not been worsening over the past several days and states that he has been ambulating with an walker which is consistent since his surgery.  Pt denies subjective fevers, chills, numbness or tingling down le b/l.

## 2020-06-01 NOTE — PROCEDURE
[Aspiration] : Aspiration [Right] : of the right [Diagnostic] : Diagnostic [Effusion] : Effusion [Knee Joint] : knee joint [Patient] : patient [Alcohol] : Alcohol [Betadine] : Betadine [Ethyl Chloride Spray] : ethyl chloride spray was used as a topical anesthetic [Superior] : superior [Lateral] : lateral [18] : an 18-gauge [Cloudy] : cloudy [Bloody] : bloody [___ mL Fluid] : [unfilled] mL of [Bandage Applied] : a bandage [Cell Count] : cell count [Culture] : culture [Tolerated Well] : The patient tolerated the procedure well [None] : none

## 2020-06-01 NOTE — ED PROVIDER NOTE - ATTENDING CONTRIBUTION TO CARE
78 y/o male with PMH of DM, HTN, HLD, CAD c/o right knee infection. Pt states he had a knee replacement many yrs ago and has had recent infections to right knee over the past few months requiring PICC lines and IV abx. Pt states that he noticed green d/c from incision site for past 3 days. Saw his orthopedist today and fluid was drained from the knee and pt was sent to the ED for admission. pt notes mild discomfort to knee. no numbness, tingling or weakness. no fever or chills. no trauma. no further complaints. VSS, afebrile. PT AAO, NAD, right knee with TTP, neurovascular intact. Labs noted. x-rays done. pt evaluated in ED by ortho and admitted to Orthopedics. No abx at this time per Ortho. Pt stable in ED.

## 2020-06-01 NOTE — H&P ADULT - PROBLEM SELECTOR PLAN 1
- Plan for OR tomorrow for R knee components explant, I&D, implantation of antibiotic cement spacer  - f/u preop labs  - f/u preop xrays  - WBAT R knee  - NPO/IVF at midnight  - continue home meds  - Dr. Farnsworth for Medical clearance  - Hold anticoagulation for OR  - Dispo pending OR  - Pt and plan discussed for OR tomorrow - Plan for OR tomorrow for R knee components explant, I&D, implantation of antibiotic cement spacer  - f/u preop labs  - f/u preop xrays  - WBAT R knee  - NPO/IVF at midnight  - continue home meds  - Dr. Farnsworth for Medical clearance  - Hold anticoagulation for OR  - Dispo pending OR  - Pt and plan discussed with Dr. Arrington

## 2020-06-01 NOTE — H&P ADULT - NSHPPHYSICALEXAM_GEN_ALL_CORE
General: Alert and oriented, NAD  MSK:  R knee swollen with increased warmth compared to L knee  1x1cm erythematous lesion on distal aspect of R TKA incision with no active drainage   + TTP over R knee  AROM/PROM >90degrees flexion with some discomfort at maximum flexion.    Decreased ROM of R knee secondary to pain.  EHL/FHL/TA/Gastro 5/5 b/l   DP's palpable b/l le   Gross sensation to light touch intact throughout lower extremities b/l le   calves soft compressible, nttp,   Remainder of PE as per medical clearance

## 2020-06-01 NOTE — ED PROVIDER NOTE - CLINICAL SUMMARY MEDICAL DECISION MAKING FREE TEXT BOX
right knee infection sent in by ortho for admission and surgery. neurovascular intact. vss,. a febrile. labs noted. x-rays done. pt evaluated in ED by ortho and recommend admission and no antibiotics at this time.

## 2020-06-01 NOTE — H&P ADULT - ATTENDING COMMENTS
Patient seen by me in office and referred to the ED for admission. Initial R TKA was implanted 18 years ago, had been functioning well until around the start of the new year 2020 with onset of pain. Workup at that time was negative for infection and positive for osteolysis; patient underwent a synovectomy, debridement, and poly exchange on 3/6/20. The wound dehisced with the appearance of purulence in the first week of April and he was taken back on 4/7/20 for a debridement with poly exchange and retention of the remainder of the implants. MSSA was cultured; he received 6 weeks of Ancef + rifampin followed by Keflex. 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 Planning for right knee component explantation, irrigation and debridement, and application of static antibiotic spacer tomorrow. This is an urgent procedure that must be undertaken as soon as possible to maximize his chances for infection eradication and preservation of function. Will be for staged further surgical management following an appropriate length of IV antibiotics and clinical monitoring. He is aware that this process may require more than two surgeries.

## 2020-06-01 NOTE — HISTORY OF PRESENT ILLNESS
[de-identified] : s/p right knee I&D and poly exchange 4/7/20 [de-identified] : Patient reports appearance of a pimple in the middle of his incision on Saturday that began draining purulent fluid on Sunday (yesterday). No fevers/chills at home, no increased pain in the knee. He has been compliant with Keflex as per ID. Still with moderate (3-5/10) pain on ambulation, still reliant on cane.\par \par Wife's mother passed away last week and they were planning to fly to [place] to scatter her ashes next week. [de-identified] : Right knee incision healed with 3mm draining sinus tract directly overlying patella. Expressible purulence from tract. ROM 0-100, stable to varus/valgus at relaxed extension and less than 5mm ant/post translation on flexion drawers. NVID. [de-identified] : - Had a long discussion with him and his wife regarding the diagnosis, natural history, and treatment options. It appears that the infection that was surgically addressed in April was never eradicated. At this point will require multistage surgical treatment. Will admit today for explantation and spacer application tomorrow. Will work with ID regarding postop management, most likely 6wk IV abx followed by 2wk holiday and repeat testing. We discussed that this may require more than two surgeries to fully address. Chronic suppression was also discussed but I think is inappropriate for him; he agreed. I do not think there is any further role for other types of debridement with implant retention. [de-identified] : 78y/o male with chronic periprosthetic infection right knee

## 2020-06-01 NOTE — CONSULT NOTE ADULT - ASSESSMENT
79M w h/o DM on insulin, CAD s/p PCIs, HTN, HLD, GERD, BPH, h/o TKRs c/b R prosthesis infection growing MSSA s/p I/D x2 in 3/6 and 4/2 2020 continued on IV ancef and rifampin x6wk now on keflex p/w increased drainage from site for OR Washout, medicine called for pre-operative evaluation    #Pre-op evaluation  - METS > 4  - RCRI 2 - 3.6% risk of MI, pulmonary edema, VF, Cardiac arrest, and complete heart block  - HAMPTON 0.2% risk of MI or arrest  - [facility] (10.6% risk of any complication)  - EKG: sinus bradycardia  #MSSA Joint infection - Mgmt per primary team.  #CAD - prior h/o stents. No chest pain. ASA/Plavix w statin and losartan  #DM2 - BGs at target. A1C in 5s (5.4 in 04/2020).  #HTN - at target. On amlodipine, losartan, toprol  #HLD - lipitor 20  #GERD - chronic. on omeprazole 20 ER    Recommendations   - Patient is intermediate risk for intermediate risk procedure (Knee I/D and antibiotic spacer insertion)   - No further diagnostic studies indicated at this time; pt can proceed to surgery   - Recommend ID Consultation - pt had appointment wChristiano Lorenzana   - F/U Outpatient arthrocentesis culture/sensitivities   - Would start lantus 5u tonight (1/2 dose while NPO); maintain on SSI; accuchecks q6h   - Adjust flomax to 0.4mg BID   - Would hold losartan for OR tomorrow   - Can continue beta-blocker and amlodipine   - Would restart ASA 81 as soon as safe to do so post-operatively per primary team

## 2020-06-01 NOTE — ED ADULT TRIAGE NOTE - CHIEF COMPLAINT QUOTE
post op right knee surgery on 4/6 referred to go to the ED for possible surgery again. Reported drain to site. Denies fever

## 2020-06-01 NOTE — H&P ADULT - NSICDXPASTSURGICALHX_GEN_ALL_CORE_FT
PAST SURGICAL HISTORY:  CAD (coronary artery disease) stents placement    H/O shoulder surgery Right    H/O total knee replacement, bilateral     Umbilical hernia Umbilical and inguinal hernia

## 2020-06-01 NOTE — ED ADULT NURSE NOTE - NSIMPLEMENTINTERV_GEN_ALL_ED
Implemented All Universal Safety Interventions:  [place] to call system. Call bell, personal items and telephone within reach. Instruct patient to call for assistance. Room bathroom lighting operational. Non-slip footwear when patient is off stretcher. Physically safe environment: no spills, clutter or unnecessary equipment. Stretcher in lowest position, wheels locked, appropriate side rails in place.

## 2020-06-01 NOTE — ED PROVIDER NOTE - OBJECTIVE STATEMENT
80 y/o male with hx of DM, HTN, HLD, CAD c/o right knee infection. pt states knee replacement many yrs ago and recent infections to right knee over past few months. pt states noticed green d/c from incision site past 3 days. pt states saw his ortho today and fluid drained from knee and pt sent to ED for admission. pt notes mild discomfort to knee. no numbness, tingling or weakness. no fever or chills. no trauma. no further complaints.

## 2020-06-01 NOTE — ED ADULT NURSE NOTE - OBJECTIVE STATEMENT
post op right knee surgery on 4/6 referred to go to the ED for possible surgery again. Reported drain to site. Denies fever 80 yo male presents to the ED with Post operative infection of right knee. Saturday 5/30 patient noticed drainage from incision. First procedure was 3/6- and pt obtained post op infection - repeat  clean out/procedure was 4/6. Patient states that he was seen by orthopedics this AM - Fluid was drained from incision and plan was made to return to OR. Denies Fever.

## 2020-06-01 NOTE — H&P ADULT - NSHPLABSRESULTS_GEN_ALL_CORE
Preop cbc, bmp, pt/inr, ptt, ESR, CRP, ua ordered f/u   COVID-19 swab  preop cxr ordered  preop ekg ordered Preop cbc, bmp, pt/inr, ptt, ESR, CRP, ua ordered f/u   COVID-19 swab negative  preop cxr ordered  preop ekg ordered

## 2020-06-01 NOTE — ED PROVIDER NOTE - PHYSICAL EXAMINATION
right knee: swelling., no erythema. FROM. pustular lesion to mid aspect of surgical scar. distal NVI.

## 2020-06-02 ENCOUNTER — RESULT REVIEW (OUTPATIENT)
Age: 80
End: 2020-06-02

## 2020-06-02 ENCOUNTER — APPOINTMENT (OUTPATIENT)
Dept: ORTHOPEDIC SURGERY | Facility: HOSPITAL | Age: 80
End: 2020-06-02

## 2020-06-02 LAB
A1C WITH ESTIMATED AVERAGE GLUCOSE RESULT: 5.9 % — HIGH (ref 4–5.6)
ANION GAP SERPL CALC-SCNC: 12 MMOL/L — SIGNIFICANT CHANGE UP (ref 5–17)
APPEARANCE UR: CLEAR — SIGNIFICANT CHANGE UP
BILIRUB UR-MCNC: NEGATIVE — SIGNIFICANT CHANGE UP
BUN SERPL-MCNC: 19 MG/DL — SIGNIFICANT CHANGE UP (ref 7–23)
CALCIUM SERPL-MCNC: 9.4 MG/DL — SIGNIFICANT CHANGE UP (ref 8.4–10.5)
CHLORIDE SERPL-SCNC: 105 MMOL/L — SIGNIFICANT CHANGE UP (ref 96–108)
CO2 SERPL-SCNC: 26 MMOL/L — SIGNIFICANT CHANGE UP (ref 22–31)
COLOR SPEC: YELLOW — SIGNIFICANT CHANGE UP
CREAT SERPL-MCNC: 0.86 MG/DL — SIGNIFICANT CHANGE UP (ref 0.5–1.3)
DIFF PNL FLD: NEGATIVE — SIGNIFICANT CHANGE UP
ESTIMATED AVERAGE GLUCOSE: 123 MG/DL — HIGH (ref 68–114)
GLUCOSE BLDC GLUCOMTR-MCNC: 107 MG/DL — HIGH (ref 70–99)
GLUCOSE BLDC GLUCOMTR-MCNC: 173 MG/DL — HIGH (ref 70–99)
GLUCOSE BLDC GLUCOMTR-MCNC: 186 MG/DL — HIGH (ref 70–99)
GLUCOSE BLDC GLUCOMTR-MCNC: 187 MG/DL — HIGH (ref 70–99)
GLUCOSE SERPL-MCNC: 110 MG/DL — HIGH (ref 70–99)
GLUCOSE UR QL: NEGATIVE — SIGNIFICANT CHANGE UP
GRAM STN FLD: SIGNIFICANT CHANGE UP
HCT VFR BLD CALC: 34.3 % — LOW (ref 39–50)
HGB BLD-MCNC: 10.7 G/DL — LOW (ref 13–17)
KETONES UR-MCNC: NEGATIVE — SIGNIFICANT CHANGE UP
LEUKOCYTE ESTERASE UR-ACNC: NEGATIVE — SIGNIFICANT CHANGE UP
MCHC RBC-ENTMCNC: 29.9 PG — SIGNIFICANT CHANGE UP (ref 27–34)
MCHC RBC-ENTMCNC: 31.2 GM/DL — LOW (ref 32–36)
MCV RBC AUTO: 95.8 FL — SIGNIFICANT CHANGE UP (ref 80–100)
NITRITE UR-MCNC: NEGATIVE — SIGNIFICANT CHANGE UP
NRBC # BLD: 0 /100 WBCS — SIGNIFICANT CHANGE UP (ref 0–0)
PH UR: 6 — SIGNIFICANT CHANGE UP (ref 5–8)
PLATELET # BLD AUTO: 239 K/UL — SIGNIFICANT CHANGE UP (ref 150–400)
POTASSIUM SERPL-MCNC: 4.5 MMOL/L — SIGNIFICANT CHANGE UP (ref 3.5–5.3)
POTASSIUM SERPL-SCNC: 4.5 MMOL/L — SIGNIFICANT CHANGE UP (ref 3.5–5.3)
PROT UR-MCNC: NEGATIVE MG/DL — SIGNIFICANT CHANGE UP
RBC # BLD: 3.58 M/UL — LOW (ref 4.2–5.8)
RBC # FLD: 14.4 % — SIGNIFICANT CHANGE UP (ref 10.3–14.5)
SODIUM SERPL-SCNC: 143 MMOL/L — SIGNIFICANT CHANGE UP (ref 135–145)
SP GR SPEC: 1.01 — SIGNIFICANT CHANGE UP (ref 1–1.03)
SPECIMEN SOURCE: SIGNIFICANT CHANGE UP
UROBILINOGEN FLD QL: 0.2 E.U./DL — SIGNIFICANT CHANGE UP
WBC # BLD: 6.22 K/UL — SIGNIFICANT CHANGE UP (ref 3.8–10.5)
WBC # FLD AUTO: 6.22 K/UL — SIGNIFICANT CHANGE UP (ref 3.8–10.5)

## 2020-06-02 PROCEDURE — 11981 INSERTION DRUG DLVR IMPLANT: CPT | Mod: 58,RT

## 2020-06-02 PROCEDURE — 99232 SBSQ HOSP IP/OBS MODERATE 35: CPT

## 2020-06-02 PROCEDURE — 88331 PATH CONSLTJ SURG 1 BLK 1SPC: CPT | Mod: 26

## 2020-06-02 PROCEDURE — 99233 SBSQ HOSP IP/OBS HIGH 50: CPT

## 2020-06-02 PROCEDURE — 88300 SURGICAL PATH GROSS: CPT | Mod: 26

## 2020-06-02 PROCEDURE — 27488 REMOVAL OF KNEE PROSTHESIS: CPT | Mod: 58,RT

## 2020-06-02 PROCEDURE — 73560 X-RAY EXAM OF KNEE 1 OR 2: CPT | Mod: 26,RT

## 2020-06-02 PROCEDURE — 88305 TISSUE EXAM BY PATHOLOGIST: CPT | Mod: 26

## 2020-06-02 RX ORDER — ASPIRIN/CALCIUM CARB/MAGNESIUM 324 MG
81 TABLET ORAL DAILY
Refills: 0 | Status: DISCONTINUED | OUTPATIENT
Start: 2020-06-03 | End: 2020-06-07

## 2020-06-02 RX ORDER — BUPIVACAINE 13.3 MG/ML
20 INJECTION, SUSPENSION, LIPOSOMAL INFILTRATION ONCE
Refills: 0 | Status: DISCONTINUED | OUTPATIENT
Start: 2020-06-02 | End: 2020-06-07

## 2020-06-02 RX ORDER — LOSARTAN POTASSIUM 100 MG/1
100 TABLET, FILM COATED ORAL DAILY
Refills: 0 | Status: DISCONTINUED | OUTPATIENT
Start: 2020-06-03 | End: 2020-06-03

## 2020-06-02 RX ORDER — SODIUM CHLORIDE 9 MG/ML
1000 INJECTION, SOLUTION INTRAVENOUS
Refills: 0 | Status: DISCONTINUED | OUTPATIENT
Start: 2020-06-02 | End: 2020-06-07

## 2020-06-02 RX ORDER — CLOPIDOGREL BISULFATE 75 MG/1
75 TABLET, FILM COATED ORAL DAILY
Refills: 0 | Status: DISCONTINUED | OUTPATIENT
Start: 2020-06-03 | End: 2020-06-07

## 2020-06-02 RX ORDER — NAFCILLIN 10 G/100ML
2 INJECTION, POWDER, FOR SOLUTION INTRAVENOUS ONCE
Refills: 0 | Status: COMPLETED | OUTPATIENT
Start: 2020-06-02 | End: 2020-06-05

## 2020-06-02 RX ORDER — NAFCILLIN 10 G/100ML
2 INJECTION, POWDER, FOR SOLUTION INTRAVENOUS EVERY 4 HOURS
Refills: 0 | Status: DISCONTINUED | OUTPATIENT
Start: 2020-06-02 | End: 2020-06-07

## 2020-06-02 RX ADMIN — Medication 1: at 22:48

## 2020-06-02 RX ADMIN — ATORVASTATIN CALCIUM 20 MILLIGRAM(S): 80 TABLET, FILM COATED ORAL at 22:49

## 2020-06-02 RX ADMIN — SODIUM CHLORIDE 100 MILLILITER(S): 9 INJECTION, SOLUTION INTRAVENOUS at 00:00

## 2020-06-02 RX ADMIN — NAFCILLIN 200 GRAM(S): 10 INJECTION, POWDER, FOR SOLUTION INTRAVENOUS at 18:51

## 2020-06-02 RX ADMIN — OXYCODONE HYDROCHLORIDE 10 MILLIGRAM(S): 5 TABLET ORAL at 19:56

## 2020-06-02 RX ADMIN — TAMSULOSIN HYDROCHLORIDE 0.4 MILLIGRAM(S): 0.4 CAPSULE ORAL at 18:51

## 2020-06-02 NOTE — PROGRESS NOTE ADULT - SUBJECTIVE AND OBJECTIVE BOX
Ortho Note    Pt comfortable without complaints, pain controlled  Denies CP, SOB, N/V, numbness/tingling     Vital Signs Last 24 Hrs  T(C): 36.5 (06-02-20 @ 08:11), Max: 36.5 (06-02-20 @ 08:11)  T(F): 97.7 (06-02-20 @ 08:11), Max: 97.7 (06-02-20 @ 08:11)  HR: 51 (06-02-20 @ 08:11) (49 - 51)  BP: 161/78 (06-02-20 @ 08:11) (147/68 - 161/78)  BP(mean): --  RR: 17 (06-02-20 @ 08:11) (17 - 18)  SpO2: 99% (06-02-20 @ 08:11) (95% - 99%)    VSS  General: A&Ox3, NAD  RLE: Ace Wrap DSG C/D/I  Pulses: Foot WWP; DP pulse palpable; Cap refill < 2 sec  Sensation: SILT distally and symmetric to contralateral extremity  Motor: TA/EHL/FHL/GS 5/5 and symmetric to contralateral extremity                          10.7   6.22  )-----------( 239      ( 02 Jun 2020 06:26 )             34.3   02 Jun 2020 06:26    143    |  105    |  19     ----------------------------<  110    4.5     |  26     |  0.86     Ca    9.4        02 Jun 2020 06:26    TPro  7.1    /  Alb  4.1    /  TBili  <0.2   /  DBili  x      /  AST  16     /  ALT  10     /  AlkPhos  53     01 Jun 2020 11:53

## 2020-06-02 NOTE — CONSULT NOTE ADULT - ASSESSMENT
78 yo male with hx CAD, DM, R TKR 2002, revision 3/6/20, now p/w wound dehiscence and drainage from incision c/f SSI s/p washout 4/7 with purulence noted in joint space and concern for hardware involvement--OR cultures growing MSSA. Also found to be COVID-19+ during that admission (asymptomatic). He finished a six week course of cefazolin on 5/18 and was transitioned to chronic suppressive therapy with cephalexin with plan for 3 month course. A few days ago he noted a pimple developing over incision site c/f sinus tract formation/relapsed infection. Plan for BRANDON and spacer placement 6/2.  - to f/u OR findings and cultures  - after OR cultures obtained, recommend commence nafcillin 2g IV q4h    ID Team 1 78 yo male with hx CAD, DM, R TKR 2002, revision 3/6/20, initially p/w wound dehiscence and drainage from incision c/f SSI s/p washout 4/7 with purulence noted in joint space and concern for hardware involvement--OR cultures growing MSSA. Also found to be COVID-19+ during that admission (asymptomatic). He finished a six week course of cefazolin + rifampin on 5/18 and was transitioned to chronic suppressive therapy with cephalexin with plan for 3 month course. A few days ago he noted a pimple developing over incision site c/f sinus tract formation/relapsed infection. Plan for BRANDON and spacer placement 6/2.  - to f/u OR findings and cultures  - after OR cultures obtained, recommend commence nafcillin 2g IV q4h    ID Team 1

## 2020-06-02 NOTE — PROGRESS NOTE ADULT - SUBJECTIVE AND OBJECTIVE BOX
Ortho Post Op Check    Procedure: R TKA explant, spacer placement  Surgeon: Oh    Pt comfortable without complaints, pain controlled  Denies CP, SOB, N/V, numbness/tingling     Vital Signs Last 24 Hrs  T(C): 36.7 (06-02-20 @ 17:18), Max: 36.7 (06-02-20 @ 17:18)  T(F): 98 (06-02-20 @ 17:18), Max: 98 (06-02-20 @ 17:18)  HR: 83 (06-02-20 @ 17:48) (80 - 85)  BP: 150/71 (06-02-20 @ 17:48) (139/61 - 162/71)  BP(mean): 102 (06-02-20 @ 17:48) (88 - 114)  RR: 19 (06-02-20 @ 17:48) (15 - 19)  SpO2: 100% (06-02-20 @ 17:48) (100% - 100%)      Physical Exam:  General: Resting comfortably in bed. AAOx3. NAD.  Extremities:        LLE: No gross deformity. SILT L2-S1 distribution, symmetric. TA/EHL/FHL/GS motor intact. WWP       RLE: Prevena dressing in place with good suction. KI in place. SILT L2-S1 distribution, symmetric. TA/EHL/FHL/GS motor intact. WWP                            10.7   6.22  )-----------( 239      ( 02 Jun 2020 06:26 )             34.3   02 Jun 2020 06:26    143    |  105    |  19     ----------------------------<  110    4.5     |  26     |  0.86       TPro  7.1    /  Alb  4.1    /  TBili  <0.2   /  DBili  x      /  AST  16     /  ALT  10     /  AlkPhos  53     01 Jun 2020 11:53      Post-op X-Ray: antibiotic spacer in place R knee    A/P: 79yMale POD#0 s/p R TKA explant, antibiotic spacer placement  - Stable  - Pain Control  - AM labs  - F/u OR cultures  - DVT ppx: SCDs; ASA, plavix starting POD1  - Post op abx: Nafcillin 2g q4h per ID  - PT, WBS: TTWB RLE with locked faisal  - Dispo: Pending PT eval    Ortho Pager [phone]

## 2020-06-02 NOTE — PROGRESS NOTE ADULT - SUBJECTIVE AND OBJECTIVE BOX
Late Entry: pt seen at 9AM    INTERVAL HPI/OVERNIGHT EVENTS:    SUBJECTIVE: Patient seen and examined at bedside.   Pt for OR at noon. Denies any fever, chest pain, vision changes, dsypnea, numbness, weakness. BP elevated this AM. no N/V/Abd pain.     Joint aspirate NGTD    ROS: 12 point ROS reviewed and otherwise negative    OBJECTIVE:    VITAL SIGNS:  ICU Vital Signs Last 24 Hrs  T(C): 36.5 (2020 08:11), Max: 37.1 (2020 15:15)  T(F): 97.7 (2020 08:11), Max: 98.8 (2020 15:15)  HR: 51 (2020 08:11) (49 - 76)  BP: 161/78 (2020 08:11) (147/68 - 172/81)  BP(mean): --  ABP: --  ABP(mean): --  RR: 17 (2020 08:11) (17 - 18)  SpO2: 99% (2020 08:11) (95% - 99%)         @ 07:  -  02 @ 07:00  --------------------------------------------------------  IN: 0 mL / OUT: 450 mL / NET: -450 mL    0602 @ 07:01  -  0602 @ 15:08  --------------------------------------------------------  IN: 700 mL / OUT: 0 mL / NET: 700 mL      CAPILLARY BLOOD GLUCOSE      POCT Blood Glucose.: 107 mg/dL (2020 11:30)      PHYSICAL EXAM:  GEN: Male in NAD  HEENT: NC/AT, MMM  CV: RRR, nml S1S2, no BLE edema  PULM: nml effort, CTAB,   ABD: Soft, NABS, non-tender  NEURO: Alert, moving all ext  PSYCH: conversant, appropriate    MEDICATIONS:  MEDICATIONS  (STANDING):  amLODIPine   Tablet 2.5 milliGRAM(s) Oral daily  atorvastatin 20 milliGRAM(s) Oral at bedtime  BUpivacaine liposome 1.3% Injectable (no eMAR) 20 milliLiter(s) Local Injection once  dextrose 5%. 1000 milliLiter(s) (50 mL/Hr) IV Continuous <Continuous>  dextrose 50% Injectable 12.5 Gram(s) IV Push once  dextrose 50% Injectable 25 Gram(s) IV Push once  dextrose 50% Injectable 25 Gram(s) IV Push once  insulin glargine Injectable (LANTUS) 5 Unit(s) SubCutaneous at bedtime  insulin lispro (HumaLOG) corrective regimen sliding scale   SubCutaneous Before meals and at bedtime  lactated ringers. 1000 milliLiter(s) (100 mL/Hr) IV Continuous <Continuous>  metoprolol succinate ER 25 milliGRAM(s) Oral daily  nafcillin  IVPB 2 Gram(s) IV Intermittent once  pantoprazole    Tablet 40 milliGRAM(s) Oral before breakfast  tamsulosin 0.4 milliGRAM(s) Oral two times a day    MEDICATIONS  (PRN):  acetaminophen   Tablet .. 650 milliGRAM(s) Oral every 6 hours PRN Temp greater or equal to 38C (100.4F), Mild Pain (1 - 3)  dextrose 40% Gel 15 Gram(s) Oral once PRN Blood Glucose LESS THAN 70 milliGRAM(s)/deciliter  glucagon  Injectable 1 milliGRAM(s) IntraMuscular once PRN Glucose LESS THAN 70 milligrams/deciliter  HYDROmorphone  Injectable 0.5 milliGRAM(s) IV Push every 4 hours PRN breakthrough pain  magnesium hydroxide Suspension 30 milliLiter(s) Oral daily PRN Constipation  melatonin 3 milliGRAM(s) Oral at bedtime PRN Insomnia  oxyCODONE    IR 10 milliGRAM(s) Oral every 4 hours PRN Severe Pain (7 - 10)  oxyCODONE    IR 5 milliGRAM(s) Oral every 4 hours PRN Moderate Pain (4 - 6)      ALLERGIES:  Allergies    No Known Allergies    Intolerances        LABS:                        10.7   6.22  )-----------( 239      ( 2020 06:26 )             34.3     06-02    143  |  105  |  19  ----------------------------<  110<H>  4.5   |  26  |  0.86    Ca    9.4      2020 06:26    TPro  7.1  /  Alb  4.1  /  TBili  <0.2  /  DBili  x   /  AST  16  /  ALT  10  /  AlkPhos  53  06-01    PT/INR - ( 2020 11:53 )   PT: 12.1 sec;   INR: 1.06          PTT - ( 2020 11:53 )  PTT:29.7 sec  Urinalysis Basic - ( 2020 03:10 )    Color: Yellow / Appearance: Clear / S.015 / pH: x  Gluc: x / Ketone: NEGATIVE  / Bili: Negative / Urobili: 0.2 E.U./dL   Blood: x / Protein: NEGATIVE mg/dL / Nitrite: NEGATIVE   Leuk Esterase: NEGATIVE / RBC: x / WBC x   Sq Epi: x / Non Sq Epi: x / Bacteria: x        RADIOLOGY & ADDITIONAL TESTS: Reviewed.

## 2020-06-02 NOTE — PROGRESS NOTE ADULT - ASSESSMENT
79M w h/o DM on insulin, CAD s/p PCIs, HTN, HLD, GERD, BPH, h/o TKRs c/b R prosthesis infection growing MSSA s/p I/D x2 in 3/6 and 4/2 2020 continued on IV ancef and rifampin x6wk now on keflex p/w increased drainage from site for OR Washout, medicine called for pre-operative evaluation    #Pre-op evaluation  - METS > 4  - RCRI 2 - 3.6% risk of MI, pulmonary edema, VF, Cardiac arrest, and complete heart block  - HAMPTON 0.2% risk of MI or arrest  - NSQI Average risk (10.6% risk of any complication)  - EKG: sinus bradycardia  #MSSA Joint infection - Mgmt per primary team. On IV nafcillin  #CAD - prior h/o stents. No chest pain. ASA/Plavix w statin and losartan  #DM2 - BGs at target. A1C in 5s (5.4 in 04/2020).  #HTN - Above target. Possibly due to anxiety - pt states his family worried he is currently in the hospital. However he states he knows he is in good hands. On amlodipine, losartan, toprol  #HLD - lipitor 20  #GERD - chronic. on omeprazole 20 ER  #Normocytic anemia - 10.7 today. Baseline has been 9-10. Etiology possibly due to anemia of chronic disease in setting of recurrent joint infections. Does not appear to be symptomatic. Continue to monitor    Recommendations   - Patient is intermediate risk for intermediate risk procedure (Knee I/D and antibiotic spacer insertion)   - No further diagnostic studies indicated at this time; pt can proceed to surgery   - F/U ID consult recs as well as culture/sensitivities from aspirate and OR samples   - Holding losartan today   - IF BP > 180 post-op, can consider giving losartan   - Continue w lantus 5u tonight and accuchecks.    - Would restart ASA 81 as soon as safe to do so post-operatively per primary team

## 2020-06-02 NOTE — PROGRESS NOTE ADULT - ASSESSMENT
A/P: 79M presenting to [facility] w presumed R TKA PJI following aspiration in office by Dr. Arrington 06/01    - Plan for OR today w Dr. Arrington for explant and Abx spacer placement  - NPO/IVF  - Pain control  - Hold anticoagulation for OR  - CBC/CMP/Coags/UA/T&S  - CXR/EKG  - Medical Clearance by Dr. Farnsworth  - Dr. Lorenzana to follow from ID -- f/u office aspiration results  - Discussed with attending-- Dr. Arrington    Ortho Pager [phone]

## 2020-06-02 NOTE — PROGRESS NOTE ADULT - SUBJECTIVE AND OBJECTIVE BOX
Ortho Note    Pt comfortable without complaints, pain controlled  Denies CP, SOB, N/V, numbness/tingling     Vital Signs Last 24 Hrs  T(C): 36.5 (06-02-20 @ 08:11), Max: 36.5 (06-02-20 @ 05:37)  T(F): 97.7 (06-02-20 @ 08:11), Max: 97.7 (06-02-20 @ 05:37)  HR: 51 (06-02-20 @ 08:11) (49 - 51)  BP: 161/78 (06-02-20 @ 08:11) (147/68 - 161/78)  BP(mean): --  RR: 17 (06-02-20 @ 08:11) (17 - 18)  SpO2: 99% (06-02-20 @ 08:11) (95% - 99%)    General: Pt Alert and oriented, NAD  DSG C/D/I  Pulses:  Sensation:  Motor: EHL/FHL/TA/GS                          10.7   6.22  )-----------( 239      ( 02 Jun 2020 06:26 )             34.3   02 Jun 2020 06:26    143    |  105    |  19     ----------------------------<  110    4.5     |  26     |  0.86     Ca    9.4        02 Jun 2020 06:26    TPro  7.1    /  Alb  4.1    /  TBili  <0.2   /  DBili  x      /  AST  16     /  ALT  10     /  AlkPhos  53     01 Jun 2020 11:53      A/P: 79yMale POD# s/p   - Stable  - Pain Control  - DVT ppx:  - PT, WBS:     Ortho Pager [phone]

## 2020-06-02 NOTE — CONSULT NOTE ADULT - SUBJECTIVE AND OBJECTIVE BOX
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  Pt states that two days ago he began to notice an erythematous lesion on his surgical incision which was draining fluid which prompted him to call Dr. Arrington who advised him to come to the ED immediately, pt elected to wait and follow up in Dr. Smith office 2020.  In office pt states Dr. Arrington manually expressed fluid from his right knee and did an aspiration of his right knee prior to sending him to the ER for admission for surgery tomorrow.  Pt states that his knee pain has not been worsening over the past several days and states that he has been ambulating with an walker which is consistent since his surgery.  Pt denies subjective fevers, chills, numbness or tingling down le b/l. (2020 11:55)      PAST MEDICAL & SURGICAL HISTORY:  Anemia  Hyperlipidemia  CAD (coronary artery disease)  DM (diabetes mellitus)  Hypertension  H/O shoulder surgery: Right  Umbilical hernia: Umbilical and inguinal hernia  H/O total knee replacement, bilateral  CAD (coronary artery disease): stents placement        REVIEW OF SYSTEMS:    General:	 no weakness; no fevers, no chills  Skin/Breast: no rash  Respiratory and Thorax: no SOB, no cough  Cardiovascular:	No chest pain  Gastrointestinal:	 no nausea, vomiting , diarrhea  Genitourinary:	no dysuria, no difficulty urinating, no hematuria  Musculoskeletal:	no weakness, no joint swelling/pain  Neurological:	no focal weakness/numbness  Endocrine:	no polyuria, no polydipsia      ANTIBIOTICS:  MEDICATIONS  (STANDING):  amLODIPine   Tablet 2.5 milliGRAM(s) Oral daily  atorvastatin 20 milliGRAM(s) Oral at bedtime  BUpivacaine liposome 1.3% Injectable (no eMAR) 20 milliLiter(s) Local Injection once  dextrose 5%. 1000 milliLiter(s) (50 mL/Hr) IV Continuous <Continuous>  dextrose 50% Injectable 12.5 Gram(s) IV Push once  dextrose 50% Injectable 25 Gram(s) IV Push once  dextrose 50% Injectable 25 Gram(s) IV Push once  insulin glargine Injectable (LANTUS) 5 Unit(s) SubCutaneous at bedtime  insulin lispro (HumaLOG) corrective regimen sliding scale   SubCutaneous Before meals and at bedtime  lactated ringers. 1000 milliLiter(s) (100 mL/Hr) IV Continuous <Continuous>  metoprolol succinate ER 25 milliGRAM(s) Oral daily  nafcillin  IVPB 2 Gram(s) IV Intermittent once  pantoprazole    Tablet 40 milliGRAM(s) Oral before breakfast  tamsulosin 0.4 milliGRAM(s) Oral two times a day    MEDICATIONS  (PRN):  acetaminophen   Tablet .. 650 milliGRAM(s) Oral every 6 hours PRN Temp greater or equal to 38C (100.4F), Mild Pain (1 - 3)  dextrose 40% Gel 15 Gram(s) Oral once PRN Blood Glucose LESS THAN 70 milliGRAM(s)/deciliter  glucagon  Injectable 1 milliGRAM(s) IntraMuscular once PRN Glucose LESS THAN 70 milligrams/deciliter  HYDROmorphone  Injectable 0.5 milliGRAM(s) IV Push every 4 hours PRN breakthrough pain  magnesium hydroxide Suspension 30 milliLiter(s) Oral daily PRN Constipation  melatonin 3 milliGRAM(s) Oral at bedtime PRN Insomnia  oxyCODONE    IR 10 milliGRAM(s) Oral every 4 hours PRN Severe Pain (7 - 10)  oxyCODONE    IR 5 milliGRAM(s) Oral every 4 hours PRN Moderate Pain (4 - 6)      Allergies    No Known Allergies    Intolerances        SOCIAL HISTORY:    FAMILY HISTORY:      Vital Signs Last 24 Hrs  T(C): 36.5 (2020 08:11), Max: 36.5 (2020 05:37)  T(F): 97.7 (2020 08:11), Max: 97.7 (2020 05:37)  HR: 51 (2020 08:11) (49 - 60)  BP: 161/78 (2020 08:11) (147/68 - 164/77)  BP(mean): --  RR: 17 (2020 08:11) (17 - 18)  SpO2: 99% (2020 08:11) (95% - 99%)    PHYSICAL EXAM:  Constitutional:Well-developed, well nourished  Eyes:DANA, EOMI  Ear/Nose/Throat: no oral lesion, no sinus tenderness on percussion	  Neck:no JVD, no lymphadenopathy, supple  Respiratory: CTA kris  Cardiovascular: S1S2 RRR, no murmurs  Gastrointestinal:soft, (+) BS, no HSM  Extremities:no e/e/c; R knee small circular scab over incision site   Vascular: DP Pulse:	right normal; left normal            LABS:                        10.7   6.22  )-----------( 239      ( 2020 06:26 )             34.3     06-02    143  |  105  |  19  ----------------------------<  110<H>  4.5   |  26  |  0.86    Ca    9.4      2020 06:26    TPro  7.1  /  Alb  4.1  /  TBili  <0.2  /  DBili  x   /  AST  16  /  ALT  10  /  AlkPhos  53  06-01    PT/INR - ( 2020 11:53 )   PT: 12.1 sec;   INR: 1.06          PTT - ( 2020 11:53 )  PTT:29.7 sec  Urinalysis Basic - ( 2020 03:10 )    Color: Yellow / Appearance: Clear / S.015 / pH: x  Gluc: x / Ketone: NEGATIVE  / Bili: Negative / Urobili: 0.2 E.U./dL   Blood: x / Protein: NEGATIVE mg/dL / Nitrite: NEGATIVE   Leuk Esterase: NEGATIVE / RBC: x / WBC x   Sq Epi: x / Non Sq Epi: x / Bacteria: x        MICROBIOLOGY: reviewed  RADIOLOGY & ADDITIONAL STUDIES: reviewed

## 2020-06-02 NOTE — PROGRESS NOTE ADULT - SUBJECTIVE AND OBJECTIVE BOX
Ortho Note    Pt comfortable without complaints, pain controlled  Denies CP, SOB, N/V, numbness/tingling     Vital Signs Last 24 Hrs  T(C): 36.5 (06-02-20 @ 08:11), Max: 36.5 (06-02-20 @ 05:37)  T(F): 97.7 (06-02-20 @ 08:11), Max: 97.7 (06-02-20 @ 05:37)  HR: 51 (06-02-20 @ 08:11) (49 - 51)  BP: 161/78 (06-02-20 @ 08:11) (147/68 - 161/78)  BP(mean): --  RR: 17 (06-02-20 @ 08:11) (17 - 18)  SpO2: 99% (06-02-20 @ 08:11) (95% - 99%)    General: Pt Alert and oriented, NAD  DSG C/D/I Right knee  Pulses intact RLE  Sensation intact RLE  Motor: EHL/FHL/TA/GS 5/5 RLE                          10.7   6.22  )-----------( 239      ( 02 Jun 2020 06:26 )             34.3   02 Jun 2020 06:26    143    |  105    |  19     ----------------------------<  110    4.5     |  26     |  0.86     Ca    9.4        02 Jun 2020 06:26    TPro  7.1    /  Alb  4.1    /  TBili  <0.2   /  DBili  x      /  AST  16     /  ALT  10     /  AlkPhos  53     01 Jun 2020 11:53      A/P: 79yMale with Right TKR PJI  - For OR today  - NPO/IVF  - Pain Control  - Hold abx till OR  - Per ID Dr. Harriett mullen Nafcillin 2g IV q4hrs after OR cultures taken    Ortho Pager [phone]

## 2020-06-02 NOTE — BRIEF OPERATIVE NOTE - NSICDXBRIEFPROCEDURE_GEN_ALL_CORE_FT
PROCEDURES:  Removal, hardware, knee, with antibiotic spacer insertion 02-Jun-2020 13:16:26 R knee Channing Anaya

## 2020-06-03 ENCOUNTER — TRANSCRIPTION ENCOUNTER (OUTPATIENT)
Age: 80
End: 2020-06-03

## 2020-06-03 LAB
ANION GAP SERPL CALC-SCNC: 13 MMOL/L — SIGNIFICANT CHANGE UP (ref 5–17)
BASOPHILS # BLD AUTO: 0.02 K/UL — SIGNIFICANT CHANGE UP (ref 0–0.2)
BASOPHILS NFR BLD AUTO: 0.2 % — SIGNIFICANT CHANGE UP (ref 0–2)
BUN SERPL-MCNC: 20 MG/DL — SIGNIFICANT CHANGE UP (ref 7–23)
CALCIUM SERPL-MCNC: 8.8 MG/DL — SIGNIFICANT CHANGE UP (ref 8.4–10.5)
CHLORIDE SERPL-SCNC: 102 MMOL/L — SIGNIFICANT CHANGE UP (ref 96–108)
CO2 SERPL-SCNC: 25 MMOL/L — SIGNIFICANT CHANGE UP (ref 22–31)
CREAT SERPL-MCNC: 1.2 MG/DL — SIGNIFICANT CHANGE UP (ref 0.5–1.3)
EOSINOPHIL # BLD AUTO: 0.33 K/UL — SIGNIFICANT CHANGE UP (ref 0–0.5)
EOSINOPHIL NFR BLD AUTO: 3.4 % — SIGNIFICANT CHANGE UP (ref 0–6)
GLUCOSE BLDC GLUCOMTR-MCNC: 141 MG/DL — HIGH (ref 70–99)
GLUCOSE BLDC GLUCOMTR-MCNC: 158 MG/DL — HIGH (ref 70–99)
GLUCOSE BLDC GLUCOMTR-MCNC: 163 MG/DL — HIGH (ref 70–99)
GLUCOSE BLDC GLUCOMTR-MCNC: 165 MG/DL — HIGH (ref 70–99)
GLUCOSE BLDC GLUCOMTR-MCNC: 183 MG/DL — HIGH (ref 70–99)
GLUCOSE SERPL-MCNC: 188 MG/DL — HIGH (ref 70–99)
HCT VFR BLD CALC: 27.9 % — LOW (ref 39–50)
HGB BLD-MCNC: 8.8 G/DL — LOW (ref 13–17)
IMM GRANULOCYTES NFR BLD AUTO: 0.4 % — SIGNIFICANT CHANGE UP (ref 0–1.5)
LYMPHOCYTES # BLD AUTO: 0.53 K/UL — LOW (ref 1–3.3)
LYMPHOCYTES # BLD AUTO: 5.4 % — LOW (ref 13–44)
MCHC RBC-ENTMCNC: 30.3 PG — SIGNIFICANT CHANGE UP (ref 27–34)
MCHC RBC-ENTMCNC: 31.5 GM/DL — LOW (ref 32–36)
MCV RBC AUTO: 96.2 FL — SIGNIFICANT CHANGE UP (ref 80–100)
MONOCYTES # BLD AUTO: 0.89 K/UL — SIGNIFICANT CHANGE UP (ref 0–0.9)
MONOCYTES NFR BLD AUTO: 9.1 % — SIGNIFICANT CHANGE UP (ref 2–14)
NEUTROPHILS # BLD AUTO: 8.02 K/UL — HIGH (ref 1.8–7.4)
NEUTROPHILS NFR BLD AUTO: 81.5 % — HIGH (ref 43–77)
NIGHT BLUE STAIN TISS: SIGNIFICANT CHANGE UP
NRBC # BLD: 0 /100 WBCS — SIGNIFICANT CHANGE UP (ref 0–0)
PLATELET # BLD AUTO: 204 K/UL — SIGNIFICANT CHANGE UP (ref 150–400)
POTASSIUM SERPL-MCNC: 4 MMOL/L — SIGNIFICANT CHANGE UP (ref 3.5–5.3)
POTASSIUM SERPL-SCNC: 4 MMOL/L — SIGNIFICANT CHANGE UP (ref 3.5–5.3)
RBC # BLD: 2.9 M/UL — LOW (ref 4.2–5.8)
RBC # FLD: 14.6 % — HIGH (ref 10.3–14.5)
SODIUM SERPL-SCNC: 140 MMOL/L — SIGNIFICANT CHANGE UP (ref 135–145)
SPECIMEN SOURCE: SIGNIFICANT CHANGE UP
SURGICAL PATHOLOGY STUDY: SIGNIFICANT CHANGE UP
WBC # BLD: 9.83 K/UL — SIGNIFICANT CHANGE UP (ref 3.8–10.5)
WBC # FLD AUTO: 9.83 K/UL — SIGNIFICANT CHANGE UP (ref 3.8–10.5)

## 2020-06-03 PROCEDURE — 76937 US GUIDE VASCULAR ACCESS: CPT | Mod: 26,59

## 2020-06-03 PROCEDURE — 99232 SBSQ HOSP IP/OBS MODERATE 35: CPT

## 2020-06-03 PROCEDURE — 36569 INSJ PICC 5 YR+ W/O IMAGING: CPT

## 2020-06-03 PROCEDURE — 71045 X-RAY EXAM CHEST 1 VIEW: CPT | Mod: 26

## 2020-06-03 RX ORDER — SODIUM CHLORIDE 9 MG/ML
10 INJECTION INTRAMUSCULAR; INTRAVENOUS; SUBCUTANEOUS
Refills: 0 | Status: DISCONTINUED | OUTPATIENT
Start: 2020-06-03 | End: 2020-06-07

## 2020-06-03 RX ORDER — CHLORHEXIDINE GLUCONATE 213 G/1000ML
1 SOLUTION TOPICAL
Refills: 0 | Status: DISCONTINUED | OUTPATIENT
Start: 2020-06-03 | End: 2020-06-07

## 2020-06-03 RX ADMIN — MAGNESIUM HYDROXIDE 30 MILLILITER(S): 400 TABLET, CHEWABLE ORAL at 22:20

## 2020-06-03 RX ADMIN — SODIUM CHLORIDE 100 MILLILITER(S): 9 INJECTION, SOLUTION INTRAVENOUS at 00:29

## 2020-06-03 RX ADMIN — NAFCILLIN 200 GRAM(S): 10 INJECTION, POWDER, FOR SOLUTION INTRAVENOUS at 00:26

## 2020-06-03 RX ADMIN — Medication 25 MILLIGRAM(S): at 06:33

## 2020-06-03 RX ADMIN — Medication 1: at 12:28

## 2020-06-03 RX ADMIN — AMLODIPINE BESYLATE 2.5 MILLIGRAM(S): 2.5 TABLET ORAL at 06:33

## 2020-06-03 RX ADMIN — TAMSULOSIN HYDROCHLORIDE 0.4 MILLIGRAM(S): 0.4 CAPSULE ORAL at 19:03

## 2020-06-03 RX ADMIN — Medication 3 MILLIGRAM(S): at 00:54

## 2020-06-03 RX ADMIN — CLOPIDOGREL BISULFATE 75 MILLIGRAM(S): 75 TABLET, FILM COATED ORAL at 12:29

## 2020-06-03 RX ADMIN — Medication 81 MILLIGRAM(S): at 12:29

## 2020-06-03 RX ADMIN — NAFCILLIN 200 GRAM(S): 10 INJECTION, POWDER, FOR SOLUTION INTRAVENOUS at 21:39

## 2020-06-03 RX ADMIN — INSULIN GLARGINE 5 UNIT(S): 100 INJECTION, SOLUTION SUBCUTANEOUS at 21:46

## 2020-06-03 RX ADMIN — OXYCODONE HYDROCHLORIDE 5 MILLIGRAM(S): 5 TABLET ORAL at 21:39

## 2020-06-03 RX ADMIN — Medication 1: at 17:45

## 2020-06-03 RX ADMIN — ATORVASTATIN CALCIUM 20 MILLIGRAM(S): 80 TABLET, FILM COATED ORAL at 21:39

## 2020-06-03 RX ADMIN — NAFCILLIN 200 GRAM(S): 10 INJECTION, POWDER, FOR SOLUTION INTRAVENOUS at 16:30

## 2020-06-03 RX ADMIN — TAMSULOSIN HYDROCHLORIDE 0.4 MILLIGRAM(S): 0.4 CAPSULE ORAL at 06:33

## 2020-06-03 RX ADMIN — INSULIN GLARGINE 5 UNIT(S): 100 INJECTION, SOLUTION SUBCUTANEOUS at 00:25

## 2020-06-03 RX ADMIN — PANTOPRAZOLE SODIUM 40 MILLIGRAM(S): 20 TABLET, DELAYED RELEASE ORAL at 06:33

## 2020-06-03 RX ADMIN — Medication 1: at 21:47

## 2020-06-03 RX ADMIN — NAFCILLIN 200 GRAM(S): 10 INJECTION, POWDER, FOR SOLUTION INTRAVENOUS at 09:34

## 2020-06-03 RX ADMIN — NAFCILLIN 200 GRAM(S): 10 INJECTION, POWDER, FOR SOLUTION INTRAVENOUS at 04:09

## 2020-06-03 RX ADMIN — NAFCILLIN 200 GRAM(S): 10 INJECTION, POWDER, FOR SOLUTION INTRAVENOUS at 13:25

## 2020-06-03 RX ADMIN — LOSARTAN POTASSIUM 100 MILLIGRAM(S): 100 TABLET, FILM COATED ORAL at 06:33

## 2020-06-03 NOTE — PROGRESS NOTE ADULT - SUBJECTIVE AND OBJECTIVE BOX
INTERVAL HPI/OVERNIGHT EVENTS: SIMONE O/N    SUBJECTIVE: Patient seen and examined at bedside.   Pt states pain is controlled in R knee. No fever, chills, sweats. Does endorse difficulty urinating even when mobilized to stand/sit. Endorses some suprapubic fullness. Awaiting bladder scan. Endorses h/o post-op urine retention in the past    ROS: 12 point ROS reviewed and otherwise negative    OBJECTIVE:    VITAL SIGNS:  ICU Vital Signs Last 24 Hrs  T(C): 36.8 (2020 09:39), Max: 36.8 (2020 09:39)  T(F): 98.2 (2020 09:39), Max: 98.2 (2020 09:39)  HR: 92 (2020 09:39) (76 - 97)  BP: 138/63 (2020 13:24) (105/58 - 162/71)  BP(mean): 92 (2020 18:46) (88 - 114)  ABP: --  ABP(mean): --  RR: 18 (2020 09:39) (14 - 19)  SpO2: 98% (2020 09:39) (96% - 100%)         @ 07:  -  03 @ 07:00  --------------------------------------------------------  IN: 1900 mL / OUT: 305 mL / NET: 1595 mL     @ 07: @ 14:22  --------------------------------------------------------  IN: 0 mL / OUT: 1200 mL / NET: -1200 mL      CAPILLARY BLOOD GLUCOSE      POCT Blood Glucose.: 163 mg/dL (2020 11:52)      PHYSICAL EXAM:  GEN: male in NAD on RA  HEENT: NC/AT, MMM  CV: RRR, nml S1S2, no BLE edema, SCD in palce  PULM: nml effort, CTAB  ABD: Soft, NABS, some suprapubic tenderness wo rebound  NEURO: alert, moving all extremities, sensation intact  EXT: dressing c/d/i, w drain w sanguinous fluid.     MEDICATIONS:  MEDICATIONS  (STANDING):  amLODIPine   Tablet 2.5 milliGRAM(s) Oral daily  aspirin enteric coated 81 milliGRAM(s) Oral daily  atorvastatin 20 milliGRAM(s) Oral at bedtime  BUpivacaine liposome 1.3% Injectable (no eMAR) 20 milliLiter(s) Local Injection once  clopidogrel Tablet 75 milliGRAM(s) Oral daily  dextrose 5%. 1000 milliLiter(s) (50 mL/Hr) IV Continuous <Continuous>  dextrose 50% Injectable 12.5 Gram(s) IV Push once  dextrose 50% Injectable 25 Gram(s) IV Push once  dextrose 50% Injectable 25 Gram(s) IV Push once  insulin glargine Injectable (LANTUS) 5 Unit(s) SubCutaneous at bedtime  insulin lispro (HumaLOG) corrective regimen sliding scale   SubCutaneous Before meals and at bedtime  lactated ringers. 1000 milliLiter(s) (100 mL/Hr) IV Continuous <Continuous>  losartan 100 milliGRAM(s) Oral daily  metoprolol succinate ER 25 milliGRAM(s) Oral daily  nafcillin  IVPB 2 Gram(s) IV Intermittent once  nafcillin  IVPB 2 Gram(s) IV Intermittent every 4 hours  pantoprazole    Tablet 40 milliGRAM(s) Oral before breakfast  tamsulosin 0.4 milliGRAM(s) Oral two times a day    MEDICATIONS  (PRN):  acetaminophen   Tablet .. 650 milliGRAM(s) Oral every 6 hours PRN Temp greater or equal to 38C (100.4F), Mild Pain (1 - 3)  dextrose 40% Gel 15 Gram(s) Oral once PRN Blood Glucose LESS THAN 70 milliGRAM(s)/deciliter  glucagon  Injectable 1 milliGRAM(s) IntraMuscular once PRN Glucose LESS THAN 70 milligrams/deciliter  HYDROmorphone  Injectable 0.5 milliGRAM(s) IV Push every 4 hours PRN breakthrough pain  magnesium hydroxide Suspension 30 milliLiter(s) Oral daily PRN Constipation  melatonin 3 milliGRAM(s) Oral at bedtime PRN Insomnia  oxyCODONE    IR 10 milliGRAM(s) Oral every 4 hours PRN Severe Pain (7 - 10)  oxyCODONE    IR 5 milliGRAM(s) Oral every 4 hours PRN Moderate Pain (4 - 6)      ALLERGIES:  Allergies    No Known Allergies    Intolerances        LABS:                        8.8    9.83  )-----------( 204      ( 2020 10:13 )             27.9     06-03    140  |  102  |  20  ----------------------------<  188<H>  4.0   |  25  |  1.20    Ca    8.8      2020 10:13        Urinalysis Basic - ( 2020 03:10 )    Color: Yellow / Appearance: Clear / S.015 / pH: x  Gluc: x / Ketone: NEGATIVE  / Bili: Negative / Urobili: 0.2 E.U./dL   Blood: x / Protein: NEGATIVE mg/dL / Nitrite: NEGATIVE   Leuk Esterase: NEGATIVE / RBC: x / WBC x   Sq Epi: x / Non Sq Epi: x / Bacteria: x        RADIOLOGY & ADDITIONAL TESTS: Reviewed.

## 2020-06-03 NOTE — PROGRESS NOTE ADULT - SUBJECTIVE AND OBJECTIVE BOX
INTERVAL HPI/OVERNIGHT EVENTS: No fevers. Doing PT. R knee pain.    CONSTITUTIONAL:  Negative fever or chills, feels well, good appetite  EYES:  Negative  blurry vision or double vision  CARDIOVASCULAR:  Negative for chest pain or palpitations  RESPIRATORY:  Negative for cough, wheezing, or SOB   GASTROINTESTINAL:  Negative for nausea, vomiting, diarrhea, constipation, or abdominal pain  GENITOURINARY:  Negative frequency, urgency or dysuria  NEUROLOGIC:  No headache, confusion, dizziness, lightheadedness      ANTIBIOTICS/RELEVANT:    MEDICATIONS  (STANDING):  amLODIPine   Tablet 2.5 milliGRAM(s) Oral daily  aspirin enteric coated 81 milliGRAM(s) Oral daily  atorvastatin 20 milliGRAM(s) Oral at bedtime  BUpivacaine liposome 1.3% Injectable (no eMAR) 20 milliLiter(s) Local Injection once  chlorhexidine 2% Cloths 1 Application(s) Topical <User Schedule>  clopidogrel Tablet 75 milliGRAM(s) Oral daily  dextrose 5%. 1000 milliLiter(s) (50 mL/Hr) IV Continuous <Continuous>  dextrose 50% Injectable 12.5 Gram(s) IV Push once  dextrose 50% Injectable 25 Gram(s) IV Push once  dextrose 50% Injectable 25 Gram(s) IV Push once  insulin glargine Injectable (LANTUS) 5 Unit(s) SubCutaneous at bedtime  insulin lispro (HumaLOG) corrective regimen sliding scale   SubCutaneous Before meals and at bedtime  lactated ringers. 1000 milliLiter(s) (100 mL/Hr) IV Continuous <Continuous>  metoprolol succinate ER 25 milliGRAM(s) Oral daily  nafcillin  IVPB 2 Gram(s) IV Intermittent once  nafcillin  IVPB 2 Gram(s) IV Intermittent every 4 hours  pantoprazole    Tablet 40 milliGRAM(s) Oral before breakfast  tamsulosin 0.4 milliGRAM(s) Oral two times a day    MEDICATIONS  (PRN):  acetaminophen   Tablet .. 650 milliGRAM(s) Oral every 6 hours PRN Temp greater or equal to 38C (100.4F), Mild Pain (1 - 3)  dextrose 40% Gel 15 Gram(s) Oral once PRN Blood Glucose LESS THAN 70 milliGRAM(s)/deciliter  glucagon  Injectable 1 milliGRAM(s) IntraMuscular once PRN Glucose LESS THAN 70 milligrams/deciliter  HYDROmorphone  Injectable 0.5 milliGRAM(s) IV Push every 4 hours PRN breakthrough pain  magnesium hydroxide Suspension 30 milliLiter(s) Oral daily PRN Constipation  melatonin 3 milliGRAM(s) Oral at bedtime PRN Insomnia  oxyCODONE    IR 10 milliGRAM(s) Oral every 4 hours PRN Severe Pain (7 - 10)  oxyCODONE    IR 5 milliGRAM(s) Oral every 4 hours PRN Moderate Pain (4 - 6)  sodium chloride 0.9% lock flush 10 milliLiter(s) IV Push every 1 hour PRN Pre/post blood products, medications, blood draw, and to maintain line patency        Vital Signs Last 24 Hrs  T(C): 36.8 (2020 09:39), Max: 36.8 (2020 09:39)  T(F): 98.2 (2020 09:39), Max: 98.2 (2020 09:39)  HR: 92 (2020 09:39) (76 - 97)  BP: 138/63 (2020 13:24) (105/58 - 162/71)  BP(mean): 92 (2020 18:46) (88 - 114)  RR: 18 (2020 09:39) (14 - 19)  SpO2: 98% (2020 09:39) (96% - 100%)    PHYSICAL EXAM:  Constitutional:Well-developed, well nourished  Eyes:DANA, EOMI  Ear/Nose/Throat: no oral lesion, no sinus tenderness on percussion	  Neck:no JVD, no lymphadenopathy, supple  Respiratory: CTA kris  Cardiovascular: S1S2 RRR, no murmurs  Gastrointestinal:soft, (+) BS, no HSM  Extremities:no e/e/c; wound drain   Vascular: DP Pulse:	right normal; left normal      LABS:                        8.8    9.83  )-----------( 204      ( 2020 10:13 )             27.9     06-03    140  |  102  |  20  ----------------------------<  188<H>  4.0   |  25  |  1.20    Ca    8.8      2020 10:13        Urinalysis Basic - ( 2020 03:10 )    Color: Yellow / Appearance: Clear / S.015 / pH: x  Gluc: x / Ketone: NEGATIVE  / Bili: Negative / Urobili: 0.2 E.U./dL   Blood: x / Protein: NEGATIVE mg/dL / Nitrite: NEGATIVE   Leuk Esterase: NEGATIVE / RBC: x / WBC x   Sq Epi: x / Non Sq Epi: x / Bacteria: x        MICROBIOLOGY: reviewed    RADIOLOGY & ADDITIONAL STUDIES: reviewed

## 2020-06-03 NOTE — DISCHARGE NOTE PROVIDER - CARE PROVIDER_API CALL
Nathan Arrington)  Orthopedics  [address] [place] [address]  Phone: [phone]  Fax: [phone]  Follow Up Time:     Karena Lorenzana  INTERNAL MEDICINE  [address] [address]  Phone: [phone]  Fax: [phone]  Follow Up Time:

## 2020-06-03 NOTE — PROGRESS NOTE ADULT - ASSESSMENT
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 c/w incentive spirometry, c/w bowel regimen. PPx w SCDs  #SUBHASH - Cr increased from 0.86 to 1.2. Hypovolemia vs urinary retention in setting of suprapubic fullness.   #MSSA Joint infection - Mgmt per primary team. On IV nafcillin  #CAD - prior h/o stents. No chest pain. ASA/Plavix w statin and losartan  #DM2 - BGs at target. A1C in 5s (5.4 in 04/2020). on Lantus 5  #HTN - At target. Would hold ARB in setting of SUBHASH  #HLD - lipitor 20  #GERD - chronic. on omeprazole 20 ER  #Normocytic anemia - decreased to 8.8 from 10.7. Baseline around 9-10. Does not appear symptomatic .    Recommendations  --Bladder scan. Straight cath if still retaining.   --Elevated Cr possibly due to hypovolemia vs obstruction from suspected urine retention. Would hold losartan at this time. Avoid NSAIDs  --F/U OR Cultures  --F/U ID Recommendations  --Would add SQH for DVT PPx    Dispo: TBD pending PT, receiving PICC today, also pending final ID recs

## 2020-06-03 NOTE — DISCHARGE NOTE PROVIDER - NSDCHHNEEDSERVICE_GEN_ALL_CORE
Observation and assessment/Teaching and training/Medication teaching and assessment/Rehabilitation services/Wound care and assessment

## 2020-06-03 NOTE — PHYSICAL THERAPY INITIAL EVALUATION ADULT - DID THE PATIENT HAVE SURGERY?
Pt c/o EULA for a few days, pt sts neb treatment at home is broken. yes/Removal, hardware, knee, with antibiotic spacer insertion 02-Jun-2020 13:16:26 R knee Channing Anaya.

## 2020-06-03 NOTE — PROGRESS NOTE ADULT - SUBJECTIVE AND OBJECTIVE BOX
Orthopaedic Surgery Progress Note    Post-operative day #1 s/p R knee explant and antibiotic spacer placement    Subjective:     Patient seen and examined. Patient comfortable without complaints, pain controlled.  Denies chest pain, shortness of breath, nausea/vomiting, numbness/tingling.    Objective:    Vital Signs Last 24 Hrs  T(C): 36.8 (06-03-20 @ 09:39), Max: 36.8 (06-03-20 @ 09:39)  T(F): 98.2 (06-03-20 @ 09:39), Max: 98.2 (06-03-20 @ 09:39)  HR: 92 (06-03-20 @ 09:39) (86 - 92)  BP: 105/58 (06-03-20 @ 09:39) (105/58 - 122/57)  RR: 18 (06-03-20 @ 09:39) (16 - 18)  SpO2: 98% (06-03-20 @ 09:39) (98% - 99%)  AVSS    PE:  General: Patient alert and oriented, NAD  Dressing: Clean/dry/intact prevena with knee immobilizer in place   Pulses: 2+ DP BLE   Sensation: SILT BLE  Motor: EHL/FHL/TA/GS 5/5 BLE                          10.7   6.22  )-----------( 239      ( 02 Jun 2020 06:26 )             34.3   02 Jun 2020 06:26    143    |  105    |  19     ----------------------------<  110    4.5     |  26     |  0.86       TPro  7.1    /  Alb  4.1    /  TBili  <0.2   /  DBili  x      /  AST  16     /  ALT  10     /  AlkPhos  53     01 Jun 2020 11:53      A/P: 79yMale POD#1 s/p R knee explant and antibiotic spacer placement   1. Pain control as needed  2. DVT prophylaxis: ASA, Plavix  3. PT, weight-bearing status: TTWB in knee immobilizer  4. Continue antibiotics Nafcillen 2g Q4H. Appreciate ID recs.  5. Dispo: pending PT eval    Ortho Pager [phone] Orthopaedic Surgery Progress Note    Post-operative day #1 s/p R knee explant and antibiotic spacer placement    Subjective:     Patient seen and examined. Patient comfortable without complaints, pain controlled.  Denies chest pain, shortness of breath, nausea/vomiting, numbness/tingling.    Objective:    Vital Signs Last 24 Hrs  T(C): 36.8 (06-03-20 @ 09:39), Max: 36.8 (06-03-20 @ 09:39)  T(F): 98.2 (06-03-20 @ 09:39), Max: 98.2 (06-03-20 @ 09:39)  HR: 92 (06-03-20 @ 09:39) (86 - 92)  BP: 105/58 (06-03-20 @ 09:39) (105/58 - 122/57)  RR: 18 (06-03-20 @ 09:39) (16 - 18)  SpO2: 98% (06-03-20 @ 09:39) (98% - 99%)  AVSS    PE:  General: Patient alert and oriented, NAD  Dressing: Clean/dry/intact prevena with faisal brace in place   Pulses: 2+ DP BLE   Sensation: SILT BLE  Motor: EHL/FHL/TA/GS 5/5 BLE                          10.7   6.22  )-----------( 239      ( 02 Jun 2020 06:26 )             34.3   02 Jun 2020 06:26    143    |  105    |  19     ----------------------------<  110    4.5     |  26     |  0.86       TPro  7.1    /  Alb  4.1    /  TBili  <0.2   /  DBili  x      /  AST  16     /  ALT  10     /  AlkPhos  53     01 Jun 2020 11:53      A/P: 79yMale POD#1 s/p R knee explant and antibiotic spacer placement   1. Pain control as needed  2. DVT prophylaxis: ASA, Plavix  3. PT, weight-bearing status: TTWB in faisal brace locked at 15 degrees, pillows/blankets under knee when in bed  4. Continue antibiotics Nafcillen 2g Q4H. Appreciate ID recs.  5. Dispo: pending PT eval    Ortho Pager [phone]

## 2020-06-03 NOTE — CONSULT NOTE ADULT - SUBJECTIVE AND OBJECTIVE BOX
V[facility] Service Consult Note    [facility] - [facility]OBLEM Dx:  Hypertension: Hypertension  DM (diabetes mellitus): DM (diabetes mellitus)  CAD (coronary artery disease): CAD (coronary artery disease)  Hyperlipidemia: Hyperlipidemia  Anemia: Anemia  Septic joint of right knee joint: Septic joint of right knee joint             Diagnosis: prosthetic joint infection     Indications for Vascular Access (Check all that apply)  [X  ]  Antibiotic Therapy       Antibiotic Prescribed:   nafcillin                                                                         Expected Duration of Therapy:  6 weeks             [  ]  IV Hydration  [  ]  Total Parenteral Nutrition  [  ]  Chemotherapy  [  ]  Difficult Venous Access  [  ]  CVP monitoring  [  ]  Medications with high potential for tissue necrosis on extravasation  [  ]  Other    Screening (Check all that apply)  Previous Radiation to chest  [  ] Yes      [ X ]  No  Breast Cancer                          [  ] Left     [  ]  Right    [  X]  No  Lymph Node Dissection         [  ] Left     [  ]  Right    [ X ]  No  Pacemaker or ICD                   [  ] Left     [  ]  Right    [X  ]  No  Upper Extremity DVT             [  ] Left     [  ]  Right    [ X ]  No  Chronic Kidney Disease         [  ]  Yes     [ X ]  No  Hemodialysis                           [  ]  Yes     [X  ]  No  AV Fistula/ Graft                     [  ]  Left    [  ]  Right    [X ]  No  Temp>101F in past 24 H       [  ]  Yes     X[  ]  No  H/O PICC/Midline                   [  X]  Yes     [  ]  No    Lab data:                        8.8    9.83  )-----------( 204      ( 03 Jun 2020 10:13 )             27.9     06-03    140  |  102  |  20  ----------------------------<  188<H>  4.0   |  25  |  1.20    Ca    8.8      03 Jun 2020 10:13        Culture Results:   Testing in progress (06-02 @ 22:17)  Culture Results:   No growth to date (06-02 @ 16:47)          I have reviewed the chart, interviewed and examined the patient and determined that this patient:  [ X ] Is a candidate for a PICC line  [  ] Is a candidate for a Midline  [  ] Is not a candidate for vascular access device (reason)    Lumens:    [  X] Single  [  ] Double

## 2020-06-03 NOTE — DISCHARGE NOTE PROVIDER - NSDCFUSCHEDAPPT_GEN_ALL_CORE_FT
ISAK SOLOMON ; 07/21/2020 ; [facility] [address]  ISAK SOLOMON ; 07/23/2020 ; [facility] Med  [address]

## 2020-06-03 NOTE — DISCHARGE NOTE PROVIDER - NSDCCPTREATMENT_GEN_ALL_CORE_FT
PRINCIPAL PROCEDURE  Procedure: Removal, hardware, knee, with antibiotic spacer insertion  Findings and Treatment: R knee

## 2020-06-03 NOTE — PHYSICAL THERAPY INITIAL EVALUATION ADULT - ADDITIONAL COMMENTS
Pt reports having a cane at home 2/2 past surgeries and has used a cane for ADLs 2/2 knee discomfort. He reports completing ADL independently in PLOF. He denied any JASON his home.

## 2020-06-03 NOTE — DISCHARGE NOTE PROVIDER - HOSPITAL COURSE
Admitted    Surgery - R knee explant and antibiotic spacer placement     Infectious disease consult    Antibiotics    PICC placement    Pain control    DVT prophylaxis    OOB/Physical Therapy

## 2020-06-03 NOTE — DISCHARGE NOTE PROVIDER - NSDCCPCAREPLAN_GEN_ALL_CORE_FT
PRINCIPAL DISCHARGE DIAGNOSIS  Diagnosis: Septic joint of right knee joint  Assessment and Plan of Treatment:

## 2020-06-03 NOTE — CONSULT NOTE ADULT - REASON FOR ADMISSION
Chronic prosthetic joint infection

## 2020-06-03 NOTE — DISCHARGE NOTE PROVIDER - NSDCFUADDINST_GEN_ALL_CORE_FT
Toe touch weightbearing with assistive device.  Continue the brace locked at 15 degrees and place pillows under your knee while in bed.     You may take showers. Keep the battery pack dry. You may disconnect the dressing from the battery pack prior to showers and keep away from water. To do this, hold the on/off button down until it turns off, close the clamp on the tubing, then disconnect the tubing from the battery pack. Do the reverse to turn it back on.  No soaking in bathtubs.  The dressing has a battery that usually dies in 7 days. Once this occurs, you may remove the dressing and dispose of it, then leave incision open to air. Keep incision clean and dry.     No strenuous activity, heavy lifting, driving or returning to work until cleared by MD.    Try to have regular bowel movements, take stool softener or laxative if necessary.  May take Pepcid or Prilosec for upset stomach.  May take Aleve or Naproxen instead of Meloxicam/Celebrex.  Swelling may travel all the way down leg to foot, this is normal and will subside in a few weeks.  Call to schedule an appt with Dr. Arrington for follow up, if you have staples or sutures they will be removed in office.  Contact your doctor if you experience: fever greater than 101.5, chills, chest pain, difficulty breathing, redness or excessive drainage around the incision, other concerns.  Follow up with your primary care provider. Keep flower catheter in place until you are able to follow-up with either your urologist or a urologist at [facility]. Empty the flower as taught to you by the nurse at [facility].    Toe touch weightbearing with assistive device.  Continue the brace locked at 15 degrees and place pillows under your knee while in bed.     You may take showers. Keep the battery pack dry. You may disconnect the dressing from the battery pack prior to showers and keep away from water. To do this, hold the on/off button down until it turns off, close the clamp on the tubing, then disconnect the tubing from the battery pack. Do the reverse to turn it back on.  No soaking in bathtubs.  The dressing has a battery that usually dies in 7 days. Once this occurs, you may remove the dressing and dispose of it, then leave incision open to air. Keep incision clean and dry.     No strenuous activity, heavy lifting, driving or returning to work until cleared by MD.    Try to have regular bowel movements, take stool softener or laxative if necessary.  May take Pepcid or Prilosec for upset stomach.  May take Aleve or Naproxen instead of Meloxicam/Celebrex.  Swelling may travel all the way down leg to foot, this is normal and will subside in a few weeks.  Call to schedule an appt with Dr. Arrington for follow up, if you have staples or sutures they will be removed in office.  Contact your doctor if you experience: fever greater than 101.5, chills, chest pain, difficulty breathing, redness or excessive drainage around the incision, other concerns.  Follow up with your primary care provider. Keep flower catheter in place until you are able to follow-up with either your urologist or a urologist at [facility]. Empty the flower as taught to you by the nurse at [facility].    Toe touch weightbearing with assistive device.  Continue the brace locked at 15 degrees and place pillows under your knee while in bed.     You have an aquacel dressing. It is water resistant and can get wet. Do not go in pools, bathtubs, or soak the incision. Sponge bathe so that you do not remove your faisal brace.    No strenuous activity, heavy lifting, driving or returning to work until cleared by MD.    Try to have regular bowel movements, take stool softener or laxative if necessary.  May take Pepcid or Prilosec for upset stomach.  May take Aleve or Naproxen instead of Meloxicam/Celebrex.  Swelling may travel all the way down leg to foot, this is normal and will subside in a few weeks.  Call to schedule an appt with Dr. Arrington for follow up, if you have staples or sutures they will be removed in office.  Contact your doctor if you experience: fever greater than 101.5, chills, chest pain, difficulty breathing, redness or excessive drainage around the incision, other concerns.  Follow up with your primary care provider.

## 2020-06-03 NOTE — PHYSICAL THERAPY INITIAL EVALUATION ADULT - PERTINENT HX OF CURRENT PROBLEM, REHAB EVAL
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  Pt states that two days ago he began to notice an erythematous lesion on his surgical incision which was draining fluid which prompted him to call Dr. Arrington. See H&P for full HPI.

## 2020-06-03 NOTE — PROGRESS NOTE ADULT - ASSESSMENT
78 yo male with hx CAD, DM, R TKR 2002, revision 3/6/20, initially p/w wound dehiscence and drainage from incision c/f SSI s/p washout 4/7 with purulence noted in joint space and concern for hardware involvement--OR cultures growing MSSA. Also found to be COVID-19+ during that admission (asymptomatic). He finished a six week course of cefazolin + rifampin on 5/18 and was transitioned to chronic suppressive therapy with cephalexin with plan for 3 month course. A few days ago he noted a pimple developing over incision site c/f sinus tract formation/relapsed infection. Plan for BRANDON and spacer placement 6/2.  - to f/u OR cultures 6/2--ngtd  - continue nafcillin 2g IV [id number] 6 week course through 7/13 (at home will plan to administer via continuous infusion pump)  - s/p PICC  - weekly CBC, CMP, ESR, CRP to be faxed to [phone]    ID Team 1

## 2020-06-03 NOTE — DISCHARGE NOTE PROVIDER - NSFOLLOWUPCLINICS_GEN_ALL_ED_FT
[facility]  Urology  [address], [address] [place] [address]  Phone: [phone]  Fax:   Follow Up Time: 1 week

## 2020-06-03 NOTE — PROCEDURE NOTE - NSPROCDETAILS_GEN_ALL_CORE
location identified, draped/prepped, sterile technique used/sterile dressing applied/sterile technique, catheter placed/ultrasound assessment/supine position

## 2020-06-04 LAB
ANION GAP SERPL CALC-SCNC: 11 MMOL/L — SIGNIFICANT CHANGE UP (ref 5–17)
BUN SERPL-MCNC: 19 MG/DL — SIGNIFICANT CHANGE UP (ref 7–23)
CALCIUM SERPL-MCNC: 9 MG/DL — SIGNIFICANT CHANGE UP (ref 8.4–10.5)
CHLORIDE SERPL-SCNC: 103 MMOL/L — SIGNIFICANT CHANGE UP (ref 96–108)
CO2 SERPL-SCNC: 26 MMOL/L — SIGNIFICANT CHANGE UP (ref 22–31)
CREAT SERPL-MCNC: 1.26 MG/DL — SIGNIFICANT CHANGE UP (ref 0.5–1.3)
CRP SERPL-MCNC: 10.85 MG/DL — HIGH (ref 0–0.4)
ERYTHROCYTE [SEDIMENTATION RATE] IN BLOOD: 54 MM/HR — HIGH
GLUCOSE BLDC GLUCOMTR-MCNC: 127 MG/DL — HIGH (ref 70–99)
GLUCOSE BLDC GLUCOMTR-MCNC: 148 MG/DL — HIGH (ref 70–99)
GLUCOSE BLDC GLUCOMTR-MCNC: 178 MG/DL — HIGH (ref 70–99)
GLUCOSE BLDC GLUCOMTR-MCNC: 214 MG/DL — HIGH (ref 70–99)
GLUCOSE SERPL-MCNC: 161 MG/DL — HIGH (ref 70–99)
HCT VFR BLD CALC: 26.2 % — LOW (ref 39–50)
HGB BLD-MCNC: 8.3 G/DL — LOW (ref 13–17)
MCHC RBC-ENTMCNC: 30.3 PG — SIGNIFICANT CHANGE UP (ref 27–34)
MCHC RBC-ENTMCNC: 31.7 GM/DL — LOW (ref 32–36)
MCV RBC AUTO: 95.6 FL — SIGNIFICANT CHANGE UP (ref 80–100)
NRBC # BLD: 0 /100 WBCS — SIGNIFICANT CHANGE UP (ref 0–0)
PLATELET # BLD AUTO: 189 K/UL — SIGNIFICANT CHANGE UP (ref 150–400)
POTASSIUM SERPL-MCNC: 3.6 MMOL/L — SIGNIFICANT CHANGE UP (ref 3.5–5.3)
POTASSIUM SERPL-SCNC: 3.6 MMOL/L — SIGNIFICANT CHANGE UP (ref 3.5–5.3)
RBC # BLD: 2.74 M/UL — LOW (ref 4.2–5.8)
RBC # FLD: 14.7 % — HIGH (ref 10.3–14.5)
SODIUM SERPL-SCNC: 140 MMOL/L — SIGNIFICANT CHANGE UP (ref 135–145)
WBC # BLD: 8.9 K/UL — SIGNIFICANT CHANGE UP (ref 3.8–10.5)
WBC # FLD AUTO: 8.9 K/UL — SIGNIFICANT CHANGE UP (ref 3.8–10.5)

## 2020-06-04 PROCEDURE — 99232 SBSQ HOSP IP/OBS MODERATE 35: CPT

## 2020-06-04 RX ORDER — MULTIVIT WITH MIN/MFOLATE/K2 340-15/3 G
1 POWDER (GRAM) ORAL ONCE
Refills: 0 | Status: COMPLETED | OUTPATIENT
Start: 2020-06-04 | End: 2020-06-04

## 2020-06-04 RX ORDER — SENNA PLUS 8.6 MG/1
2 TABLET ORAL AT BEDTIME
Refills: 0 | Status: DISCONTINUED | OUTPATIENT
Start: 2020-06-04 | End: 2020-06-07

## 2020-06-04 RX ADMIN — NAFCILLIN 200 GRAM(S): 10 INJECTION, POWDER, FOR SOLUTION INTRAVENOUS at 21:33

## 2020-06-04 RX ADMIN — Medication 1 BOTTLE: at 11:49

## 2020-06-04 RX ADMIN — NAFCILLIN 200 GRAM(S): 10 INJECTION, POWDER, FOR SOLUTION INTRAVENOUS at 17:36

## 2020-06-04 RX ADMIN — Medication 3 MILLIGRAM(S): at 00:10

## 2020-06-04 RX ADMIN — CHLORHEXIDINE GLUCONATE 1 APPLICATION(S): 213 SOLUTION TOPICAL at 05:39

## 2020-06-04 RX ADMIN — Medication 25 MILLIGRAM(S): at 05:39

## 2020-06-04 RX ADMIN — Medication 1: at 21:51

## 2020-06-04 RX ADMIN — Medication 2: at 12:02

## 2020-06-04 RX ADMIN — NAFCILLIN 200 GRAM(S): 10 INJECTION, POWDER, FOR SOLUTION INTRAVENOUS at 09:46

## 2020-06-04 RX ADMIN — INSULIN GLARGINE 5 UNIT(S): 100 INJECTION, SOLUTION SUBCUTANEOUS at 21:51

## 2020-06-04 RX ADMIN — CLOPIDOGREL BISULFATE 75 MILLIGRAM(S): 75 TABLET, FILM COATED ORAL at 11:46

## 2020-06-04 RX ADMIN — ATORVASTATIN CALCIUM 20 MILLIGRAM(S): 80 TABLET, FILM COATED ORAL at 21:33

## 2020-06-04 RX ADMIN — PANTOPRAZOLE SODIUM 40 MILLIGRAM(S): 20 TABLET, DELAYED RELEASE ORAL at 05:39

## 2020-06-04 RX ADMIN — NAFCILLIN 200 GRAM(S): 10 INJECTION, POWDER, FOR SOLUTION INTRAVENOUS at 13:53

## 2020-06-04 RX ADMIN — Medication 81 MILLIGRAM(S): at 11:46

## 2020-06-04 RX ADMIN — NAFCILLIN 200 GRAM(S): 10 INJECTION, POWDER, FOR SOLUTION INTRAVENOUS at 05:43

## 2020-06-04 RX ADMIN — AMLODIPINE BESYLATE 2.5 MILLIGRAM(S): 2.5 TABLET ORAL at 05:39

## 2020-06-04 RX ADMIN — TAMSULOSIN HYDROCHLORIDE 0.4 MILLIGRAM(S): 0.4 CAPSULE ORAL at 05:39

## 2020-06-04 RX ADMIN — MAGNESIUM HYDROXIDE 30 MILLILITER(S): 400 TABLET, CHEWABLE ORAL at 05:43

## 2020-06-04 RX ADMIN — TAMSULOSIN HYDROCHLORIDE 0.4 MILLIGRAM(S): 0.4 CAPSULE ORAL at 17:36

## 2020-06-04 RX ADMIN — NAFCILLIN 200 GRAM(S): 10 INJECTION, POWDER, FOR SOLUTION INTRAVENOUS at 00:10

## 2020-06-04 NOTE — PROVIDER CONTACT NOTE (OTHER) - SITUATION
Pt is complaining of need to void. Pt bladder scanned, 700 cc. Attempted to straight cath, resistance met, pt has some blood and dribbling blood. Urology may need to come intervene.

## 2020-06-04 NOTE — PROGRESS NOTE ADULT - SUBJECTIVE AND OBJECTIVE BOX
INTERVAL HPI/OVERNIGHT EVENTS:    SUBJECTIVE: Patient seen and examined at bedside.   Pt states pain is controlled in the knee. Disappointed about requiring flower. Still no flatus or BM but eating wo nausea or abd pain. Denies any lightheadedness, chest pain, dyspnea when working w PT.     ROS: 12 point ROS reviewed and otherwise negative    OBJECTIVE:    VITAL SIGNS:  ICU Vital Signs Last 24 Hrs  T(C): 36.8 (04 Jun 2020 16:34), Max: 37.3 (03 Jun 2020 19:50)  T(F): 98.3 (04 Jun 2020 16:34), Max: 99.1 (03 Jun 2020 19:50)  HR: 82 (04 Jun 2020 16:34) (79 - 90)  BP: 131/77 (04 Jun 2020 16:34) (131/77 - 173/81)  BP(mean): --  ABP: --  ABP(mean): --  RR: 17 (04 Jun 2020 16:34) (16 - 19)  SpO2: 98% (04 Jun 2020 16:34) (96% - 100%)        06-03 @ 07:01 - 06-04 @ 07:00  --------------------------------------------------------  IN: 0 mL / OUT: 3825 mL / NET: -3825 mL    06-04 @ 07:01  - 06-04 @ 18:12  --------------------------------------------------------  IN: 200 mL / OUT: 1450 mL / NET: -1250 mL      CAPILLARY BLOOD GLUCOSE      POCT Blood Glucose.: 127 mg/dL (04 Jun 2020 17:28)      PHYSICAL EXAM:  GEN: male in NAD on RA  HEENT: NC/AT, MMM  CV: RRR, no murmurs, no BLE edema  PULM: nml effort, CTAB  ABD: Soft, non-tender, NABS  NEURO: Alert, moving all extremities, sensation intact  EXT: Prevena w sanguious drainage  Flower w light yellow urine    MEDICATIONS:  MEDICATIONS  (STANDING):  amLODIPine   Tablet 2.5 milliGRAM(s) Oral daily  aspirin enteric coated 81 milliGRAM(s) Oral daily  atorvastatin 20 milliGRAM(s) Oral at bedtime  BUpivacaine liposome 1.3% Injectable (no eMAR) 20 milliLiter(s) Local Injection once  chlorhexidine 2% Cloths 1 Application(s) Topical <User Schedule>  clopidogrel Tablet 75 milliGRAM(s) Oral daily  dextrose 5%. 1000 milliLiter(s) (50 mL/Hr) IV Continuous <Continuous>  dextrose 50% Injectable 12.5 Gram(s) IV Push once  dextrose 50% Injectable 25 Gram(s) IV Push once  dextrose 50% Injectable 25 Gram(s) IV Push once  insulin glargine Injectable (LANTUS) 5 Unit(s) SubCutaneous at bedtime  insulin lispro (HumaLOG) corrective regimen sliding scale   SubCutaneous Before meals and at bedtime  lactated ringers. 1000 milliLiter(s) (100 mL/Hr) IV Continuous <Continuous>  metoprolol succinate ER 25 milliGRAM(s) Oral daily  nafcillin  IVPB 2 Gram(s) IV Intermittent once  nafcillin  IVPB 2 Gram(s) IV Intermittent every 4 hours  pantoprazole    Tablet 40 milliGRAM(s) Oral before breakfast  senna 2 Tablet(s) Oral at bedtime  tamsulosin 0.4 milliGRAM(s) Oral two times a day    MEDICATIONS  (PRN):  acetaminophen   Tablet .. 650 milliGRAM(s) Oral every 6 hours PRN Temp greater or equal to 38C (100.4F), Mild Pain (1 - 3)  bisacodyl Suppository 10 milliGRAM(s) Rectal daily PRN Constipation  dextrose 40% Gel 15 Gram(s) Oral once PRN Blood Glucose LESS THAN 70 milliGRAM(s)/deciliter  glucagon  Injectable 1 milliGRAM(s) IntraMuscular once PRN Glucose LESS THAN 70 milligrams/deciliter  HYDROmorphone  Injectable 0.5 milliGRAM(s) IV Push every 4 hours PRN breakthrough pain  magnesium hydroxide Suspension 30 milliLiter(s) Oral daily PRN Constipation  melatonin 3 milliGRAM(s) Oral at bedtime PRN Insomnia  oxyCODONE    IR 10 milliGRAM(s) Oral every 4 hours PRN Severe Pain (7 - 10)  oxyCODONE    IR 5 milliGRAM(s) Oral every 4 hours PRN Moderate Pain (4 - 6)  sodium chloride 0.9% lock flush 10 milliLiter(s) IV Push every 1 hour PRN Pre/post blood products, medications, blood draw, and to maintain line patency      ALLERGIES:  Allergies    No Known Allergies    Intolerances        LABS:                        8.3    8.90  )-----------( 189      ( 04 Jun 2020 05:41 )             26.2     06-04    140  |  103  |  19  ----------------------------<  161<H>  3.6   |  26  |  1.26    Ca    9.0      04 Jun 2020 05:41            RADIOLOGY & ADDITIONAL TESTS: Reviewed.

## 2020-06-04 NOTE — PROGRESS NOTE ADULT - SUBJECTIVE AND OBJECTIVE BOX
Ortho Note    Pt seen and examined. Pain well-controlled. Reports having trouble  urinating, and no BM since pre-op. Has been straight-catheterized 3x since surgery, until  flower catheter place last night.   Denies CP, SOB, N/V, numbness/tingling     Vital Signs Last 24 Hrs  T(C): 37.1 (06-04-20 @ 09:52), Max: 37.1 (06-04-20 @ 09:52)  T(F): 98.7 (06-04-20 @ 09:52), Max: 98.7 (06-04-20 @ 09:52)  HR: 89 (06-04-20 @ 09:52) (89 - 90)  BP: 139/66 (06-04-20 @ 09:52) (137/68 - 139/66)  BP(mean): --  RR: 16 (06-04-20 @ 09:52) (16 - 16)  SpO2: 100% (06-04-20 @ 09:52) (97% - 100%)  AVSS    focused exam:  General: Pt Alert and oriented, NAD  DSG- R knee prevena C/D/I  Pulses: +2DP, WWP feet  Sensation: SILT BLE  Motor: 5/5 EHL/FHL/TA/GS                          8.3    8.90  )-----------( 189      ( 04 Jun 2020 05:41 )             26.2   04 Jun 2020 05:41    140    |  103    |  19     ----------------------------<  161    3.6     |  26     |  1.26     Ca    9.0        04 Jun 2020 05:41        A/P: 79yMale POD#2 s/p right knee explant/ antibiotic spacer placement  - Labs WNL, afebrile + VSS- continue to monitor  - Pain Control- continue current regimen, pain well-controlled  - DVT ppx: plavix (home med)  - PT, WBS: TTWB RLE in faisal locked at 15degrees flexion  -constipation- MOM given this morning, mag citrate x1 ordered; suppository if orals ineffective  - urinary retention- h/o BPH on flomax bid; follow-up outpatient with urologist. Plan to keep flower until ambulation improves/ having regular BMs; likely to go home with flower and follow-up outpatient with own urologist. Also given # for urology clinic at [facility]  - OOB for meals, aggressive I/S  -appreciate ID recs- PICC inserted yesterday,  continue nafcillin 2 g q4h, weekly ESR,CRP, CBC, CMP outpatient  - dispo: Plan to continue to follow aspiration cultures x 5 days (until Saturday); home Saturday with home infusion services (wife also RN who patient states can help)    Ortho Pager [phone]

## 2020-06-04 NOTE — PROGRESS NOTE ADULT - SUBJECTIVE AND OBJECTIVE BOX
INTERVAL HPI/OVERNIGHT EVENTS: denies R knee pain. No fevers. Constipated. Urinary retention--Plummer placed.    CONSTITUTIONAL:  Negative fever or chills, feels well, good appetite  EYES:  Negative  blurry vision or double vision  CARDIOVASCULAR:  Negative for chest pain or palpitations  RESPIRATORY:  Negative for cough, wheezing, or SOB   GASTROINTESTINAL:  Negative for nausea, vomiting, diarrhea, constipation, or abdominal pain  GENITOURINARY:  Negative frequency, urgency or dysuria  NEUROLOGIC:  No headache, confusion, dizziness, lightheadedness      ANTIBIOTICS/RELEVANT:    MEDICATIONS  (STANDING):  amLODIPine   Tablet 2.5 milliGRAM(s) Oral daily  aspirin enteric coated 81 milliGRAM(s) Oral daily  atorvastatin 20 milliGRAM(s) Oral at bedtime  BUpivacaine liposome 1.3% Injectable (no eMAR) 20 milliLiter(s) Local Injection once  chlorhexidine 2% Cloths 1 Application(s) Topical <User Schedule>  clopidogrel Tablet 75 milliGRAM(s) Oral daily  dextrose 5%. 1000 milliLiter(s) (50 mL/Hr) IV Continuous <Continuous>  dextrose 50% Injectable 12.5 Gram(s) IV Push once  dextrose 50% Injectable 25 Gram(s) IV Push once  dextrose 50% Injectable 25 Gram(s) IV Push once  insulin glargine Injectable (LANTUS) 5 Unit(s) SubCutaneous at bedtime  insulin lispro (HumaLOG) corrective regimen sliding scale   SubCutaneous Before meals and at bedtime  lactated ringers. 1000 milliLiter(s) (100 mL/Hr) IV Continuous <Continuous>  metoprolol succinate ER 25 milliGRAM(s) Oral daily  nafcillin  IVPB 2 Gram(s) IV Intermittent once  nafcillin  IVPB 2 Gram(s) IV Intermittent every 4 hours  pantoprazole    Tablet 40 milliGRAM(s) Oral before breakfast  senna 2 Tablet(s) Oral at bedtime  tamsulosin 0.4 milliGRAM(s) Oral two times a day    MEDICATIONS  (PRN):  acetaminophen   Tablet .. 650 milliGRAM(s) Oral every 6 hours PRN Temp greater or equal to 38C (100.4F), Mild Pain (1 - 3)  bisacodyl Suppository 10 milliGRAM(s) Rectal daily PRN Constipation  dextrose 40% Gel 15 Gram(s) Oral once PRN Blood Glucose LESS THAN 70 milliGRAM(s)/deciliter  glucagon  Injectable 1 milliGRAM(s) IntraMuscular once PRN Glucose LESS THAN 70 milligrams/deciliter  HYDROmorphone  Injectable 0.5 milliGRAM(s) IV Push every 4 hours PRN breakthrough pain  magnesium hydroxide Suspension 30 milliLiter(s) Oral daily PRN Constipation  melatonin 3 milliGRAM(s) Oral at bedtime PRN Insomnia  oxyCODONE    IR 10 milliGRAM(s) Oral every 4 hours PRN Severe Pain (7 - 10)  oxyCODONE    IR 5 milliGRAM(s) Oral every 4 hours PRN Moderate Pain (4 - 6)  sodium chloride 0.9% lock flush 10 milliLiter(s) IV Push every 1 hour PRN Pre/post blood products, medications, blood draw, and to maintain line patency        Vital Signs Last 24 Hrs  T(C): 37.3 (04 Jun 2020 13:43), Max: 37.3 (03 Jun 2020 19:50)  T(F): 99.1 (04 Jun 2020 13:43), Max: 99.1 (03 Jun 2020 19:50)  HR: 86 (04 Jun 2020 13:43) (79 - 106)  BP: 166/79 (04 Jun 2020 13:43) (99/61 - 173/81)  BP(mean): --  RR: 19 (04 Jun 2020 13:43) (16 - 19)  SpO2: 96% (04 Jun 2020 13:43) (96% - 100%)    PHYSICAL EXAM:  Constitutional:Well-developed, well nourished  Eyes:DANA, EOMI  Ear/Nose/Throat: no oral lesion, no sinus tenderness on percussion	  Neck:no JVD, no lymphadenopathy, supple  Respiratory: CTA kris  Cardiovascular: S1S2 RRR, no murmurs  Gastrointestinal:soft, (+) BS, no HSM  Extremities:no e/e/c; R knee swelling  Vascular: DP Pulse:	right normal; left normal      LABS:                        8.3    8.90  )-----------( 189      ( 04 Jun 2020 05:41 )             26.2     06-04    140  |  103  |  19  ----------------------------<  161<H>  3.6   |  26  |  1.26    Ca    9.0      04 Jun 2020 05:41            MICROBIOLOGY: reviewed    RADIOLOGY & ADDITIONAL STUDIES: reviewed

## 2020-06-04 NOTE — PROGRESS NOTE ADULT - ASSESSMENT
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 c/w incentive spirometry, c/w bowel regimen. PPx w SCDs  #SUBHASH - Cr now improving. Likely 2/2 retention.  #Constipation - likely from immobility and post-op. On bowel regimen  #Urinary retention - s/p flower. On flomax   #MSSA Joint infection - Mgmt per primary team. On IV nafcillin  #CAD - prior h/o stents. No chest pain. ASA/Plavix w statin and losartan  #DM2 - BGs at target. A1C in 5s (5.4 in 04/2020). on Lantus 5  #HTN - At target. Would hold ARB in setting of SUBHASH  #HLD - lipitor 20  #GERD - chronic. on omeprazole 20 ER  #Normocytic anemia - decreased to 8.8 from 10.7. Baseline around 9-10. Does not appear symptomatic .    Recommendations  --Continue aggressive mobilization and bowel regimen  --Flower in place; would consider TOV if pt has BM  --Cr improved. Can continue holding ARB as long as BPs remain at target  --F/U OR Cultures  --F/U ID Recommendations  --Would add SQH for DVT PPx    Dispo: Home w HPT. Pending final Culture growth/sensitivities and ID Recs. Anticipated time of DC Sat 6/6

## 2020-06-04 NOTE — PROGRESS NOTE ADULT - ASSESSMENT
80 yo male with hx CAD, DM, R TKR 2002, revision 3/6/20, initially p/w wound dehiscence and drainage from incision c/f SSI s/p washout 4/7 with purulence noted in joint space and concern for hardware involvement--OR cultures growing MSSA. Also found to be COVID-19+ during that admission (asymptomatic). He finished a six week course of cefazolin + rifampin on 5/18 and was transitioned to chronic suppressive therapy with cephalexin with plan for 3 month course. A few days ago he noted a pimple developing over incision site c/f sinus tract formation/relapsed infection. Plan for BRANDON and spacer placement 6/2.  - to f/u OR cultures 6/2--ngtd  - continue nafcillin 2g IV t1c--scafdbpozo 6 week course through 7/13 (at home will plan to administer via continuous infusion pump); monitor for development of hypokalemia while on nafcillin  - s/p PICC  - weekly CBC, CMP, ESR, CRP to be faxed to [phone]    ID Team 1

## 2020-06-04 NOTE — PROGRESS NOTE ADULT - ATTENDING COMMENTS
Patient examined by me. Pain well controlled. PICC done yesterday afternoon. Plummer placed for ongoing urinary retention. No complaints.    Steve brace in semiextension in place. Prevena holding good suction. Thigh and calf soft, nonerythematous. Moving ankle/toes, sensation intact, CR <2s.    Aspiration and tissue cultures NGTD.    Plan as above. Hopefully for d/c home Saturday.

## 2020-06-05 ENCOUNTER — TRANSCRIPTION ENCOUNTER (OUTPATIENT)
Age: 80
End: 2020-06-05

## 2020-06-05 LAB
ANION GAP SERPL CALC-SCNC: 14 MMOL/L — SIGNIFICANT CHANGE UP (ref 5–17)
BUN SERPL-MCNC: 15 MG/DL — SIGNIFICANT CHANGE UP (ref 7–23)
CALCIUM SERPL-MCNC: 9.2 MG/DL — SIGNIFICANT CHANGE UP (ref 8.4–10.5)
CHLORIDE SERPL-SCNC: 101 MMOL/L — SIGNIFICANT CHANGE UP (ref 96–108)
CO2 SERPL-SCNC: 26 MMOL/L — SIGNIFICANT CHANGE UP (ref 22–31)
CREAT SERPL-MCNC: 1.23 MG/DL — SIGNIFICANT CHANGE UP (ref 0.5–1.3)
GLUCOSE BLDC GLUCOMTR-MCNC: 134 MG/DL — HIGH (ref 70–99)
GLUCOSE BLDC GLUCOMTR-MCNC: 168 MG/DL — HIGH (ref 70–99)
GLUCOSE BLDC GLUCOMTR-MCNC: 180 MG/DL — HIGH (ref 70–99)
GLUCOSE BLDC GLUCOMTR-MCNC: 214 MG/DL — HIGH (ref 70–99)
GLUCOSE SERPL-MCNC: 176 MG/DL — HIGH (ref 70–99)
HCT VFR BLD CALC: 29.5 % — LOW (ref 39–50)
HGB BLD-MCNC: 9.1 G/DL — LOW (ref 13–17)
MCHC RBC-ENTMCNC: 30 PG — SIGNIFICANT CHANGE UP (ref 27–34)
MCHC RBC-ENTMCNC: 30.8 GM/DL — LOW (ref 32–36)
MCV RBC AUTO: 97.4 FL — SIGNIFICANT CHANGE UP (ref 80–100)
NRBC # BLD: 0 /100 WBCS — SIGNIFICANT CHANGE UP (ref 0–0)
PLATELET # BLD AUTO: 231 K/UL — SIGNIFICANT CHANGE UP (ref 150–400)
POTASSIUM SERPL-MCNC: 3.7 MMOL/L — SIGNIFICANT CHANGE UP (ref 3.5–5.3)
POTASSIUM SERPL-SCNC: 3.7 MMOL/L — SIGNIFICANT CHANGE UP (ref 3.5–5.3)
RBC # BLD: 3.03 M/UL — LOW (ref 4.2–5.8)
RBC # FLD: 14.7 % — HIGH (ref 10.3–14.5)
SODIUM SERPL-SCNC: 141 MMOL/L — SIGNIFICANT CHANGE UP (ref 135–145)
WBC # BLD: 8.68 K/UL — SIGNIFICANT CHANGE UP (ref 3.8–10.5)
WBC # FLD AUTO: 8.68 K/UL — SIGNIFICANT CHANGE UP (ref 3.8–10.5)

## 2020-06-05 PROCEDURE — 99232 SBSQ HOSP IP/OBS MODERATE 35: CPT

## 2020-06-05 RX ORDER — AMLODIPINE BESYLATE 2.5 MG/1
2.5 TABLET ORAL ONCE
Refills: 0 | Status: COMPLETED | OUTPATIENT
Start: 2020-06-05 | End: 2020-06-05

## 2020-06-05 RX ADMIN — NAFCILLIN 200 GRAM(S): 10 INJECTION, POWDER, FOR SOLUTION INTRAVENOUS at 17:59

## 2020-06-05 RX ADMIN — Medication 1: at 22:01

## 2020-06-05 RX ADMIN — PANTOPRAZOLE SODIUM 40 MILLIGRAM(S): 20 TABLET, DELAYED RELEASE ORAL at 05:26

## 2020-06-05 RX ADMIN — NAFCILLIN 200 GRAM(S): 10 INJECTION, POWDER, FOR SOLUTION INTRAVENOUS at 13:02

## 2020-06-05 RX ADMIN — Medication 81 MILLIGRAM(S): at 12:35

## 2020-06-05 RX ADMIN — NAFCILLIN 200 GRAM(S): 10 INJECTION, POWDER, FOR SOLUTION INTRAVENOUS at 00:50

## 2020-06-05 RX ADMIN — Medication 1: at 07:49

## 2020-06-05 RX ADMIN — AMLODIPINE BESYLATE 2.5 MILLIGRAM(S): 2.5 TABLET ORAL at 05:26

## 2020-06-05 RX ADMIN — INSULIN GLARGINE 5 UNIT(S): 100 INJECTION, SOLUTION SUBCUTANEOUS at 21:55

## 2020-06-05 RX ADMIN — TAMSULOSIN HYDROCHLORIDE 0.4 MILLIGRAM(S): 0.4 CAPSULE ORAL at 05:26

## 2020-06-05 RX ADMIN — CLOPIDOGREL BISULFATE 75 MILLIGRAM(S): 75 TABLET, FILM COATED ORAL at 12:35

## 2020-06-05 RX ADMIN — CHLORHEXIDINE GLUCONATE 1 APPLICATION(S): 213 SOLUTION TOPICAL at 06:16

## 2020-06-05 RX ADMIN — ATORVASTATIN CALCIUM 20 MILLIGRAM(S): 80 TABLET, FILM COATED ORAL at 21:55

## 2020-06-05 RX ADMIN — NAFCILLIN 200 GRAM(S): 10 INJECTION, POWDER, FOR SOLUTION INTRAVENOUS at 09:17

## 2020-06-05 RX ADMIN — TAMSULOSIN HYDROCHLORIDE 0.4 MILLIGRAM(S): 0.4 CAPSULE ORAL at 18:00

## 2020-06-05 RX ADMIN — NAFCILLIN 200 GRAM(S): 10 INJECTION, POWDER, FOR SOLUTION INTRAVENOUS at 05:26

## 2020-06-05 RX ADMIN — NAFCILLIN 200 GRAM(S): 10 INJECTION, POWDER, FOR SOLUTION INTRAVENOUS at 21:54

## 2020-06-05 RX ADMIN — Medication 25 MILLIGRAM(S): at 05:26

## 2020-06-05 RX ADMIN — AMLODIPINE BESYLATE 2.5 MILLIGRAM(S): 2.5 TABLET ORAL at 01:17

## 2020-06-05 RX ADMIN — SENNA PLUS 2 TABLET(S): 8.6 TABLET ORAL at 21:55

## 2020-06-05 RX ADMIN — Medication 2: at 12:34

## 2020-06-05 RX ADMIN — NAFCILLIN 200 GRAM(S): 10 INJECTION, POWDER, FOR SOLUTION INTRAVENOUS at 00:49

## 2020-06-05 NOTE — DISCHARGE NOTE NURSING/CASE MANAGEMENT/SOCIAL WORK - PATIENT PORTAL LINK FT
You can access the FollowMyHealth Patient Portal offered by [facility] by registering at the following website: [email or web address]. By joining [employer]’s FollowMyHealth portal, you will also be able to view your health information using other applications (apps) compatible with our system.

## 2020-06-05 NOTE — PROVIDER CONTACT NOTE (MEDICATION) - ASSESSMENT
/76 HR 81. Pt denied chest pain, sob, HA, or generalized pain. Pt takes losartan 100mg po once daily in AM but is not ordered.

## 2020-06-05 NOTE — PROGRESS NOTE ADULT - SUBJECTIVE AND OBJECTIVE BOX
Ortho Note    Pt comfortable without complaints, pain controlled.  Denies CP, SOB, N/V, numbness/tingling down le b/l, denies subjective fever or chills.     Vital Signs Last 24 Hrs  T(C): 36.7 (06-05-20 @ 09:16), Max: 36.7 (06-05-20 @ 05:25)  T(F): 98 (06-05-20 @ 09:16), Max: 98.1 (06-05-20 @ 05:25)  HR: 88 (06-05-20 @ 09:16) (83 - 96)  BP: 148/75 (06-05-20 @ 09:16) (106/58 - 177/91)  BP(mean): --  RR: 17 (06-05-20 @ 09:16) (17 - 18)  SpO2: 98% (06-05-20 @ 09:16) (98% - 99%)      General: Pt Alert and oriented, NAD  DSG R knee C/D/I faisal in place  Pulses:  DPs palpable b/l le   Sensation:  SILT throughout le b/l   Motor: EHL/FHL/TA/GS 5/5 b/l   calves soft compressible NTTP  Flower in place                          9.1    8.68  )-----------( 231      ( 05 Jun 2020 07:12 )             29.5   05 Jun 2020 07:12    141    |  101    |  15     ----------------------------<  176    3.7     |  26     |  1.23     Ca    9.2        05 Jun 2020 07:12        A/P: 79yMale POD#3 s/p R TKA explant antibiotic cement spacer placement   - Stable  - Pain Control as needed  - DVT ppx:  Plavix and asa  - PT, WBS: TTWB RLE  - trend labs  - Continue flower   - Plan for dc tomorrow    Ortho Pager [phone]

## 2020-06-05 NOTE — PROGRESS NOTE ADULT - ASSESSMENT
78 yo male with hx CAD, DM, R TKR 2002, revision 3/6/20, initially p/w wound dehiscence and drainage from incision c/f SSI s/p washout 4/7 with purulence noted in joint space and concern for hardware involvement--OR cultures growing MSSA. Also found to be COVID-19+ during that admission (asymptomatic). He finished a six week course of cefazolin + rifampin on 5/18 and was transitioned to chronic suppressive therapy with cephalexin with plan for 3 month course. A few days ago he noted a pimple developing over incision site c/f sinus tract formation/relapsed infection. s/p BRANDON and spacer placement 6/2.  - to f/u OR cultures 6/2--ngtd  - continue nafcillin 2g IV [id number] 6 week course through 7/13 (at home will plan to administer via continuous infusion pump)  - s/p PICC  - weekly CBC, CMP, ESR, CRP to be faxed to [phone]  - primary team attempting TOV    Will arrange post hospital f/u with me in 2-3 weeks    Please reconsult with ?    ID Team 1

## 2020-06-05 NOTE — PROGRESS NOTE ADULT - SUBJECTIVE AND OBJECTIVE BOX
Ortho Note    Pt comfortable without complaints, pain controlled.  Denies CP, SOB, N/V, numbness/tingling down le b/l, subjective fever.     Vital Signs Last 24 Hrs  T(C): 36.7 (06-05-20 @ 09:16), Max: 36.7 (06-05-20 @ 05:25)  T(F): 98 (06-05-20 @ 09:16), Max: 98.1 (06-05-20 @ 05:25)  HR: 88 (06-05-20 @ 09:16) (83 - 96)  BP: 148/75 (06-05-20 @ 09:16) (106/58 - 177/91)  BP(mean): --  RR: 17 (06-05-20 @ 09:16) (17 - 18)  SpO2: 98% (06-05-20 @ 09:16) (98% - 99%)      General: Pt Alert and oriented, NAD  DSG C/D/I, faisal in place  Pulses:  DPs palpable b/l le  Sensation:  SILT throughout le b/l   Motor: EHL/FHL/TA/GS 5/5 b/l  calves soft compressible nttp                          9.1    8.68  )-----------( 231      ( 05 Jun 2020 07:12 )             29.5   05 Jun 2020 07:12    141    |  101    |  15     ----------------------------<  176    3.7     |  26     |  1.23     Ca    9.2        05 Jun 2020 07:12        A/P: 79yMale POD#3 s/p R TKA explant static antibiotic spacer placement  - Stable  - Pain Control as needed  - DVT ppx: Plavix and asa  - PT, WBS:   TTWB RLE  - Trend labs  - OR cultures NGTD  - Dispo home with home infusion services    Ortho Pager [phone]

## 2020-06-05 NOTE — PROGRESS NOTE ADULT - SUBJECTIVE AND OBJECTIVE BOX
INTERVAL HPI/OVERNIGHT EVENTS: Constipation resolved. Undergoing TOV. R knee pain improved.    CONSTITUTIONAL:  Negative fever or chills, feels well, good appetite  EYES:  Negative  blurry vision or double vision  CARDIOVASCULAR:  Negative for chest pain or palpitations  RESPIRATORY:  Negative for cough, wheezing, or SOB   GASTROINTESTINAL:  Negative for nausea, vomiting, diarrhea, constipation, or abdominal pain  GENITOURINARY:  Negative frequency, urgency or dysuria  NEUROLOGIC:  No headache, confusion, dizziness, lightheadedness      ANTIBIOTICS/RELEVANT:    MEDICATIONS  (STANDING):  amLODIPine   Tablet 2.5 milliGRAM(s) Oral daily  aspirin enteric coated 81 milliGRAM(s) Oral daily  atorvastatin 20 milliGRAM(s) Oral at bedtime  BUpivacaine liposome 1.3% Injectable (no eMAR) 20 milliLiter(s) Local Injection once  chlorhexidine 2% Cloths 1 Application(s) Topical <User Schedule>  clopidogrel Tablet 75 milliGRAM(s) Oral daily  dextrose 5%. 1000 milliLiter(s) (50 mL/Hr) IV Continuous <Continuous>  dextrose 50% Injectable 12.5 Gram(s) IV Push once  dextrose 50% Injectable 25 Gram(s) IV Push once  dextrose 50% Injectable 25 Gram(s) IV Push once  insulin glargine Injectable (LANTUS) 5 Unit(s) SubCutaneous at bedtime  insulin lispro (HumaLOG) corrective regimen sliding scale   SubCutaneous Before meals and at bedtime  lactated ringers. 1000 milliLiter(s) (100 mL/Hr) IV Continuous <Continuous>  metoprolol succinate ER 25 milliGRAM(s) Oral daily  nafcillin  IVPB 2 Gram(s) IV Intermittent every 4 hours  pantoprazole    Tablet 40 milliGRAM(s) Oral before breakfast  senna 2 Tablet(s) Oral at bedtime  tamsulosin 0.4 milliGRAM(s) Oral two times a day    MEDICATIONS  (PRN):  acetaminophen   Tablet .. 650 milliGRAM(s) Oral every 6 hours PRN Temp greater or equal to 38C (100.4F), Mild Pain (1 - 3)  bisacodyl Suppository 10 milliGRAM(s) Rectal daily PRN Constipation  dextrose 40% Gel 15 Gram(s) Oral once PRN Blood Glucose LESS THAN 70 milliGRAM(s)/deciliter  glucagon  Injectable 1 milliGRAM(s) IntraMuscular once PRN Glucose LESS THAN 70 milligrams/deciliter  HYDROmorphone  Injectable 0.5 milliGRAM(s) IV Push every 4 hours PRN breakthrough pain  magnesium hydroxide Suspension 30 milliLiter(s) Oral daily PRN Constipation  melatonin 3 milliGRAM(s) Oral at bedtime PRN Insomnia  oxyCODONE    IR 10 milliGRAM(s) Oral every 4 hours PRN Severe Pain (7 - 10)  oxyCODONE    IR 5 milliGRAM(s) Oral every 4 hours PRN Moderate Pain (4 - 6)  sodium chloride 0.9% lock flush 10 milliLiter(s) IV Push every 1 hour PRN Pre/post blood products, medications, blood draw, and to maintain line patency        Vital Signs Last 24 Hrs  T(C): 36.9 (05 Jun 2020 12:41), Max: 36.9 (05 Jun 2020 00:48)  T(F): 98.4 (05 Jun 2020 12:41), Max: 98.5 (05 Jun 2020 00:48)  HR: 74 (05 Jun 2020 12:41) (74 - 96)  BP: 148/54 (05 Jun 2020 12:41) (106/58 - 177/91)  BP(mean): --  RR: 17 (05 Jun 2020 12:41) (17 - 18)  SpO2: 99% (05 Jun 2020 12:41) (98% - 100%)    PHYSICAL EXAM:  Constitutional:Well-developed, well nourished  Eyes:DANA, EOMI  Ear/Nose/Throat: no oral lesion, no sinus tenderness on percussion	  Neck:no JVD, no lymphadenopathy, supple  Respiratory: CTA kris  Cardiovascular: S1S2 RRR, no murmurs  Gastrointestinal:soft, (+) BS, no HSM  Extremities:no e/e/c  Vascular: DP Pulse:	right normal; left normal      LABS:                        9.1    8.68  )-----------( 231      ( 05 Jun 2020 07:12 )             29.5     06-05    141  |  101  |  15  ----------------------------<  176<H>  3.7   |  26  |  1.23    Ca    9.2      05 Jun 2020 07:12            MICROBIOLOGY: reviewed    RADIOLOGY & ADDITIONAL STUDIES: reviewed

## 2020-06-05 NOTE — PROGRESS NOTE ADULT - ASSESSMENT
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 c/w incentive spirometry, c/w bowel regimen. PPx w SCDs  #SUBHASH - Cr now improving. Likely 2/2 retention.  #Constipation - likely from immobility and post-op. On bowel regimen  #Urinary retention - s/p flower. On flomax   #MSSA Joint infection - Mgmt per primary team. On IV nafcillin  --No growth to date on OR Cx  #CAD - prior h/o stents. No chest pain. ASA/Plavix w statin and losartan  #DM2 - BGs at target. A1C in 5s (5.4 in 04/2020). on Lantus 5  #HTN - At target. Would hold ARB in setting of SUBHASH  #HLD - lipitor 20  #GERD - chronic. on omeprazole 20 ER  #Normocytic anemia - decreased to 8.8 from 10.7. Baseline around 9-10. Does not appear symptomatic .    Recommendations  --Would increase lantus to 8u HS due to increasing BGs ranging 120-210  --Encourage PO intake  --F/U ID recommendations  --Mobilization to chair. to f/u w TOV as outpatient    Anticipate DC in AM

## 2020-06-05 NOTE — PROVIDER CONTACT NOTE (OTHER) - ACTION/TREATMENT ORDERED:
6AM doses of Metoprolol 25mg XL po and Amlodipine 2.5mg po given. Will continue to monitor.
As per MD Easton, he will contact urology

## 2020-06-05 NOTE — CHART NOTE - NSCHARTNOTEFT_GEN_A_CORE
Admitting Diagnosis:   Patient is a 79y old  Male who presents with a chief complaint of Chronic prosthetic joint infection (04 Jun 2020 18:11)      Consult: Yes [   ]  No [  x ]    Reason for Initial Nutrition Assessment:LOS      PAST MEDICAL & SURGICAL HISTORY:  Anemia  Hyperlipidemia  CAD (coronary artery disease)  DM (diabetes mellitus)  Hypertension  H/O shoulder surgery: Right  Umbilical hernia: Umbilical and inguinal hernia  H/O total knee replacement, bilateral  CAD (coronary artery disease): stents placement      Current Nutrition Order :C-CHO with no snack    PO Intake: Excellent (%) [  x ]  Good (50-75%) [   ]  Fair (25-50%) [   ]  Poor (<25%) [   ]    GI Issues: none    Pain: slight knee pain reported    Skin Integrity :surgical incision    Labs:   06-05    141  |  101  |  15  ----------------------------<  176<H>  3.7   |  26  |  1.23    Ca    9.2      05 Jun 2020 07:12      CAPILLARY BLOOD GLUCOSE      POCT Blood Glucose.: 180 mg/dL (05 Jun 2020 07:33)  POCT Blood Glucose.: 178 mg/dL (04 Jun 2020 21:44)  POCT Blood Glucose.: 127 mg/dL (04 Jun 2020 17:28)  POCT Blood Glucose.: 214 mg/dL (04 Jun 2020 11:50)    Nutritionally Pertinent Lab Values:    Medications:  MEDICATIONS  (STANDING):  amLODIPine   Tablet 2.5 milliGRAM(s) Oral daily  aspirin enteric coated 81 milliGRAM(s) Oral daily  atorvastatin 20 milliGRAM(s) Oral at bedtime  BUpivacaine liposome 1.3% Injectable (no eMAR) 20 milliLiter(s) Local Injection once  chlorhexidine 2% Cloths 1 Application(s) Topical <User Schedule>  clopidogrel Tablet 75 milliGRAM(s) Oral daily  dextrose 5%. 1000 milliLiter(s) (50 mL/Hr) IV Continuous <Continuous>  dextrose 50% Injectable 12.5 Gram(s) IV Push once  dextrose 50% Injectable 25 Gram(s) IV Push once  dextrose 50% Injectable 25 Gram(s) IV Push once  insulin glargine Injectable (LANTUS) 5 Unit(s) SubCutaneous at bedtime  insulin lispro (HumaLOG) corrective regimen sliding scale   SubCutaneous Before meals and at bedtime  lactated ringers. 1000 milliLiter(s) (100 mL/Hr) IV Continuous <Continuous>  metoprolol succinate ER 25 milliGRAM(s) Oral daily  nafcillin  IVPB 2 Gram(s) IV Intermittent every 4 hours  pantoprazole    Tablet 40 milliGRAM(s) Oral before breakfast  senna 2 Tablet(s) Oral at bedtime  tamsulosin 0.4 milliGRAM(s) Oral two times a day    MEDICATIONS  (PRN):  acetaminophen   Tablet .. 650 milliGRAM(s) Oral every 6 hours PRN Temp greater or equal to 38C (100.4F), Mild Pain (1 - 3)  bisacodyl Suppository 10 milliGRAM(s) Rectal daily PRN Constipation  dextrose 40% Gel 15 Gram(s) Oral once PRN Blood Glucose LESS THAN 70 milliGRAM(s)/deciliter  glucagon  Injectable 1 milliGRAM(s) IntraMuscular once PRN Glucose LESS THAN 70 milligrams/deciliter  HYDROmorphone  Injectable 0.5 milliGRAM(s) IV Push every 4 hours PRN breakthrough pain  magnesium hydroxide Suspension 30 milliLiter(s) Oral daily PRN Constipation  melatonin 3 milliGRAM(s) Oral at bedtime PRN Insomnia  oxyCODONE    IR 10 milliGRAM(s) Oral every 4 hours PRN Severe Pain (7 - 10)  oxyCODONE    IR 5 milliGRAM(s) Oral every 4 hours PRN Moderate Pain (4 - 6)  sodium chloride 0.9% lock flush 10 milliLiter(s) IV Push every 1 hour PRN Pre/post blood products, medications, blood draw, and to maintain line patency      Admitted Anthropometrics:Height:5ft 6inches,IBW:142lbs +/-10%,120% of IBW at present weight of 170lbs.BMI:27.4    Weight:170lbs  Daily     Daily     Weight Change: as per patient, weighed 198lbs about a "year ago" .Reported weight loss due to a medication which caused him to lose his appetite. Patient could not tell me which med    Nutrition Focused Physical Exam: Completed [   ]  Unable to complete [   ]Not warranted. Patient visually appears well nourished with no signs of malnutrition  Muscle Wasting:  Subcutaneous Fat Wasting:    Estimated energy needs: Based on ABW (due to between % of IBW) ABW:77.1kg p31-30pstg:1928-2313kcal and increased protein needs due to surgical demands:x1-.2gm:93gmprotein and 30-35ccfluids:2313-2698cc fluids  Subjective: 78y/o male with history of IDDM,CAD s/p PCI's,HTN,HLD,GERD,BPH,TKR's admitted with chronic prosthetic joint infection growing MRSA s/p I/D x2.Per patient eating 100%.Patient reported his wife shops and cooks .Diet reported to be generally low in concentrated sweets and salt. Avoids juice and desserts.HGBA1C wnl.RD did a brief review of CHO and DASH diet. Encouraged avoidance of sodium sources in diet. Patient reported he has lost about 28lbs due to taking a med that caused him to lose his appetite.Now has maintained his weight of 170lbs for several months and would like to get down to 165lbs.    Nutrition Diagnosis :Increased nutrient needs r.t increased demand for protein and nutrients AEB:Surgical demands    [  ] No active nutrition diagnosis at this time  [  ] Current medical condition precludes nutrition intervention    Goal: Meet 80% of needs consistently    Recommendations:1.Add DASH restriction to diet due to increased BP and noted history    Education: reviewed C-CHO and DASH diet .Literature provided    Risk Level: High [   ] Moderate [  x ] Low [   ]

## 2020-06-05 NOTE — DISCHARGE NOTE NURSING/CASE MANAGEMENT/SOCIAL WORK - NSDCFUADDAPPT_GEN_ALL_CORE_FT
You experienced urinary retention post-operatively. After multiple straight catheterizations, you received a flower catheter. You should follow-up with your urologist outpatient or with the urology clinic at [facility] (phone number provided above).    Please call to make an appointment.

## 2020-06-05 NOTE — PROGRESS NOTE ADULT - SUBJECTIVE AND OBJECTIVE BOX
Late Entry - Pt seen at 1300h  INTERVAL HPI/OVERNIGHT EVENTS: SIMOEN O/N    SUBJECTIVE: Patient seen and examined at bedside.   Pt had BM overnight. States pain is controlled. Prevena removed. No fever, sweats, chest pain, dyspnea. Eating wo issue.     ROS: 12 point ROS reviewed and otherwise negative    OBJECTIVE:    VITAL SIGNS:  ICU Vital Signs Last 24 Hrs  T(C): 37 (05 Jun 2020 17:58), Max: 37 (05 Jun 2020 17:58)  T(F): 98.6 (05 Jun 2020 17:58), Max: 98.6 (05 Jun 2020 17:58)  HR: 83 (05 Jun 2020 17:58) (74 - 96)  BP: 115/67 (05 Jun 2020 17:58) (106/58 - 177/91)  BP(mean): --  ABP: --  ABP(mean): --  RR: 17 (05 Jun 2020 17:58) (17 - 18)  SpO2: 100% (05 Jun 2020 17:58) (98% - 100%)        06-04 @ 07:01 - 06-05 @ 07:00  --------------------------------------------------------  IN: 600 mL / OUT: 2675 mL / NET: -2075 mL    06-05 @ 07:01  -  06-05 @ 19:53  --------------------------------------------------------  IN: 740 mL / OUT: 450 mL / NET: 290 mL      CAPILLARY BLOOD GLUCOSE      POCT Blood Glucose.: 134 mg/dL (05 Jun 2020 16:54)      PHYSICAL EXAM:  GEN: male in NAD on RA  HEENT: NC/AT, MMM  CV: RRR, no murmurs, no BLE edema  PULM: nml effort, CTAB  ABD: Soft, non-tender, NABS  NEURO: Alert, moving all extremities, sensation intact  EXT: dressing c/d/i on R knee  [place] w light yellow urine    MEDICATIONS:  MEDICATIONS  (STANDING):  amLODIPine   Tablet 2.5 milliGRAM(s) Oral daily  aspirin enteric coated 81 milliGRAM(s) Oral daily  atorvastatin 20 milliGRAM(s) Oral at bedtime  BUpivacaine liposome 1.3% Injectable (no eMAR) 20 milliLiter(s) Local Injection once  chlorhexidine 2% Cloths 1 Application(s) Topical <User Schedule>  clopidogrel Tablet 75 milliGRAM(s) Oral daily  dextrose 5%. 1000 milliLiter(s) (50 mL/Hr) IV Continuous <Continuous>  dextrose 50% Injectable 12.5 Gram(s) IV Push once  dextrose 50% Injectable 25 Gram(s) IV Push once  dextrose 50% Injectable 25 Gram(s) IV Push once  insulin glargine Injectable (LANTUS) 5 Unit(s) SubCutaneous at bedtime  insulin lispro (HumaLOG) corrective regimen sliding scale   SubCutaneous Before meals and at bedtime  lactated ringers. 1000 milliLiter(s) (100 mL/Hr) IV Continuous <Continuous>  metoprolol succinate ER 25 milliGRAM(s) Oral daily  nafcillin  IVPB 2 Gram(s) IV Intermittent every 4 hours  pantoprazole    Tablet 40 milliGRAM(s) Oral before breakfast  senna 2 Tablet(s) Oral at bedtime  tamsulosin 0.4 milliGRAM(s) Oral two times a day    MEDICATIONS  (PRN):  acetaminophen   Tablet .. 650 milliGRAM(s) Oral every 6 hours PRN Temp greater or equal to 38C (100.4F), Mild Pain (1 - 3)  bisacodyl Suppository 10 milliGRAM(s) Rectal daily PRN Constipation  dextrose 40% Gel 15 Gram(s) Oral once PRN Blood Glucose LESS THAN 70 milliGRAM(s)/deciliter  glucagon  Injectable 1 milliGRAM(s) IntraMuscular once PRN Glucose LESS THAN 70 milligrams/deciliter  HYDROmorphone  Injectable 0.5 milliGRAM(s) IV Push every 4 hours PRN breakthrough pain  magnesium hydroxide Suspension 30 milliLiter(s) Oral daily PRN Constipation  melatonin 3 milliGRAM(s) Oral at bedtime PRN Insomnia  oxyCODONE    IR 10 milliGRAM(s) Oral every 4 hours PRN Severe Pain (7 - 10)  oxyCODONE    IR 5 milliGRAM(s) Oral every 4 hours PRN Moderate Pain (4 - 6)  sodium chloride 0.9% lock flush 10 milliLiter(s) IV Push every 1 hour PRN Pre/post blood products, medications, blood draw, and to maintain line patency      ALLERGIES:  Allergies    No Known Allergies    Intolerances        LABS:                        9.1    8.68  )-----------( 231      ( 05 Jun 2020 07:12 )             29.5     06-05    141  |  101  |  15  ----------------------------<  176<H>  3.7   |  26  |  1.23    Ca    9.2      05 Jun 2020 07:12            RADIOLOGY & ADDITIONAL TESTS: Reviewed.

## 2020-06-06 LAB
ANION GAP SERPL CALC-SCNC: 12 MMOL/L — SIGNIFICANT CHANGE UP (ref 5–17)
BUN SERPL-MCNC: 15 MG/DL — SIGNIFICANT CHANGE UP (ref 7–23)
CALCIUM SERPL-MCNC: 8.7 MG/DL — SIGNIFICANT CHANGE UP (ref 8.4–10.5)
CHLORIDE SERPL-SCNC: 104 MMOL/L — SIGNIFICANT CHANGE UP (ref 96–108)
CO2 SERPL-SCNC: 27 MMOL/L — SIGNIFICANT CHANGE UP (ref 22–31)
CREAT SERPL-MCNC: 1.19 MG/DL — SIGNIFICANT CHANGE UP (ref 0.5–1.3)
GLUCOSE BLDC GLUCOMTR-MCNC: 132 MG/DL — HIGH (ref 70–99)
GLUCOSE BLDC GLUCOMTR-MCNC: 146 MG/DL — HIGH (ref 70–99)
GLUCOSE BLDC GLUCOMTR-MCNC: 159 MG/DL — HIGH (ref 70–99)
GLUCOSE BLDC GLUCOMTR-MCNC: 257 MG/DL — HIGH (ref 70–99)
GLUCOSE SERPL-MCNC: 126 MG/DL — HIGH (ref 70–99)
HCT VFR BLD CALC: 23.7 % — LOW (ref 39–50)
HGB BLD-MCNC: 7.5 G/DL — LOW (ref 13–17)
MCHC RBC-ENTMCNC: 30.4 PG — SIGNIFICANT CHANGE UP (ref 27–34)
MCHC RBC-ENTMCNC: 31.6 GM/DL — LOW (ref 32–36)
MCV RBC AUTO: 96 FL — SIGNIFICANT CHANGE UP (ref 80–100)
NRBC # BLD: 0 /100 WBCS — SIGNIFICANT CHANGE UP (ref 0–0)
PLATELET # BLD AUTO: 186 K/UL — SIGNIFICANT CHANGE UP (ref 150–400)
POTASSIUM SERPL-MCNC: 4.1 MMOL/L — SIGNIFICANT CHANGE UP (ref 3.5–5.3)
POTASSIUM SERPL-SCNC: 4.1 MMOL/L — SIGNIFICANT CHANGE UP (ref 3.5–5.3)
RBC # BLD: 2.47 M/UL — LOW (ref 4.2–5.8)
RBC # FLD: 14.6 % — HIGH (ref 10.3–14.5)
SODIUM SERPL-SCNC: 143 MMOL/L — SIGNIFICANT CHANGE UP (ref 135–145)
WBC # BLD: 7.64 K/UL — SIGNIFICANT CHANGE UP (ref 3.8–10.5)
WBC # FLD AUTO: 7.64 K/UL — SIGNIFICANT CHANGE UP (ref 3.8–10.5)

## 2020-06-06 PROCEDURE — 99232 SBSQ HOSP IP/OBS MODERATE 35: CPT

## 2020-06-06 RX ORDER — SENNA PLUS 8.6 MG/1
2 TABLET ORAL
Qty: 0 | Refills: 0 | DISCHARGE
Start: 2020-06-06

## 2020-06-06 RX ORDER — TAMSULOSIN HYDROCHLORIDE 0.4 MG/1
1 CAPSULE ORAL
Qty: 0 | Refills: 0 | DISCHARGE

## 2020-06-06 RX ORDER — ASPIRIN/CALCIUM CARB/MAGNESIUM 324 MG
1 TABLET ORAL
Qty: 0 | Refills: 0 | DISCHARGE
Start: 2020-06-06

## 2020-06-06 RX ORDER — ACETAMINOPHEN 500 MG
3 TABLET ORAL
Qty: 0 | Refills: 0 | DISCHARGE
Start: 2020-06-06

## 2020-06-06 RX ORDER — METFORMIN HYDROCHLORIDE 850 MG/1
1 TABLET ORAL
Qty: 0 | Refills: 0 | DISCHARGE

## 2020-06-06 RX ORDER — OXYCODONE HYDROCHLORIDE 5 MG/1
1 TABLET ORAL
Qty: 1 | Refills: 0
Start: 2020-06-06

## 2020-06-06 RX ORDER — INSULIN GLARGINE 100 [IU]/ML
8 INJECTION, SOLUTION SUBCUTANEOUS
Qty: 30 | Refills: 0
Start: 2020-06-06

## 2020-06-06 RX ORDER — INSULIN GLARGINE 100 [IU]/ML
35 INJECTION, SOLUTION SUBCUTANEOUS
Qty: 0 | Refills: 0 | DISCHARGE

## 2020-06-06 RX ADMIN — NAFCILLIN 200 GRAM(S): 10 INJECTION, POWDER, FOR SOLUTION INTRAVENOUS at 12:27

## 2020-06-06 RX ADMIN — INSULIN GLARGINE 5 UNIT(S): 100 INJECTION, SOLUTION SUBCUTANEOUS at 21:54

## 2020-06-06 RX ADMIN — Medication 81 MILLIGRAM(S): at 11:44

## 2020-06-06 RX ADMIN — ATORVASTATIN CALCIUM 20 MILLIGRAM(S): 80 TABLET, FILM COATED ORAL at 21:52

## 2020-06-06 RX ADMIN — Medication 3: at 12:27

## 2020-06-06 RX ADMIN — NAFCILLIN 200 GRAM(S): 10 INJECTION, POWDER, FOR SOLUTION INTRAVENOUS at 17:48

## 2020-06-06 RX ADMIN — PANTOPRAZOLE SODIUM 40 MILLIGRAM(S): 20 TABLET, DELAYED RELEASE ORAL at 06:34

## 2020-06-06 RX ADMIN — AMLODIPINE BESYLATE 2.5 MILLIGRAM(S): 2.5 TABLET ORAL at 05:22

## 2020-06-06 RX ADMIN — CHLORHEXIDINE GLUCONATE 1 APPLICATION(S): 213 SOLUTION TOPICAL at 09:25

## 2020-06-06 RX ADMIN — TAMSULOSIN HYDROCHLORIDE 0.4 MILLIGRAM(S): 0.4 CAPSULE ORAL at 05:22

## 2020-06-06 RX ADMIN — NAFCILLIN 200 GRAM(S): 10 INJECTION, POWDER, FOR SOLUTION INTRAVENOUS at 05:22

## 2020-06-06 RX ADMIN — TAMSULOSIN HYDROCHLORIDE 0.4 MILLIGRAM(S): 0.4 CAPSULE ORAL at 17:48

## 2020-06-06 RX ADMIN — NAFCILLIN 200 GRAM(S): 10 INJECTION, POWDER, FOR SOLUTION INTRAVENOUS at 01:17

## 2020-06-06 RX ADMIN — CLOPIDOGREL BISULFATE 75 MILLIGRAM(S): 75 TABLET, FILM COATED ORAL at 11:44

## 2020-06-06 RX ADMIN — Medication 1: at 22:28

## 2020-06-06 RX ADMIN — Medication 25 MILLIGRAM(S): at 05:22

## 2020-06-06 RX ADMIN — Medication 650 MILLIGRAM(S): at 21:54

## 2020-06-06 RX ADMIN — NAFCILLIN 200 GRAM(S): 10 INJECTION, POWDER, FOR SOLUTION INTRAVENOUS at 09:15

## 2020-06-06 RX ADMIN — SENNA PLUS 2 TABLET(S): 8.6 TABLET ORAL at 21:56

## 2020-06-06 RX ADMIN — NAFCILLIN 200 GRAM(S): 10 INJECTION, POWDER, FOR SOLUTION INTRAVENOUS at 21:53

## 2020-06-06 RX ADMIN — Medication 3 MILLIGRAM(S): at 21:55

## 2020-06-06 NOTE — PROGRESS NOTE ADULT - SUBJECTIVE AND OBJECTIVE BOX
Ortho Note    Procedure: R TKA explant, spacer placement  Surgeon: Oh    Happy demeanor this morning. Reports no issues.   Pt comfortable without complaints, pain controlled  Denies CP, SOB, N/V, numbness/tingling     Vital Signs Last 24 Hrs  T(C): 36.9 (06 Jun 2020 04:15), Max: 37.2 (05 Jun 2020 20:28)  T(F): 98.5 (06 Jun 2020 04:15), Max: 98.9 (05 Jun 2020 20:28)  HR: 71 (06 Jun 2020 04:15) (71 - 83)  BP: 163/76 (06 Jun 2020 04:15) (115/67 - 163/76)  BP(mean): 105 (06 Jun 2020 04:15) (105 - 105)  RR: 16 (06 Jun 2020 04:15) (16 - 17)  SpO2: 100% (06 Jun 2020 04:15) (99% - 100%)      Physical Exam:  General: Resting comfortably in bed. AAOx3. NAD.  Extremities:        LLE: No gross deformity. SILT L2-S1 distribution, symmetric. TA/EHL/FHL/GS motor intact. WWP       RLE: Incisional dressing in place with good suction CDI. KI in place. SILT L2-S1 distribution, symmetric. TA/EHL/FHL/GS motor intact. WWP      A/P: 79yMale POD#1 s/p R TKA explant, antibiotic spacer placement  - Stable  - Pain Control  - AM labs  - F/u OR cultures  - DVT ppx: SCDs; ASA, plavix   - Post op abx: Nafcillin 2g q4h per ID  - PT, WBS: TTWB RLE with locked faisal  - Dispo: Pending PT eval -- D/C today if cleared    Ortho Pager [phone]

## 2020-06-06 NOTE — PROGRESS NOTE ADULT - ASSESSMENT
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 c/w incentive spirometry, c/w bowel regimen. PPx w SCDs  #SUBHASH - Cr now improving. Likely 2/2 retention.  #Constipation - likely from immobility and post-op. On bowel regimen  #Urinary retention - resolved. Likely 2/2 immobility and surgery  #MSSA Joint infection - Mgmt per primary team. On IV nafcillin  --No growth to date on OR Cx  #CAD - prior h/o stents. No chest pain. ASA/Plavix w statin and losartan  #DM2 - BGs at target. A1C in 5s (5.4 in 04/2020). on Lantus 5  #HTN - Above target at 160-170s.   #HLD - lipitor 20  #GERD - chronic. on omeprazole 20 ER  #Normocytic anemia - decreased to 7.5 from 9.1. Denies any melena/hematochezia and pt not endorsing sxs when ambulating. Likely from operative losses. Would f/u as outpatient.     Recommendations  --Would increase lantus to 8u HS on discharge (Please correct this on med rec)  --Would restart home losartan 100mg HS due to elevated BP and improvement in retention  --F/U ID recommendations  --Would obtain F/U CBC as outpatient to monitor resolution of normocytic anemia    From medical standpoint, pt optimized for disposition - Home w HPT

## 2020-06-06 NOTE — PROGRESS NOTE ADULT - NSHPATTENDINGPLANDISCUSS_GEN_ALL_CORE
primary team
primary team
primary team, Dr. Farnsworth
Ortho
Ortho, patient's wife

## 2020-06-06 NOTE — PROGRESS NOTE ADULT - SUBJECTIVE AND OBJECTIVE BOX
INTERVAL HPI/OVERNIGHT EVENTS: SIMONE O/N    SUBJECTIVE: Patient seen and examined at bedside.   Pt ambulated w walker today wo chest pain, dyspnea, lightheadedness. Moving BMs and also voided urine s/p flower removal. States pain is controlled. Denies fever, chills, N/V/Abd pain  Eager to go home today    ROS: 12 point ROS reviewed and otherwise negative    OBJECTIVE:    VITAL SIGNS:  ICU Vital Signs Last 24 Hrs  T(C): 36.9 (06 Jun 2020 04:15), Max: 37.2 (05 Jun 2020 20:28)  T(F): 98.5 (06 Jun 2020 04:15), Max: 98.9 (05 Jun 2020 20:28)  HR: 71 (06 Jun 2020 04:15) (71 - 83)  BP: 163/76 (06 Jun 2020 04:15) (115/67 - 163/76)  BP(mean): 105 (06 Jun 2020 04:15) (105 - 105)  ABP: --  ABP(mean): --  RR: 16 (06 Jun 2020 04:15) (16 - 17)  SpO2: 100% (06 Jun 2020 04:15) (99% - 100%)        06-05 @ 07:01  -  06-06 @ 07:00  --------------------------------------------------------  IN: 1560 mL / OUT: 1550 mL / NET: 10 mL    06-06 @ 07:01  -  06-06 @ 10:31  --------------------------------------------------------  IN: 400 mL / OUT: 0 mL / NET: 400 mL      CAPILLARY BLOOD GLUCOSE      POCT Blood Glucose.: 146 mg/dL (06 Jun 2020 09:22)      PHYSICAL EXAM:  GEN: male in NAD on RA  HEENT: NC/AT, MMM  CV: RRR, no murmurs, no BLE edema  PULM: nml effort, CTAB  ABD: Soft, non-tender, NABS  NEURO: Alert, moving all extremities, sensation intact  EXT: dressing c/d/i on R knee    MEDICATIONS:  MEDICATIONS  (STANDING):  amLODIPine   Tablet 2.5 milliGRAM(s) Oral daily  aspirin enteric coated 81 milliGRAM(s) Oral daily  atorvastatin 20 milliGRAM(s) Oral at bedtime  BUpivacaine liposome 1.3% Injectable (no eMAR) 20 milliLiter(s) Local Injection once  chlorhexidine 2% Cloths 1 Application(s) Topical <User Schedule>  clopidogrel Tablet 75 milliGRAM(s) Oral daily  dextrose 5%. 1000 milliLiter(s) (50 mL/Hr) IV Continuous <Continuous>  dextrose 50% Injectable 12.5 Gram(s) IV Push once  dextrose 50% Injectable 25 Gram(s) IV Push once  dextrose 50% Injectable 25 Gram(s) IV Push once  insulin glargine Injectable (LANTUS) 5 Unit(s) SubCutaneous at bedtime  insulin lispro (HumaLOG) corrective regimen sliding scale   SubCutaneous Before meals and at bedtime  lactated ringers. 1000 milliLiter(s) (100 mL/Hr) IV Continuous <Continuous>  metoprolol succinate ER 25 milliGRAM(s) Oral daily  nafcillin  IVPB 2 Gram(s) IV Intermittent every 4 hours  pantoprazole    Tablet 40 milliGRAM(s) Oral before breakfast  senna 2 Tablet(s) Oral at bedtime  tamsulosin 0.4 milliGRAM(s) Oral two times a day    MEDICATIONS  (PRN):  acetaminophen   Tablet .. 650 milliGRAM(s) Oral every 6 hours PRN Temp greater or equal to 38C (100.4F), Mild Pain (1 - 3)  bisacodyl Suppository 10 milliGRAM(s) Rectal daily PRN Constipation  dextrose 40% Gel 15 Gram(s) Oral once PRN Blood Glucose LESS THAN 70 milliGRAM(s)/deciliter  glucagon  Injectable 1 milliGRAM(s) IntraMuscular once PRN Glucose LESS THAN 70 milligrams/deciliter  HYDROmorphone  Injectable 0.5 milliGRAM(s) IV Push every 4 hours PRN breakthrough pain  magnesium hydroxide Suspension 30 milliLiter(s) Oral daily PRN Constipation  melatonin 3 milliGRAM(s) Oral at bedtime PRN Insomnia  oxyCODONE    IR 10 milliGRAM(s) Oral every 4 hours PRN Severe Pain (7 - 10)  oxyCODONE    IR 5 milliGRAM(s) Oral every 4 hours PRN Moderate Pain (4 - 6)  sodium chloride 0.9% lock flush 10 milliLiter(s) IV Push every 1 hour PRN Pre/post blood products, medications, blood draw, and to maintain line patency      ALLERGIES:  Allergies    No Known Allergies    Intolerances        LABS:                        7.5    7.64  )-----------( 186      ( 06 Jun 2020 05:45 )             23.7     06-06    143  |  104  |  15  ----------------------------<  126<H>  4.1   |  27  |  1.19    Ca    8.7      06 Jun 2020 05:45            RADIOLOGY & ADDITIONAL TESTS: Reviewed.

## 2020-06-07 VITALS
SYSTOLIC BLOOD PRESSURE: 170 MMHG | OXYGEN SATURATION: 99 % | TEMPERATURE: 98 F | RESPIRATION RATE: 15 BRPM | DIASTOLIC BLOOD PRESSURE: 76 MMHG | HEART RATE: 87 BPM

## 2020-06-07 LAB
ANION GAP SERPL CALC-SCNC: 12 MMOL/L — SIGNIFICANT CHANGE UP (ref 5–17)
BUN SERPL-MCNC: 13 MG/DL — SIGNIFICANT CHANGE UP (ref 7–23)
CALCIUM SERPL-MCNC: 8.7 MG/DL — SIGNIFICANT CHANGE UP (ref 8.4–10.5)
CHLORIDE SERPL-SCNC: 104 MMOL/L — SIGNIFICANT CHANGE UP (ref 96–108)
CO2 SERPL-SCNC: 26 MMOL/L — SIGNIFICANT CHANGE UP (ref 22–31)
CREAT SERPL-MCNC: 1.21 MG/DL — SIGNIFICANT CHANGE UP (ref 0.5–1.3)
GLUCOSE BLDC GLUCOMTR-MCNC: 132 MG/DL — HIGH (ref 70–99)
GLUCOSE BLDC GLUCOMTR-MCNC: 225 MG/DL — HIGH (ref 70–99)
GLUCOSE SERPL-MCNC: 123 MG/DL — HIGH (ref 70–99)
HCT VFR BLD CALC: 24.3 % — LOW (ref 39–50)
HGB BLD-MCNC: 7.7 G/DL — LOW (ref 13–17)
MCHC RBC-ENTMCNC: 30.3 PG — SIGNIFICANT CHANGE UP (ref 27–34)
MCHC RBC-ENTMCNC: 31.7 GM/DL — LOW (ref 32–36)
MCV RBC AUTO: 95.7 FL — SIGNIFICANT CHANGE UP (ref 80–100)
NRBC # BLD: 0 /100 WBCS — SIGNIFICANT CHANGE UP (ref 0–0)
PLATELET # BLD AUTO: 210 K/UL — SIGNIFICANT CHANGE UP (ref 150–400)
POTASSIUM SERPL-MCNC: 3.5 MMOL/L — SIGNIFICANT CHANGE UP (ref 3.5–5.3)
POTASSIUM SERPL-SCNC: 3.5 MMOL/L — SIGNIFICANT CHANGE UP (ref 3.5–5.3)
RBC # BLD: 2.54 M/UL — LOW (ref 4.2–5.8)
RBC # FLD: 14.6 % — HIGH (ref 10.3–14.5)
SODIUM SERPL-SCNC: 142 MMOL/L — SIGNIFICANT CHANGE UP (ref 135–145)
WBC # BLD: 6.91 K/UL — SIGNIFICANT CHANGE UP (ref 3.8–10.5)
WBC # FLD AUTO: 6.91 K/UL — SIGNIFICANT CHANGE UP (ref 3.8–10.5)

## 2020-06-07 PROCEDURE — 71046 X-RAY EXAM CHEST 2 VIEWS: CPT

## 2020-06-07 PROCEDURE — 36415 COLL VENOUS BLD VENIPUNCTURE: CPT

## 2020-06-07 PROCEDURE — 82962 GLUCOSE BLOOD TEST: CPT

## 2020-06-07 PROCEDURE — 71045 X-RAY EXAM CHEST 1 VIEW: CPT

## 2020-06-07 PROCEDURE — 87015 SPECIMEN INFECT AGNT CONCNTJ: CPT

## 2020-06-07 PROCEDURE — 85730 THROMBOPLASTIN TIME PARTIAL: CPT

## 2020-06-07 PROCEDURE — 86850 RBC ANTIBODY SCREEN: CPT

## 2020-06-07 PROCEDURE — 73590 X-RAY EXAM OF LOWER LEG: CPT

## 2020-06-07 PROCEDURE — 87635 SARS-COV-2 COVID-19 AMP PRB: CPT

## 2020-06-07 PROCEDURE — 73560 X-RAY EXAM OF KNEE 1 OR 2: CPT

## 2020-06-07 PROCEDURE — 80053 COMPREHEN METABOLIC PANEL: CPT

## 2020-06-07 PROCEDURE — 88331 PATH CONSLTJ SURG 1 BLK 1SPC: CPT

## 2020-06-07 PROCEDURE — 97116 GAIT TRAINING THERAPY: CPT

## 2020-06-07 PROCEDURE — 87070 CULTURE OTHR SPECIMN AEROBIC: CPT

## 2020-06-07 PROCEDURE — 83036 HEMOGLOBIN GLYCOSYLATED A1C: CPT

## 2020-06-07 PROCEDURE — 97161 PT EVAL LOW COMPLEX 20 MIN: CPT

## 2020-06-07 PROCEDURE — 89060 EXAM SYNOVIAL FLUID CRYSTALS: CPT

## 2020-06-07 PROCEDURE — 87205 SMEAR GRAM STAIN: CPT

## 2020-06-07 PROCEDURE — 85027 COMPLETE CBC AUTOMATED: CPT

## 2020-06-07 PROCEDURE — 73562 X-RAY EXAM OF KNEE 3: CPT

## 2020-06-07 PROCEDURE — 85025 COMPLETE CBC W/AUTO DIFF WBC: CPT

## 2020-06-07 PROCEDURE — 88300 SURGICAL PATH GROSS: CPT

## 2020-06-07 PROCEDURE — 80048 BASIC METABOLIC PNL TOTAL CA: CPT

## 2020-06-07 PROCEDURE — 93005 ELECTROCARDIOGRAM TRACING: CPT

## 2020-06-07 PROCEDURE — 73552 X-RAY EXAM OF FEMUR 2/>: CPT

## 2020-06-07 PROCEDURE — 99285 EMERGENCY DEPT VISIT HI MDM: CPT | Mod: 25

## 2020-06-07 PROCEDURE — 87206 SMEAR FLUORESCENT/ACID STAI: CPT

## 2020-06-07 PROCEDURE — 81003 URINALYSIS AUTO W/O SCOPE: CPT

## 2020-06-07 PROCEDURE — C1713: CPT

## 2020-06-07 PROCEDURE — 87102 FUNGUS ISOLATION CULTURE: CPT

## 2020-06-07 PROCEDURE — 89051 BODY FLUID CELL COUNT: CPT

## 2020-06-07 PROCEDURE — 88305 TISSUE EXAM BY PATHOLOGIST: CPT

## 2020-06-07 PROCEDURE — 86140 C-REACTIVE PROTEIN: CPT

## 2020-06-07 PROCEDURE — 86901 BLOOD TYPING SEROLOGIC RH(D): CPT

## 2020-06-07 PROCEDURE — 85652 RBC SED RATE AUTOMATED: CPT

## 2020-06-07 PROCEDURE — 36573 INSJ PICC RS&I 5 YR+: CPT

## 2020-06-07 PROCEDURE — 87075 CULTR BACTERIA EXCEPT BLOOD: CPT

## 2020-06-07 PROCEDURE — 85610 PROTHROMBIN TIME: CPT

## 2020-06-07 PROCEDURE — 87116 MYCOBACTERIA CULTURE: CPT

## 2020-06-07 RX ADMIN — NAFCILLIN 200 GRAM(S): 10 INJECTION, POWDER, FOR SOLUTION INTRAVENOUS at 05:30

## 2020-06-07 RX ADMIN — PANTOPRAZOLE SODIUM 40 MILLIGRAM(S): 20 TABLET, DELAYED RELEASE ORAL at 05:29

## 2020-06-07 RX ADMIN — CLOPIDOGREL BISULFATE 75 MILLIGRAM(S): 75 TABLET, FILM COATED ORAL at 11:47

## 2020-06-07 RX ADMIN — TAMSULOSIN HYDROCHLORIDE 0.4 MILLIGRAM(S): 0.4 CAPSULE ORAL at 05:29

## 2020-06-07 RX ADMIN — Medication 650 MILLIGRAM(S): at 05:28

## 2020-06-07 RX ADMIN — Medication 25 MILLIGRAM(S): at 05:35

## 2020-06-07 RX ADMIN — AMLODIPINE BESYLATE 2.5 MILLIGRAM(S): 2.5 TABLET ORAL at 05:28

## 2020-06-07 RX ADMIN — NAFCILLIN 200 GRAM(S): 10 INJECTION, POWDER, FOR SOLUTION INTRAVENOUS at 12:01

## 2020-06-07 RX ADMIN — NAFCILLIN 200 GRAM(S): 10 INJECTION, POWDER, FOR SOLUTION INTRAVENOUS at 09:28

## 2020-06-07 RX ADMIN — Medication 81 MILLIGRAM(S): at 11:47

## 2020-06-07 RX ADMIN — NAFCILLIN 200 GRAM(S): 10 INJECTION, POWDER, FOR SOLUTION INTRAVENOUS at 01:31

## 2020-06-07 RX ADMIN — CHLORHEXIDINE GLUCONATE 1 APPLICATION(S): 213 SOLUTION TOPICAL at 05:44

## 2020-06-07 NOTE — PROGRESS NOTE ADULT - REASON FOR ADMISSION
Chronic prosthetic joint infection

## 2020-06-07 NOTE — PROGRESS NOTE ADULT - SUBJECTIVE AND OBJECTIVE BOX
Ortho Note    Procedure: R TKA explant, spacer placement  Surgeon: Oh    Happy demeanor this morning. Reports no issues overnight.  Pt comfortable without complaints, pain controlled  Denies CP, SOB, N/V, numbness/tingling     Vital Signs Last 24 Hrs  Vital Signs Last 24 Hrs  T(C): 36.7 (07 Jun 2020 05:40), Max: 36.8 (06 Jun 2020 12:56)  T(F): 98 (07 Jun 2020 05:40), Max: 98.3 (06 Jun 2020 12:56)  HR: 66 (07 Jun 2020 05:40) (66 - 73)  BP: 178/78 (07 Jun 2020 05:40) (142/78 - 178/78)  BP(mean): --  RR: 18 (07 Jun 2020 05:40) (16 - 18)  SpO2: 100% (07 Jun 2020 05:40) (98% - 100%)      Physical Exam:  General: Resting comfortably in bed. AAOx3. NAD.  Extremities:        LLE: No gross deformity. SILT L2-S1 distribution, symmetric. TA/EHL/FHL/GS motor intact. WWP       RLE: Incisional dressing in place with good suction CDI. KI in place. SILT L2-S1 distribution, symmetric. TA/EHL/FHL/GS motor intact. WWP      A/P: 79yMale s/p R TKA explant, antibiotic spacer placement  - Stable  - Pain Control  - AM labs  - F/u OR cultures  - DVT ppx: SCDs; ASA, plavix   - Post op abx: Nafcillin 2g q4h per ID  - PT, WBS: TTWB RLE with locked faisal  - Dispo: d/c if cleared today    Ortho Pager [phone]

## 2020-06-10 DIAGNOSIS — K59.00 CONSTIPATION, UNSPECIFIED: ICD-10-CM

## 2020-06-10 DIAGNOSIS — R33.9 RETENTION OF URINE, UNSPECIFIED: ICD-10-CM

## 2020-06-10 DIAGNOSIS — D62 ACUTE POSTHEMORRHAGIC ANEMIA: ICD-10-CM

## 2020-06-10 DIAGNOSIS — T84.53XA INFECTION AND INFLAMMATORY REACTION DUE TO INTERNAL RIGHT KNEE PROSTHESIS, INITIAL ENCOUNTER: ICD-10-CM

## 2020-06-10 DIAGNOSIS — E78.5 HYPERLIPIDEMIA, UNSPECIFIED: ICD-10-CM

## 2020-06-10 DIAGNOSIS — N17.9 ACUTE KIDNEY FAILURE, UNSPECIFIED: ICD-10-CM
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
## 2020-06-10 DIAGNOSIS — I25.10 ATHEROSCLEROTIC HEART DISEASE OF NATIVE CORONARY ARTERY WITHOUT ANGINA PECTORIS: ICD-10-CM

## 2020-06-10 DIAGNOSIS — K21.9 GASTRO-ESOPHAGEAL REFLUX DISEASE WITHOUT ESOPHAGITIS: ICD-10-CM

## 2020-06-10 DIAGNOSIS — E11.9 TYPE 2 DIABETES MELLITUS WITHOUT COMPLICATIONS: ICD-10-CM

## 2020-06-10 DIAGNOSIS — I10 ESSENTIAL (PRIMARY) HYPERTENSION: ICD-10-CM

## 2020-06-10 DIAGNOSIS — B95.61 METHICILLIN SUSCEPTIBLE STAPHYLOCOCCUS AUREUS INFECTION AS THE CAUSE OF DISEASES CLASSIFIED ELSEWHERE: ICD-10-CM

## 2020-06-10 PROBLEM — E87.6 HYPOKALEMIA: Status: ACTIVE | Noted: 2020-06-10

## 2020-06-11 ENCOUNTER — RX RENEWAL (OUTPATIENT)
Age: 80
End: 2020-06-11

## 2020-06-12 ENCOUNTER — OUTPATIENT (OUTPATIENT)
Dept: OUTPATIENT SERVICES | Facility: HOSPITAL | Age: 80
LOS: 1 days | End: 2020-06-12
Payer: MEDICARE

## 2020-06-12 ENCOUNTER — APPOINTMENT (OUTPATIENT)
Dept: ORTHOPEDIC SURGERY | Facility: CLINIC | Age: 80
End: 2020-06-12
Payer: MEDICARE

## 2020-06-12 ENCOUNTER — RESULT REVIEW (OUTPATIENT)
Age: 80
End: 2020-06-12

## 2020-06-12 VITALS
TEMPERATURE: 99.1 F | OXYGEN SATURATION: 98 % | SYSTOLIC BLOOD PRESSURE: 150 MMHG | DIASTOLIC BLOOD PRESSURE: 70 MMHG | HEART RATE: 88 BPM

## 2020-06-12 DIAGNOSIS — K42.9 UMBILICAL HERNIA WITHOUT OBSTRUCTION OR GANGRENE: Chronic | ICD-10-CM

## 2020-06-12 DIAGNOSIS — I25.10 ATHEROSCLEROTIC HEART DISEASE OF NATIVE CORONARY ARTERY WITHOUT ANGINA PECTORIS: Chronic | ICD-10-CM

## 2020-06-12 DIAGNOSIS — Z96.653 PRESENCE OF ARTIFICIAL KNEE JOINT, BILATERAL: Chronic | ICD-10-CM

## 2020-06-12 DIAGNOSIS — Z98.890 OTHER SPECIFIED POSTPROCEDURAL STATES: Chronic | ICD-10-CM

## 2020-06-12 LAB
B PERT IGG+IGM PNL SER: SIGNIFICANT CHANGE UP
COLOR FLD: SIGNIFICANT CHANGE UP
EOSINOPHIL # FLD: 3 % — SIGNIFICANT CHANGE UP
FLUID INTAKE SUBSTANCE CLASS: SIGNIFICANT CHANGE UP
FLUID SEGMENTED GRANULOCYTES: 90 % — SIGNIFICANT CHANGE UP
GRAM STN FLD: SIGNIFICANT CHANGE UP
LYMPHOCYTES # FLD: 6 % — SIGNIFICANT CHANGE UP
MONOS+MACROS # FLD: 1 % — SIGNIFICANT CHANGE UP
RCV VOL RI: HIGH /UL (ref 0–0)
SPECIMEN SOURCE FLD: SIGNIFICANT CHANGE UP
SPECIMEN SOURCE: SIGNIFICANT CHANGE UP
SYNOVIAL CRYSTALS CLARITY: ABNORMAL
SYNOVIAL CRYSTALS COLOR: ABNORMAL
SYNOVIAL CRYSTALS ID: SIGNIFICANT CHANGE UP
SYNOVIAL CRYSTALS TUBE: SIGNIFICANT CHANGE UP
TOTAL NUCLEATED CELL COUNT, BODY FLUID: 4270 /UL — SIGNIFICANT CHANGE UP
TUBE TYPE: SIGNIFICANT CHANGE UP

## 2020-06-12 PROCEDURE — 71046 X-RAY EXAM CHEST 2 VIEWS: CPT

## 2020-06-12 PROCEDURE — 73560 X-RAY EXAM OF KNEE 1 OR 2: CPT

## 2020-06-12 PROCEDURE — 87070 CULTURE OTHR SPECIMN AEROBIC: CPT

## 2020-06-12 PROCEDURE — 89051 BODY FLUID CELL COUNT: CPT

## 2020-06-12 PROCEDURE — 73560 X-RAY EXAM OF KNEE 1 OR 2: CPT | Mod: 26,RT

## 2020-06-12 PROCEDURE — 71046 X-RAY EXAM CHEST 2 VIEWS: CPT | Mod: 26

## 2020-06-12 PROCEDURE — 87205 SMEAR GRAM STAIN: CPT

## 2020-06-12 PROCEDURE — 87102 FUNGUS ISOLATION CULTURE: CPT

## 2020-06-12 PROCEDURE — 89060 EXAM SYNOVIAL FLUID CRYSTALS: CPT

## 2020-06-12 PROCEDURE — 87075 CULTR BACTERIA EXCEPT BLOOD: CPT

## 2020-06-12 PROCEDURE — 99024 POSTOP FOLLOW-UP VISIT: CPT

## 2020-06-12 NOTE — PROCEDURE
[Right] : of the right [Aspiration] : Aspiration [Knee Joint] : knee joint [Alcohol] : Alcohol [Effusion] : Effusion [Patient] : patient [Betadine] : Betadine [Ethyl Chloride Spray] : ethyl chloride spray was used as a topical anesthetic [Medial] : medial [18] : an 18-gauge [___ mL Fluid] : [unfilled] mL of [Bandage Applied] : a bandage [Culture] : culture [Cell Count] : cell count [Gram Stain] : gram stain [Tolerated Well] : The patient tolerated the procedure well [None] : none [de-identified] : dark bloody

## 2020-06-12 NOTE — HISTORY OF PRESENT ILLNESS
[Clean/Dry/Intact] : clean, dry and intact [Erythema] : not erythematous [Dehiscence] : not dehisced [Swelling] : swollen [Neuro Intact] : an unremarkable neurological exam [Discharge] : absent of discharge [Negative Gissel's] : maneuvers demonstrated a negative Gissel's sign [Vascular Intact] : ~T peripheral vascular exam normal [de-identified] : General appearance: well nourished and hydrated, pleasant, alert and oriented x 3, cooperative.  \par HEENT: normocephalic, EOM intact, wearing mask, external auditory canal clear.  \par Cardiovascular: no lower leg edema, no varicosities, dorsalis pedis pulses palpable and symmetric.  \par Lymphatics: no palpable lymphadenopathy, no lymphedema.  \par Neurologic: sensation is normal, no muscle weakness in upper or lower extremities.  \par Dermatologic: skin moist, warm, no rash.  \par Spine: cervical spine with normal lordosis and painless range of motion, thoracic spine with normal kyphosis and painless range of motion, lumbosacral spine with normal lordosis and painless range of motion.  \par Gait: ambulating with walker, appears compliant with TTWB RLE.\par \par Right knee:\par - Inspection: large effusion; negative ecchymosis and erythema.  \par - Wounds: midline incision well approximated with intact staples; no surrounding erythema, no drainage.\par - Alignment: normal.  \par - Palpation: appropriate incisional tenderness on palpation.  \par - ROM active: fixed at approximately 15 degrees. [de-identified] : Coming in a little ahead of schedule due to concerns of low grade fevers overnight. They measured with a couple of home thermometers and got various readings between 98.4 - 101.2F from around 5pm - 3am. He denies chills, respiratory symptoms, urinary discomfort or frequency, increased knee or leg pain, or any new symptoms. The surgical site has a consistent dull ache. He has been compliant with locked brace use. He does note some knee swelling without drainage. He has been receiving a constant IV infusion of nafcillin as per Dr. Lorenzana's recommendations. Potassium supplementation was started three days ago and he has been tolerating it well. He has a good appetite. [de-identified] : Right knee XRs demonstrate no interval change in appearance of the static cement spacer. No new fracture. \par \par Chest XR demonstrates no focal consolidation.\par \par Temperature taken upon visit today was 98.2F. [de-identified] : 1st POA s/p R knee explantation and application of static antibiotic cement spacer, DOS 6/2/20 [de-identified] : I'm not sure what the significance is of the low-grade fevers last night. He does not appear to have any signs or symptoms of atelectasis, pneumonia, UTI, DVT, or new surgical site infection. There was a large hemarthrosis in the right knee without any suggestion of purulence. Temperature is normal today. He has been receiving nafcillin without interruption. Will send the intraarticular hematoma fluid for analysis and also repeat serum inflammatory markers and CBC. Advised them to continue monitoring for home fever/chills and inform me if they continue. \par Cont TTWB RLE, nafcillin as previously directed\par See me again next week, no new XRs needed [de-identified] : 80y/o male 10 days s/p R knee explant and static cement spacer for chronic PJI

## 2020-06-16 ENCOUNTER — APPOINTMENT (OUTPATIENT)
Dept: ORTHOPEDIC SURGERY | Facility: CLINIC | Age: 80
End: 2020-06-16
Payer: MEDICARE

## 2020-06-16 VITALS
HEIGHT: 66 IN | HEART RATE: 64 BPM | SYSTOLIC BLOOD PRESSURE: 122 MMHG | OXYGEN SATURATION: 97 % | WEIGHT: 165 LBS | DIASTOLIC BLOOD PRESSURE: 80 MMHG | BODY MASS INDEX: 26.52 KG/M2

## 2020-06-16 VITALS — TEMPERATURE: 98.7 F

## 2020-06-16 PROCEDURE — 99024 POSTOP FOLLOW-UP VISIT: CPT

## 2020-06-16 PROCEDURE — 20610 DRAIN/INJ JOINT/BURSA W/O US: CPT | Mod: 79,RT

## 2020-06-16 RX ORDER — RIFAMPIN 300 MG/1
300 CAPSULE ORAL
Refills: 0 | Status: DISCONTINUED | COMMUNITY
End: 2020-06-16

## 2020-06-16 RX ORDER — DULAGLUTIDE 4.5 MG/.5ML
INJECTION, SOLUTION SUBCUTANEOUS
Refills: 0 | Status: DISCONTINUED | COMMUNITY
End: 2020-06-16

## 2020-06-16 RX ORDER — OXYCODONE 5 MG/1
5 TABLET ORAL
Qty: 40 | Refills: 0 | Status: DISCONTINUED | COMMUNITY
Start: 2020-03-19 | End: 2020-06-16

## 2020-06-16 RX ORDER — OXYCODONE 5 MG/1
5 TABLET ORAL
Qty: 40 | Refills: 0 | Status: DISCONTINUED | COMMUNITY
Start: 2020-03-06 | End: 2020-06-16

## 2020-06-16 NOTE — PROCEDURE
[Aspiration] : Aspiration [Right] : of the right [Knee Joint] : knee joint [Effusion] : Effusion [Alcohol] : Alcohol [Patient] : patient [Betadine] : Betadine [Ethyl Chloride Spray] : ethyl chloride spray was used as a topical anesthetic [18] : an 18-gauge [Lateral] : lateral [___ mL Fluid] : [unfilled] mL of [Bloody] : bloody [Bandage Applied] : a bandage [Tolerated Well] : The patient tolerated the procedure well [None] : none

## 2020-06-16 NOTE — HISTORY OF PRESENT ILLNESS
[de-identified] : R knee incision healed without drainage, erythema, or superficial fluctuance. Large effusion noted again. Staples removed and exchanged with steristrips. Appropriately TTP about the incision. Moderate bilateral pitting ankle and foot edema noted. DP palpable, feet warm, CR <2s. Calves nontender.  [de-identified] :  s/p R knee explantation and application of static antibiotic cement spacer, DOS 6/2/20 [de-identified] : Has been doing better at home. Wife reports resolution of fevers with Tmax 99.0 over the weekend. Right knee pain has been stable, well controlled. Still compliant with brace wear and TTWB with walker. Has been receiving nafcillin infusion. [de-identified] : Serum bloodwork from last visit was reviewed with them; on chart. Relevant findings: CBC 9.5 > 8/27 < 353, CRP 7.1, . Right knee aspirate consistent with hematoma at 4k WBC, 875k RBC; negative cultures thus far. [de-identified] : Cont nafcillin, TTWB RLE in locked [place] with walker - emphasized importance of limited WB given the recurrent hemarthroses\par Compressive dressing to R knee, ice/elevation\par RTC 2wk with R knee XRs [de-identified] : 78y/o male 2wk s/p right knee explantation and static cement spacer for chronic MSSA PJI

## 2020-06-17 LAB
BASOPHILS # BLD AUTO: 0.04 K/UL
BASOPHILS NFR BLD AUTO: 0.4 %
CRP SERPL-MCNC: 7.13 MG/DL
DEPRECATED D DIMER PPP IA-ACNC: 2382 NG/ML DDU
EOSINOPHIL # BLD AUTO: 1.05 K/UL
EOSINOPHIL NFR BLD AUTO: 11.1 %
ERYTHROCYTE [SEDIMENTATION RATE] IN BLOOD BY WESTERGREN METHOD: 118 MM/HR
HCT VFR BLD CALC: 26.7 %
HGB BLD-MCNC: 8 G/DL
IMM GRANULOCYTES NFR BLD AUTO: 0.3 %
LYMPHOCYTES # BLD AUTO: 1.31 K/UL
LYMPHOCYTES NFR BLD AUTO: 13.8 %
MAN DIFF?: NORMAL
MCHC RBC-ENTMCNC: 30 GM/DL
MCHC RBC-ENTMCNC: 30.2 PG
MCV RBC AUTO: 100.8 FL
MONOCYTES # BLD AUTO: 1.12 K/UL
MONOCYTES NFR BLD AUTO: 11.8 %
NEUTROPHILS # BLD AUTO: 5.91 K/UL
NEUTROPHILS NFR BLD AUTO: 62.6 %
PLATELET # BLD AUTO: 353 K/UL
RBC # BLD: 2.65 M/UL
RBC # FLD: 15.6 %
WBC # FLD AUTO: 9.46 K/UL

## 2020-06-22 ENCOUNTER — APPOINTMENT (OUTPATIENT)
Dept: INFECTIOUS DISEASE | Facility: CLINIC | Age: 80
End: 2020-06-22
Payer: MEDICARE

## 2020-06-22 VITALS
WEIGHT: 167 LBS | BODY MASS INDEX: 26.84 KG/M2 | HEIGHT: 66 IN | SYSTOLIC BLOOD PRESSURE: 166 MMHG | TEMPERATURE: 98.4 F | HEART RATE: 87 BPM | DIASTOLIC BLOOD PRESSURE: 80 MMHG | OXYGEN SATURATION: 99 %

## 2020-06-22 PROCEDURE — 99214 OFFICE O/P EST MOD 30 MIN: CPT

## 2020-06-22 RX ORDER — CEFAZOLIN SODIUM IN 0.9 % NACL 2 G/100 ML
PLASTIC BAG, INJECTION (ML) INTRAVENOUS
Refills: 0 | Status: DISCONTINUED | COMMUNITY
End: 2020-06-22

## 2020-06-22 RX ORDER — CEPHALEXIN 500 MG/1
500 CAPSULE ORAL EVERY 8 HOURS
Qty: 270 | Refills: 0 | Status: DISCONTINUED | COMMUNITY
Start: 2020-05-07 | End: 2020-06-22

## 2020-06-22 NOTE — HISTORY OF PRESENT ILLNESS
[FreeTextEntry1] : 78 yo male with hx CAD, DM, R TKR 2002, revision 3/6/20, initially p/w wound dehiscence and drainage from incision c/f SSI s/p washout 4/7 with purulence noted in joint space and concern for hardware involvement--OR cultures growing MSSA. Also found to be COVID-19+ during that admission (asymptomatic). He finished a six week course of cefazolin + rifampin on 5/18 and was transitioned to chronic suppressive therapy with cephalexin with plan for 3 month course. Subsequently developed a pimple developing over incision site c/f sinus tract formation/relapsed infection. s/p BRANDON and spacer placement 6/2--multiple OR cx with MSSA for which he has been on nafcillin. Course c/b hypokalemia likely due to nafcillin--resolved with KCl supplementation. \par 10 days ago had Tm 101.4--saw orthopedist who performed R knee arthrocentesis--studies c/w hemarthrosis. Notes R knee pain and swelling. Doing limited PT. No recurrent fevers. Tolerating nafcillin.

## 2020-06-22 NOTE — ASSESSMENT
[FreeTextEntry1] : 78 yo male with hx CAD, DM, R TKR 2002, revision 3/6/20, initially p/w wound dehiscence and drainage from incision c/f SSI s/p washout 4/7 with purulence noted in joint space and concern for hardware involvement--OR cultures growing MSSA. Also found to be COVID-19+ during that admission (asymptomatic). He finished a six week course of cefazolin + rifampin on 5/18 and was transitioned to chronic suppressive therapy with cephalexin with plan for 3 month course. Subsequently developed a pimple developing over incision site c/f sinus tract formation/relapsed infection. s/p BRANDON and spacer placement 6/2--multiple OR cx with MSSA for which he has been on nafcillin. Course c/b hypokalemia likely due to nafcillin--resolved with KCl supplementation. Course c/b hemarthrosis.\par - monitor weekly CBC, CMP, ESR, CRP\par - continue nafcillin 12g/d (continuous infusion pump)--anticipate 6 week course through 7/13\par - continue PO KCl supplementation while on nafcillin\par \par rtc 2 weeks  [Treatment Education] : treatment education [Treatment Adherence] : treatment adherence [Anticipatory Guidance] : anticipatory guidance

## 2020-06-22 NOTE — PHYSICAL EXAM
[General Appearance - Alert] : alert [General Appearance - In No Acute Distress] : in no acute distress [Auscultation Breath Sounds / Voice Sounds] : lungs were clear to auscultation bilaterally [Heart Sounds] : normal S1 and S2 [Heart Rate And Rhythm] : heart rate was normal and rhythm regular [Heart Sounds Gallop] : no gallops [Bowel Sounds] : normal bowel sounds [Murmurs] : no murmurs [Heart Sounds Pericardial Friction Rub] : no pericardial rub [Abdomen Tenderness] : non-tender [Abdomen Soft] : soft [] : no hepato-splenomegaly [Abdomen Mass (___ Cm)] : no abdominal mass palpated [Costovertebral Angle Tenderness] : no CVA tenderness [FreeTextEntry1] : R knee effusion; edema; slight warmth; incision c/d/i [Sensation] : the sensory exam was normal to light touch and pinprick [Deep Tendon Reflexes (DTR)] : deep tendon reflexes were 2+ and symmetric [No Focal Deficits] : no focal deficits [Affect] : the affect was normal [Oriented To Time, Place, And Person] : oriented to person, place, and time

## 2020-06-24 LAB
CULTURE RESULTS: NO GROWTH — SIGNIFICANT CHANGE UP
SPECIMEN SOURCE: SIGNIFICANT CHANGE UP

## 2020-06-30 ENCOUNTER — RESULT REVIEW (OUTPATIENT)
Age: 80
End: 2020-06-30

## 2020-06-30 ENCOUNTER — OUTPATIENT (OUTPATIENT)
Dept: OUTPATIENT SERVICES | Facility: HOSPITAL | Age: 80
LOS: 1 days | End: 2020-06-30
Payer: MEDICARE

## 2020-06-30 ENCOUNTER — APPOINTMENT (OUTPATIENT)
Dept: ORTHOPEDIC SURGERY | Facility: CLINIC | Age: 80
End: 2020-06-30
Payer: MEDICARE

## 2020-06-30 VITALS
BODY MASS INDEX: 27 KG/M2 | TEMPERATURE: 97.8 F | WEIGHT: 168 LBS | HEART RATE: 92 BPM | HEIGHT: 66 IN | DIASTOLIC BLOOD PRESSURE: 70 MMHG | OXYGEN SATURATION: 98 % | SYSTOLIC BLOOD PRESSURE: 132 MMHG

## 2020-06-30 DIAGNOSIS — Z96.653 PRESENCE OF ARTIFICIAL KNEE JOINT, BILATERAL: Chronic | ICD-10-CM

## 2020-06-30 DIAGNOSIS — I25.10 ATHEROSCLEROTIC HEART DISEASE OF NATIVE CORONARY ARTERY WITHOUT ANGINA PECTORIS: Chronic | ICD-10-CM

## 2020-06-30 DIAGNOSIS — K42.9 UMBILICAL HERNIA WITHOUT OBSTRUCTION OR GANGRENE: Chronic | ICD-10-CM

## 2020-06-30 DIAGNOSIS — Z98.890 OTHER SPECIFIED POSTPROCEDURAL STATES: Chronic | ICD-10-CM

## 2020-06-30 PROCEDURE — 73560 X-RAY EXAM OF KNEE 1 OR 2: CPT | Mod: 26,RT

## 2020-06-30 PROCEDURE — 99024 POSTOP FOLLOW-UP VISIT: CPT

## 2020-06-30 NOTE — HISTORY OF PRESENT ILLNESS
[de-identified] : 4wks out from R knee static spacer. Pain is manageable but he complains of constant discomfort and being totally reliant on his wife. Has been receiving nafcillin continuous infusion and is following with Dr. Lorenzana for same. Appetite is suppressed, though no marck nausea or other GI distress. Has been using the locked [place] as directed.  [de-identified] : s/p R knee explantation and application of static antibiotic cement spacer, DOS 6/2/20.  [de-identified] : Right knee XRs demonstrate the static antibiotic spacer. The anterior portion of the spacer has dissociated from the posterior portion but remains contained in reasonable position within the suprapatellar recess.\par \par Labwork reviewed, on chart. [facility] weekly trends: \par CRP 56 > 46 > 30\par  > 39 > 73 [de-identified] : Right knee incision healed without erythema, fluctuance, or drainage. Moderate effusion noted. Improved appearance to bilateral ankle and foot edema noted. DP palpable, feet warm, CR <2s. Calves nontender.  [de-identified] : Cont nafcillin, TTWB RLE in locked [place] with walker\par Cont compressive dressing to R knee, ice/elevation\par RTC 2wk with R knee XRs. Re-review inflammatory marker trend at that time as well. As long as trend toward normal persists (including through subsequent antibiotic holiday), will plan for revision to definitive TKA following the holiday [de-identified] : 78y/o male 4 weeks s/p right knee explantation and static spacer for chronic MSSA PJI

## 2020-07-01 LAB
CULTURE RESULTS: SIGNIFICANT CHANGE UP
SPECIMEN SOURCE: SIGNIFICANT CHANGE UP

## 2020-07-02 ENCOUNTER — INPATIENT (INPATIENT)
Facility: HOSPITAL | Age: 80
LOS: 1 days | Discharge: HOME HEALTH | DRG: 86 | End: 2020-07-04
Attending: NEUROLOGICAL SURGERY | Admitting: NEUROLOGICAL SURGERY
Payer: MEDICARE

## 2020-07-02 VITALS
WEIGHT: 164.91 LBS | HEART RATE: 104 BPM | DIASTOLIC BLOOD PRESSURE: 78 MMHG | HEIGHT: 66 IN | RESPIRATION RATE: 18 BRPM | OXYGEN SATURATION: 93 % | TEMPERATURE: 98 F | SYSTOLIC BLOOD PRESSURE: 153 MMHG

## 2020-07-02 DIAGNOSIS — E11.9 TYPE 2 DIABETES MELLITUS WITHOUT COMPLICATIONS: ICD-10-CM

## 2020-07-02 DIAGNOSIS — T84.59XA INFECTION AND INFLAMMATORY REACTION DUE TO OTHER INTERNAL JOINT PROSTHESIS, INITIAL ENCOUNTER: ICD-10-CM

## 2020-07-02 DIAGNOSIS — I25.10 ATHEROSCLEROTIC HEART DISEASE OF NATIVE CORONARY ARTERY WITHOUT ANGINA PECTORIS: ICD-10-CM

## 2020-07-02 DIAGNOSIS — N40.0 BENIGN PROSTATIC HYPERPLASIA WITHOUT LOWER URINARY TRACT SYMPTOMS: ICD-10-CM

## 2020-07-02 DIAGNOSIS — K42.9 UMBILICAL HERNIA WITHOUT OBSTRUCTION OR GANGRENE: Chronic | ICD-10-CM

## 2020-07-02 DIAGNOSIS — Z98.890 OTHER SPECIFIED POSTPROCEDURAL STATES: Chronic | ICD-10-CM

## 2020-07-02 DIAGNOSIS — I25.10 ATHEROSCLEROTIC HEART DISEASE OF NATIVE CORONARY ARTERY WITHOUT ANGINA PECTORIS: Chronic | ICD-10-CM

## 2020-07-02 DIAGNOSIS — E78.5 HYPERLIPIDEMIA, UNSPECIFIED: ICD-10-CM

## 2020-07-02 DIAGNOSIS — I10 ESSENTIAL (PRIMARY) HYPERTENSION: ICD-10-CM

## 2020-07-02 DIAGNOSIS — S06.5X9A TRAUMATIC SUBDURAL HEMORRHAGE WITH LOSS OF CONSCIOUSNESS OF UNSPECIFIED DURATION, INITIAL ENCOUNTER: ICD-10-CM

## 2020-07-02 DIAGNOSIS — Z96.653 PRESENCE OF ARTIFICIAL KNEE JOINT, BILATERAL: Chronic | ICD-10-CM

## 2020-07-02 LAB
APTT BLD: 34.9 SEC — SIGNIFICANT CHANGE UP (ref 27.5–35.5)
BASOPHILS # BLD AUTO: 0.06 K/UL — SIGNIFICANT CHANGE UP (ref 0–0.2)
BASOPHILS NFR BLD AUTO: 0.7 % — SIGNIFICANT CHANGE UP (ref 0–2)
CHOLEST SERPL-MCNC: 144 MG/DL — SIGNIFICANT CHANGE UP (ref 10–199)
EOSINOPHIL # BLD AUTO: 0.91 K/UL — HIGH (ref 0–0.5)
EOSINOPHIL NFR BLD AUTO: 10.1 % — HIGH (ref 0–6)
HCT VFR BLD CALC: 30.4 % — LOW (ref 39–50)
HDLC SERPL-MCNC: 39 MG/DL — LOW
HGB BLD-MCNC: 9.5 G/DL — LOW (ref 13–17)
IMM GRANULOCYTES NFR BLD AUTO: 0.4 % — SIGNIFICANT CHANGE UP (ref 0–1.5)
INR BLD: 1.08 — SIGNIFICANT CHANGE UP (ref 0.88–1.16)
LIPID PNL WITH DIRECT LDL SERPL: 75 MG/DL — SIGNIFICANT CHANGE UP
LYMPHOCYTES # BLD AUTO: 1.28 K/UL — SIGNIFICANT CHANGE UP (ref 1–3.3)
LYMPHOCYTES # BLD AUTO: 14.2 % — SIGNIFICANT CHANGE UP (ref 13–44)
MCHC RBC-ENTMCNC: 30.7 PG — SIGNIFICANT CHANGE UP (ref 27–34)
MCHC RBC-ENTMCNC: 31.3 GM/DL — LOW (ref 32–36)
MCV RBC AUTO: 98.4 FL — SIGNIFICANT CHANGE UP (ref 80–100)
MONOCYTES # BLD AUTO: 0.68 K/UL — SIGNIFICANT CHANGE UP (ref 0–0.9)
MONOCYTES NFR BLD AUTO: 7.5 % — SIGNIFICANT CHANGE UP (ref 2–14)
NEUTROPHILS # BLD AUTO: 6.04 K/UL — SIGNIFICANT CHANGE UP (ref 1.8–7.4)
NEUTROPHILS NFR BLD AUTO: 67.1 % — SIGNIFICANT CHANGE UP (ref 43–77)
NRBC # BLD: 0 /100 WBCS — SIGNIFICANT CHANGE UP (ref 0–0)
PLATELET # BLD AUTO: 377 K/UL — SIGNIFICANT CHANGE UP (ref 150–400)
PROTHROM AB SERPL-ACNC: 12.9 SEC — SIGNIFICANT CHANGE UP (ref 10.6–13.6)
RBC # BLD: 3.09 M/UL — LOW (ref 4.2–5.8)
RBC # FLD: 18.2 % — HIGH (ref 10.3–14.5)
TOTAL CHOLESTEROL/HDL RATIO MEASUREMENT: 3.7 RATIO — SIGNIFICANT CHANGE UP (ref 3.4–9.6)
TRIGL SERPL-MCNC: 151 MG/DL — HIGH (ref 10–149)
TROPONIN T SERPL-MCNC: 0.02 NG/ML — HIGH (ref 0–0.01)
WBC # BLD: 9.01 K/UL — SIGNIFICANT CHANGE UP (ref 3.8–10.5)
WBC # FLD AUTO: 9.01 K/UL — SIGNIFICANT CHANGE UP (ref 3.8–10.5)

## 2020-07-02 PROCEDURE — 70450 CT HEAD/BRAIN W/O DYE: CPT | Mod: 26

## 2020-07-02 PROCEDURE — 99223 1ST HOSP IP/OBS HIGH 75: CPT

## 2020-07-02 PROCEDURE — 72125 CT NECK SPINE W/O DYE: CPT | Mod: 26

## 2020-07-02 PROCEDURE — 99291 CRITICAL CARE FIRST HOUR: CPT

## 2020-07-02 PROCEDURE — 99231 SBSQ HOSP IP/OBS SF/LOW 25: CPT

## 2020-07-02 PROCEDURE — 99232 SBSQ HOSP IP/OBS MODERATE 35: CPT

## 2020-07-02 PROCEDURE — 73560 X-RAY EXAM OF KNEE 1 OR 2: CPT | Mod: 26,RT

## 2020-07-02 PROCEDURE — 70450 CT HEAD/BRAIN W/O DYE: CPT | Mod: 26,77

## 2020-07-02 RX ORDER — METOPROLOL TARTRATE 50 MG
25 TABLET ORAL DAILY
Refills: 0 | Status: DISCONTINUED | OUTPATIENT
Start: 2020-07-02 | End: 2020-07-04

## 2020-07-02 RX ORDER — DESMOPRESSIN ACETATE 0.1 MG/1
20 TABLET ORAL ONCE
Refills: 0 | Status: DISCONTINUED | OUTPATIENT
Start: 2020-07-02 | End: 2020-07-02

## 2020-07-02 RX ORDER — ONDANSETRON 8 MG/1
4 TABLET, FILM COATED ORAL EVERY 6 HOURS
Refills: 0 | Status: DISCONTINUED | OUTPATIENT
Start: 2020-07-02 | End: 2020-07-04

## 2020-07-02 RX ORDER — ATORVASTATIN CALCIUM 80 MG/1
20 TABLET, FILM COATED ORAL AT BEDTIME
Refills: 0 | Status: DISCONTINUED | OUTPATIENT
Start: 2020-07-02 | End: 2020-07-04

## 2020-07-02 RX ORDER — TAMSULOSIN HYDROCHLORIDE 0.4 MG/1
0.4 CAPSULE ORAL AT BEDTIME
Refills: 0 | Status: DISCONTINUED | OUTPATIENT
Start: 2020-07-02 | End: 2020-07-04

## 2020-07-02 RX ORDER — LEVETIRACETAM 250 MG/1
500 TABLET, FILM COATED ORAL EVERY 12 HOURS
Refills: 0 | Status: DISCONTINUED | OUTPATIENT
Start: 2020-07-03 | End: 2020-07-04

## 2020-07-02 RX ORDER — LOSARTAN POTASSIUM 100 MG/1
100 TABLET, FILM COATED ORAL DAILY
Refills: 0 | Status: DISCONTINUED | OUTPATIENT
Start: 2020-07-02 | End: 2020-07-04

## 2020-07-02 RX ORDER — DESMOPRESSIN ACETATE 0.1 MG/1
20 TABLET ORAL ONCE
Refills: 0 | Status: COMPLETED | OUTPATIENT
Start: 2020-07-02 | End: 2020-07-02

## 2020-07-02 RX ORDER — ACETAMINOPHEN 500 MG
650 TABLET ORAL EVERY 6 HOURS
Refills: 0 | Status: DISCONTINUED | OUTPATIENT
Start: 2020-07-02 | End: 2020-07-04

## 2020-07-02 RX ORDER — CEFAZOLIN SODIUM 1 G
2000 VIAL (EA) INJECTION EVERY 12 HOURS
Refills: 0 | Status: DISCONTINUED | OUTPATIENT
Start: 2020-07-02 | End: 2020-07-04

## 2020-07-02 RX ORDER — SODIUM CHLORIDE 9 MG/ML
1000 INJECTION INTRAMUSCULAR; INTRAVENOUS; SUBCUTANEOUS
Refills: 0 | Status: DISCONTINUED | OUTPATIENT
Start: 2020-07-02 | End: 2020-07-03

## 2020-07-02 RX ORDER — AMLODIPINE BESYLATE 2.5 MG/1
1 TABLET ORAL
Qty: 0 | Refills: 0 | DISCHARGE

## 2020-07-02 RX ORDER — TAMSULOSIN HYDROCHLORIDE 0.4 MG/1
1 CAPSULE ORAL
Qty: 0 | Refills: 0 | DISCHARGE

## 2020-07-02 RX ORDER — CHOLECALCIFEROL (VITAMIN D3) 125 MCG
1 CAPSULE ORAL
Qty: 0 | Refills: 0 | DISCHARGE

## 2020-07-02 RX ORDER — OMEPRAZOLE 10 MG/1
1 CAPSULE, DELAYED RELEASE ORAL
Qty: 0 | Refills: 0 | DISCHARGE

## 2020-07-02 RX ORDER — DULAGLUTIDE 4.5 MG/.5ML
0 INJECTION, SOLUTION SUBCUTANEOUS
Qty: 0 | Refills: 0 | DISCHARGE

## 2020-07-02 RX ORDER — LEVETIRACETAM 250 MG/1
1000 TABLET, FILM COATED ORAL ONCE
Refills: 0 | Status: COMPLETED | OUTPATIENT
Start: 2020-07-02 | End: 2020-07-02

## 2020-07-02 RX ADMIN — SODIUM CHLORIDE 50 MILLILITER(S): 9 INJECTION INTRAMUSCULAR; INTRAVENOUS; SUBCUTANEOUS at 22:15

## 2020-07-02 RX ADMIN — LEVETIRACETAM 1000 MILLIGRAM(S): 250 TABLET, FILM COATED ORAL at 17:16

## 2020-07-02 RX ADMIN — DESMOPRESSIN ACETATE 220 MICROGRAM(S): 0.1 TABLET ORAL at 13:57

## 2020-07-02 RX ADMIN — Medication 100 MILLIGRAM(S): at 18:14

## 2020-07-02 RX ADMIN — ATORVASTATIN CALCIUM 20 MILLIGRAM(S): 80 TABLET, FILM COATED ORAL at 22:15

## 2020-07-02 RX ADMIN — Medication 650 MILLIGRAM(S): at 18:31

## 2020-07-02 RX ADMIN — TAMSULOSIN HYDROCHLORIDE 0.4 MILLIGRAM(S): 0.4 CAPSULE ORAL at 22:15

## 2020-07-02 NOTE — ED ADULT NURSE NOTE - OBJECTIVE STATEMENT
Patient AOX4 presenting with R hand weakness x 0600 today. Patient reports he was urinating in the middle of the night and his R hand became acutely numb. Patient with R knee replacement sx June 2, 2020. Reports mechanical fall last night while ambulating with walker "in a very narrow hallway". Patient denies HA/nausea/visual disturbances. Answering questions and following verbal commands appropriately. Patient states symptoms improved x onset.

## 2020-07-02 NOTE — ED ADULT NURSE NOTE - CHPI ED NUR SYMPTOMS NEG
no weakness/no confusion/no fever/no vomiting/no blurred vision/no change in level of consciousness/no dizziness/no loss of consciousness/no nausea

## 2020-07-02 NOTE — CONSULT NOTE ADULT - ASSESSMENT
80yo male with PMH of CAD s/p 6 stents 6-7 years ago  (on ASA & Plavix), DM, HTN, HLD, and chronic right knee prosthetic joint infection s/p primary TKA in 2002 with 2x I&D and most recently 6/20 w/ spacer placement who presents today complaining of R hand numbness/weakness and clumsiness.   Stroke was initiated for R hand weakness, NIHSS and CTH revealed acute left frontoparietal SDH with mild left to right midline shift. CTA H/N and CTP not obtained given SDH seen on CTH.     Recommendations:  - Consult neurosurgery team  - Reverse and hold anti-platelet regimen (ASA and Plavix)  - DVT ppx - SCDs  - PT/OT eval

## 2020-07-02 NOTE — CONSULT NOTE ADULT - SUBJECTIVE AND OBJECTIVE BOX
Cardiology Consult    HPI: 79M PMH giant cell arteritis, HTN, CAD with 6 stents in past, approximately 8 years ago, DM presented with R hand weakness after fall yesterday. Patient recently admitted for infection of R prosthetic knee and has been on IV antibiotics and knee immobilzer     OBJECTIVE  Vitals:  T(C): 36.5 (07-02-20 @ 17:00), Max: 36.9 (07-02-20 @ 12:25)  HR: 90 (07-02-20 @ 17:00) (79 - 104)  BP: 162/73 (07-02-20 @ 17:00) (153/78 - 163/74)  RR: 18 (07-02-20 @ 17:00) (18 - 18)  SpO2: 99% (07-02-20 @ 17:00) (93% - 99%)  Wt(kg): --    I/O:  I&O's Summary      PHYSICAL EXAM:  Appearance: NAD. Speaking in full sentences.   HEENT: No pallor noted.  Conjunctiva clear b/l. Moist oral mucosa.  Cardiovascular: RRR with no murmurs.  Respiratory: Lungs CTAB.   Gastrointestinal:  Soft, nontender. Non-distended. Non-rigid.	  Extremities: No edema b/l. No erythema b/l. LE WWP b/l.  Vascular: DP intact  Neurologic:  Alert and awake. Moving all extremities. Following commands.   	  LABS:                        9.5    9.01  )-----------( 377      ( 02 Jul 2020 12:50 )             30.4     07-02    136  |  104  |  25<H>  ----------------------------<  151<H>  5.2   |  18<L>  |  1.70<H>    Ca    9.4      02 Jul 2020 12:50    TPro  7.1  /  Alb  4.0  /  TBili  0.2  /  DBili  x   /  AST  26  /  ALT  14  /  AlkPhos  81  07-02    PT/INR - ( 02 Jul 2020 12:50 )   PT: 12.9 sec;   INR: 1.08          PTT - ( 02 Jul 2020 12:50 )  PTT:34.9 sec      RADIOLOGY & ADDITIONAL TESTS:  Reviewed .    MEDICATIONS  (STANDING):  atorvastatin 20 milliGRAM(s) Oral at bedtime  ceFAZolin   IVPB 2000 milliGRAM(s) IV Intermittent every 12 hours  losartan 100 milliGRAM(s) Oral daily  metoprolol succinate ER 25 milliGRAM(s) Oral daily  tamsulosin 0.4 milliGRAM(s) Oral at bedtime    MEDICATIONS  (PRN):  acetaminophen   Tablet .. 650 milliGRAM(s) Oral every 6 hours PRN Temp greater or equal to 38C (100.4F), Mild Pain (1 - 3)  ondansetron   Disintegrating Tablet 4 milliGRAM(s) Oral every 6 hours PRN Nausea Cardiology Consult    HPI: 79M PMH giant cell arteritis, HTN, CAD with 6 stents in past, approximately 8 years ago, DM presented with R hand weakness after fall yesterday. Patient recently admitted for infection of R prosthetic knee and has been on IV antibiotics and knee immobilzer and walking with     OBJECTIVE  Vitals:  T(C): 36.5 (07-02-20 @ 17:00), Max: 36.9 (07-02-20 @ 12:25)  HR: 90 (07-02-20 @ 17:00) (79 - 104)  BP: 162/73 (07-02-20 @ 17:00) (153/78 - 163/74)  RR: 18 (07-02-20 @ 17:00) (18 - 18)  SpO2: 99% (07-02-20 @ 17:00) (93% - 99%)  Wt(kg): --    I/O:  I&O's Summary      PHYSICAL EXAM:  Appearance: NAD. Speaking in full sentences.   HEENT: No pallor noted.  Conjunctiva clear b/l. Moist oral mucosa.  Cardiovascular: RRR with no murmurs.  Respiratory: Lungs CTAB.   Gastrointestinal:  Soft, nontender. Non-distended. Non-rigid.	  Extremities: No edema b/l. No erythema b/l. LE WWP b/l.  Vascular: DP intact  Neurologic:  Alert and awake. Moving all extremities. Following commands.   	  LABS:                        9.5    9.01  )-----------( 377      ( 02 Jul 2020 12:50 )             30.4     07-02    136  |  104  |  25<H>  ----------------------------<  151<H>  5.2   |  18<L>  |  1.70<H>    Ca    9.4      02 Jul 2020 12:50    TPro  7.1  /  Alb  4.0  /  TBili  0.2  /  DBili  x   /  AST  26  /  ALT  14  /  AlkPhos  81  07-02    PT/INR - ( 02 Jul 2020 12:50 )   PT: 12.9 sec;   INR: 1.08          PTT - ( 02 Jul 2020 12:50 )  PTT:34.9 sec      RADIOLOGY & ADDITIONAL TESTS:  Reviewed .    MEDICATIONS  (STANDING):  atorvastatin 20 milliGRAM(s) Oral at bedtime  ceFAZolin   IVPB 2000 milliGRAM(s) IV Intermittent every 12 hours  losartan 100 milliGRAM(s) Oral daily  metoprolol succinate ER 25 milliGRAM(s) Oral daily  tamsulosin 0.4 milliGRAM(s) Oral at bedtime    MEDICATIONS  (PRN):  acetaminophen   Tablet .. 650 milliGRAM(s) Oral every 6 hours PRN Temp greater or equal to 38C (100.4F), Mild Pain (1 - 3)  ondansetron   Disintegrating Tablet 4 milliGRAM(s) Oral every 6 hours PRN Nausea Cardiology Consult    HPI: 79M PMH giant cell arteritis, HTN, CAD with 6 stents in past, approximately 8 years ago, DM presented with R hand weakness after fall yesterday. Patient recently admitted for infection of R prosthetic knee and has been on IV antibiotics and knee immobilizer and walking with walker. Patient had fall with walker yesterday and noted R hand weakness this AM. Patient brought to [facility] and stroke code called. CTH demonstrated acute left subdural hemorrhage with 1-2mm midline shift. Patient had reported taking his home ASA/plavix this AM and given DDAVP in ER for reversal. Cardiology consulted for evaluation of need for DAPT.   Patient states work up of CAD about 8 years ago for which he had presented with dyspnea on exertion- had stress test for which was positive- had [facility] for which underwent PCI. Over course of 1-2 years had 6 stents placed and reports no stents within last 5 years. Patient reports no active chest pain, shortness of breath. No longer with dyspnea since stents placed. Episode of fall not associated with headache, dizziness, no chest pain, shortness of breath or palpitations.    OBJECTIVE  Vitals:  T(C): 36.5 (07-02-20 @ 17:00), Max: 36.9 (07-02-20 @ 12:25)  HR: 90 (07-02-20 @ 17:00) (79 - 104)  BP: 162/73 (07-02-20 @ 17:00) (153/78 - 163/74)  RR: 18 (07-02-20 @ 17:00) (18 - 18)  SpO2: 99% (07-02-20 @ 17:00) (93% - 99%)  Wt(kg): --    I/O:  I&O's Summary      PHYSICAL EXAM:  Appearance: NAD. Speaking in full sentences.   HEENT:  Conjunctiva clear b/l. Moist oral mucosa.  Cardiovascular: RRR with no murmurs.  Respiratory: Lungs CTAB. 	  Extremities: No edema b/l. No erythema b/l. LE WWP b/l.  Vascular: DP intact  Neurologic:  Alert and awake. Moving all extremities. Following commands.   	  LABS:                        9.5    9.01  )-----------( 377      ( 02 Jul 2020 12:50 )             30.4     07-02    136  |  104  |  25<H>  ----------------------------<  151<H>  5.2   |  18<L>  |  1.70<H>    Ca    9.4      02 Jul 2020 12:50    TPro  7.1  /  Alb  4.0  /  TBili  0.2  /  DBili  x   /  AST  26  /  ALT  14  /  AlkPhos  81  07-02    PT/INR - ( 02 Jul 2020 12:50 )   PT: 12.9 sec;   INR: 1.08          PTT - ( 02 Jul 2020 12:50 )  PTT:34.9 sec      RADIOLOGY & ADDITIONAL TESTS:  Reviewed .    MEDICATIONS  (STANDING):  atorvastatin 20 milliGRAM(s) Oral at bedtime  ceFAZolin   IVPB 2000 milliGRAM(s) IV Intermittent every 12 hours  losartan 100 milliGRAM(s) Oral daily  metoprolol succinate ER 25 milliGRAM(s) Oral daily  tamsulosin 0.4 milliGRAM(s) Oral at bedtime    MEDICATIONS  (PRN):  acetaminophen   Tablet .. 650 milliGRAM(s) Oral every 6 hours PRN Temp greater or equal to 38C (100.4F), Mild Pain (1 - 3)  ondansetron   Disintegrating Tablet 4 milliGRAM(s) Oral every 6 hours PRN Nausea

## 2020-07-02 NOTE — CONSULT NOTE ADULT - ASSESSMENT
ASSESSMENT:   79M PMH CAD (stents >5 years ago, no MI in past), giant cell arteritis, DM, HTN presenting with fall and noted subdural hematoma. Cardiology consulted for DAPT recommendations in setting of bleed.    PLAN:   #CAD  Patient with stent >5 years ago, no active chest pain, no dizziness.   - Given no recent stent in last year and recommend holding DAPT at this time  - Have patient follow up with cardiologist Dr. Hill for restarting      Cardiology will sign off at this time. Reconsult with any other questions. ASSESSMENT:   79M PMH CAD (stents >5 years ago, no MI in past), giant cell arteritis, DM, HTN presenting with fall and noted subdural hematoma. Cardiology consulted for DAPT recommendations in setting of bleed.    PLAN:   #CAD  Patient with stent >5 years ago, no active chest pain/SOB or other anginal equivalents. EKG without ischemic changes.  - Given last PCI > 5 years ago and no active cardiac issues, ok to hold DAPT in setting of  SDH   - Patient to fu with his cardiologist, Dr. Hill for follow up as outpatient      Cardiology will sign off at this time. Please  reconsult with any other questions.

## 2020-07-02 NOTE — H&P ADULT - NSHPPHYSICALEXAM_GEN_ALL_CORE
General: patient seen sitting upright in bed in NAD  Neuro: AAOx3, FE, OE spontaneously, speech clear and fluent, CNII-XI grossly intact, face symmetric, no pronator drift, finger to nose intact, strength 5/5 b/l UE and LE, SILT throughout  HEENT: PERRL, EOMI  Neck: supple  Cardiac: RRR, S1S2  Pulmonary: chest rise symmetric  Abdomen: soft, nontender, nondistended  Ext: warm, perfusing well

## 2020-07-02 NOTE — H&P ADULT - NSHPLABSRESULTS_GEN_ALL_CORE
< from: CT Brain Stroke Protocol (07.02.20 @ 12:54) >      IMPRESSION:    Acute subdural hematoma overlying the left frontoparietal convexity, as above. Mild left to right midline shift. No acute transcortical infarction.    Age-appropriate volume loss with minimal small vessel ischemic disease.    < end of copied text >    .  LABS:                         9.5    9.01  )-----------( 377      ( 02 Jul 2020 12:50 )             30.4     07-02    136  |  104  |  25<H>  ----------------------------<  151<H>  5.2   |  18<L>  |  1.70<H>    Ca    9.4      02 Jul 2020 12:50    TPro  7.1  /  Alb  4.0  /  TBili  0.2  /  DBili  x   /  AST  26  /  ALT  14  /  AlkPhos  81  07-02    PT/INR - ( 02 Jul 2020 12:50 )   PT: 12.9 sec;   INR: 1.08          PTT - ( 02 Jul 2020 12:50 )  PTT:34.9 sec          RADIOLOGY, EKG & ADDITIONAL TESTS: Reviewed.

## 2020-07-02 NOTE — ED PROVIDER NOTE - OBJECTIVE STATEMENT
80 yo male with hx anemia CAD HTN HLD  recent right knee replacement 2 months ago now receiving antibiotics as out pt with increased Scr to 1.9, with fall 1day ago  no apparent head trauma no back or rib pain or sob-with right  weakness and decreased coordination of fine movements of right hand - no dizziness - no visual complaints   no prior hx CV or TIA-- n leg swelling clf tenderness no sob or cp

## 2020-07-02 NOTE — CONSULT NOTE ADULT - SUBJECTIVE AND OBJECTIVE BOX
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 As per wife, patient was unable to hold a cup properly without it falling to the floor, and she noticed intermittent R hand twitching since 6am. Following CTH, patient was questioned about potential fall, and he report that he fell last night while ambulating with the walker, and he fell to his left without hitting his head. Stroke was initiated for R hand weakness, NIHSS and CTH revealed acute left frontoparietal SDH with mild left to right midline shift. CTA H/N and CTP not obtained given SDH seen on CTH.     PAST MEDICAL & SURGICAL HISTORY:  Anemia  Hyperlipidemia  CAD (coronary artery disease)  DM (diabetes mellitus)  Hypertension  H/O shoulder surgery: Right  Umbilical hernia: Umbilical and inguinal hernia  H/O total knee replacement, bilateral  CAD (coronary artery disease): stents placement    Review of Systems:  Constitutional: No fever, weight loss or fatigue  Eyes: No eye pain, visual disturbances, or discharge  ENMT:  No difficulty hearing, tinnitus, vertigo; No sinus or throat pain  Neck: No pain or stiffness  Respiratory: No cough, wheezing, chills or hemoptysis  Cardiovascular: No chest pain, palpitations, shortness of breath, or leg swelling  Gastrointestinal: No abdominal pain. No nausea, vomiting or hematemesis; No diarrhea or constipation. No hematochezia.  Genitourinary: No dysuria, frequency, hematuria or incontinence  Neurological: As per HPI    Allergies  No Known Allergies    Vital Signs Last 24 Hrs  T(C): 36.9 (02 Jul 2020 12:25), Max: 36.9 (02 Jul 2020 12:25)  T(F): 98.4 (02 Jul 2020 12:25), Max: 98.4 (02 Jul 2020 12:25)  HR: 83 (02 Jul 2020 13:25) (83 - 104)  BP: 163/74 (02 Jul 2020 13:25) (153/78 - 163/74)  BP(mean): --  RR: 18 (02 Jul 2020 13:25) (18 - 18)  SpO2: 98% (02 Jul 2020 13:25) (93% - 98%)    PHYSICAL EXAM:  Constitutional: Elderly man in no acute distress    Neurologic:  -Mental status: Alert and oriented to person, age, and month. Speech is fluent with intact naming.  Recent and remote memory intact. Attention/concentration intact.  No dysarthria, no aphasia.     -Cranial nerves:  II: Visual fields full to confrontation. Pupils PERRLA 3mm brisk  III, IV, VI: extraocular movements are intact without nystagmus  V: Facial sensation intact V1 through V3 intact bilaterally  VII: No facial droop  VIII: hearing is intact to finger rub  IX, X: uvula is midline and soft palate rises symmetrically  XI: Head turning and shoulder shrug intact  XII: Tongue protrudes in the midline    -Motor: Normal bulk and tone, strength 5/5 in bilateral upper and lower extremities.  strength 5/5. -Sensation: Intact to light touch.  No neglect.   -Coordination: No dysmetria on finger-to-nose.   R hand clumsiness, decreased finger tapping on R hand.   -Gait: Deferred    NIHSS:    Fingerstick Blood Glucose: CAPILLARY BLOOD GLUCOSE  POCT Blood Glucose.: 148 mg/dL (02 Jul 2020 12:30)       LABS:                        9.5    9.01  )-----------( 377      ( 02 Jul 2020 12:50 )             30.4     07-02    136  |  104  |  25<H>  ----------------------------<  151<H>  5.2   |  18<L>  |  1.70<H>    Ca    9.4      02 Jul 2020 12:50    TPro  7.1  /  Alb  4.0  /  TBili  0.2  /  DBili  x   /  AST  26  /  ALT  14  /  AlkPhos  81  07-02    PT/INR - ( 02 Jul 2020 12:50 )   PT: 12.9 sec;   INR: 1.08          PTT - ( 02 Jul 2020 12:50 )  PTT:34.9 sec  CARDIAC MARKERS ( 02 Jul 2020 12:50 )  x     / 0.02 ng/mL / x     / x     / x        RADIOLOGY & ADDITIONAL STUDIES:  < from: CT Brain Stroke Protocol (07.02.20 @ 12:54) >  IMPRESSION:     Acute subdural hematoma overlying the left frontoparietal convexity, as above. Mild left to right midline shift. No acute transcortical infarction.    Age-appropriate volume loss with minimal small vessel ischemic disease.    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 As per wife, patient was unable to hold a cup properly without it falling to the floor, and she noticed intermittent R hand twitching since 6am. Following CTH, patient was questioned about potential fall, and he report that he fell last night while ambulating with the walker, and he fell to his left without hitting his head. Stroke was initiated for R hand weakness, NIHSS 3, and CTH revealed acute left frontoparietal SDH with mild left to right midline shift. CTA H/N and CTP not obtained given SDH seen on CTH.     PAST MEDICAL & SURGICAL HISTORY:  Anemia  Hyperlipidemia  CAD (coronary artery disease)  DM (diabetes mellitus)  Hypertension  H/O shoulder surgery: Right  Umbilical hernia: Umbilical and inguinal hernia  H/O total knee replacement, bilateral  CAD (coronary artery disease): stents placement    Review of Systems:  Constitutional: No fever, weight loss or fatigue  Eyes: No eye pain, visual disturbances, or discharge  ENMT:  No difficulty hearing, tinnitus, vertigo; No sinus or throat pain  Neck: No pain or stiffness  Respiratory: No cough, wheezing, chills or hemoptysis  Cardiovascular: No chest pain, palpitations, shortness of breath, or leg swelling  Gastrointestinal: No abdominal pain. No nausea, vomiting or hematemesis; No diarrhea or constipation. No hematochezia.  Genitourinary: No dysuria, frequency, hematuria or incontinence  Neurological: As per HPI    Allergies  No Known Allergies    Vital Signs Last 24 Hrs  T(C): 36.9 (02 Jul 2020 12:25), Max: 36.9 (02 Jul 2020 12:25)  T(F): 98.4 (02 Jul 2020 12:25), Max: 98.4 (02 Jul 2020 12:25)  HR: 83 (02 Jul 2020 13:25) (83 - 104)  BP: 163/74 (02 Jul 2020 13:25) (153/78 - 163/74)  BP(mean): --  RR: 18 (02 Jul 2020 13:25) (18 - 18)  SpO2: 98% (02 Jul 2020 13:25) (93% - 98%)    PHYSICAL EXAM:  Constitutional: Elderly man in no acute distress    Neurologic:  -Mental status: Alert and oriented to person, age, and month. Speech is fluent with intact naming.  Recent and remote memory intact. Attention/concentration intact.  No dysarthria, no aphasia.     -Cranial nerves:  II: Visual fields full to confrontation. Pupils PERRLA 3mm brisk  III, IV, VI: extraocular movements are intact without nystagmus  V: Facial sensation intact V1 through V3 intact bilaterally  VII: No facial droop  VIII: hearing is intact to finger rub  IX, X: uvula is midline and soft palate rises symmetrically  XI: Head turning and shoulder shrug intact  XII: Tongue protrudes in the midline    -Motor: Normal bulk and tone, strength 5/5 in bilateral upper and lower extremities.  strength 5/5. -Sensation: Intact to light touch.  No neglect.   -Coordination: No dysmetria on finger-to-nose.   R hand clumsiness, decreased finger tapping on R hand.   -Gait: Deferred    NIHSS: 3    Fingerstick Blood Glucose: CAPILLARY BLOOD GLUCOSE  POCT Blood Glucose.: 148 mg/dL (02 Jul 2020 12:30)       LABS:                        9.5    9.01  )-----------( 377      ( 02 Jul 2020 12:50 )             30.4     07-02    136  |  104  |  25<H>  ----------------------------<  151<H>  5.2   |  18<L>  |  1.70<H>    Ca    9.4      02 Jul 2020 12:50    TPro  7.1  /  Alb  4.0  /  TBili  0.2  /  DBili  x   /  AST  26  /  ALT  14  /  AlkPhos  81  07-02    PT/INR - ( 02 Jul 2020 12:50 )   PT: 12.9 sec;   INR: 1.08          PTT - ( 02 Jul 2020 12:50 )  PTT:34.9 sec  CARDIAC MARKERS ( 02 Jul 2020 12:50 )  x     / 0.02 ng/mL / x     / x     / x        RADIOLOGY & ADDITIONAL STUDIES:  < from: CT Brain Stroke Protocol (07.02.20 @ 12:54) >  IMPRESSION:     Acute subdural hematoma overlying the left frontoparietal convexity, as above. Mild left to right midline shift. No acute transcortical infarction.    Age-appropriate volume loss with minimal small vessel ischemic disease.    The study was completed at 12:45 PM and the findings were discussed with JAVED Monteiro at 12:50 PM on 7/2/2020    < end of copied text >    IV-tPA: Not given                                     Reason IV-tPA not given: Recent surgery, and SDH on CTH

## 2020-07-02 NOTE — H&P ADULT - PROBLEM SELECTOR PLAN 2
-cardiology at [facility] consulted, recs to d/c aspirin and plavix  -message left for outpatient cardiologist, Dr. Crooks, to call back

## 2020-07-02 NOTE — CONSULT NOTE ADULT - ASSESSMENT
80 yo male with hx CAD, DM, R TKR 2002, revision 3/6/20, initially p/w wound dehiscence and drainage from incision c/f SSI s/p washout 4/7 with purulence noted in joint space and concern for hardware involvement--OR cultures growing MSSA. Also found to be COVID-19+ during that admission (asymptomatic). He finished a six week course of cefazolin + rifampin on 5/18 and was transitioned to chronic suppressive therapy with cephalexin with plan for 3 month course however subsequently developed sinus tract formation/relapsed infection. s/p BRANDON and spacer placement 6/2. Course c/b SUBHASH (?AIN in setting of nafcillin (also with peripheral eosinophilia)). He had a mechanical fall yesterday--no LOC--lost his balance. Apparently hit his head. Early this AM developed acute onset of R hand tingling/numbness as well as decreased  strength. Sent emergently to ED--found to have acute L frontoparietal SDH.   - continue cefazolin 2g IV q12h    Patient has f/u scheduled with me Monday 7/6    Please reconsult with ?    Dr. Masterson to cover 7/3-5 should questions or concerns arise    ID Team 2

## 2020-07-02 NOTE — CONSULT NOTE ADULT - ATTENDING COMMENTS
I have personally seen, examined, and participated in the care of this patient. I have reviewed all pertinent clinical information, including history, physical exam, plan, laboratory, imaging, fellow's note and agree with the above.

## 2020-07-02 NOTE — H&P ADULT - NSICDXPASTMEDICALHX_GEN_ALL_CORE_FT
PAST MEDICAL HISTORY:  Anemia     BPH (benign prostatic hyperplasia)     CAD (coronary artery disease) 6 stents    DM (diabetes mellitus)     Giant cell arteritis     Hyperlipidemia     Hypertension

## 2020-07-02 NOTE — ED PROVIDER NOTE - CLINICAL SUMMARY MEDICAL DECISION MAKING FREE TEXT BOX
80 yo male right hand dominant with weakness and decreased dexterity of his right hand approx 6-7 hrs ago  no dizziness or headache no facial droop noted  - hx right knee replacement with infection currently being treated with IV abx and now increased SCr to 1.9 per Dr. Lorenzana.   noted SDH left sided  no mass effect - 1- 2 mm shift L--R-- Admit to Dr Zoey Larkin

## 2020-07-02 NOTE — H&P ADULT - ASSESSMENT
80 y/o male with PMH of BPH, giant cell arteritis, HLD, HTN, CAD w/ 6 stents placed >5 years ago (on ASA/plavix at home) and DM presents with R hand weakness (resolved) s/p fall on 7/1 with acute L SDH on CTH.

## 2020-07-02 NOTE — ED PROVIDER NOTE - CRANIAL NERVE AND PUPILLARY EXAM
cranial nerves 2-12 intact/corneal reflex intact/central vision intact/extra-ocular movements intact/central and peripheral vision intact/gag reflex intact/tongue is midline/cough reflex intact/peripheral vision intact

## 2020-07-02 NOTE — CONSULT NOTE ADULT - SUBJECTIVE AND OBJECTIVE BOX
HPI: 80 yo male with hx CAD, DM, R TKR 2002, revision 3/6/20, initially p/w wound dehiscence and drainage from incision c/f SSI s/p washout 4/7 with purulence noted in joint space and concern for hardware involvement--OR cultures growing MSSA. Also found to be COVID-19+ during that admission (asymptomatic). He finished a six week course of cefazolin + rifampin on 5/18 and was transitioned to chronic suppressive therapy with cephalexin with plan for 3 month course however subsequently developed sinus tract formation/relapsed infection. s/p BRANDON and spacer placement 6/2. Course c/b SUBHASH (?AIN in setting of nafcillin (also with peripheral eosinophilia)). He had a mechanical fall yesterday--no LOC--lost his balance. Apparently hit his head. Early this AM developed acute onset of R hand tingling/numbness as well as decreased  strength. Sent emergently to ED--found to have acute L frontoparietal SDH.       PAST MEDICAL & SURGICAL HISTORY:  Anemia  Hyperlipidemia  CAD (coronary artery disease)  DM (diabetes mellitus)  Hypertension  H/O shoulder surgery: Right  Umbilical hernia: Umbilical and inguinal hernia  H/O total knee replacement, bilateral  CAD (coronary artery disease): stents placement        REVIEW OF SYSTEMS:    General:	 no weakness; no fevers, no chills  Skin/Breast: no rash  Respiratory and Thorax: no SOB, no cough  Cardiovascular:	No chest pain  Gastrointestinal:	 no nausea, vomiting , diarrhea  Genitourinary:	no dysuria, no difficulty urinating, no hematuria  Musculoskeletal:	no weakness, + joint swelling/pain  Neurological:	no focal weakness/numbness  Endocrine:	no polyuria, no polydipsia      ANTIBIOTICS:  MEDICATIONS  (STANDING):  levETIRAcetam 1000 milliGRAM(s) Oral once    MEDICATIONS  (PRN):  ondansetron   Disintegrating Tablet 4 milliGRAM(s) Oral every 6 hours PRN Nausea      Allergies    No Known Allergies    Intolerances        SOCIAL HISTORY:    FAMILY HISTORY:      Vital Signs Last 24 Hrs  T(C): 36.7 (02 Jul 2020 15:35), Max: 36.9 (02 Jul 2020 12:25)  T(F): 98.1 (02 Jul 2020 15:35), Max: 98.4 (02 Jul 2020 12:25)  HR: 79 (02 Jul 2020 15:35) (79 - 104)  BP: 154/70 (02 Jul 2020 15:35) (153/78 - 163/74)  BP(mean): --  RR: 18 (02 Jul 2020 15:35) (18 - 18)  SpO2: 99% (02 Jul 2020 15:35) (93% - 99%)    PHYSICAL EXAM:  Constitutional:Well-developed, well nourished  Eyes:DANA, EOMI  Ear/Nose/Throat: no oral lesion, no sinus tenderness on percussion	  Neck:no JVD, no lymphadenopathy, supple  Respiratory: CTA kris  Cardiovascular: S1S2 RRR, no murmurs  Gastrointestinal:soft, (+) BS, no HSM  Extremities: R knee swollen; slightly warm   Vascular: DP Pulse:	right normal; left normal            LABS:                        9.5    9.01  )-----------( 377      ( 02 Jul 2020 12:50 )             30.4     07-02    136  |  104  |  25<H>  ----------------------------<  151<H>  5.2   |  18<L>  |  1.70<H>    Ca    9.4      02 Jul 2020 12:50    TPro  7.1  /  Alb  4.0  /  TBili  0.2  /  DBili  x   /  AST  26  /  ALT  14  /  AlkPhos  81  07-02    PT/INR - ( 02 Jul 2020 12:50 )   PT: 12.9 sec;   INR: 1.08          PTT - ( 02 Jul 2020 12:50 )  PTT:34.9 sec      MICROBIOLOGY: reviewed  RADIOLOGY & ADDITIONAL STUDIES: reviewed

## 2020-07-02 NOTE — ED ADULT NURSE NOTE - CHIEF COMPLAINT QUOTE
78 y/o male sent by his PMD for evaluation of right hand numbness since 0630 today, pt on IV antibiotics every 4 hours noted PICC line to right upper arm. Pt is aox4, denies any other neuro symptoms. Pt ambulates with walker due to "missing right knee"

## 2020-07-02 NOTE — CONSULT NOTE ADULT - SUBJECTIVE AND OBJECTIVE BOX
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 Wife reported symptoms of right hand incoordination, numbness, paresthesias to Dr. Lorenzana this morning. Evaluation in ED +SDH, stable on repeat exams. Patient himself denies any new pain aside from the right hand issues.     On exam, the right knee has stable effusion compared to recent prior visits at my office. The incision is healed without drainage, surrounding erythema, or fluctuance. The Steve brace is intact, locked at 15 degrees of extension. He has good strength of ankle dorsiflexion and plantarflexion. Foot warm and well perfused, DP palpable.    Labs reviewed - WBC 9; Cr 1.7 (down from 1.9 on 6/30/20 following transition from IV nafcillin to cefazolin)

## 2020-07-02 NOTE — CONSULT NOTE ADULT - ASSESSMENT
Imp: 78y/o male s/p fall with acute SDH, with right knee static antibiotic cement spacer for chronic periprosthetic MSSA infection    Recommendations:  - Please obtain repeat right knee XRs to ensure no fracture or dislodgment of the static spacer (ordered)  - Please continue cefazolin 2g q12hr as per Dr. Lorenzana  - Cont monitoring renal functions  - Touchdown weightbearing on right lower extremity with locked Steve at all times  - Activity orders as per primary team; rolling walker at bedside at all times; observe close fall precautions  - SCDs  - SDH monitoring and management as per primary team  - Follow up with me as previously scheduled in 2 weeks. Plan has been for 2-week antibiotic holiday starting at that time followed by repeat serum and synovial labs, and staged reimplantation of the right knee to follow.     Please contact me with any questions.    Nathan Arrington MD  [phone] no known allergies

## 2020-07-02 NOTE — ED ADULT NURSE NOTE - INTERVENTIONS DEFINITIONS
Call bell, personal items and telephone within reach/Non-slip footwear when patient is off stretcher/[place] to call system/Instruct patient to call for assistance/Physically safe environment: no spills, clutter or unnecessary equipment/Room bathroom lighting operational/Provide visual clues: red socks/Stretcher in lowest position, wheels locked, appropriate side rails in place/Provide visual cue, wrist band, yellow gown, etc./Monitor gait and stability/Review medications for side effects contributing to fall risk/Monitor for mental status changes and reorient to person, place, and time/Reinforce activity limits and safety measures with patient and family

## 2020-07-02 NOTE — H&P ADULT - NSICDXPASTSURGICALHX_GEN_ALL_CORE_FT
PAST SURGICAL HISTORY:  CAD (coronary artery disease) 6 stents placement    H/O shoulder surgery Right    H/O total knee replacement, bilateral     S/P hardware removal 6/2, infected R prosthetic knee, replaced hardware    Umbilical hernia Umbilical and inguinal hernia

## 2020-07-02 NOTE — ED PROVIDER NOTE - CRITICAL CARE PROVIDED
documentation/additional history taking/interpretation of diagnostic studies/direct patient care (not related to procedure)/consultation with other physicians/conducted a detailed discussion of DNR status

## 2020-07-02 NOTE — STROKE CODE NOTE - IV ALTEPLASE ADMINISTRATION
Please call patient and report the following results:    1) Right cheek: No cancer. The pathologist saw a scratch and signs of a prior pimple. No concerns. Will continue to monitor.    2) R chest next to cancer scar: traumatized keratosis. This is a harmless growth that was scratched at. It just just happened to be next to the cancer scar. No cancer was seen.    Telly Krishnan MD, MS    Department of Dermatology  [facility]: Phone: [phone], Fax:[phone]  [facility]: Phone: [phone], Fax: [phone]   No

## 2020-07-02 NOTE — H&P ADULT - HISTORY OF PRESENT ILLNESS
80 y/o R-handed male with PMH of BPH, giant cell arteritis, HLD, HTN, CAD w/ 6 stents placed >5 years ago and DM presents to ER c/o right hand weakness this morning  s/p fall yesterday at 9am while walking through a doorway using his walker. Patient states that he noticed the weakness at 6:30 am today after he woke up and was unable to  a urinal in his hand. Upon arrival to ER, patient's CTH showed an acute left subdural hemorrhage with 1-2mm midline shift. Patient reports that his weakness has resolved, but he developed numbness in his R finger tips upon arrival to ER and mild blurry vision 1-2 hours later, which has been improving since. Patient denies headache, LOC, nausea, dizziness, numbness/tingling/weakness in all other extremities, diplopia, h/o head injury prior to fall yesterday, abdominal pain, chest pain, and fever.  Patient is on aspirin and plavix (last dose this morning) and was given DDAVP in ER for reversal.  Patient was recently discharged from [facility] on 6/7 for removal of hardware (6/2) following infection of his R prosthetic knee. Patient is currently wearing knee immobilizer and is on cefazolin as per ID.    outpatient cardiologist: Dr. Crooks - [phone] 78 y/o R-handed male bib wife with PMH of BPH, giant cell arteritis, HLD, HTN, CAD w/ 6 stents placed >5 years ago and DM presents to ER c/o right hand weakness this morning  s/p fall yesterday at 9am while walking through a doorway sideways using his walker. Patient states that he noticed the weakness at 6:30 am today after he woke up and was unable to  a urinal in his hand. Upon arrival to ER, patient's CTH showed an acute left subdural hemorrhage with 1-2mm midline shift. Patient reports that his weakness has resolved, but he developed numbness in his R finger tips upon arrival to ER and mild blurry vision 1-2 hours later, which has been improving since. Patient denies headache, LOC, nausea, dizziness, numbness/tingling/weakness in all other extremities, diplopia, h/o head injury prior to fall yesterday, abdominal pain, chest pain, and fever.  Patient is on aspirin and plavix (last dose this morning) and was given DDAVP in ER for reversal.  Patient was recently discharged from [facility] on 6/7 for removal of hardware (6/2) following infection of his R prosthetic knee. Patient is currently wearing knee immobilizer and is on cefazolin as per ID.    outpatient cardiologist: Dr. Crooks - [phone]

## 2020-07-02 NOTE — ED ADULT TRIAGE NOTE - CHIEF COMPLAINT QUOTE
80 y/o male sent by his PMD for evaluation of right hand numbness since 0630 today, pt on IV antibiotics every 4 hours noted PICC line to right upper arm. Pt is aox4, denies any other neuro symptoms. Pt ambulates with walker due to "missing right knee"

## 2020-07-02 NOTE — H&P ADULT - PROBLEM SELECTOR PLAN 1
-admit to neuro SDU/telemetry under Dr. Greer  -repeat CTH in am  -keppra 500mg BID with loading dose of 1g for seizure ppx  -possible embolization in 2 weeks  -neuro/vitals checks  -pain control prn    d/w Dr. Greer

## 2020-07-02 NOTE — H&P ADULT - NSICDXFAMILYHX_GEN_ALL_CORE_FT
No pertinent family history in first degree relatives FAMILY HISTORY:  FH: CAD (coronary artery disease), mother and father

## 2020-07-03 ENCOUNTER — TRANSCRIPTION ENCOUNTER (OUTPATIENT)
Age: 80
End: 2020-07-03

## 2020-07-03 LAB
ANION GAP SERPL CALC-SCNC: 15 MMOL/L — SIGNIFICANT CHANGE UP (ref 5–17)
BUN SERPL-MCNC: 23 MG/DL — SIGNIFICANT CHANGE UP (ref 7–23)
CALCIUM SERPL-MCNC: 9.1 MG/DL — SIGNIFICANT CHANGE UP (ref 8.4–10.5)
CHLORIDE SERPL-SCNC: 104 MMOL/L — SIGNIFICANT CHANGE UP (ref 96–108)
CO2 SERPL-SCNC: 16 MMOL/L — LOW (ref 22–31)
CREAT SERPL-MCNC: 1.45 MG/DL — HIGH (ref 0.5–1.3)
CULTURE RESULTS: NO GROWTH — SIGNIFICANT CHANGE UP
GLUCOSE SERPL-MCNC: 84 MG/DL — SIGNIFICANT CHANGE UP (ref 70–99)
HCT VFR BLD CALC: 24.5 % — LOW (ref 39–50)
HCT VFR BLD CALC: 25 % — LOW (ref 39–50)
HGB BLD-MCNC: 7.6 G/DL — LOW (ref 13–17)
HGB BLD-MCNC: 7.7 G/DL — LOW (ref 13–17)
MAGNESIUM SERPL-MCNC: 1.8 MG/DL — SIGNIFICANT CHANGE UP (ref 1.6–2.6)
MCHC RBC-ENTMCNC: 29.8 PG — SIGNIFICANT CHANGE UP (ref 27–34)
MCHC RBC-ENTMCNC: 30 PG — SIGNIFICANT CHANGE UP (ref 27–34)
MCHC RBC-ENTMCNC: 30.8 GM/DL — LOW (ref 32–36)
MCHC RBC-ENTMCNC: 31 GM/DL — LOW (ref 32–36)
MCV RBC AUTO: 96.8 FL — SIGNIFICANT CHANGE UP (ref 80–100)
MCV RBC AUTO: 96.9 FL — SIGNIFICANT CHANGE UP (ref 80–100)
NRBC # BLD: 0 /100 WBCS — SIGNIFICANT CHANGE UP (ref 0–0)
NRBC # BLD: 0 /100 WBCS — SIGNIFICANT CHANGE UP (ref 0–0)
PHOSPHATE SERPL-MCNC: 3.7 MG/DL — SIGNIFICANT CHANGE UP (ref 2.5–4.5)
PLATELET # BLD AUTO: 196 K/UL — SIGNIFICANT CHANGE UP (ref 150–400)
PLATELET # BLD AUTO: 294 K/UL — SIGNIFICANT CHANGE UP (ref 150–400)
POTASSIUM SERPL-MCNC: 4.7 MMOL/L — SIGNIFICANT CHANGE UP (ref 3.5–5.3)
POTASSIUM SERPL-SCNC: 4.7 MMOL/L — SIGNIFICANT CHANGE UP (ref 3.5–5.3)
RBC # BLD: 2.53 M/UL — LOW (ref 4.2–5.8)
RBC # BLD: 2.58 M/UL — LOW (ref 4.2–5.8)
RBC # FLD: 17.9 % — HIGH (ref 10.3–14.5)
RBC # FLD: 18.2 % — HIGH (ref 10.3–14.5)
SARS-COV-2 RNA SPEC QL NAA+PROBE: SIGNIFICANT CHANGE UP
SODIUM SERPL-SCNC: 135 MMOL/L — SIGNIFICANT CHANGE UP (ref 135–145)
SPECIMEN SOURCE: SIGNIFICANT CHANGE UP
TROPONIN T SERPL-MCNC: 0.01 NG/ML — SIGNIFICANT CHANGE UP (ref 0–0.01)
WBC # BLD: 6.53 K/UL — SIGNIFICANT CHANGE UP (ref 3.8–10.5)
WBC # BLD: 7.95 K/UL — SIGNIFICANT CHANGE UP (ref 3.8–10.5)
WBC # FLD AUTO: 6.53 K/UL — SIGNIFICANT CHANGE UP (ref 3.8–10.5)
WBC # FLD AUTO: 7.95 K/UL — SIGNIFICANT CHANGE UP (ref 3.8–10.5)

## 2020-07-03 PROCEDURE — 93970 EXTREMITY STUDY: CPT | Mod: 26

## 2020-07-03 PROCEDURE — 70450 CT HEAD/BRAIN W/O DYE: CPT | Mod: 26

## 2020-07-03 PROCEDURE — 99232 SBSQ HOSP IP/OBS MODERATE 35: CPT

## 2020-07-03 RX ORDER — LABETALOL HCL 100 MG
10 TABLET ORAL ONCE
Refills: 0 | Status: COMPLETED | OUTPATIENT
Start: 2020-07-03 | End: 2020-07-03

## 2020-07-03 RX ORDER — CHLORHEXIDINE GLUCONATE 213 G/1000ML
1 SOLUTION TOPICAL
Refills: 0 | Status: DISCONTINUED | OUTPATIENT
Start: 2020-07-03 | End: 2020-07-04

## 2020-07-03 RX ADMIN — TAMSULOSIN HYDROCHLORIDE 0.4 MILLIGRAM(S): 0.4 CAPSULE ORAL at 22:05

## 2020-07-03 RX ADMIN — Medication 650 MILLIGRAM(S): at 10:11

## 2020-07-03 RX ADMIN — LEVETIRACETAM 500 MILLIGRAM(S): 250 TABLET, FILM COATED ORAL at 16:15

## 2020-07-03 RX ADMIN — Medication 650 MILLIGRAM(S): at 16:16

## 2020-07-03 RX ADMIN — LOSARTAN POTASSIUM 100 MILLIGRAM(S): 100 TABLET, FILM COATED ORAL at 05:01

## 2020-07-03 RX ADMIN — LEVETIRACETAM 500 MILLIGRAM(S): 250 TABLET, FILM COATED ORAL at 05:01

## 2020-07-03 RX ADMIN — ATORVASTATIN CALCIUM 20 MILLIGRAM(S): 80 TABLET, FILM COATED ORAL at 22:05

## 2020-07-03 RX ADMIN — Medication 10 MILLIGRAM(S): at 16:15

## 2020-07-03 RX ADMIN — Medication 100 MILLIGRAM(S): at 18:26

## 2020-07-03 RX ADMIN — Medication 25 MILLIGRAM(S): at 05:01

## 2020-07-03 RX ADMIN — Medication 100 MILLIGRAM(S): at 05:02

## 2020-07-03 NOTE — CHART NOTE - NSCHARTNOTEFT_GEN_A_CORE
Admitting Diagnosis:   Patient is a 79y old  Male who presents with a chief complaint of right hand weakness (03 Jul 2020 15:34)    Consult: Yes [   ]  No [ x  ]    Reason for Initial Nutrition Assessment: Wt change assessment    PAST MEDICAL & SURGICAL HISTORY:  BPH (benign prostatic hyperplasia)  Giant cell arteritis  Anemia  Hyperlipidemia  CAD (coronary artery disease): 6 stents  DM (diabetes mellitus)  Hypertension  S/P hardware removal: 6/2, infected R prosthetic knee, replaced hardware  H/O shoulder surgery: Right  Umbilical hernia: Umbilical and inguinal hernia  H/O total knee replacement, bilateral  CAD (coronary artery disease): 6 stents placement    Current Nutrition Order: CST CHO diet with evening snack    PO Intake: Good (%) [ x  ]  Fair (50-75%) [   ] Poor (<25%) [   ]    GI Issues:   WDL, last BM 7/2  No n/v/d/c noted  No abd distention noted    Pain:  No pain noted at this time    Skin Integrity:  Surgical incision, jay score 19  +3 pitting edema Right foot and knee  No pressure ulcers noted    Labs:   07-03    135  |  104  |  23  ----------------------------<  84  4.7   |  16<L>  |  1.45<H>    Ca    9.1      03 Jul 2020 07:42  Phos  3.7     07-03  Mg     1.8     07-03    TPro  7.1  /  Alb  4.0  /  TBili  0.2  /  DBili  x   /  AST  26  /  ALT  14  /  AlkPhos  81  07-02    Medications:  MEDICATIONS  (STANDING):  atorvastatin 20 milliGRAM(s) Oral at bedtime  ceFAZolin   IVPB 2000 milliGRAM(s) IV Intermittent every 12 hours  chlorhexidine 2% Cloths 1 Application(s) Topical <User Schedule>  levETIRAcetam 500 milliGRAM(s) Oral every 12 hours  losartan 100 milliGRAM(s) Oral daily  metoprolol succinate ER 25 milliGRAM(s) Oral daily  tamsulosin 0.4 milliGRAM(s) Oral at bedtime    MEDICATIONS  (PRN):  acetaminophen   Tablet .. 650 milliGRAM(s) Oral every 6 hours PRN Temp greater or equal to 38C (100.4F), Mild Pain (1 - 3)  ondansetron   Disintegrating Tablet 4 milliGRAM(s) Oral every 6 hours PRN Nausea    Admitted Anthropometrics:  Height: 66" Weight: 165lbs, IBW 142lbs+/-10%, %%, BMI 26.6 kg/m2    Weight:  6/1 170lbs (previous admission)  7/2 165lbs    Weight Change: Per discussion with pt, -5lb/ 2.9% wt loss over 1 month was intentional. Cont to trend weights biweekly.     Nutrition Focused Physical Exam: Completed [   ]  Unable to complete [ x  ]    Estimated energy needs:   ABW (75kg) used for calculations as pt between % of IBW. Needs adjusted for advanced age.   Kcal (20-25 kcal/kg): 7932-3070 kcal  Pro (1.0-1.2 g/kg pro): 75-90 g pro   **Fluid per team    Subjective:   79M with PMH of BPH, giant cell arteritis, HLD, HTN, CAD w/ 6 stents placed >5 years ago and DM with recent admission 6/1 now presents to ER c/o right hand weakness this morning s/p fall yesterday at 9am while walking through a doorway sideways using his walker. Right hand weakness is resolved. Acute left SDH and CTH are noted. AM CT head stable. Likely discharge to home tomorrow per EMR. On assessment, pt is resting in bed comfortably. Currently on CST CHO diet with evening snack consuming >75% est needs. Reviewed CST CHO diet with pt- very well educated on current diet. A1C 5.9%- DM is well controlled. POCT and BS WDL this am. Noted -5lb/ 2.9% wt loss over the last month- pt stating this was intention weight loss. Cont to encourage adequate PO intake. Please see nutrition recs below. RD to follow up per protocol.     Nutrition Diagnosis: N/A    [  ] No active nutrition diagnosis at this time  [  ] Current medical condition precludes nutrition intervention    Goal: N/A    Recommendations:  1. Cont with CST CHO diet with evening snack  2. Cont with tight BS control  3. Cont to encourage adequate PO intake.     Education: Reviewed CST CHO diet with pt- very well educated on current diet.    Risk Level: High [   ] Moderate [   ] Low [  x ]
Neurosurgical Indications for Screening Dopplers on Admission:       1) Known hypercoagulation disorder (h/o VTE, thrombophilia, HIT, etc.)   2) Admitted from prolonged stay from another institution (straight forward ER transfers not included)  3) Presenting with significant leg immobility   4) Presenting with signs and symptoms of VTE?    5) With significant critical illness, Including "found down" for unknown period of time in HPI  6) With significant neurotrauma (TBI, SCI / TLS spine fractures)   7) Who are comatose   8) With known malignancy (e.g. glioblastoma multiforme, meningioma, etc.). Excludes IA chemo 23hr observation stays  9) On hemodialysis   10) Who have received platelet transfusion or antithrombotic reversal agents recently   11) Who have had recent major orthopedic surgery          Screening dopplers indicated?   Y _x  N _    DVT Prophylaxis:  x SCD's   _ chemoprophylaxis

## 2020-07-04 ENCOUNTER — TRANSCRIPTION ENCOUNTER (OUTPATIENT)
Age: 80
End: 2020-07-04

## 2020-07-04 VITALS — TEMPERATURE: 98 F

## 2020-07-04 LAB
ANION GAP SERPL CALC-SCNC: 13 MMOL/L — SIGNIFICANT CHANGE UP (ref 5–17)
BUN SERPL-MCNC: 22 MG/DL — SIGNIFICANT CHANGE UP (ref 7–23)
CALCIUM SERPL-MCNC: 9.7 MG/DL — SIGNIFICANT CHANGE UP (ref 8.4–10.5)
CHLORIDE SERPL-SCNC: 102 MMOL/L — SIGNIFICANT CHANGE UP (ref 96–108)
CO2 SERPL-SCNC: 20 MMOL/L — LOW (ref 22–31)
CREAT SERPL-MCNC: 1.39 MG/DL — HIGH (ref 0.5–1.3)
GLUCOSE SERPL-MCNC: 109 MG/DL — HIGH (ref 70–99)
HCT VFR BLD CALC: 27 % — LOW (ref 39–50)
HGB BLD-MCNC: 8.3 G/DL — LOW (ref 13–17)
MCHC RBC-ENTMCNC: 30 PG — SIGNIFICANT CHANGE UP (ref 27–34)
MCHC RBC-ENTMCNC: 30.7 GM/DL — LOW (ref 32–36)
MCV RBC AUTO: 97.5 FL — SIGNIFICANT CHANGE UP (ref 80–100)
NRBC # BLD: 0 /100 WBCS — SIGNIFICANT CHANGE UP (ref 0–0)
PLATELET # BLD AUTO: 308 K/UL — SIGNIFICANT CHANGE UP (ref 150–400)
POTASSIUM SERPL-MCNC: 5.2 MMOL/L — SIGNIFICANT CHANGE UP (ref 3.5–5.3)
POTASSIUM SERPL-SCNC: 5.2 MMOL/L — SIGNIFICANT CHANGE UP (ref 3.5–5.3)
RBC # BLD: 2.77 M/UL — LOW (ref 4.2–5.8)
RBC # FLD: 17.9 % — HIGH (ref 10.3–14.5)
SODIUM SERPL-SCNC: 135 MMOL/L — SIGNIFICANT CHANGE UP (ref 135–145)
WBC # BLD: 8.68 K/UL — SIGNIFICANT CHANGE UP (ref 3.8–10.5)
WBC # FLD AUTO: 8.68 K/UL — SIGNIFICANT CHANGE UP (ref 3.8–10.5)

## 2020-07-04 PROCEDURE — 80061 LIPID PANEL: CPT

## 2020-07-04 PROCEDURE — 85027 COMPLETE CBC AUTOMATED: CPT

## 2020-07-04 PROCEDURE — 93970 EXTREMITY STUDY: CPT

## 2020-07-04 PROCEDURE — 80048 BASIC METABOLIC PNL TOTAL CA: CPT

## 2020-07-04 PROCEDURE — U0003: CPT

## 2020-07-04 PROCEDURE — 85730 THROMBOPLASTIN TIME PARTIAL: CPT

## 2020-07-04 PROCEDURE — 85025 COMPLETE CBC W/AUTO DIFF WBC: CPT

## 2020-07-04 PROCEDURE — 72125 CT NECK SPINE W/O DYE: CPT

## 2020-07-04 PROCEDURE — 83735 ASSAY OF MAGNESIUM: CPT

## 2020-07-04 PROCEDURE — 85610 PROTHROMBIN TIME: CPT

## 2020-07-04 PROCEDURE — 96374 THER/PROPH/DIAG INJ IV PUSH: CPT

## 2020-07-04 PROCEDURE — 73560 X-RAY EXAM OF KNEE 1 OR 2: CPT

## 2020-07-04 PROCEDURE — 82962 GLUCOSE BLOOD TEST: CPT

## 2020-07-04 PROCEDURE — 70450 CT HEAD/BRAIN W/O DYE: CPT

## 2020-07-04 PROCEDURE — 99291 CRITICAL CARE FIRST HOUR: CPT | Mod: 25

## 2020-07-04 PROCEDURE — 84484 ASSAY OF TROPONIN QUANT: CPT

## 2020-07-04 PROCEDURE — 97161 PT EVAL LOW COMPLEX 20 MIN: CPT

## 2020-07-04 PROCEDURE — 99232 SBSQ HOSP IP/OBS MODERATE 35: CPT

## 2020-07-04 PROCEDURE — 80053 COMPREHEN METABOLIC PANEL: CPT

## 2020-07-04 PROCEDURE — 36415 COLL VENOUS BLD VENIPUNCTURE: CPT

## 2020-07-04 PROCEDURE — 84100 ASSAY OF PHOSPHORUS: CPT

## 2020-07-04 RX ORDER — LOSARTAN POTASSIUM 100 MG/1
100 TABLET, FILM COATED ORAL EVERY 24 HOURS
Refills: 0 | Status: DISCONTINUED | OUTPATIENT
Start: 2020-07-04 | End: 2020-07-04

## 2020-07-04 RX ORDER — LEVETIRACETAM 250 MG/1
1 TABLET, FILM COATED ORAL
Qty: 60 | Refills: 0
Start: 2020-07-04 | End: 2020-08-02

## 2020-07-04 RX ORDER — ACETAMINOPHEN 500 MG
2 TABLET ORAL
Qty: 0 | Refills: 0 | DISCHARGE
Start: 2020-07-04

## 2020-07-04 RX ORDER — HYDRALAZINE HCL 50 MG
10 TABLET ORAL ONCE
Refills: 0 | Status: COMPLETED | OUTPATIENT
Start: 2020-07-04 | End: 2020-07-04

## 2020-07-04 RX ORDER — METOPROLOL TARTRATE 50 MG
25 TABLET ORAL EVERY 24 HOURS
Refills: 0 | Status: DISCONTINUED | OUTPATIENT
Start: 2020-07-04 | End: 2020-07-04

## 2020-07-04 RX ORDER — CLOPIDOGREL BISULFATE 75 MG/1
1 TABLET, FILM COATED ORAL
Qty: 0 | Refills: 0 | DISCHARGE

## 2020-07-04 RX ADMIN — Medication 25 MILLIGRAM(S): at 05:18

## 2020-07-04 RX ADMIN — Medication 650 MILLIGRAM(S): at 05:57

## 2020-07-04 RX ADMIN — Medication 100 MILLIGRAM(S): at 05:18

## 2020-07-04 RX ADMIN — LEVETIRACETAM 500 MILLIGRAM(S): 250 TABLET, FILM COATED ORAL at 05:18

## 2020-07-04 RX ADMIN — CHLORHEXIDINE GLUCONATE 1 APPLICATION(S): 213 SOLUTION TOPICAL at 05:19

## 2020-07-04 RX ADMIN — Medication 10 MILLIGRAM(S): at 00:16

## 2020-07-04 NOTE — DISCHARGE NOTE PROVIDER - NSDCFUSCHEDAPPT_GEN_ALL_CORE_FT
ISAK SOLOMON ; 07/06/2020 ; NPP Med  [address]  ISAK SOLOMON ; 07/14/2020 ; [facility] OrthoSurg [address] [place] [address]  ISAK SOLOMON ; 07/21/2020 ; [facility] Orth[facility] [address] [place] [address]  ISAK SOLOMON ; 07/23/2020 ; NPP Med  [address] ISAK SOLOMON ; 07/06/2020 ; NPP Med  [address]  ISAK SOLOMON ; 07/14/2020 ; [facility] Orth[facility] [address] [place] [address]  ISAK SOLOMON ; 07/21/2020 ; [facility] Orth[facility] [address] [place] [address]  ISAK SOLOMON ; 07/23/2020 ; NPP Med  [address]

## 2020-07-04 NOTE — DISCHARGE NOTE PROVIDER - CARE PROVIDERS DIRECT ADDRESSES
,DirectAddress_Unknown,[email or web address] [email or web address]

## 2020-07-04 NOTE — PHYSICAL THERAPY INITIAL EVALUATION ADULT - ADDITIONAL COMMENTS
pt reports he uses RW and right faisal currently at baseline s/p right knee sx in June; wife sometimes assists with ADLs at home because of this recovery, otherwise independent with everything

## 2020-07-04 NOTE — DISCHARGE NOTE PROVIDER - NSDCCPGOAL_GEN_ALL_CORE_FT
To get better and follow your care plan as instructed.  1. Call our office at [phone] to set up the appointment   2. Inform the doctor immediately if you have a fever (above 101), chills, night  sweats, wound drainage,  nausea, vomiting, or  worsening headache.  B. ACTIVITY LEVEL  1. Fatigue is common following brain injury, rest if you are tired.   2. You should get up and walk around every hour during the daytime.  3. No bending, lifting or twisting for the first 3 weeks, but walking is  recommended.  4. Drink plenty of water, stay out of the sun.

## 2020-07-04 NOTE — DISCHARGE NOTE PROVIDER - NSDCCPCAREPLAN_GEN_ALL_CORE_FT
PRINCIPAL DISCHARGE DIAGNOSIS  Diagnosis: Subdural hematoma  Assessment and Plan of Treatment: -      SECONDARY DISCHARGE DIAGNOSES  Diagnosis: CAD (coronary artery disease)  Assessment and Plan of Treatment: stent placed >5 years ago, off DAPT due to Subdural Hematoma, f/u with Dr. Hill as outpatient. Please stop taking Aspirin and Plavix and do not resume until instructed    Diagnosis: Acute kidney injury  Assessment and Plan of Treatment: - likely reaction to antibiotics, improving Creatinine. Per ID, ok to continue Ancef.    Diagnosis: Seizure prophylaxis  Assessment and Plan of Treatment: Continue taking Keppra for seizure prophylaxis    Diagnosis: Chronic infection of knee  Assessment and Plan of Treatment: - follow up with Dr. Arrington in 2 weeks, plan to stop antibiotics at that time and repeat Serum and synovial labs. address providedin [place] paperwork    Diagnosis: Hypertension  Assessment and Plan of Treatment: - staggered dosage of losartan and metoprolol monitor SBP    Diagnosis: Fall  Assessment and Plan of Treatment:

## 2020-07-04 NOTE — PHYSICAL THERAPY INITIAL EVALUATION ADULT - MODALITIES TREATMENT COMMENTS
visual tracking smooth pursuit intact; cranial nerves intact; no other sensory, neuro, or strength deficits noted at this time

## 2020-07-04 NOTE — DISCHARGE NOTE PROVIDER - CARE PROVIDER_API CALL
Nathan Arrington)  Orthopedics  [address] [place] [address]  Phone: [phone]  Fax: [phone]  Follow Up Time:     Rigo Greer  NEUROSURGERY  [address] [address]  Phone: [phone]  Fax: [phone]  Follow Up Time:     Karena Lorenzana  INTERNAL MEDICINE  [address] [address]  Phone: [phone]  Fax: [phone]  Follow Up Time:

## 2020-07-04 NOTE — PHYSICAL THERAPY INITIAL EVALUATION ADULT - DIAGNOSIS, PT EVAL
5D: Impaired motor function and sensory integrity associated with nonprogressive disorders of the central nervous system- acquired in adolenscence or adulthood; 5A: Primary prevention/risk reduction for loss of balance and falling

## 2020-07-04 NOTE — PHYSICAL THERAPY INITIAL EVALUATION ADULT - PERTINENT HX OF CURRENT PROBLEM, REHAB EVAL
80 y/o R-handed male bib wife with PMH of BPH, giant cell arteritis, HLD, HTN, CAD w/ 6 stents placed >5 years ago and DM presents to ER c/o right hand weakness this morning  s/p fall yesterday at 9am while walking through a doorway sideways using his walker.

## 2020-07-04 NOTE — PROGRESS NOTE ADULT - SUBJECTIVE AND OBJECTIVE BOX
HPI:  80 y/o R-handed male bib wife with PMH of BPH, giant cell arteritis, HLD, HTN, CAD w/ 6 stents placed >5 years ago and DM presents to ER c/o right hand weakness this morning  s/p fall yesterday at 9am while walking through a doorway sideways using his walker. Patient states that he noticed the weakness at 6:30 am today after he woke up and was unable to  a urinal in his hand. Upon arrival to ER, patient's CTH showed an acute left subdural hemorrhage with 1-2mm midline shift. Patient reports that his weakness has resolved, but he developed numbness in his R finger tips upon arrival to ER and mild blurry vision 1-2 hours later, which has been improving since. Patient denies headache, LOC, nausea, dizziness, numbness/tingling/weakness in all other extremities, diplopia, h/o head injury prior to fall yesterday, abdominal pain, chest pain, and fever.  Patient is on aspirin and plavix (last dose this morning) and was given DDAVP in ER for reversal.  Patient was recently discharged from [facility] on 6/7 for removal of hardware (6/2) following infection of his R prosthetic knee. Patient is currently wearing knee immobilizer and is on cefazolin as per ID.    outpatient cardiologist: Dr. Crooks - [phone] (02 Jul 2020 16:10)    Hospital Course:  7/2: patient evaluation for further evaluation of traumatic left SDH  7/3: SIMONE overnight, neuro stable, AM CT head stable. likely discharge to home tomorrow     Vital Signs Last 24 Hrs  T(C): 36.9 (03 Jul 2020 14:06), Max: 37.4 (02 Jul 2020 21:51)  T(F): 98.4 (03 Jul 2020 14:06), Max: 99.4 (02 Jul 2020 21:51)  HR: 75 (03 Jul 2020 14:20) (65 - 93)  BP: 161/125 (03 Jul 2020 14:20) (132/80 - 174/77)  BP(mean): 137 (03 Jul 2020 14:20) (100 - 137)  RR: 18 (03 Jul 2020 14:20) (16 - 18)  SpO2: 98% (03 Jul 2020 14:20) (98% - 100%)    I&O's Summary    02 Jul 2020 07:01  -  03 Jul 2020 07:00  --------------------------------------------------------  IN: 500 mL / OUT: 800 mL / NET: -300 mL    03 Jul 2020 07:01  -  03 Jul 2020 15:34  --------------------------------------------------------  IN: 600 mL / OUT: 800 mL / NET: -200 mL        PHYSICAL EXAM:  Gen: laying in hospital bed, NAD  Neuro: AA+Ox3, OE spont, FC  CN II-XII: PERRL, EOMI  Motor: MAEx4, 5/5 strength, no drift  SILT in UE and LE b/l   Cardiac: regular rate and rhythm   Pulm: clear to auscultation    TUBES/LINES:  [] Plummer  [] Lumbar Drain  [] Wound Drains  [] Others      DIET:  [] NPO  [x] Mechanical  [] Tube feeds    LABS:                        7.7    7.95  )-----------( 294      ( 03 Jul 2020 14:57 )             25.0     07-03    135  |  104  |  23  ----------------------------<  84  4.7   |  16<L>  |  1.45<H>    Ca    9.1      03 Jul 2020 07:42  Phos  3.7     07-03  Mg     1.8     07-03    TPro  7.1  /  Alb  4.0  /  TBili  0.2  /  DBili  x   /  AST  26  /  ALT  14  /  AlkPhos  81  07-02    PT/INR - ( 02 Jul 2020 12:50 )   PT: 12.9 sec;   INR: 1.08          PTT - ( 02 Jul 2020 12:50 )  PTT:34.9 sec    CARDIAC MARKERS ( 03 Jul 2020 07:42 )  x     / 0.01 ng/mL / x     / x     / x      CARDIAC MARKERS ( 02 Jul 2020 12:50 )  x     / 0.02 ng/mL / x     / x     / x          CAPILLARY BLOOD GLUCOSE          Drug Levels: [] N/A    CSF Analysis: [] N/A      Allergies    No Known Allergies    Intolerances      MEDICATIONS:  Antibiotics:  ceFAZolin   IVPB 2000 milliGRAM(s) IV Intermittent every 12 hours    Neuro:  acetaminophen   Tablet .. 650 milliGRAM(s) Oral every 6 hours PRN  levETIRAcetam 500 milliGRAM(s) Oral every 12 hours  ondansetron   Disintegrating Tablet 4 milliGRAM(s) Oral every 6 hours PRN    Anticoagulation:    OTHER:  atorvastatin 20 milliGRAM(s) Oral at bedtime  chlorhexidine 2% Cloths 1 Application(s) Topical <User Schedule>  losartan 100 milliGRAM(s) Oral daily  metoprolol succinate ER 25 milliGRAM(s) Oral daily  tamsulosin 0.4 milliGRAM(s) Oral at bedtime    IVF:    CULTURES:    RADIOLOGY & ADDITIONAL TESTS:      ASSESSMENT:  79y Male with left SDH s/p fall    PLAN:  - neuro checks  - vitals checks  - pain control  - cont keppra  - cont home meds  - SUBHASH: improving  - monitor H/H  - regular diet  - bowel regimen  - DVT PROPHYLAXIS: [x] Venodynes  [] Heparin/Lovenox    DISPOSITION: stepdown, full code, likely discharge in AM    d/w Dr. Greer    Assessment:  Present when checked    []  GCS  E   V  M     Heart Failure: []Acute, [] acute on chronic , []chronic  Heart Failure:  [] Diastolic (HFpEF), [] Systolic (HFrEF), []Combined (HFpEF and HFrEF), [] RHF, [] Pulm HTN, [] Other    [] SUBHASH, [] ATN, [] AIN, [] other  [] CKD1, [] CKD2, [] CKD 3, [] CKD 4, [] CKD 5, []ESRD    Encephalopathy: [] Metabolic, [] Hepatic, [] toxic, [] Neurological, [] Other    Abnormal Nurtitional Status: [] malnurtition (see nutrition note), [ ]underweight: BMI < 19, [] morbid obesity: BMI >40, [] Cachexia    [] Sepsis  [] hypovolemic shock,[] cardiogenic shock, [] hemorrhagic shock, [] neuogenic shock  [] Acute Respiratory Failure  []Cerebral edema, [] Brain compression/ herniation,   [] Functional quadriplegia  [] Acute blood loss anemia
HPI:  80 y/o R-handed male bib wife with PMH of BPH, giant cell arteritis, HLD, HTN, CAD w/ 6 stents placed >5 years ago and DM presents to ER c/o right hand weakness this morning  s/p fall yesterday at 9am while walking through a doorway sideways using his walker. Patient states that he noticed the weakness at 6:30 am today after he woke up and was unable to  a urinal in his hand. Upon arrival to ER, patient's CTH showed an acute left subdural hemorrhage with 1-2mm midline shift. Patient reports that his weakness has resolved, but he developed numbness in his R finger tips upon arrival to ER and mild blurry vision 1-2 hours later, which has been improving since. Patient denies headache, LOC, nausea, dizziness, numbness/tingling/weakness in all other extremities, diplopia, h/o head injury prior to fall yesterday, abdominal pain, chest pain, and fever.  Patient is on aspirin and plavix (last dose this morning) and was given DDAVP in ER for reversal.  Patient was recently discharged from [facility] on 6/7 for removal of hardware (6/2) following infection of his R prosthetic knee. Patient is currently wearing knee immobilizer and is on cefazolin as per ID.    outpatient cardiologist: Dr. Crooks - [phone] (02 Jul 2020 16:10)    OVERNIGHT EVENTS: hypertensive overnight, hydralazine x 1 given. antihypertensives staggered.     Hospital Course:  7/2: patient evaluation for further evaluation of traumatic left SDH  7/3: SIMONE overnight, neuro stable, AM CT head stable. likely discharge to home tomorrow   7/4:  hypertensive overnight, hydralazine x 1 given. antihypertensives staggered. neuro stable.       Vital Signs Last 24 Hrs  T(C): 36.8 (03 Jul 2020 22:00), Max: 37 (03 Jul 2020 09:31)  T(F): 98.3 (03 Jul 2020 22:00), Max: 98.6 (03 Jul 2020 09:31)  HR: 71 (04 Jul 2020 00:04) (67 - 93)  BP: 191/84 (04 Jul 2020 00:04) (132/80 - 191/84)  BP(mean): 120 (04 Jul 2020 00:04) (100 - 137)  RR: 18 (04 Jul 2020 00:04) (16 - 20)  SpO2: 100% (04 Jul 2020 00:04) (98% - 100%)    I&O's Summary    02 Jul 2020 07:01  -  03 Jul 2020 07:00  --------------------------------------------------------  IN: 500 mL / OUT: 800 mL / NET: -300 mL    03 Jul 2020 07:01  -  04 Jul 2020 00:59  --------------------------------------------------------  IN: 1040 mL / OUT: 1400 mL / NET: -360 mL        PHYSICAL EXAM:  Gen: laying in hospital bed, NAD  Neuro: AA+Ox3, OE spont, FC  CN II-XII: PERRL, EOMI  Motor: MAEx4, 5/5 strength, no drift  SILT in UE and LE b/l   Cardiac: regular rate and rhythm   Pulm: clear to auscultation    TUBES/LINES:  [] Plummer  [] Lumbar Drain  [] Wound Drains  [] Others      DIET:  [] NPO  [x] Mechanical  [] Tube feeds    LABS:                        7.7    7.95  )-----------( 294      ( 03 Jul 2020 14:57 )             25.0     07-03    135  |  104  |  23  ----------------------------<  84  4.7   |  16<L>  |  1.45<H>    Ca    9.1      03 Jul 2020 07:42  Phos  3.7     07-03  Mg     1.8     07-03    TPro  7.1  /  Alb  4.0  /  TBili  0.2  /  DBili  x   /  AST  26  /  ALT  14  /  AlkPhos  81  07-02    PT/INR - ( 02 Jul 2020 12:50 )   PT: 12.9 sec;   INR: 1.08          PTT - ( 02 Jul 2020 12:50 )  PTT:34.9 sec    CARDIAC MARKERS ( 03 Jul 2020 07:42 )  x     / 0.01 ng/mL / x     / x     / x      CARDIAC MARKERS ( 02 Jul 2020 12:50 )  x     / 0.02 ng/mL / x     / x     / x          CAPILLARY BLOOD GLUCOSE          Drug Levels: [] N/A    CSF Analysis: [] N/A      Allergies    No Known Allergies    Intolerances      MEDICATIONS:  Antibiotics:  ceFAZolin   IVPB 2000 milliGRAM(s) IV Intermittent every 12 hours    Neuro:  acetaminophen   Tablet .. 650 milliGRAM(s) Oral every 6 hours PRN  levETIRAcetam 500 milliGRAM(s) Oral every 12 hours  ondansetron   Disintegrating Tablet 4 milliGRAM(s) Oral every 6 hours PRN    Anticoagulation:    OTHER:  atorvastatin 20 milliGRAM(s) Oral at bedtime  chlorhexidine 2% Cloths 1 Application(s) Topical <User Schedule>  losartan 100 milliGRAM(s) Oral every 24 hours  metoprolol succinate ER 25 milliGRAM(s) Oral every 24 hours  tamsulosin 0.4 milliGRAM(s) Oral at bedtime    IVF:    CULTURES:    RADIOLOGY & ADDITIONAL TESTS:    < from: CT Head No Cont (07.03.20 @ 10:42) >    IMPRESSION: Stable left hemispheric subdural hematoma.    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, CAD (no MI, stent > 5 yrs ago), GCA, b/l TKR, multiple R TKR (2002, 6/2020), c/b SSI (periprosthetic MSSA) washout 4/7,  BRANDON and spacer placement (6/2), SUBHASH on nafcilin, now with left SDH s/p fall, stable multiple repeat CTH, neuro intact, SUBHASH improving     SUBDURAL HEMATOMA  ADVANCED ILLNESS  FH: CAD (coronary artery disease)  No pertinent family history in first degree relatives  Handoff  MEWS Score  BPH (benign prostatic hyperplasia)  Giant cell arteritis  Anemia  Hyperlipidemia  CAD (coronary artery disease)  DM (diabetes mellitus)  Hypertension  Subdural hematoma  Infection of prosthetic knee joint  Hyperlipidemia  BPH (benign prostatic hyperplasia)  Hypertension  DM (diabetes mellitus)  CAD (coronary artery disease)  Subdural hematoma  S/P hardware removal  H/O shoulder surgery  Umbilical hernia  H/O total knee replacement, bilateral  CAD (coronary artery disease)  RIGHT ARM NUMBNESS  24  Fall      PLAN:  - neuro checks  - vitals checks  - pain control  - cont keppra  - cont home meds  - R knee chronic periprosthetic MSSA infection:  	- Ortho recs: R knee xray stable, per Dr. Lorenzana, cont Cefazolin 2 g q 12, ? end date                -Touchdown weightbearing on RLE with locked [place] at all times              - f/u with Dr. Nathan Arrington in 2 weeks, [phone]                - plan for abx holiday starting at that time, followed by repeat serum and synovial labs and staged reimplantation of R knee   - monitor SUBHASH, improving now   - Hold DAPT, stent > 5 years ago, no active cardiac symptoms and EKG neg for ischemic changes  - f/u with Cardiologist, Dr Hill as outpt   - Has Right PICC line   - Cont. Cefazolin 2 g Q 12 hrs, f/u on Monday 7/6.   - monitor H/H  - regular diet  - bowel regimen  - DVT PROPHYLAXIS: [x] Venodynes  [x] Heparin/Lovenox    DISPOSITION: stepdown, full code, likely discharge in AM    d/w Dr. Greer    Assessment:  Present when checked    []  GCS  E   V  M     Heart Failure: []Acute, [] acute on chronic , []chronic  Heart Failure:  [] Diastolic (HFpEF), [] Systolic (HFrEF), []Combined (HFpEF and HFrEF), [] RHF, [] Pulm HTN, [] Other    [] SUBHASH, [] ATN, [] AIN, [] other  [] CKD1, [] CKD2, [] CKD 3, [] CKD 4, [] CKD 5, []ESRD    Encephalopathy: [] Metabolic, [] Hepatic, [] toxic, [] Neurological, [] Other    Abnormal Nurtitional Status: [] malnurtition (see nutrition note), [ ]underweight: BMI < 19, [] morbid obesity: BMI >40, [] Cachexia    [] Sepsis  [] hypovolemic shock,[] cardiogenic shock, [] hemorrhagic shock, [] neuogenic shock  [] Acute Respiratory Failure  []Cerebral edema, [] Brain compression/ herniation,   [] Functional quadriplegia  [] Acute blood loss anemia

## 2020-07-04 NOTE — DISCHARGE NOTE PROVIDER - NSDCACTIVITY_GEN_ALL_CORE
Walking - Indoors allowed/Walking - Outdoors allowed/Do not drive or operate machinery/Stairs allowed/Showering allowed/Do not make important decisions/No heavy lifting/straining

## 2020-07-04 NOTE — DISCHARGE NOTE NURSING/CASE MANAGEMENT/SOCIAL WORK - PATIENT PORTAL LINK FT
You can access the FollowMyHealth Patient Portal offered by [facility] by registering at the following website: [email or web address]. By joining [employer]’s FollowMyHealth portal, you will also be able to view your health information using other applications (apps) compatible with our system.

## 2020-07-04 NOTE — PHYSICAL THERAPY INITIAL EVALUATION ADULT - GENERAL OBSERVATIONS, REHAB EVAL
Pt received semi-supine no acute distress +tele +R faisal, cleared for PT by MELISSA Callahan, agreeable to PT Eval. Left seated out of bed no acute distress +call bell, RN aware. FIM 6

## 2020-07-04 NOTE — DISCHARGE NOTE PROVIDER - PROVIDER TOKENS
PROVIDER:[TOKEN:[[id number]]],PROVIDER:[TOKEN:[[id number]]],PROVIDER:[TOKEN:[[id number]]]

## 2020-07-04 NOTE — DISCHARGE NOTE PROVIDER - NSDCFUADDAPPT_GEN_ALL_CORE_FT
***IMPORTANT***: Follow up with Dr. Greer in office on Thursday  Call office on Monday to make an appointment. You may require MMA embolization.   Follow up with your own cardiologist Dr. Crooks as outpatient, [phone]

## 2020-07-04 NOTE — DISCHARGE NOTE PROVIDER - HOSPITAL COURSE
HPI:    80 y/o R-handed male bib wife with PMH of BPH, giant cell arteritis, HLD, HTN, CAD w/ 6 stents placed >5 years ago and DM presents to ER c/o right hand weakness this morning  s/p fall yesterday at 9am while walking through a doorway sideways using his walker. Patient states that he noticed the weakness at 6:30 am today after he woke up and was unable to  a urinal in his hand. Upon arrival to ER, patient's CTH showed an acute left subdural hemorrhage with 1-2mm midline shift. Patient reports that his weakness has resolved, but he developed numbness in his R finger tips upon arrival to ER and mild blurry vision 1-2 hours later, which has been improving since. Patient denies headache, LOC, nausea, dizziness, numbness/tingling/weakness in all other extremities, diplopia, h/o head injury prior to fall yesterday, abdominal pain, chest pain, and fever.    Patient is on aspirin and plavix (last dose this morning) and was given DDAVP in ER for reversal.    Patient was recently discharged from [facility] on 6/7 for removal of hardware (6/2) following infection of his R prosthetic knee. Patient is currently wearing knee immobilizer and is on cefazolin as per ID.        outpatient cardiologist: Dr. Crooks - [phone] (02 Jul 2020 16:10)        OVERNIGHT EVENTS: hypertensive overnight, hydralazine x 1 given. antihypertensives staggered.         Hospital Course:    7/2: patient evaluation for further evaluation of traumatic left SDH    7/3: SIMONE overnight, neuro stable, AM CT head stable. likely discharge to home tomorrow     7/4:  hypertensive overnight, hydralazine x 1 given. antihypertensives staggered. neuro stable.
Straightforward: Basic instructions, no meds, no home treatment

## 2020-07-04 NOTE — DISCHARGE NOTE PROVIDER - NSDCHHHOMEBOUND_GEN_ALL_CORE
Ataxic gait/Requires supervison due to deteriorating mental status.../Stroke/TBI (neurological/cognitive impairment)/Fall risk

## 2020-07-04 NOTE — DISCHARGE NOTE PROVIDER - NSDCMRMEDTOKEN_GEN_ALL_CORE_FT
ascorbic acid: unknown dose  aspirin 81 mg oral delayed release tablet: 1 tab(s) orally once a day  [employer]:   ceFAZolin 2 g intravenous injection: 2 milligram(s) intravenous every 12 hours  Co Q-10 100 mg oral capsule: 1 cap(s) orally once a day  Doculase 100 mg oral capsule: 1 cap(s) orally 2 times a day   Flomax 0.4 mg oral capsule: 1 cap(s) orally 2 times a day  Heparin 3mL from 5mL syringe. Administer after each infusion. :   insulin glargine 100 units/mL subcutaneous solution: 8 unit(s) subcutaneous once a day (at bedtime)   Labs - : CBC, CMP, ESR, CRP once per week for 6 weeks. Starte date: 06/10/2020 Send results to infectious disease Dr. Lorenzana at fax: [phone]  Lipitor 20 mg oral tablet: 1 tab(s) orally once a day  losartan 100 mg oral tablet: 1 tab(s) orally once a day  metFORMIN 1000 mg oral tablet: 1 tab(s) orally 2 times a day  metoprolol succinate 25 mg oral tablet, extended release: 1 tab(s) orally once a day  NaCl 0.9% PICC saline flush 10ml after each use: 1 flush 3 times a day after each use  NS flush 10mL. Administer after each infusion. :   Plavix 75 mg oral tablet: 1 tab(s) orally once a day  Weekly CBC, CMP, ESR, CRP to be faxed to [phone] Dr. Lorenzana: 1 application intravenous once a week ascorbic acid: unknown dose  [employer]:   ceFAZolin 2 g intravenous injection: 2 milligram(s) intravenous every 12 hours  Co Q-10 100 mg oral capsule: 1 cap(s) orally once a day  Doculase 100 mg oral capsule: 1 cap(s) orally 2 times a day   Flomax 0.4 mg oral capsule: 1 cap(s) orally 2 times a day  Heparin 3mL from 5mL syringe. Administer after each infusion. :   insulin glargine 100 units/mL subcutaneous solution: 8 unit(s) subcutaneous once a day (at bedtime)   Keppra 500 mg oral tablet: 1 tab(s) orally every 12 hours MDD:2  Labs - : CBC, CMP, ESR, CRP once per week for 6 weeks. Starte date: 06/10/2020 Send results to infectious disease Dr. Lorenzana at fax: [phone]  Lipitor 20 mg oral tablet: 1 tab(s) orally once a day  losartan 100 mg oral tablet: 1 tab(s) orally once a day  metFORMIN 1000 mg oral tablet: 1 tab(s) orally 2 times a day  metoprolol succinate 25 mg oral tablet, extended release: 1 tab(s) orally once a day  NaCl 0.9% PICC saline flush 10ml after each use: 1 flush 3 times a day after each use  NS flush 10mL. Administer after each infusion. :   Tylenol 325 mg oral tablet: 2 tab(s) orally every 6 hours, As needed, Temp greater or equal to 38C (100.4F), Mild Pain (1 - 3)  Weekly CBC, CMP, ESR, CRP to be faxed to [phone] Dr. Lorenzana: 1 application intravenous once a week

## 2020-07-04 NOTE — DISCHARGE NOTE NURSING/CASE MANAGEMENT/SOCIAL WORK - NSDCFUADDAPPT_GEN_ALL_CORE_FT
***IMPORTANT***: Follow up with Dr. Greer in office on Thursday  Call office on Monday to make an appointment. You may require MMA embolization.   Follow up with your own cardiologist Dr. Crooks as outpatient, [phone]

## 2020-07-06 ENCOUNTER — APPOINTMENT (OUTPATIENT)
Dept: INFECTIOUS DISEASE | Facility: CLINIC | Age: 80
End: 2020-07-06
Payer: MEDICARE

## 2020-07-06 VITALS
HEART RATE: 91 BPM | DIASTOLIC BLOOD PRESSURE: 65 MMHG | HEIGHT: 66 IN | TEMPERATURE: 98.1 F | OXYGEN SATURATION: 100 % | SYSTOLIC BLOOD PRESSURE: 106 MMHG

## 2020-07-06 DIAGNOSIS — R63.0 ANOREXIA: ICD-10-CM

## 2020-07-06 PROCEDURE — 99214 OFFICE O/P EST MOD 30 MIN: CPT

## 2020-07-06 RX ORDER — NAFCILLIN SODIUM 2 G/1
2 INJECTION, POWDER, FOR SOLUTION INTRAVENOUS
Refills: 0 | Status: DISCONTINUED | COMMUNITY
End: 2020-07-06

## 2020-07-06 NOTE — HISTORY OF PRESENT ILLNESS
[FreeTextEntry1] : 80 yo male with hx CAD, DM, R TKR 2002, revision 3/6/20, initially p/w wound dehiscence and drainage from incision c/f SSI s/p washout 4/7 with purulence noted in joint space and concern for hardware involvement--OR cultures growing MSSA. Also found to be COVID-19+ during that admission (asymptomatic). He finished a six week course of cefazolin + rifampin on 5/18 and was transitioned to chronic suppressive therapy with cephalexin with plan for 3 month course however subsequently developed sinus tract formation/relapsed infection. s/p BRANDON and spacer placement 6/2. Course c/b SUBHASH (?AIN in setting of nafcillin (also with peripheral eosinophilia)). He had a mechanical fall yesterday--no LOC--lost his balance. Apparently hit his head. Early this AM developed acute onset of R hand tingling/numbness as well as decreased  strength. Sent emergently to ED--found to have acute L frontoparietal SDH. Given DDAVP and discharged home 7/4.\par Patient's wife concerned about patient's poor appetite. No nausea. Does endorse dysgeusia. R knee swollen but feels less warm to patient. R hand  strength back to baseline.

## 2020-07-06 NOTE — ASSESSMENT
[Treatment Education] : treatment education [Treatment Adherence] : treatment adherence [Anticipatory Guidance] : anticipatory guidance [FreeTextEntry1] : 80 yo male with hx CAD, DM, R TKR 2002, revision 3/6/20, initially p/w wound dehiscence and drainage from incision c/f SSI s/p washout 4/7 with purulence noted in joint space and concern for hardware involvement--OR cultures growing MSSA. Also found to be COVID-19+ during that admission (asymptomatic). He finished a six week course of cefazolin + rifampin on 5/18 and was transitioned to chronic suppressive therapy with cephalexin with plan for 3 month course however subsequently developed sinus tract formation/relapsed infection. s/p BRANDON and spacer placement 6/2. Course c/b SUBHASH (?AIN in setting of nafcillin (also with peripheral eosinophilia)) and acute L frontoparietal SDH. \par - to f/u CBC, CMP, ESR, CRP (to be drawn 7/7)\par - continue cefazolin 2g IV q12h through 7/13 to complete 6 week antibiotic course (inclusive of nafcillin 4 weeks)\par - to f/u repeat synovial fluid studies off antibiotics\par - to repeat labs next month (including inflammatory markers) off antibiotics\par - rx short course of mirtazapine for appetite stimulation \par \par rtc 4 weeks

## 2020-07-06 NOTE — PHYSICAL EXAM
[General Appearance - Alert] : alert [General Appearance - In No Acute Distress] : in no acute distress [Auscultation Breath Sounds / Voice Sounds] : lungs were clear to auscultation bilaterally [Heart Rate And Rhythm] : heart rate was normal and rhythm regular [Heart Sounds] : normal S1 and S2 [Heart Sounds Gallop] : no gallops [Murmurs] : no murmurs [Heart Sounds Pericardial Friction Rub] : no pericardial rub [Full Pulse] : the pedal pulses are present [Edema] : there was no peripheral edema [Abdomen Soft] : soft [Bowel Sounds] : normal bowel sounds [] : no hepato-splenomegaly [Abdomen Tenderness] : non-tender [Abdomen Mass (___ Cm)] : no abdominal mass palpated [Costovertebral Angle Tenderness] : no CVA tenderness [Deep Tendon Reflexes (DTR)] : deep tendon reflexes were 2+ and symmetric [Sensation] : the sensory exam was normal to light touch and pinprick [No Focal Deficits] : no focal deficits [Oriented To Time, Place, And Person] : oriented to person, place, and time [Affect] : the affect was normal [FreeTextEntry1] : R knee swollen; slightly warm to touch; limited ROM; incision c/d/i

## 2020-07-09 DIAGNOSIS — G83.21 MONOPLEGIA OF UPPER LIMB AFFECTING RIGHT DOMINANT SIDE: ICD-10-CM
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
## 2020-07-09 DIAGNOSIS — Z95.5 PRESENCE OF CORONARY ANGIOPLASTY IMPLANT AND GRAFT: ICD-10-CM

## 2020-07-09 DIAGNOSIS — Z79.82 LONG TERM (CURRENT) USE OF ASPIRIN: ICD-10-CM

## 2020-07-09 DIAGNOSIS — N40.0 BENIGN PROSTATIC HYPERPLASIA WITHOUT LOWER URINARY TRACT SYMPTOMS: ICD-10-CM
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
## 2020-07-09 DIAGNOSIS — S06.5X0A TRAUMATIC SUBDURAL HEMORRHAGE WITHOUT LOSS OF CONSCIOUSNESS, INITIAL ENCOUNTER: ICD-10-CM

## 2020-07-09 DIAGNOSIS — N17.9 ACUTE KIDNEY FAILURE, UNSPECIFIED: ICD-10-CM

## 2020-07-09 DIAGNOSIS — T84.53XD INFECTION AND INFLAMMATORY REACTION DUE TO INTERNAL RIGHT KNEE PROSTHESIS, SUBSEQUENT ENCOUNTER: ICD-10-CM

## 2020-07-09 DIAGNOSIS — M31.6 OTHER GIANT CELL ARTERITIS: ICD-10-CM

## 2020-07-09 DIAGNOSIS — E78.5 HYPERLIPIDEMIA, UNSPECIFIED: ICD-10-CM
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
## 2020-07-09 DIAGNOSIS — I25.10 ATHEROSCLEROTIC HEART DISEASE OF NATIVE CORONARY ARTERY WITHOUT ANGINA PECTORIS: ICD-10-CM

## 2020-07-09 DIAGNOSIS — Z79.4 LONG TERM (CURRENT) USE OF INSULIN: ICD-10-CM

## 2020-07-09 DIAGNOSIS — E11.9 TYPE 2 DIABETES MELLITUS WITHOUT COMPLICATIONS: ICD-10-CM

## 2020-07-09 DIAGNOSIS — Z96.653 PRESENCE OF ARTIFICIAL KNEE JOINT, BILATERAL: ICD-10-CM

## 2020-07-09 DIAGNOSIS — I10 ESSENTIAL (PRIMARY) HYPERTENSION: ICD-10-CM

## 2020-07-09 DIAGNOSIS — R29.703 NIHSS SCORE 3: ICD-10-CM

## 2020-07-09 DIAGNOSIS — H53.8 OTHER VISUAL DISTURBANCES: ICD-10-CM

## 2020-07-11 LAB
CULTURE RESULTS: SIGNIFICANT CHANGE UP
SPECIMEN SOURCE: SIGNIFICANT CHANGE UP

## 2020-07-14 ENCOUNTER — RESULT REVIEW (OUTPATIENT)
Age: 80
End: 2020-07-14

## 2020-07-14 ENCOUNTER — APPOINTMENT (OUTPATIENT)
Dept: ORTHOPEDIC SURGERY | Facility: CLINIC | Age: 80
End: 2020-07-14
Payer: MEDICARE

## 2020-07-14 ENCOUNTER — OUTPATIENT (OUTPATIENT)
Dept: OUTPATIENT SERVICES | Facility: HOSPITAL | Age: 80
LOS: 1 days | End: 2020-07-14
Payer: MEDICARE

## 2020-07-14 VITALS
BODY MASS INDEX: 27 KG/M2 | WEIGHT: 168 LBS | SYSTOLIC BLOOD PRESSURE: 110 MMHG | HEIGHT: 66 IN | OXYGEN SATURATION: 98 % | DIASTOLIC BLOOD PRESSURE: 60 MMHG | HEART RATE: 120 BPM

## 2020-07-14 DIAGNOSIS — K42.9 UMBILICAL HERNIA WITHOUT OBSTRUCTION OR GANGRENE: Chronic | ICD-10-CM

## 2020-07-14 DIAGNOSIS — Z96.653 PRESENCE OF ARTIFICIAL KNEE JOINT, BILATERAL: Chronic | ICD-10-CM

## 2020-07-14 DIAGNOSIS — Z98.890 OTHER SPECIFIED POSTPROCEDURAL STATES: Chronic | ICD-10-CM

## 2020-07-14 DIAGNOSIS — I25.10 ATHEROSCLEROTIC HEART DISEASE OF NATIVE CORONARY ARTERY WITHOUT ANGINA PECTORIS: Chronic | ICD-10-CM

## 2020-07-14 PROCEDURE — 73560 X-RAY EXAM OF KNEE 1 OR 2: CPT

## 2020-07-14 PROCEDURE — 73560 X-RAY EXAM OF KNEE 1 OR 2: CPT | Mod: 26,RT

## 2020-07-14 PROCEDURE — 99024 POSTOP FOLLOW-UP VISIT: CPT

## 2020-07-14 NOTE — HISTORY OF PRESENT ILLNESS
[de-identified] : s/p R knee explantation and application of static antibiotic cement spacer, DOS 6/2/20. \par  [de-identified] : No new complaints since hospital discharge for traumatic SDH. Bilateral leg swelling has improved markedly. Less right knee pain than previous. Still tolerating the walker/TTWB. No wound problems. No fevers or other systemic complaints. Still with low appetite. Today was the last day of IV antibiotics; starting antibiotic holiday now. Due to visit Dr. Greer tomorrow for oversight of the SDH; still unsure of whether NSx procedure will be indicated. [de-identified] : 80y/o male 6wk s/p R knee 1st stage septic revision for MSSA PJI [de-identified] : Right knee incision healed without erythema, fluctuance, or drainage. Moderate effusion noted. Improved appearance to bilateral ankle and foot edema noted. DP palpable, feet warm, CR <2s. Calves nontender. \par \par  [de-identified] : Right knee XRs redemonstrate fracture of the anterior and posterior aspects of the static antibiotic spacer. No gross malposition of the spacer fragments.\par \par Labwork reviewed, on chart. [facility] weekly trends: \par CRP 56 > 46 > 30 > 11\par  > 39 > 73 > 96 [de-identified] : I've discussed with Dr. Lorenzana and I think we agree that the overall trend appears to be encouraging (CRP downtrending toward normal, ESR down and up but potentially could be confounded by recent head trauma). Will still move forward with 2wk antibiotic holiday followed by repeat serum and synovial labs. Will need to find out from NSx whether a procedure will be indicated from their end, as well as whether orthopedic surgery can proceed in another 3-4 weeks. Will tentatively place on schedule for 2nd stage revision vs. spacer exchange on 8/7/20. Follow up in 2 weeks for repeat aspiration.

## 2020-07-16 ENCOUNTER — APPOINTMENT (OUTPATIENT)
Dept: NEUROSURGERY | Facility: CLINIC | Age: 80
End: 2020-07-16
Payer: MEDICARE

## 2020-07-16 VITALS
RESPIRATION RATE: 17 BRPM | OXYGEN SATURATION: 98 % | WEIGHT: 168 LBS | HEART RATE: 71 BPM | BODY MASS INDEX: 27 KG/M2 | HEIGHT: 66 IN | SYSTOLIC BLOOD PRESSURE: 103 MMHG | TEMPERATURE: 97.8 F | DIASTOLIC BLOOD PRESSURE: 61 MMHG

## 2020-07-16 DIAGNOSIS — Z91.81 HISTORY OF FALLING: ICD-10-CM

## 2020-07-16 PROCEDURE — 99213 OFFICE O/P EST LOW 20 MIN: CPT

## 2020-07-17 PROBLEM — Z91.81 HISTORY OF FALL: Status: ACTIVE | Noted: 2020-07-17

## 2020-07-18 PROBLEM — N40.0 BENIGN PROSTATIC HYPERPLASIA WITHOUT LOWER URINARY TRACT SYMPTOMS: Chronic | Status: ACTIVE | Noted: 2020-07-02

## 2020-07-18 PROBLEM — I25.10 ATHEROSCLEROTIC HEART DISEASE OF NATIVE CORONARY ARTERY WITHOUT ANGINA PECTORIS: Chronic | Status: ACTIVE | Noted: 2020-03-06

## 2020-07-18 PROBLEM — M31.6 OTHER GIANT CELL ARTERITIS: Chronic | Status: ACTIVE | Noted: 2020-07-02

## 2020-07-20 NOTE — REVIEW OF SYSTEMS
[Recent Weight Loss (___ Lbs)] : recent [unfilled] ~Ulb weight loss [As Noted in HPI] : as noted in HPI [Loss Of Hearing] : hearing loss [Skin Wound] : skin wound [Limb Pain] : limb pain [Negative] : Endocrine

## 2020-07-21 ENCOUNTER — APPOINTMENT (OUTPATIENT)
Dept: ORTHOPEDIC SURGERY | Facility: CLINIC | Age: 80
End: 2020-07-21

## 2020-07-21 NOTE — ADDENDUM
[FreeTextEntry1] : PATIENT:	Timothy Macias 				\par 	\par \par Thursday, July 16, 2020\par \par I saw Mr. Macias in followup.  We will be obtaining a new CT scan to rule out any worsening subdural blood.  He was admitted with a new acute subdural hematoma on July 2 with followup on July 3 showing stable disease.  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  At this point, he has a very swollen knee and will need a new knee replacement.  I told him that the best thing he could do at this point was to go ahead with the knee surgery and that it is unlikely the subdural will need to be treated at all.  We will be repeating the CT scan in 10 days.  He does have a history of hyperlipidemia, coronary artery disease, high blood pressure, and noninsulin-dependent diabetes.  His current medications include metformin, Norvasc, Cozaar, Lipitor, metoprolol, Flomax, Lantus, omeprazole, iron, and vitamin C.  In addition to his knee, he has had multiple coronary stents as well as a shoulder repair.  He does not drink or smoke.  Once we get the CT scan, I will be in touch with him whether there is any concern regarding his ultimate knee surgery.\par \par \par \par Rigo Greer MD\par \par DL/ag [facility] #[id number]\par \par \par \par

## 2020-07-21 NOTE — DATA REVIEWED
[de-identified] : EXAM:  CT BRAIN                      \par \par PROCEDURE DATE:  07/03/2020  \par \par \par \par INTERPRETATION:  PROCEDURE: CT head without intravenous contrast\par \par INDICATION: Subdural hematoma\par \par TECHNIQUE: Multiple axial images were obtained at 5 mm intervals from the skull base to the vertex. Sagittal and coronal reformatted images were obtained from the axial data set. The images were reviewed in brain and bone windows.\par \par COMPARISON: CT's head 7/2/2020 at 4:40 PM and 12:45 PM\par \par FINDINGS: The CT examination demonstrates a left hemispheric subdural hematoma with a small component layering along the left tentorium. The hematoma measures approximately 12 mm along its parietal convexity. There is adjacent sulcal effacement and mass effect on the body of the left lateral ventricle. There is approximately 2 to 3 mm of left to right midline shift.  The ventricles are otherwise stable in size\par \par There is mild patchy hypodensity within the periventricular white matter which is nonspecific and may represent the sequela of small vessel ischemic disease in this patient.\par \par The bony windows demonstrates no fractures. The lenses are absent bilaterally which may be secondary to prior cataract surgery. The visualized paranasal sinuses are within normal limits. The mastoid air cells are well aerated.\par \par IMPRESSION: Stable left hemispheric subdural hematoma.\par \par \par \par \par \par \par Thank you for the opportunity to participate in the care of this patient.\par \par \par \par DOE LESLIE M.D., ATTENDING RADIOLOGIST\par This document has been electronically signed. Jul  3 2020 11:05AM\par   \par \par   \par \par \par EXAM:  CT BRAIN                      \par \par PROCEDURE DATE:  07/02/2020  \par \par \par \par INTERPRETATION:  Axial images were obtained from the skull base to the cranial vertex without intravenous contrast. Soft tissue and bone algorithm images were evaluated.\par \par Clinical information: Left subdural hematoma status post fall.\par \par The current study is compared with previous scan dated earlier the same day.\par \par There has been no appreciable interval change in size, density and associated mild mass effect related to acute left frontoparietal convexity subdural hematoma. There remains minimal left to right midline shift, and basilar cisterns remain widely patent. There is again minimal compression of the left lateral ventricle without evidence of hydrocephalus. There is no new site of intracranial hemorrhage.\par \par IMPRESSION:\par \par Stable left subdural hematoma.\par \par \par \par \par \par Thank you for the opportunity to participate in the care of this patient.\par \par \par \par ROSALINO MÉNDEZ M.D., ATTENDING RADIOLOGIST\par This document has been electronically signed. Jul 2 2020  6:11PM\par   \par \par   \par \par \par EXAM:  CT BRAIN STROKE PROTOCOL                      \par \par PROCEDURE DATE:  07/02/2020  \par \par \par \par INTERPRETATION:  Casey HUNT MD, have reviewed the images and the report and agree with the findings.\par \par \par PROCEDURE: CT head without intravenous contrast\par \par INDICATIONS: Right arm weakness.\par \par TECHNIQUE:  Serial axial images were obtained from the skull base to the vertex without the use of intravenous contrast. Coronal and sagittal reconstructions were created. \par \par COMPARISON EXAMINATION: None.\par \par FINDINGS:  \par VENTRICLES AND SULCI: There is age-appropriate parenchymal volume loss. No hydrocephalus. Mild effacement of the left parietal sulci.\par INTRA-AXIAL: There is mild 1 to 2 mm left-to-right midline shift. No downward herniation. No acute transcortical infarction. There are a few scattered hypodensities throughout the periventricular white matter, likely the sequela of long-standing small vessel ischemic disease.\par EXTRA-AXIAL: There is a hyperdense subdural fluid collection along the left frontoparietal convexity consistent with subdural hematoma, which measures 1.2 cm in maximal thickness (coronal image 38).\par VISUALIZED SINUSES: No air-fluid levels are identified. \par VISUALIZED MASTOIDS: Well aerated.\par CALVARIUM: No fracture.\par MISCELLANEOUS:  Bilateral ocular lens replacement. There is a small hyperdense focus along the left posterior parietal scalp is likely a sebaceous cyst.\par \par IMPRESSION: \par \par Acute subdural hematoma overlying the left frontoparietal convexity, as above. Mild left to right midline shift. No acute transcortical infarction.\par \par Age-appropriate volume loss with minimal small vessel ischemic disease.\par \par The study was completed at 12:45 PM and the findings were discussed with JAVED Monteiro at 12:50 PM on 7/2/2020\par \par \par \par \par \par Thank you for the opportunity to participate in the care of this patient.\par \par AXEL BOLANOS M.D., RADIOLOGY RESIDENT\par This document has been electronically signed.\par CASEY PEREZ M.D. ATTENDING RADIOLOGIST\par This document has been electronically signed. Jul 2 2020  1:20PM\par   \par \par   \par \par \par EXAM:  CT CERVICAL SPINE                      \par \par PROCEDURE DATE:  07/02/2020  \par \par \par \par INTERPRETATION:  PROCEDURE: CT cervical spine without contrast\par \par INDICATION: Fall, pain\par \par TECHNIQUE: Multiple axial sections were obtained from the mid orbits to the sternoclavicular joint. Sagittal and coronal reformats were obtained from the axial data set. The images were reviewed in soft tissue and bone windows.\par \par COMPARISON: None\par \par FINDINGS: There is reversal of the normal cervical lordosis.  There is trace retrolisthesis of C3 on 4, 2mm anterolisthesis C4 on 5 and trace anterolisthesis C7 on T1. There is degenerative loss of height at C4 through C6.  Otherwise, the vertebral body heights are maintained  There is diffuse disc space narrowing. There are anterior osteophytes throughout the cervical spine.  The prevertebral soft tissues are within normal limits. The osseous structures are intact without fracture. \par \par At the C2/3 level, there is uncovertebral and facet hypertrophy resulting in mild left neural foraminal narrowing.\par \par At the C3/4 level, there is posterior osseous ridging with uncovertebral facet hypertrophy resulting in moderate to severe left and mild to moderate bilateral neuroforaminal narrowing. The bones.\par \par At the C4/5 level, there is posterior osseous ridging with uncovertebral and facet hypertrophy resulting in moderate to severe left and mild right neural canal narrowing.\par \par At the C5-C6 level, there is posterior osseous ridging with uncovertebral hypertrophy resulting in mild bilateral neuroforaminal narrowing..\par \par At the C6/7 level, there is posterior osseous spurring uncovertebral hypertrophy resulting in mild bilateral neuroforaminal narrowing..\par \par At the C7/T1 level, there is no disc herniation. There is no central spinal canal stenosis or neural foramen narrowing.\par \par IMPRESSION:\par \par No evidence of acute fracture. Reversal of normal cervical lordosis with trace retrolisthesis C3-4, anterolisthesis C4-5 and C7-T1, likely degenerative.\par \par Multilevel degenerative changes as above.\par \par \par \par \par \par Thank you for the opportunity to participate in the care of this patient.\par \par \par \par SARINA TYLER M.D., ATTENDING RADIOLOGIST\par This document has been electronically signed. Jul 2 2020  1:31PM\par   \par \par   \par \par

## 2020-07-21 NOTE — HISTORY OF PRESENT ILLNESS
[FreeTextEntry1] : 78 y/o R-handed male with PMH of BPH, giant cell arteritis, HLD, HTN, CAD w/ 6 stents placed >5 years ago and DM presents to ER on July 2nd with c/o right hand weakness this morning  s/p fall yesterday at 9 am while walking through a doorway sideways using his walker. Patient states that he noticed the weakness that morning after he woke up and was unable to  a urinal in his hand. Upon arrival to ER, patient's CTH showed an acute left subdural hemorrhage with 1-2mm midline shift. Patient reports that his weakness has resolved, but he developed numbness in his R finger tips upon arrival to ER and mild blurry vision 1-2 hours later, which has been improving since. Patient denies headache, LOC, nausea, dizziness, numbness/tingling/weakness in all other extremities, diplopia, h/o head injury prior to fall yesterday, abdominal pain, chest pain, and fever.\par Patient is on aspirin and plavix and was recently discharged from 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 Patient is currently wearing knee immobilizer and is on cefazolin IV via PICC as per ID.\par \par \par

## 2020-07-21 NOTE — ASSESSMENT
[FreeTextEntry1] : Plan:\par - Patient states he is having knee surgery in the coming weeks, he is cleared to proceed from a Neurosurgery standpoint. \par - We will obtain new Head CT wo contrast in 10 days to assess SDH, patient scheduled for 7/27/2020 at 10:30AM.\par - Dr. Greer states patient can discontinue Keppra 500mg BID usage as he has not had a seizure. I have given instructions on safe tapering dosage: 1 tab PO BID for the rest of the week, then take 1 tab PO QD x 1 week, then d/c.\par \par Patient and spouse verbalize understanding and agreement with current plan.

## 2020-07-21 NOTE — REASON FOR VISIT
[Follow-Up: _____] : a [unfilled] follow-up visit [Spouse] : spouse [FreeTextEntry1] : TODAY'S VISIT:\par Patient presents with spouse post hospitalization for fall and SDH.

## 2020-07-22 LAB
CULTURE RESULTS: SIGNIFICANT CHANGE UP
SPECIMEN SOURCE: SIGNIFICANT CHANGE UP

## 2020-07-23 ENCOUNTER — APPOINTMENT (OUTPATIENT)
Dept: INFECTIOUS DISEASE | Facility: CLINIC | Age: 80
End: 2020-07-23
Payer: MEDICARE

## 2020-07-23 VITALS
OXYGEN SATURATION: 100 % | TEMPERATURE: 98.5 F | WEIGHT: 155 LBS | SYSTOLIC BLOOD PRESSURE: 109 MMHG | HEART RATE: 63 BPM | BODY MASS INDEX: 24.91 KG/M2 | HEIGHT: 66 IN | DIASTOLIC BLOOD PRESSURE: 55 MMHG

## 2020-07-23 DIAGNOSIS — T84.052A: ICD-10-CM

## 2020-07-23 PROCEDURE — 99214 OFFICE O/P EST MOD 30 MIN: CPT

## 2020-07-23 RX ORDER — ASPIRIN 81 MG
81 TABLET, DELAYED RELEASE (ENTERIC COATED) ORAL
Refills: 0 | Status: COMPLETED | COMMUNITY
End: 2020-07-23

## 2020-07-23 RX ORDER — CLOPIDOGREL 75 MG/1
TABLET, FILM COATED ORAL
Refills: 0 | Status: COMPLETED | COMMUNITY
End: 2020-07-23

## 2020-07-23 RX ORDER — UBIDECARENONE/VIT E ACET 100MG-5
CAPSULE ORAL
Refills: 0 | Status: COMPLETED | COMMUNITY
End: 2020-07-23

## 2020-07-23 RX ORDER — POTASSIUM CHLORIDE 1500 MG/1
20 TABLET, EXTENDED RELEASE ORAL DAILY
Qty: 70 | Refills: 0 | Status: COMPLETED | COMMUNITY
Start: 2020-06-10 | End: 2020-07-23

## 2020-07-23 RX ORDER — ASPIRIN ENTERIC COATED TABLETS 81 MG 81 MG/1
81 TABLET, DELAYED RELEASE ORAL
Qty: 60 | Refills: 0 | Status: COMPLETED | COMMUNITY
Start: 2020-03-06 | End: 2020-07-23

## 2020-07-23 RX ORDER — CEFAZOLIN SODIUM/WATER 2 G/20 ML
2 SYRINGE (ML) INTRAVENOUS
Refills: 0 | Status: COMPLETED | COMMUNITY
End: 2020-07-23

## 2020-07-23 RX ORDER — MIRTAZAPINE 15 MG/1
15 TABLET, FILM COATED ORAL
Qty: 30 | Refills: 0 | Status: COMPLETED | COMMUNITY
Start: 2020-07-06 | End: 2020-07-23

## 2020-07-23 RX ORDER — TAMSULOSIN HYDROCHLORIDE 0.4 MG/1
CAPSULE ORAL
Refills: 0 | Status: COMPLETED | COMMUNITY
End: 2020-07-23

## 2020-07-23 NOTE — HISTORY OF PRESENT ILLNESS
[FreeTextEntry1] : Feels well. Appetite improved. No fevers. No HA or weakness. Denies R knee pain. Plans to see Dr. Arrington next week for R knee aspiration.

## 2020-07-23 NOTE — ASSESSMENT
[FreeTextEntry1] : 78 yo male with hx CAD, DM, R TKR 2002, revision 3/6/20, initially p/w wound dehiscence and drainage from incision c/f SSI s/p washout 4/7 with purulence noted in joint space and concern for hardware involvement--OR cultures growing MSSA. Also found to be COVID-19+ during that admission (asymptomatic). He finished a six week course of cefazolin + rifampin on 5/18 and was transitioned to chronic suppressive therapy with cephalexin with plan for 3 month course however subsequently developed sinus tract formation/relapsed infection. s/p BRANDON and spacer placement 6/2. Course c/b SUBHASH (?AIN in setting of nafcillin (also with peripheral eosinophilia)) and acute L frontoparietal SDH. s/p cefazolin 2g IV q12h through 7/16--completed 6 week antibiotic course (inclusive of nafcillin 4 weeks)\par - CBC, CMP, ESR, CRP \par - will also check coags and UA as requested by patient's wife as part of medical clearance; will be seeing internist for formal medical clearance assessment including EKG\par - continue to observe off antibiotics at this time\par - to f/u repeat synovial fluid studies off antibiotics--to see Dr. Arrington next week\par - repeat CTH next week per NSGY for monitoring of SDH\par \par I plan to see the patient at [facility] when he is admitted 8/7\par \par Please note I will be away 7/24-8/4 [Anticipatory Guidance] : anticipatory guidance

## 2020-07-23 NOTE — PHYSICAL EXAM
[General Appearance - Alert] : alert [General Appearance - In No Acute Distress] : in no acute distress [Auscultation Breath Sounds / Voice Sounds] : lungs were clear to auscultation bilaterally [Heart Rate And Rhythm] : heart rate was normal and rhythm regular [Heart Sounds] : normal S1 and S2 [Heart Sounds Gallop] : no gallops [Heart Sounds Pericardial Friction Rub] : no pericardial rub [Murmurs] : no murmurs [Bowel Sounds] : normal bowel sounds [Abdomen Soft] : soft [Abdomen Tenderness] : non-tender [] : no hepato-splenomegaly [Abdomen Mass (___ Cm)] : no abdominal mass palpated [Costovertebral Angle Tenderness] : no CVA tenderness [Deep Tendon Reflexes (DTR)] : deep tendon reflexes were 2+ and symmetric [Sensation] : the sensory exam was normal to light touch and pinprick [No Focal Deficits] : no focal deficits [FreeTextEntry1] : 5/5  strength b/l hands [Oriented To Time, Place, And Person] : oriented to person, place, and time [Affect] : the affect was normal

## 2020-07-24 LAB
ALBUMIN SERPL ELPH-MCNC: 4.5 G/DL
ALP BLD-CCNC: 70 U/L
ALT SERPL-CCNC: 14 U/L
ANION GAP SERPL CALC-SCNC: 15 MMOL/L
APPEARANCE: CLEAR
APTT BLD: 30.2 SEC
AST SERPL-CCNC: 19 U/L
BASOPHILS # BLD AUTO: 0.08 K/UL
BASOPHILS NFR BLD AUTO: 0.8 %
BILIRUB SERPL-MCNC: 0.3 MG/DL
BILIRUBIN URINE: NEGATIVE
BLOOD URINE: NEGATIVE
BUN SERPL-MCNC: 39 MG/DL
CALCIUM SERPL-MCNC: 10.2 MG/DL
CHLORIDE SERPL-SCNC: 102 MMOL/L
CO2 SERPL-SCNC: 16 MMOL/L
COLOR: YELLOW
CREAT SERPL-MCNC: 1.44 MG/DL
CRP SERPL-MCNC: 0.24 MG/DL
EOSINOPHIL # BLD AUTO: 0.47 K/UL
EOSINOPHIL NFR BLD AUTO: 4.7 %
ERYTHROCYTE [SEDIMENTATION RATE] IN BLOOD BY WESTERGREN METHOD: 60 MM/HR
GLUCOSE QUALITATIVE U: NEGATIVE
GLUCOSE SERPL-MCNC: 183 MG/DL
HCT VFR BLD CALC: 29.3 %
HGB BLD-MCNC: 8.9 G/DL
IMM GRANULOCYTES NFR BLD AUTO: 0.4 %
INR PPP: 1.02 RATIO
KETONES URINE: NEGATIVE
LEUKOCYTE ESTERASE URINE: NEGATIVE
LYMPHOCYTES # BLD AUTO: 1.42 K/UL
LYMPHOCYTES NFR BLD AUTO: 14.2 %
MAN DIFF?: NORMAL
MCHC RBC-ENTMCNC: 30.4 GM/DL
MCHC RBC-ENTMCNC: 31 PG
MCV RBC AUTO: 102.1 FL
MONOCYTES # BLD AUTO: 0.72 K/UL
MONOCYTES NFR BLD AUTO: 7.2 %
NEUTROPHILS # BLD AUTO: 7.26 K/UL
NEUTROPHILS NFR BLD AUTO: 72.7 %
NITRITE URINE: NEGATIVE
PH URINE: 5.5
PLATELET # BLD AUTO: 444 K/UL
POTASSIUM SERPL-SCNC: 5.3 MMOL/L
PROT SERPL-MCNC: 6.8 G/DL
PROTEIN URINE: NEGATIVE
PT BLD: 12 SEC
RBC # BLD: 2.87 M/UL
RBC # FLD: 16.2 %
SODIUM SERPL-SCNC: 133 MMOL/L
SPECIFIC GRAVITY URINE: 1.01
UROBILINOGEN URINE: NORMAL
WBC # FLD AUTO: 9.99 K/UL

## 2020-07-27 ENCOUNTER — APPOINTMENT (OUTPATIENT)
Dept: CT IMAGING | Facility: CLINIC | Age: 80
End: 2020-07-27
Payer: MEDICARE

## 2020-07-27 ENCOUNTER — OUTPATIENT (OUTPATIENT)
Dept: OUTPATIENT SERVICES | Facility: HOSPITAL | Age: 80
LOS: 1 days | End: 2020-07-27

## 2020-07-27 DIAGNOSIS — Z98.890 OTHER SPECIFIED POSTPROCEDURAL STATES: Chronic | ICD-10-CM

## 2020-07-27 DIAGNOSIS — K42.9 UMBILICAL HERNIA WITHOUT OBSTRUCTION OR GANGRENE: Chronic | ICD-10-CM

## 2020-07-27 DIAGNOSIS — I25.10 ATHEROSCLEROTIC HEART DISEASE OF NATIVE CORONARY ARTERY WITHOUT ANGINA PECTORIS: Chronic | ICD-10-CM

## 2020-07-27 DIAGNOSIS — Z96.653 PRESENCE OF ARTIFICIAL KNEE JOINT, BILATERAL: Chronic | ICD-10-CM

## 2020-07-27 PROCEDURE — 70450 CT HEAD/BRAIN W/O DYE: CPT | Mod: 26

## 2020-07-28 ENCOUNTER — APPOINTMENT (OUTPATIENT)
Dept: ORTHOPEDIC SURGERY | Facility: CLINIC | Age: 80
End: 2020-07-28
Payer: MEDICARE

## 2020-07-28 VITALS
BODY MASS INDEX: 25.39 KG/M2 | TEMPERATURE: 98.1 F | DIASTOLIC BLOOD PRESSURE: 70 MMHG | WEIGHT: 158 LBS | HEART RATE: 82 BPM | OXYGEN SATURATION: 96 % | HEIGHT: 66 IN | SYSTOLIC BLOOD PRESSURE: 124 MMHG

## 2020-07-28 DIAGNOSIS — Z96.651 PAIN DUE TO INTERNAL ORTHOPEDIC PROSTHETIC DEVICES, IMPLANTS AND GRAFTS, INITIAL ENCOUNTER: ICD-10-CM

## 2020-07-28 DIAGNOSIS — T84.84XA PAIN DUE TO INTERNAL ORTHOPEDIC PROSTHETIC DEVICES, IMPLANTS AND GRAFTS, INITIAL ENCOUNTER: ICD-10-CM

## 2020-07-28 PROCEDURE — 99024 POSTOP FOLLOW-UP VISIT: CPT

## 2020-07-28 NOTE — HISTORY OF PRESENT ILLNESS
[de-identified] : s/p R knee explantation and application of static antibiotic cement spacer, DOS 6/2/20 [de-identified] : Right knee incision healed without erythema, fluctuance, or drainage. Effusion noted. No distal edema noted. DP palpable, feet warm, CR <2s. Calves nontender.  [de-identified] : Inflammatory marker trends:\par CRP 56 > 46 > 30 > 11 > 0.24\par  > 39 > 73 > 96 > 60 [de-identified] : No acute issues since last visit. Repeat CTH done yesterday demonstrated evolving SDH without new pathology. No further falls. Sugars have been under control.  [de-identified] : Plan for spacer revision to definitive TKA next week\par Preop medical clearance, repeat COVID-19 testing [de-identified] : 80y/o male 8 weeks s/p right knee explantation and static antibiotic cement spacer

## 2020-07-30 ENCOUNTER — APPOINTMENT (OUTPATIENT)
Dept: ORTHOPEDIC SURGERY | Facility: CLINIC | Age: 80
End: 2020-07-30
Payer: MEDICARE

## 2020-07-30 VITALS
HEIGHT: 66 IN | HEART RATE: 79 BPM | WEIGHT: 158 LBS | OXYGEN SATURATION: 98 % | SYSTOLIC BLOOD PRESSURE: 110 MMHG | BODY MASS INDEX: 25.39 KG/M2 | DIASTOLIC BLOOD PRESSURE: 50 MMHG

## 2020-07-30 DIAGNOSIS — E87.2 ACIDOSIS: ICD-10-CM

## 2020-07-30 DIAGNOSIS — Z01.818 ENCOUNTER FOR OTHER PREPROCEDURAL EXAMINATION: ICD-10-CM

## 2020-07-30 DIAGNOSIS — E87.1 HYPO-OSMOLALITY AND HYPONATREMIA: ICD-10-CM

## 2020-07-30 DIAGNOSIS — Z86.39 PERSONAL HISTORY OF OTHER ENDOCRINE, NUTRITIONAL AND METABOLIC DISEASE: ICD-10-CM

## 2020-07-30 PROCEDURE — 99024 POSTOP FOLLOW-UP VISIT: CPT

## 2020-07-30 RX ORDER — TAMSULOSIN HYDROCHLORIDE 0.4 MG/1
0.4 CAPSULE ORAL
Refills: 0 | Status: ACTIVE | COMMUNITY

## 2020-07-30 RX ORDER — ACETAMINOPHEN 500 MG/1
500 TABLET ORAL
Qty: 180 | Refills: 1 | Status: COMPLETED | COMMUNITY
Start: 2020-03-06 | End: 2020-07-30

## 2020-07-30 RX ORDER — B-COMPLEX WITH VITAMIN C
TABLET ORAL
Refills: 0 | Status: ACTIVE | COMMUNITY

## 2020-07-30 RX ORDER — ATORVASTATIN CALCIUM 20 MG/1
20 TABLET, FILM COATED ORAL
Refills: 0 | Status: ACTIVE | COMMUNITY

## 2020-07-30 RX ORDER — ELECTROLYTES/DEXTROSE
SOLUTION, ORAL ORAL
Refills: 0 | Status: ACTIVE | COMMUNITY

## 2020-07-30 NOTE — RESULTS/DATA
[de-identified] : Mild Hyponatremia at 133, Mild AGMA, Cr Stable 1.44 (recentl 1.3-1.4 range) [] : results reviewed [de-identified] : Hgb stable at 8.9 (8-9 range over last few months), no leukocytosis [de-identified] : from 6/1/2020 [de-identified] : from 6/12/2020

## 2020-07-30 NOTE — ASSESSMENT
[Patient Optimized for Surgery] : Patient optimized for surgery [No Further Testing Recommended] : no further testing recommended [FreeTextEntry4] : The patient is a 79 year old man with history of CAD, DM, previous right knee TKR in 2002 s/p revision on 3/6/2020, complicated by wound dehiscence s/p washout on 4/7/2020, found to be MSSA positive, s/p R knee explantation and application of static antibiotic cement spacer on 6/2/2020.  His hospital course was complicated by SUBHASH and Left Subdural Hematoma, both of which are stable.\par \par The patient is planned for Right Knee Total Arthroplasty with Dr. Nathan Arrington on 8/3/2020.\par \par -Labs/Diagnostics reviewed with patient in detail\par -METS >4\par -RCRI = 10.1% 30-day risk of death/MI/cardiac arrest\par \par Patient is at MODERATE risk for MODERATE surgery/procedure\par \par Pending COVID SCREEN, Patient is MEDICALLY OPTIMIZED TO PROCEED WITH PROPOSED SURGERY.\par \par **Attemped to call PMD multiple times, but no answer.\par PATIENT CLEARED FROM NEUROSURGERY AND INFECTIOUS DISEASE STANDPOINT (SEE NOTE IN ALLSCRIPTS)\par \par Recommendations\par 1) Diabetes Mellitus\par -Reported A1C of 4.9 per patient?  Patient unlikely needs insulin moving forward but would have him make follow-up with his PMD/Endocrinologist postoperatively\par -STOP Metformin on day of surgery\par -On night prior to surgery, patient instructed to decrease his basal bolus insulin by half (4 units, from 8 units TDD)\par -Patient instructed to continue CHO/Fat supplementation with Glucerna\par 2) Mild Hyponatremia\par -Patient appears euvolemic\par -Possible component of SIADH in setting of recent SDH\par -Patient counseled on fluid restriction.  We discussed free water restriction of at least 2 liters\par -I encouraged use of sports drinks for electrolytes\par 3)Anion Gap Metabolic Acidosis\par -Possibly multifactorial in the setting of chronic iron supplementation, and starvation ketosis.  Patient has lost a great deal of weight because of aguesia secondary to antibiotic therapy\par -He was encouraged to increase his calorie intake and supplement with Glucerna/Ensure\par -Recommend follow-up with Nephrology postoperatively\par 4) SUBHASH\par -Cr stable at 1.44\par -Consider isotonic fluids as inpatient\par -Serial BMP\par 5) Left SDH\par -Stable\par -Cleared form neurosurgery standpoint\par 6) CAD\par -No active cardiopulmonary complaints\par -Hold antiplatelets in the setting of recent ICH\par \par \par \par \par  [As per surgery] : as per surgery

## 2020-07-30 NOTE — PHYSICAL EXAM
[No Acute Distress] : no acute distress [Well Nourished] : well nourished [Well Developed] : well developed [Well-Appearing] : well-appearing [Normal Sclera/Conjunctiva] : normal sclera/conjunctiva [PERRL] : pupils equal round and reactive to light [Normal Oropharynx] : the oropharynx was normal [Normal Outer Ear/Nose] : the outer ears and nose were normal in appearance [EOMI] : extraocular movements intact [Supple] : supple [No JVD] : no jugular venous distention [No Lymphadenopathy] : no lymphadenopathy [No Respiratory Distress] : no respiratory distress  [Thyroid Normal, No Nodules] : the thyroid was normal and there were no nodules present [Clear to Auscultation] : lungs were clear to auscultation bilaterally [No Accessory Muscle Use] : no accessory muscle use [Normal Rate] : normal rate  [No Murmur] : no murmur heard [Regular Rhythm] : with a regular rhythm [Normal S1, S2] : normal S1 and S2 [No Abdominal Bruit] : a ~M bruit was not heard ~T in the abdomen [No Carotid Bruits] : no carotid bruits [Pedal Pulses Present] : the pedal pulses are present [No Varicosities] : no varicosities [No Edema] : there was no peripheral edema [No Palpable Aorta] : no palpable aorta [No Extremity Clubbing/Cyanosis] : no extremity clubbing/cyanosis [Soft] : abdomen soft [Non Tender] : non-tender [No Masses] : no abdominal mass palpated [Non-distended] : non-distended [No HSM] : no HSM [Normal Bowel Sounds] : normal bowel sounds [Normal Posterior Cervical Nodes] : no posterior cervical lymphadenopathy [No Spinal Tenderness] : no spinal tenderness [Normal Anterior Cervical Nodes] : no anterior cervical lymphadenopathy [No CVA Tenderness] : no CVA  tenderness [No Rash] : no rash [Coordination Grossly Intact] : coordination grossly intact [Normal Affect] : the affect was normal [Deep Tendon Reflexes (DTR)] : deep tendon reflexes were 2+ and symmetric [Normal Gait] : normal gait [No Focal Deficits] : no focal deficits [Normal Insight/Judgement] : insight and judgment were intact [de-identified] : right knee swelling

## 2020-07-30 NOTE — HISTORY OF PRESENT ILLNESS
[Coronary Artery Disease] : coronary artery disease [No Pertinent Pulmonary History] : no history of asthma, COPD, sleep apnea, or smoking [No Adverse Anesthesia Reaction] : no adverse anesthesia reaction in self or family member [Chronic Anticoagulation] : no chronic anticoagulation [Chronic Kidney Disease] : no chronic kidney disease [(Patient denies any chest pain, claudication, dyspnea on exertion, orthopnea, palpitations or syncope)] : Patient denies any chest pain, claudication, dyspnea on exertion, orthopnea, palpitations or syncope [Diabetes] : no diabetes [Moderate (4-6 METs)] : Moderate (4-6 METs) [FreeTextEntry1] : Right Total Knee Arthroplasty s/p R knee explantation and application of static antibiotic cement spacer, DOS 6/2/20 [FreeTextEntry3] : Dr. Nathan Arrington [FreeTextEntry2] : 8/3/2020 [FreeTextEntry5] : SUBHASH, DM, BPH, SDH [FreeTextEntry4] : The patient is a 79 year old man presenting with right knee pain with complications related to previous hardware . The patient is planned for Right Knee Total Arthroplasty with Dr. Nathan Arrington on 8/3/2020.\par \par The patient is status post R knee explantation and application of static antibiotic cement spacer on 6/2/2020.\par \par Medical History:  The patient is a 79 year old man with history of CAD, DM, previous right knee TKR in 2002 s/p revision on 3/6/2020 who initially presented with wound dehiscence and drainage from incision and had a washout on 4/7, found to have positive cultures for MSSA.  He had a hospital course complicated by asymptomatic COVID+ status.  He was seen by ID and completed 6 weeks of cefazolin and rifampin, followed by chronic suppressive therapy with cephalexin with plan for 3 month course however subsequently developed sinus tract formation/relapsed infection. This prompted R knee explantation and application of static antibiotic cement spacer.  This was then complicated by SUBHASH and acute left frontoparietal subdural hematoma and was followed by internal medicine and neurosurgery during his hospital stay.\par \par He is currently off antibiotics, and mostly symptom free beside right knee pain and swelling.  He had follow up with infectious disease and neurosurgery and has been cleared for proceeding with planned surgery from their standpoint.\par \par The patient denies recent/active cardiopulmonary symptoms including chest pain, shortness of breath, dyspnea of exertion, palpitations, swelling, headache, dizziness, syncope.  PCI performed about 5 years ago.  DAPT on hold given recent subdural hematoma.\par \par The patient denies recent COVID exposure or COVID-related symptoms.\par \par

## 2020-07-31 RX ORDER — POVIDONE-IODINE 5 %
1 AEROSOL (ML) TOPICAL ONCE
Refills: 0 | Status: DISCONTINUED | OUTPATIENT
Start: 2020-08-03 | End: 2020-08-07

## 2020-07-31 RX ORDER — POVIDONE-IODINE 5 %
1 AEROSOL (ML) TOPICAL ONCE
Refills: 0 | Status: COMPLETED | OUTPATIENT
Start: 2020-08-03 | End: 2020-08-03

## 2020-07-31 RX ORDER — CEFAZOLIN SODIUM 1 G
2 VIAL (EA) INJECTION
Qty: 0 | Refills: 0 | DISCHARGE

## 2020-07-31 NOTE — H&P ADULT - PROBLEM SELECTOR PLAN 1
Admit to Orthopedic service  For elective right knee revision arthroplasty, possible antibiotic cement spacer exchange  Medically cleared for surgery by Dr. Anaya

## 2020-07-31 NOTE — H&P ADULT - NSHPPHYSICALEXAM_GEN_ALL_CORE
MSK: decreased ROM of right knee secondary to pain    Rest of PE per medical clearance MSK: decreased ROM of right knee secondary to pain  Right knee:  No open lesions or signs of infection Surgical incision c/d/i  TTP of the medial joint line  In knee mobilizer  Muscle strength 5/5 to EHL/TA/GS  Pedal pulse 2+  Compartments are soft and compressible b/l, nonTTP      Rest of PE per medical clearance

## 2020-07-31 NOTE — H&P ADULT - NSICDXPASTMEDICALHX_GEN_ALL_CORE_FT
PAST MEDICAL HISTORY:  Anemia     BPH (benign prostatic hyperplasia)     CAD (coronary artery disease) 6 stents    DM (diabetes mellitus)     Giant cell arteritis     H/O subdural hemorrhage s/p fall on 7/2/2020    Hyperlipidemia     Hypertension

## 2020-07-31 NOTE — H&P ADULT - HISTORY OF PRESENT ILLNESS
78 yo M with right knee infection     Presents today for revision right knee revision arthroplasty, possible cement spacer exchange. 80 yo M with right knee infection x after revision surgery. Patient had an antibiotic spacer placed. Patient states to pain but controlled. Patient denies any CP, SOB, fever, chills, numbness/tingling.     Presents today for revision right knee revision arthroplasty, possible cement spacer exchange.

## 2020-07-31 NOTE — H&P ADULT - NSHPLABSRESULTS_GEN_ALL_CORE
Preop BMP, PT/INR, PTT, UA WNL  Preop CXR WNL per clearance   Preop EKG WNL per clearance  3M DOS  CBC WNL except for H/H 8.9/29.3 - T&S DOS Preop BMP, PT/INR, PTT, UA WNL  Preop CXR WNL per clearance   Preop EKG WNL per clearance  3M DOS  CBC WNL except for H/H 8.9/29.3 -   Previous T and S performed

## 2020-08-02 ENCOUNTER — TRANSCRIPTION ENCOUNTER (OUTPATIENT)
Age: 80
End: 2020-08-02

## 2020-08-03 ENCOUNTER — APPOINTMENT (OUTPATIENT)
Dept: ORTHOPEDIC SURGERY | Facility: HOSPITAL | Age: 80
End: 2020-08-03

## 2020-08-03 ENCOUNTER — INPATIENT (INPATIENT)
Facility: HOSPITAL | Age: 80
LOS: 3 days | Discharge: HOME | DRG: 464 | End: 2020-08-07
Attending: ORTHOPAEDIC SURGERY | Admitting: ORTHOPAEDIC SURGERY
Payer: MEDICARE

## 2020-08-03 ENCOUNTER — RESULT REVIEW (OUTPATIENT)
Age: 80
End: 2020-08-03

## 2020-08-03 VITALS
WEIGHT: 151.68 LBS | RESPIRATION RATE: 16 BRPM | HEART RATE: 53 BPM | HEIGHT: 66.5 IN | DIASTOLIC BLOOD PRESSURE: 57 MMHG | OXYGEN SATURATION: 98 % | TEMPERATURE: 98 F | SYSTOLIC BLOOD PRESSURE: 122 MMHG

## 2020-08-03 DIAGNOSIS — Z98.890 OTHER SPECIFIED POSTPROCEDURAL STATES: Chronic | ICD-10-CM

## 2020-08-03 DIAGNOSIS — D64.9 ANEMIA, UNSPECIFIED: ICD-10-CM

## 2020-08-03 DIAGNOSIS — E11.9 TYPE 2 DIABETES MELLITUS WITHOUT COMPLICATIONS: ICD-10-CM

## 2020-08-03 DIAGNOSIS — N40.0 BENIGN PROSTATIC HYPERPLASIA WITHOUT LOWER URINARY TRACT SYMPTOMS: ICD-10-CM

## 2020-08-03 DIAGNOSIS — I25.10 ATHEROSCLEROTIC HEART DISEASE OF NATIVE CORONARY ARTERY WITHOUT ANGINA PECTORIS: Chronic | ICD-10-CM

## 2020-08-03 DIAGNOSIS — M31.6 OTHER GIANT CELL ARTERITIS: ICD-10-CM

## 2020-08-03 DIAGNOSIS — I25.10 ATHEROSCLEROTIC HEART DISEASE OF NATIVE CORONARY ARTERY WITHOUT ANGINA PECTORIS: ICD-10-CM

## 2020-08-03 DIAGNOSIS — I10 ESSENTIAL (PRIMARY) HYPERTENSION: ICD-10-CM

## 2020-08-03 DIAGNOSIS — T84.53XA INFECTION AND INFLAMMATORY REACTION DUE TO INTERNAL RIGHT KNEE PROSTHESIS, INITIAL ENCOUNTER: ICD-10-CM

## 2020-08-03 DIAGNOSIS — Z96.653 PRESENCE OF ARTIFICIAL KNEE JOINT, BILATERAL: Chronic | ICD-10-CM

## 2020-08-03 DIAGNOSIS — Z86.79 PERSONAL HISTORY OF OTHER DISEASES OF THE CIRCULATORY SYSTEM: ICD-10-CM

## 2020-08-03 DIAGNOSIS — K42.9 UMBILICAL HERNIA WITHOUT OBSTRUCTION OR GANGRENE: Chronic | ICD-10-CM
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
## 2020-08-03 DIAGNOSIS — E78.5 HYPERLIPIDEMIA, UNSPECIFIED: ICD-10-CM

## 2020-08-03 LAB
BASE EXCESS BLDA CALC-SCNC: -6.6 MMOL/L — LOW (ref -2–3)
BASE EXCESS BLDA CALC-SCNC: -7.3 MMOL/L — LOW (ref -2–3)
BLD GP AB SCN SERPL QL: NEGATIVE — SIGNIFICANT CHANGE UP
CA-I BLDA-SCNC: 1.17 MMOL/L — SIGNIFICANT CHANGE UP (ref 1.12–1.3)
CA-I BLDA-SCNC: 1.27 MMOL/L — SIGNIFICANT CHANGE UP (ref 1.12–1.3)
COHGB MFR BLDA: 0.3 % — SIGNIFICANT CHANGE UP
COHGB MFR BLDA: 0.3 % — SIGNIFICANT CHANGE UP
GLUCOSE BLDC GLUCOMTR-MCNC: 110 MG/DL — HIGH (ref 70–99)
GLUCOSE BLDC GLUCOMTR-MCNC: 140 MG/DL — HIGH (ref 70–99)
GLUCOSE BLDC GLUCOMTR-MCNC: 171 MG/DL — HIGH (ref 70–99)
GLUCOSE BLDC GLUCOMTR-MCNC: 182 MG/DL — HIGH (ref 70–99)
GLUCOSE BLDC GLUCOMTR-MCNC: 312 MG/DL — HIGH (ref 70–99)
GRAM STN FLD: SIGNIFICANT CHANGE UP
HCO3 BLDA-SCNC: 19 MMOL/L — LOW (ref 21–28)
HCO3 BLDA-SCNC: 19 MMOL/L — LOW (ref 21–28)
HCT VFR BLD CALC: 26.3 % — LOW (ref 39–50)
HGB BLD-MCNC: 8.9 G/DL — LOW (ref 13–17)
HGB BLDA-MCNC: 8.5 G/DL — LOW (ref 13–17)
HGB BLDA-MCNC: 9.9 G/DL — LOW (ref 13–17)
MCHC RBC-ENTMCNC: 31.1 PG — SIGNIFICANT CHANGE UP (ref 27–34)
MCHC RBC-ENTMCNC: 33.8 GM/DL — SIGNIFICANT CHANGE UP (ref 32–36)
MCV RBC AUTO: 92 FL — SIGNIFICANT CHANGE UP (ref 80–100)
METHGB MFR BLDA: 0.5 % — SIGNIFICANT CHANGE UP
METHGB MFR BLDA: 1 % — SIGNIFICANT CHANGE UP
NRBC # BLD: 0 /100 WBCS — SIGNIFICANT CHANGE UP (ref 0–0)
O2 CT VFR BLDA CALC: 13.1 ML/DL — SIGNIFICANT CHANGE UP (ref 15–23)
O2 CT VFR BLDA CALC: 15.1 ML/DL — SIGNIFICANT CHANGE UP (ref 15–23)
OXYHGB MFR BLDA: 98 % — SIGNIFICANT CHANGE UP (ref 94–100)
OXYHGB MFR BLDA: 99 % — SIGNIFICANT CHANGE UP (ref 94–100)
PCO2 BLDA: 40 MMHG — SIGNIFICANT CHANGE UP (ref 35–48)
PCO2 BLDA: 42 MMHG — SIGNIFICANT CHANGE UP (ref 35–48)
PH BLDA: 7.27 — LOW (ref 7.35–7.45)
PH BLDA: 7.3 — LOW (ref 7.35–7.45)
PLATELET # BLD AUTO: 240 K/UL — SIGNIFICANT CHANGE UP (ref 150–400)
PO2 BLDA: 476 MMHG — HIGH (ref 83–108)
PO2 BLDA: 482 MMHG — HIGH (ref 83–108)
POTASSIUM BLDA-SCNC: 4.4 MMOL/L — SIGNIFICANT CHANGE UP (ref 3.5–4.9)
POTASSIUM BLDA-SCNC: 4.5 MMOL/L — SIGNIFICANT CHANGE UP (ref 3.5–4.9)
RBC # BLD: 2.86 M/UL — LOW (ref 4.2–5.8)
RBC # FLD: 16.1 % — HIGH (ref 10.3–14.5)
RH IG SCN BLD-IMP: POSITIVE — SIGNIFICANT CHANGE UP
SAO2 % BLDA: 100 % — SIGNIFICANT CHANGE UP (ref 95–100)
SAO2 % BLDA: 100 % — SIGNIFICANT CHANGE UP (ref 95–100)
SODIUM BLDA-SCNC: 124 MMOL/L — LOW (ref 138–146)
SODIUM BLDA-SCNC: 126 MMOL/L — LOW (ref 138–146)
SPECIMEN SOURCE: SIGNIFICANT CHANGE UP
WBC # BLD: 9.28 K/UL — SIGNIFICANT CHANGE UP (ref 3.8–10.5)
WBC # FLD AUTO: 9.28 K/UL — SIGNIFICANT CHANGE UP (ref 3.8–10.5)

## 2020-08-03 PROCEDURE — 88304 TISSUE EXAM BY PATHOLOGIST: CPT | Mod: 26

## 2020-08-03 PROCEDURE — 73560 X-RAY EXAM OF KNEE 1 OR 2: CPT | Mod: 26,RT

## 2020-08-03 PROCEDURE — 88305 TISSUE EXAM BY PATHOLOGIST: CPT | Mod: 26

## 2020-08-03 PROCEDURE — 88331 PATH CONSLTJ SURG 1 BLK 1SPC: CPT | Mod: 26

## 2020-08-03 PROCEDURE — 27487 REVISE/REPLACE KNEE JOINT: CPT | Mod: 78,RT

## 2020-08-03 PROCEDURE — 11982 REMOVE DRUG IMPLANT DEVICE: CPT | Mod: 78

## 2020-08-03 RX ORDER — ASPIRIN/CALCIUM CARB/MAGNESIUM 324 MG
81 TABLET ORAL
Refills: 0 | Status: DISCONTINUED | OUTPATIENT
Start: 2020-08-04 | End: 2020-08-07

## 2020-08-03 RX ORDER — METFORMIN HYDROCHLORIDE 850 MG/1
1 TABLET ORAL
Qty: 0 | Refills: 0 | DISCHARGE

## 2020-08-03 RX ORDER — OXYCODONE HYDROCHLORIDE 5 MG/1
5 TABLET ORAL EVERY 4 HOURS
Refills: 0 | Status: DISCONTINUED | OUTPATIENT
Start: 2020-08-03 | End: 2020-08-07

## 2020-08-03 RX ORDER — ASCORBIC ACID 60 MG
0 TABLET,CHEWABLE ORAL
Qty: 0 | Refills: 0 | DISCHARGE

## 2020-08-03 RX ORDER — TAMSULOSIN HYDROCHLORIDE 0.4 MG/1
0.4 CAPSULE ORAL AT BEDTIME
Refills: 0 | Status: DISCONTINUED | OUTPATIENT
Start: 2020-08-03 | End: 2020-08-04

## 2020-08-03 RX ORDER — LOSARTAN POTASSIUM 100 MG/1
1 TABLET, FILM COATED ORAL
Qty: 0 | Refills: 0 | DISCHARGE

## 2020-08-03 RX ORDER — INSULIN LISPRO 100/ML
VIAL (ML) SUBCUTANEOUS
Refills: 0 | Status: DISCONTINUED | OUTPATIENT
Start: 2020-08-03 | End: 2020-08-07

## 2020-08-03 RX ORDER — HYDROMORPHONE HYDROCHLORIDE 2 MG/ML
0.5 INJECTION INTRAMUSCULAR; INTRAVENOUS; SUBCUTANEOUS
Refills: 0 | Status: DISCONTINUED | OUTPATIENT
Start: 2020-08-03 | End: 2020-08-07

## 2020-08-03 RX ORDER — OXYCODONE HYDROCHLORIDE 5 MG/1
10 TABLET ORAL EVERY 4 HOURS
Refills: 0 | Status: DISCONTINUED | OUTPATIENT
Start: 2020-08-03 | End: 2020-08-07

## 2020-08-03 RX ORDER — DEXTROSE 50 % IN WATER 50 %
15 SYRINGE (ML) INTRAVENOUS ONCE
Refills: 0 | Status: DISCONTINUED | OUTPATIENT
Start: 2020-08-03 | End: 2020-08-07

## 2020-08-03 RX ORDER — SODIUM CHLORIDE 9 MG/ML
1000 INJECTION, SOLUTION INTRAVENOUS
Refills: 0 | Status: DISCONTINUED | OUTPATIENT
Start: 2020-08-03 | End: 2020-08-04

## 2020-08-03 RX ORDER — GLUCAGON INJECTION, SOLUTION 0.5 MG/.1ML
1 INJECTION, SOLUTION SUBCUTANEOUS ONCE
Refills: 0 | Status: DISCONTINUED | OUTPATIENT
Start: 2020-08-03 | End: 2020-08-07

## 2020-08-03 RX ORDER — CELECOXIB 200 MG/1
400 CAPSULE ORAL ONCE
Refills: 0 | Status: COMPLETED | OUTPATIENT
Start: 2020-08-03 | End: 2020-08-03

## 2020-08-03 RX ORDER — POLYETHYLENE GLYCOL 3350 17 G/17G
17 POWDER, FOR SOLUTION ORAL DAILY
Refills: 0 | Status: DISCONTINUED | OUTPATIENT
Start: 2020-08-03 | End: 2020-08-07

## 2020-08-03 RX ORDER — SENNA PLUS 8.6 MG/1
2 TABLET ORAL AT BEDTIME
Refills: 0 | Status: DISCONTINUED | OUTPATIENT
Start: 2020-08-03 | End: 2020-08-07

## 2020-08-03 RX ORDER — BUPIVACAINE 13.3 MG/ML
20 INJECTION, SUSPENSION, LIPOSOMAL INFILTRATION ONCE
Refills: 0 | Status: DISCONTINUED | OUTPATIENT
Start: 2020-08-03 | End: 2020-08-07

## 2020-08-03 RX ORDER — CLOPIDOGREL BISULFATE 75 MG/1
75 TABLET, FILM COATED ORAL DAILY
Refills: 0 | Status: DISCONTINUED | OUTPATIENT
Start: 2020-08-04 | End: 2020-08-04

## 2020-08-03 RX ORDER — INSULIN GLARGINE 100 [IU]/ML
8 INJECTION, SOLUTION SUBCUTANEOUS AT BEDTIME
Refills: 0 | Status: DISCONTINUED | OUTPATIENT
Start: 2020-08-03 | End: 2020-08-07

## 2020-08-03 RX ORDER — ONDANSETRON 8 MG/1
4 TABLET, FILM COATED ORAL EVERY 6 HOURS
Refills: 0 | Status: DISCONTINUED | OUTPATIENT
Start: 2020-08-03 | End: 2020-08-07

## 2020-08-03 RX ORDER — ACETAMINOPHEN 500 MG
1000 TABLET ORAL ONCE
Refills: 0 | Status: COMPLETED | OUTPATIENT
Start: 2020-08-03 | End: 2020-08-03

## 2020-08-03 RX ORDER — SODIUM CHLORIDE 9 MG/ML
1000 INJECTION, SOLUTION INTRAVENOUS
Refills: 0 | Status: DISCONTINUED | OUTPATIENT
Start: 2020-08-03 | End: 2020-08-07

## 2020-08-03 RX ORDER — CHLORHEXIDINE GLUCONATE 213 G/1000ML
1 SOLUTION TOPICAL ONCE
Refills: 0 | Status: COMPLETED | OUTPATIENT
Start: 2020-08-03 | End: 2020-08-03

## 2020-08-03 RX ORDER — ATORVASTATIN CALCIUM 80 MG/1
1 TABLET, FILM COATED ORAL
Qty: 0 | Refills: 0 | DISCHARGE

## 2020-08-03 RX ORDER — UBIDECARENONE 100 MG
1 CAPSULE ORAL
Qty: 0 | Refills: 0 | DISCHARGE

## 2020-08-03 RX ORDER — MAGNESIUM HYDROXIDE 400 MG/1
30 TABLET, CHEWABLE ORAL DAILY
Refills: 0 | Status: DISCONTINUED | OUTPATIENT
Start: 2020-08-03 | End: 2020-08-07

## 2020-08-03 RX ORDER — DEXTROSE 50 % IN WATER 50 %
12.5 SYRINGE (ML) INTRAVENOUS ONCE
Refills: 0 | Status: DISCONTINUED | OUTPATIENT
Start: 2020-08-03 | End: 2020-08-07

## 2020-08-03 RX ORDER — DEXTROSE 50 % IN WATER 50 %
25 SYRINGE (ML) INTRAVENOUS ONCE
Refills: 0 | Status: DISCONTINUED | OUTPATIENT
Start: 2020-08-03 | End: 2020-08-07

## 2020-08-03 RX ORDER — LANOLIN ALCOHOL/MO/W.PET/CERES
3 CREAM (GRAM) TOPICAL AT BEDTIME
Refills: 0 | Status: DISCONTINUED | OUTPATIENT
Start: 2020-08-03 | End: 2020-08-07

## 2020-08-03 RX ORDER — HYDROMORPHONE HYDROCHLORIDE 2 MG/ML
0.5 INJECTION INTRAMUSCULAR; INTRAVENOUS; SUBCUTANEOUS EVERY 4 HOURS
Refills: 0 | Status: DISCONTINUED | OUTPATIENT
Start: 2020-08-03 | End: 2020-08-07

## 2020-08-03 RX ORDER — GABAPENTIN 400 MG/1
600 CAPSULE ORAL ONCE
Refills: 0 | Status: COMPLETED | OUTPATIENT
Start: 2020-08-03 | End: 2020-08-03

## 2020-08-03 RX ORDER — METOPROLOL TARTRATE 50 MG
25 TABLET ORAL DAILY
Refills: 0 | Status: DISCONTINUED | OUTPATIENT
Start: 2020-08-03 | End: 2020-08-05

## 2020-08-03 RX ORDER — ATORVASTATIN CALCIUM 80 MG/1
20 TABLET, FILM COATED ORAL AT BEDTIME
Refills: 0 | Status: DISCONTINUED | OUTPATIENT
Start: 2020-08-03 | End: 2020-08-07

## 2020-08-03 RX ORDER — CEFAZOLIN SODIUM 1 G
2000 VIAL (EA) INJECTION EVERY 8 HOURS
Refills: 0 | Status: DISCONTINUED | OUTPATIENT
Start: 2020-08-03 | End: 2020-08-07

## 2020-08-03 RX ORDER — ACETAMINOPHEN 500 MG
650 TABLET ORAL EVERY 6 HOURS
Refills: 0 | Status: DISCONTINUED | OUTPATIENT
Start: 2020-08-03 | End: 2020-08-07

## 2020-08-03 RX ADMIN — ATORVASTATIN CALCIUM 20 MILLIGRAM(S): 80 TABLET, FILM COATED ORAL at 22:09

## 2020-08-03 RX ADMIN — Medication 1000 MILLIGRAM(S): at 07:24

## 2020-08-03 RX ADMIN — CHLORHEXIDINE GLUCONATE 1 APPLICATION(S): 213 SOLUTION TOPICAL at 07:14

## 2020-08-03 RX ADMIN — Medication 650 MILLIGRAM(S): at 18:47

## 2020-08-03 RX ADMIN — Medication 100 MILLIGRAM(S): at 19:40

## 2020-08-03 RX ADMIN — HYDROMORPHONE HYDROCHLORIDE 0.5 MILLIGRAM(S): 2 INJECTION INTRAMUSCULAR; INTRAVENOUS; SUBCUTANEOUS at 18:27

## 2020-08-03 RX ADMIN — Medication 650 MILLIGRAM(S): at 18:02

## 2020-08-03 RX ADMIN — INSULIN GLARGINE 8 UNIT(S): 100 INJECTION, SOLUTION SUBCUTANEOUS at 22:55

## 2020-08-03 RX ADMIN — TAMSULOSIN HYDROCHLORIDE 0.4 MILLIGRAM(S): 0.4 CAPSULE ORAL at 22:09

## 2020-08-03 RX ADMIN — Medication 8: at 22:55

## 2020-08-03 RX ADMIN — CELECOXIB 400 MILLIGRAM(S): 200 CAPSULE ORAL at 07:25

## 2020-08-03 RX ADMIN — GABAPENTIN 600 MILLIGRAM(S): 400 CAPSULE ORAL at 07:25

## 2020-08-03 RX ADMIN — Medication 1 APPLICATION(S): at 07:14

## 2020-08-03 RX ADMIN — HYDROMORPHONE HYDROCHLORIDE 0.5 MILLIGRAM(S): 2 INJECTION INTRAMUSCULAR; INTRAVENOUS; SUBCUTANEOUS at 16:56

## 2020-08-03 NOTE — PHYSICAL THERAPY INITIAL EVALUATION ADULT - ONSET DATE, REHAB EVAL
--continue management per primary service  --permissive HTN secondary to small right pontine CVA 03-Aug-2020

## 2020-08-03 NOTE — PHYSICAL THERAPY INITIAL EVALUATION ADULT - PERTINENT HX OF CURRENT PROBLEM, REHAB EVAL
80 yo M with right knee infection x after revision surgery. Patient had an antibiotic spacer placed. Patient states to pain but controlled. Patient denies any CP, SOB, fever, chills. Presents today for revision right knee revision arthroplasty, possible cement spacer exchange.

## 2020-08-03 NOTE — BRIEF OPERATIVE NOTE - NSICDXBRIEFPREOP_GEN_ALL_CORE_FT
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

## 2020-08-03 NOTE — PROGRESS NOTE ADULT - SUBJECTIVE AND OBJECTIVE BOX
Ortho Post Op Check    Procedure:  Rev R TKA  Surgeon: Dr. Arrington    Pt comfortable without complaints, pain controlled.   Denies CP, SOB, N/V, numbness/tingling down le b/l     Vital Signs Last 24 Hrs  T(C): 36.7 (08-03-20 @ 13:36), Max: 36.7 (08-03-20 @ 13:36)  T(F): 98 (08-03-20 @ 13:36), Max: 98 (08-03-20 @ 13:36)  HR: 57 (08-03-20 @ 14:30) (55 - 59)  BP: 126/59 (08-03-20 @ 14:30) (121/60 - 126/60)  BP(mean): 85 (08-03-20 @ 14:30) (82 - 86)  RR: 16 (08-03-20 @ 14:30) (14 - 21)  SpO2: 99% (08-03-20 @ 14:30) (99% - 100%)      General: Pt Alert and oriented, NAD  DSG prevena R knee, webrill ace C/D/I HVx1 in place  Pulses:  DPs palpable b/l le   Sensation:  SILT throughout distal le b/l   Motor: EHL/FHL/TA/GS 5/5 b/l                           8.9    9.28  )-----------( 240      ( 03 Aug 2020 14:32 )             26.3           Post-op X-Ray: components in place    A/P: 79yMale POD#0 s/p removal of abx spacer revision R TKA  - Stable  - Pain Control as needed  - DVT ppx:  asa, plavix  - Post op abx:  standing ancef until cultures result  - PT, WBS: WBAT R knee while locked in extension with faisal in place  - Neurosurgery following for SDH in the past month will continue to appreciate recs  - Monitor drain output  - Dispo pending PT eval    Ortho Pager [phone]

## 2020-08-03 NOTE — BRIEF OPERATIVE NOTE - NSICDXBRIEFPOSTOP_GEN_ALL_CORE_FT
POST-OP DIAGNOSIS:  Status post revision of total knee replacement, right 03-Aug-2020 13:39:12  Aminata Robins

## 2020-08-03 NOTE — PHYSICAL THERAPY INITIAL EVALUATION ADULT - ADDITIONAL COMMENTS
Pt has been using a RW since his last surgery. He lives home with his wife and has no stairs to enter his home.

## 2020-08-04 ENCOUNTER — TRANSCRIPTION ENCOUNTER (OUTPATIENT)
Age: 80
End: 2020-08-04

## 2020-08-04 LAB
ANION GAP SERPL CALC-SCNC: 11 MMOL/L — SIGNIFICANT CHANGE UP (ref 5–17)
B PERT IGG+IGM PNL SER: NORMAL
BUN SERPL-MCNC: 34 MG/DL — HIGH (ref 7–23)
CALCIUM SERPL-MCNC: 8.9 MG/DL — SIGNIFICANT CHANGE UP (ref 8.4–10.5)
CHLORIDE SERPL-SCNC: 101 MMOL/L — SIGNIFICANT CHANGE UP (ref 96–108)
CO2 SERPL-SCNC: 22 MMOL/L — SIGNIFICANT CHANGE UP (ref 22–31)
COLOR FLD: NORMAL
CREAT SERPL-MCNC: 1.61 MG/DL — HIGH (ref 0.5–1.3)
EOSINOPHIL # FLD MANUAL: 2 %
FLUID INTAKE SUBSTANCE CLASS: NORMAL
GLUCOSE BLDC GLUCOMTR-MCNC: 112 MG/DL — HIGH (ref 70–99)
GLUCOSE BLDC GLUCOMTR-MCNC: 122 MG/DL — HIGH (ref 70–99)
GLUCOSE BLDC GLUCOMTR-MCNC: 127 MG/DL — HIGH (ref 70–99)
GLUCOSE BLDC GLUCOMTR-MCNC: 96 MG/DL — SIGNIFICANT CHANGE UP (ref 70–99)
GLUCOSE SERPL-MCNC: 100 MG/DL — HIGH (ref 70–99)
HCT VFR BLD CALC: 26.3 % — LOW (ref 39–50)
HGB BLD-MCNC: 8.5 G/DL — LOW (ref 13–17)
LYMPHOCYTES # FLD MANUAL: 26 %
MCHC RBC-ENTMCNC: 31 PG — SIGNIFICANT CHANGE UP (ref 27–34)
MCHC RBC-ENTMCNC: 32.3 GM/DL — SIGNIFICANT CHANGE UP (ref 32–36)
MCV RBC AUTO: 96 FL — SIGNIFICANT CHANGE UP (ref 80–100)
MONOS+MACROS NFR FLD MANUAL: 10 %
NEUTS SEG # FLD MANUAL: 62 %
NRBC # BLD: 0 /100 WBCS — SIGNIFICANT CHANGE UP (ref 0–0)
PLATELET # BLD AUTO: 213 K/UL — SIGNIFICANT CHANGE UP (ref 150–400)
POTASSIUM SERPL-MCNC: 4.6 MMOL/L — SIGNIFICANT CHANGE UP (ref 3.5–5.3)
POTASSIUM SERPL-SCNC: 4.6 MMOL/L — SIGNIFICANT CHANGE UP (ref 3.5–5.3)
RBC # BLD: 2.74 M/UL — LOW (ref 4.2–5.8)
RBC # FLD MANUAL: ABNORMAL /UL
RBC # FLD: 17.1 % — HIGH (ref 10.3–14.5)
SODIUM SERPL-SCNC: 134 MMOL/L — LOW (ref 135–145)
TOTAL CELLS COUNTED FLD: 181 /UL
TUBE TYPE: NORMAL
URATE AMN-MCNC: NORMAL
WBC # BLD: 9.03 K/UL — SIGNIFICANT CHANGE UP (ref 3.8–10.5)
WBC # FLD AUTO: 9.03 K/UL — SIGNIFICANT CHANGE UP (ref 3.8–10.5)

## 2020-08-04 RX ORDER — SODIUM CHLORIDE 9 MG/ML
1000 INJECTION INTRAMUSCULAR; INTRAVENOUS; SUBCUTANEOUS
Refills: 0 | Status: DISCONTINUED | OUTPATIENT
Start: 2020-08-04 | End: 2020-08-04

## 2020-08-04 RX ORDER — TAMSULOSIN HYDROCHLORIDE 0.4 MG/1
0.4 CAPSULE ORAL ONCE
Refills: 0 | Status: COMPLETED | OUTPATIENT
Start: 2020-08-04 | End: 2020-08-04

## 2020-08-04 RX ORDER — TAMSULOSIN HYDROCHLORIDE 0.4 MG/1
0.4 CAPSULE ORAL
Refills: 0 | Status: DISCONTINUED | OUTPATIENT
Start: 2020-08-04 | End: 2020-08-07

## 2020-08-04 RX ADMIN — INSULIN GLARGINE 8 UNIT(S): 100 INJECTION, SOLUTION SUBCUTANEOUS at 23:03

## 2020-08-04 RX ADMIN — Medication 650 MILLIGRAM(S): at 15:36

## 2020-08-04 RX ADMIN — TAMSULOSIN HYDROCHLORIDE 0.4 MILLIGRAM(S): 0.4 CAPSULE ORAL at 10:53

## 2020-08-04 RX ADMIN — Medication 100 MILLIGRAM(S): at 14:33

## 2020-08-04 RX ADMIN — Medication 650 MILLIGRAM(S): at 06:50

## 2020-08-04 RX ADMIN — Medication 81 MILLIGRAM(S): at 05:47

## 2020-08-04 RX ADMIN — OXYCODONE HYDROCHLORIDE 5 MILLIGRAM(S): 5 TABLET ORAL at 22:45

## 2020-08-04 RX ADMIN — POLYETHYLENE GLYCOL 3350 17 GRAM(S): 17 POWDER, FOR SOLUTION ORAL at 14:39

## 2020-08-04 RX ADMIN — Medication 650 MILLIGRAM(S): at 00:28

## 2020-08-04 RX ADMIN — ATORVASTATIN CALCIUM 20 MILLIGRAM(S): 80 TABLET, FILM COATED ORAL at 21:18

## 2020-08-04 RX ADMIN — OXYCODONE HYDROCHLORIDE 5 MILLIGRAM(S): 5 TABLET ORAL at 14:40

## 2020-08-04 RX ADMIN — Medication 650 MILLIGRAM(S): at 23:05

## 2020-08-04 RX ADMIN — Medication 81 MILLIGRAM(S): at 21:17

## 2020-08-04 RX ADMIN — Medication 650 MILLIGRAM(S): at 01:25

## 2020-08-04 RX ADMIN — Medication 100 MILLIGRAM(S): at 04:25

## 2020-08-04 RX ADMIN — SODIUM CHLORIDE 80 MILLILITER(S): 9 INJECTION INTRAMUSCULAR; INTRAVENOUS; SUBCUTANEOUS at 14:33

## 2020-08-04 RX ADMIN — Medication 25 MILLIGRAM(S): at 05:47

## 2020-08-04 RX ADMIN — OXYCODONE HYDROCHLORIDE 5 MILLIGRAM(S): 5 TABLET ORAL at 15:36

## 2020-08-04 RX ADMIN — Medication 650 MILLIGRAM(S): at 05:50

## 2020-08-04 RX ADMIN — Medication 100 MILLIGRAM(S): at 19:17

## 2020-08-04 RX ADMIN — OXYCODONE HYDROCHLORIDE 5 MILLIGRAM(S): 5 TABLET ORAL at 21:20

## 2020-08-04 RX ADMIN — Medication 3 MILLIGRAM(S): at 00:26

## 2020-08-04 RX ADMIN — TAMSULOSIN HYDROCHLORIDE 0.4 MILLIGRAM(S): 0.4 CAPSULE ORAL at 23:03

## 2020-08-04 RX ADMIN — Medication 3 MILLIGRAM(S): at 23:03

## 2020-08-04 RX ADMIN — SODIUM CHLORIDE 100 MILLILITER(S): 9 INJECTION, SOLUTION INTRAVENOUS at 02:03

## 2020-08-04 RX ADMIN — Medication 650 MILLIGRAM(S): at 14:40

## 2020-08-04 RX ADMIN — Medication 650 MILLIGRAM(S): at 19:20

## 2020-08-04 RX ADMIN — Medication 650 MILLIGRAM(S): at 18:20

## 2020-08-04 NOTE — DISCHARGE NOTE PROVIDER - NSDCCPCAREPLAN_GEN_ALL_CORE_FT
PRINCIPAL DISCHARGE DIAGNOSIS  Diagnosis: Infection of total right knee replacement  Assessment and Plan of Treatment: improvement s/p Revision Right TKR

## 2020-08-04 NOTE — DISCHARGE NOTE PROVIDER - HOSPITAL COURSE
Admitted    Surgery Revision Right TKR    Christine-op Antibiotics    Pain control    DVT prophylaxis    OOB/Physical Therapy Admitted 8/3/20    Surgery Revision Right TKR    Christine-op Antibiotics    Pain control    DVT prophylaxis    OOB/Physical Therapy     Neurosurgery Consult for recent subdural hematoma- follow up outpatient on 8/18 with Dr. Agee    Neurology consult for mild acute memory loss- CT head shows SDH stable; f/u with neurosurgery outpatient

## 2020-08-04 NOTE — DISCHARGE NOTE PROVIDER - CARE PROVIDER_API CALL
Nathan Arrington)  Orthopedics  [address] [facility] [place] [address]  Phone: [phone]  Fax: [phone]  Follow Up Time: 2 weeks Nathan Arrington)  Orthopedics  [address], [address] [place] [address]  Phone: [phone]  Fax: [phone]  Follow Up Time: 2 weeks    Timothy Agee  NEUROLOGY  [address] [address]  Phone: [phone]  Fax: [phone]  Follow Up Time: 1 week

## 2020-08-04 NOTE — DISCHARGE NOTE PROVIDER - NSDCHHNEEDSERVICE_GEN_ALL_CORE
Other, specify.../Medication teaching and assessment/Observation and assessment/Rehabilitation services/Teaching and training/Wound care and assessment

## 2020-08-04 NOTE — PROGRESS NOTE ADULT - SUBJECTIVE AND OBJECTIVE BOX
Ortho Note    Pt comfortable without complaints, pain controlled  Denies CP, SOB, N/V, numbness/tingling     Vital Signs Last 24 Hrs  T(C): 36.7 (08-04-20 @ 09:45), Max: 36.7 (08-04-20 @ 09:45)  T(F): 98 (08-04-20 @ 09:45), Max: 98 (08-04-20 @ 09:45)  HR: 68 (08-04-20 @ 09:45) (68 - 68)  BP: 159/71 (08-04-20 @ 09:45) (159/71 - 159/71)  BP(mean): --  RR: 21 (08-04-20 @ 09:45) (21 - 21)  SpO2: 100% (08-04-20 @ 09:45) (100% - 100%)  AVSS    focused exam:  General: Pt Alert and oriented, NAD  DSG- prevena to R knee CDI, HV in place  Pulses: +2DP, WWP feet  Sensation: SILT BLE  Motor: 5/5 EHL/FHL/TA/GS                          8.5    9.03  )-----------( 213      ( 04 Aug 2020 07:41 )             26.3   04 Aug 2020 07:41    134    |  101    |  34     ----------------------------<  100    4.6     |  22     |  1.61     Ca    8.9        04 Aug 2020 07:41        A/P: 79yMale POD#1 s/p right knee removal of abx space, implant of TKR  - s/p 2 units PRBCs in OR, Hgb WNL, VSS- continue to monitor  - urinary retention- takes flomax 0.4mg bid; increased inpatient prescription; improving- will continue to monitor UO and bladder scans  - Pain Control  - DVT ppx: ASA + plavix  - PT, WBS: WBAT in faisal locked in extension  - OR cultures NGTD- continue to follow  - appreciate medicine/ neurosurgery recs (recent h/o SDH)  - OOB for meals, I/S  - dispo: home with HPT pending finalization of OR cultures and removal of HV    Ortho Pager [phone] Ortho Note    Pt comfortable without complaints, pain controlled  Denies CP, SOB, N/V, numbness/tingling     Vital Signs Last 24 Hrs  T(C): 36.7 (08-04-20 @ 09:45), Max: 36.7 (08-04-20 @ 09:45)  T(F): 98 (08-04-20 @ 09:45), Max: 98 (08-04-20 @ 09:45)  HR: 68 (08-04-20 @ 09:45) (68 - 68)  BP: 159/71 (08-04-20 @ 09:45) (159/71 - 159/71)  BP(mean): --  RR: 21 (08-04-20 @ 09:45) (21 - 21)  SpO2: 100% (08-04-20 @ 09:45) (100% - 100%)  AVSS    focused exam:  General: Pt Alert and oriented, NAD  DSG- prevena to R knee CDI, HV in place  Pulses: +2DP, WWP feet  Sensation: SILT BLE  Motor: 5/5 EHL/FHL/TA/GS                          8.5    9.03  )-----------( 213      ( 04 Aug 2020 07:41 )             26.3   04 Aug 2020 07:41    134    |  101    |  34     ----------------------------<  100    4.6     |  22     |  1.61     Ca    8.9        04 Aug 2020 07:41        A/P: 79yMale POD#1 s/p right knee removal of abx spacer, revision TKR  - s/p 2 units PRBCs in OR, Hgb WNL, VSS- continue to monitor  - urinary retention- takes flomax 0.4mg bid; increased inpatient prescription; improving- will continue to monitor UO and bladder scans  - Pain Control  - DVT ppx: ASA + plavix  - PT, WBS: WBAT in faisal locked in extension  - OR cultures NGTD- continue to follow  - appreciate medicine/ neurosurgery recs (recent h/o SDH)  - OOB for meals, I/S  - dispo: home with HPT pending finalization of OR cultures and removal of HV    Ortho Pager [phone]

## 2020-08-04 NOTE — DISCHARGE NOTE NURSING/CASE MANAGEMENT/SOCIAL WORK - PATIENT PORTAL LINK FT
You can access the FollowMyHealth Patient Portal offered by [facility] by registering at the following website: [email or web address]. By joining [employer]’s FollowMyHealth portal, you will also be able to view your health information using other applications (apps) compatible with our system.

## 2020-08-04 NOTE — DISCHARGE NOTE PROVIDER - PROVIDER TOKENS
PROVIDER:[TOKEN:[[id number]],FOLLOWUP:[2 weeks]] PROVIDER:[TOKEN:[[id number]],FOLLOWUP:[2 weeks]],PROVIDER:[TOKEN:[[id number]],FOLLOWUP:[1 week]]

## 2020-08-04 NOTE — DISCHARGE NOTE PROVIDER - NSDCFUADDINST_GEN_ALL_CORE_FT
Weight bear as tolerated with assistive device in faisal locked in extension.  No strenuous activity, heavy lifting, driving or returning to work until cleared by MD.  You may shower - dressing is water-resistant, no soaking in bathtubs.  Remove dressing after post op day 5-7, then leave incision open to air. Keep incision clean and dry.  Try to have regular bowel movements, take stool softener or laxative if necessary.  Swelling may travel all the way down leg to foot, this is normal and will subside in a few weeks.  Call to schedule an appt with Dr. Arrington for follow up, if you have staples or sutures they will be removed in office.  Contact your doctor if you experience: fever greater than 101.5, chills, chest pain, difficulty breathing, redness or excessive drainage around the incision, other concerns.  Follow up with your primary care provider. Weight bear as tolerated with assistive device in faisal locked in extension.  No strenuous activity, heavy lifting, driving or returning to work until cleared by MD.  You have a prevena dressing. Keep incision clean and dry.   The dressing has a battery that usually dies in 7 days. Once this occurs, you may remove the dressing and dispose of it, then leave incision open to air. Keep incision clean and dry.    Remove dressing after post op day 5-7, then leave incision open to air. Keep incision clean and dry.  Try to have regular bowel movements, take stool softener or laxative if necessary.  Swelling may travel all the way down leg to foot, this is normal and will subside in a few weeks.  Call to schedule an appt with Dr. Arrington for follow up, if you have staples or sutures they will be removed in office.  Contact your doctor if you experience: fever greater than 101.5, chills, chest pain, difficulty breathing, redness or excessive drainage around the incision, other concerns.  Follow up with your primary care provider. Weight bear as tolerated with assistive device in faisal locked in extension.  No strenuous activity, heavy lifting, driving or returning to work until cleared by MD.  You have an aquacel dressing. You may shower with this dressing. No pools, baths, or soaking incision.     Remove dressing in 10 days after discharge (Monday 8/17) then leave incision open to air. Keep incision clean and dry.  Try to have regular bowel movements, take stool softener or laxative if necessary.  Swelling may travel all the way down leg to foot, this is normal and will subside in a few weeks.  Call to schedule an appt with Dr. Arrington for follow up, if you have staples or sutures they will be removed in office.  Contact your doctor if you experience: fever greater than 101.5, chills, chest pain, difficulty breathing, redness or excessive drainage around the incision, other concerns.  Follow up with your primary care provider.

## 2020-08-04 NOTE — CHART NOTE - NSCHARTNOTEFT_GEN_A_CORE
Admitting Diagnosis:   Patient is a 79y old  Male who presents with a chief complaint of Right knee infection (04 Aug 2020 11:53)    Consult: Yes [   ]  No [ x ]    Reason for Initial Nutrition Assessment: Weight Loss Nutrition Assessment    PAST MEDICAL & SURGICAL HISTORY:  H/O subdural hemorrhage: s/p fall on 7/2/2020  BPH (benign prostatic hyperplasia)  Giant cell arteritis  Anemia  Hyperlipidemia  CAD (coronary artery disease): 6 stents  DM (diabetes mellitus)  Hypertension  S/P hardware removal: 6/2, infected R prosthetic knee, replaced hardware  H/O shoulder surgery: Right  Umbilical hernia: Umbilical and inguinal hernia  H/O total knee replacement, bilateral  CAD (coronary artery disease): 6 stents placement    Current Nutrition Order: CST CHO diet with evening snack    PO Intake: Good (%) [ x  ]  Fair (50-75%) [   ] Poor (<25%) [   ]    GI Issues:   WDL, last BM 8/1 (recommend initiation of bowel regime)  No n/v/d/c noted  No abd distention    Pain:  No pain noted at this time- well controlled on current bowel regime.     Skin Integrity:  Surgical incision, jay score 22  No edema present  HV drain in place    Labs:   08-04    134<L>  |  101  |  34<H>  ----------------------------<  100<H>  4.6   |  22  |  1.61<H>    Ca    8.9      04 Aug 2020 07:41    CAPILLARY BLOOD GLUCOSE    POCT Blood Glucose.: 112 mg/dL (04 Aug 2020 12:31)  POCT Blood Glucose.: 96 mg/dL (04 Aug 2020 06:44)  POCT Blood Glucose.: 312 mg/dL (03 Aug 2020 22:21)  POCT Blood Glucose.: 140 mg/dL (03 Aug 2020 16:25)    Medications:  MEDICATIONS  (STANDING):  acetaminophen   Tablet .. 650 milliGRAM(s) Oral every 6 hours  aspirin enteric coated 81 milliGRAM(s) Oral two times a day  atorvastatin 20 milliGRAM(s) Oral at bedtime  BUpivacaine liposome 1.3% Injectable (no eMAR) 20 milliLiter(s) Local Injection once  ceFAZolin   IVPB 2000 milliGRAM(s) IV Intermittent every 8 hours  clopidogrel Tablet 75 milliGRAM(s) Oral daily  dextrose 5%. 1000 milliLiter(s) (50 mL/Hr) IV Continuous <Continuous>  dextrose 50% Injectable 12.5 Gram(s) IV Push once  dextrose 50% Injectable 25 Gram(s) IV Push once  dextrose 50% Injectable 25 Gram(s) IV Push once  insulin glargine Injectable (LANTUS) 8 Unit(s) SubCutaneous at bedtime  insulin lispro (HumaLOG) corrective regimen sliding scale   SubCutaneous Before meals and at bedtime  melatonin 3 milliGRAM(s) Oral at bedtime  metoprolol succinate ER 25 milliGRAM(s) Oral daily  polyethylene glycol 3350 17 Gram(s) Oral daily  povidone iodine 5% Nasal Swab 1 Application(s) Both Nostrils once  tamsulosin 0.4 milliGRAM(s) Oral two times a day    MEDICATIONS  (PRN):  aluminum hydroxide/magnesium hydroxide/simethicone Suspension 30 milliLiter(s) Oral four times a day PRN Indigestion  dextrose 40% Gel 15 Gram(s) Oral once PRN Blood Glucose LESS THAN 70 milliGRAM(s)/deciliter  glucagon  Injectable 1 milliGRAM(s) IntraMuscular once PRN Glucose LESS THAN 70 milligrams/deciliter  HYDROmorphone  Injectable 0.5 milliGRAM(s) IV Push every 4 hours PRN Breakthrough  HYDROmorphone  Injectable 0.5 milliGRAM(s) IV Push every 15 minutes PRN Severe Pain (7 - 10)  magnesium hydroxide Suspension 30 milliLiter(s) Oral daily PRN Constipation  ondansetron Injectable 4 milliGRAM(s) IV Push every 6 hours PRN Nausea and/or Vomiting  oxyCODONE    IR 5 milliGRAM(s) Oral every 4 hours PRN Moderate Pain (4 - 6)  oxyCODONE    IR 10 milliGRAM(s) Oral every 4 hours PRN Severe Pain (7 - 10)  senna 2 Tablet(s) Oral at bedtime PRN Constipation    Admitted Anthropometrics:  Height: 66.5" Weight: 152lbs, IBW 139lbs+/-10%, %%, BMI 24.1 kg/m2    Weight:  8/3 152lbs    Weight Change: Pt with recent admission 6/1 weighing 170lbs revealing a 18lbs/10.5% intentional weight loss with healthy lifestyle changes. Please cont to trend weights biweekly.     Nutrition Focused Physical Exam: Completed [   ]  Unable to complete [   ]- Not pertinent    Estimated energy needs:   ABW (63kg) used for calculations as pt between % of IBW. Needs adjusted for advanced age and post-op wound healing  Kcal (25-30 kcal/kg): 7745-4664 kcal  Protein (1.1-1.3 g/kg pro): 69-81 g pro  Fluids (30-35 ml/kg): 3652-3860 ml    Subjective: 79M with hx of DM (A1C now 5.9%) presenting with right knee infection after revision surgery. Patient had an antibiotic spacer placed now s/p removal of abx spacer revision R TKA 8/3. Pain is well controlled. Plan for d/c pending finalization of OR cultures and removal of HV. On assessment, pt is resting in bed without complaints. Currently pt is on CST CHO diet with evening snack. Pt is consuming % of meals. Denies n/v/d/c. Education reviewing health weight for geriatric adults, CST CHO diet (very well controlled), and wound healing recommendations- pt appears receptive. Pt has been working on losing weight over the past few months by instilling healthy habits, now 18lbs/10.5% intentional weight loss over the last 2 months. Pt wishes to remain between 150-155lbs as new UBW. Noted good appetite PTA. NKFA. Please see nutrition recs below. RD to follow up per protocol.     Nutrition Diagnosis: Increased kcal, pro needs RT wound healing AEB revision of TKA    [  ] No active nutrition diagnosis at this time  [  ] Current medical condition precludes nutrition intervention    Goal: Consistently meet >75% est needs    Recommendations:  1. Cont with CST CHO diet with evening snack  2. Cont to monitor pain and adjust regime per team  3. Recommend initiation of bowel regime as last BM 8/1 p/t sx  4. Cont to encourage adequate PO intake and hydration  5. RD diet education reenforcement prn    Education: Education reviewing health weight for geriatric adults, CST CHO diet (very well controlled), and wound healing recommendations- pt appears receptive    Risk Level: High [   ] Moderate [ x  ] Low [   ]

## 2020-08-04 NOTE — DISCHARGE NOTE NURSING/CASE MANAGEMENT/SOCIAL WORK - NSDCPEWEB_GEN_ALL_CORE
[facility] for Tobacco Control website --- [email or web address] --- [email or web address]

## 2020-08-04 NOTE — DISCHARGE NOTE PROVIDER - NSDCMRMEDTOKEN_GEN_ALL_CORE_FT
Co Q-10 100 mg oral capsule: 1 cap(s) orally once a day  Flomax 0.4 mg oral capsule: 1 cap(s) orally 2 times a day  insulin glargine 100 units/mL subcutaneous solution: 8 unit(s) subcutaneous once a day (at bedtime)   Lipitor 20 mg oral tablet: 1 tab(s) orally once a day  losartan 100 mg oral tablet: 1 tab(s) orally once a day  metFORMIN 1000 mg oral tablet: 1 tab(s) orally 2 times a day  metoprolol succinate 25 mg oral tablet, extended release: 1 tab(s) orally once a day amLODIPine 2.5 mg oral tablet: 1 tab(s) orally once a day  Co Q-10 100 mg oral capsule: 1 cap(s) orally once a day  Flomax 0.4 mg oral capsule: 1 cap(s) orally 2 times a day  insulin glargine 100 units/mL subcutaneous solution: 8 unit(s) subcutaneous once a day (at bedtime)   Lipitor 20 mg oral tablet: 1 tab(s) orally once a day  losartan 100 mg oral tablet: 1 tab(s) orally once a day  metFORMIN 1000 mg oral tablet: 1 tab(s) orally 2 times a day  Metoprolol Succinate ER 50 mg oral tablet, extended release: 1 tab(s) orally once a day acetaminophen 325 mg oral tablet: 2 tab(s) orally every 6 hours  amLODIPine 2.5 mg oral tablet: 1 tab(s) orally once a day  aspirin 81 mg oral delayed release tablet: 1 tab(s) orally 2 times a day for 1 month  Co Q-10 100 mg oral capsule: 1 cap(s) orally once a day  insulin glargine 100 units/mL subcutaneous solution: 8 unit(s) subcutaneous once a day (at bedtime)   Lipitor 20 mg oral tablet: 1 tab(s) orally once a day  losartan 100 mg oral tablet: 1 tab(s) orally once a day  metFORMIN 1000 mg oral tablet: 1 tab(s) orally 2 times a day  Metoprolol Succinate ER 50 mg oral tablet, extended release: 1 tab(s) orally once a day  oxyCODONE 5 mg oral tablet: 1 tab(s) orally every 6 hours, As Needed -Moderate Pain (4 - 6) MDD:4  polyethylene glycol 3350 oral powder for reconstitution: 17 gram(s) orally once a day  senna oral tablet: 2 tab(s) orally once a day (at bedtime), As needed, Constipation acetaminophen 325 mg oral tablet: 2 tab(s) orally every 6 hours  amLODIPine 2.5 mg oral tablet: 1 tab(s) orally once a day  aspirin 81 mg oral delayed release tablet: 1 tab(s) orally 2 times a day for 1 month  Co Q-10 100 mg oral capsule: 1 cap(s) orally once a day  insulin glargine 100 units/mL subcutaneous solution: 8 unit(s) subcutaneous once a day (at bedtime)   Keflex 500 mg oral capsule: 1 cap(s) orally every 8 hours for 3 months  Lipitor 20 mg oral tablet: 1 tab(s) orally once a day  losartan 100 mg oral tablet: 1 tab(s) orally once a day  metFORMIN 1000 mg oral tablet: 1 tab(s) orally 2 times a day  Metoprolol Succinate ER 50 mg oral tablet, extended release: 1 tab(s) orally once a day  oxyCODONE 5 mg oral tablet: 1 tab(s) orally every 6 hours, As Needed -Moderate Pain (4 - 6) MDD:4  polyethylene glycol 3350 oral powder for reconstitution: 17 gram(s) orally once a day  senna oral tablet: 2 tab(s) orally once a day (at bedtime), As needed, Constipation acetaminophen 325 mg oral tablet: 2 tab(s) orally every 6 hours  amLODIPine 2.5 mg oral tablet: 1 tab(s) orally once a day  aspirin 81 mg oral delayed release tablet: 1 tab(s) orally 2 times a day for 1 month  Co Q-10 100 mg oral capsule: 1 cap(s) orally once a day  insulin glargine 100 units/mL subcutaneous solution: 8 unit(s) subcutaneous once a day (at bedtime)   Keflex 500 mg oral capsule: 1 cap(s) orally every 8 hours for 3 months  Lipitor 20 mg oral tablet: 1 tab(s) orally once a day  losartan 100 mg oral tablet: 1 tab(s) orally once a day  metFORMIN 1000 mg oral tablet: 1 tab(s) orally 2 times a day  Metoprolol Succinate ER 50 mg oral tablet, extended release: 1 tab(s) orally once a day  oxyCODONE 5 mg oral tablet: 1 tab(s) orally every 6 hours, As Needed -Moderate Pain (4 - 6) MDD:4  polyethylene glycol 3350 oral powder for reconstitution: 17 gram(s) orally once a day  senna oral tablet: 2 tab(s) orally once a day (at bedtime), As needed, Constipation  tamsulosin 0.4 mg oral capsule: 1 cap(s) orally 2 times a day

## 2020-08-04 NOTE — DISCHARGE NOTE PROVIDER - CARE PROVIDERS DIRECT ADDRESSES
,DirectAddress_Unknown ,DirectAddress_Unknown,[email or web address]

## 2020-08-04 NOTE — DISCHARGE NOTE NURSING/CASE MANAGEMENT/SOCIAL WORK - NSDCPEEMAIL_GEN_ALL_CORE
[facility] for Tobacco Control email [email or web address]

## 2020-08-04 NOTE — PROGRESS NOTE ADULT - SUBJECTIVE AND OBJECTIVE BOX
SUBJECTIVE: Patient seen and examined. Pain controlled.  Pt did well o/n. No f/c/n/v/cp/sob. Required straight cath x 1.    OBJECTIVE:  NAD  Vital Signs Last 24 Hrs  T(C): 36.2 (04 Aug 2020 05:44), Max: 36.7 (03 Aug 2020 13:36)  T(F): 97.2 (04 Aug 2020 05:44), Max: 98 (03 Aug 2020 13:36)  HR: 60 (04 Aug 2020 05:44) (49 - 76)  BP: 112/54 (04 Aug 2020 05:44) (100/50 - 135/86)  BP(mean): 87 (03 Aug 2020 17:00) (75 - 90)  RR: 13 (04 Aug 2020 05:44) (11 - 25)  SpO2: 100% (04 Aug 2020 05:44) (91% - 100%)      Physical Exam:  General: Resting comfortably in bed. AAOx3. NAD.  Extremities:        LLE: No gross deformity. SILT L2-S1 distribution, symmetric. TA/EHL/FHL/GS motor intact. WWP, DP 2+       RLE: Dressing c/d/i with Steve locked in extension. Prevena w/ good function. HVx1. SILT L2-S1 distribution, symmetric. TA/EHL/FHL/GS motor intact. 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 Pain controlled.  Pt did well o/n. No f/c/n/v/cp/sob. Required straight cath x 1.    OBJECTIVE:  NAD  Vital Signs Last 24 Hrs  T(C): 36.2 (04 Aug 2020 05:44), Max: 36.7 (03 Aug 2020 13:36)  T(F): 97.2 (04 Aug 2020 05:44), Max: 98 (03 Aug 2020 13:36)  HR: 60 (04 Aug 2020 05:44) (49 - 76)  BP: 112/54 (04 Aug 2020 05:44) (100/50 - 135/86)  BP(mean): 87 (03 Aug 2020 17:00) (75 - 90)  RR: 13 (04 Aug 2020 05:44) (11 - 25)  SpO2: 100% (04 Aug 2020 05:44) (91% - 100%)      Physical Exam:  General: Resting comfortably in bed. AAOx3. NAD.  Extremities:        LLE: No gross deformity. SILT L2-S1 distribution, symmetric. TA/EHL/FHL/GS motor intact. [facility]       RLE: Dressing c/d/i with Bishop locked in extension. Prevena w/ good function. HVx1. SILT L2-S1 distribution, symmetric. TA/EHL/FHL/GS motor intact. 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

## 2020-08-05 LAB
ANION GAP SERPL CALC-SCNC: 8 MMOL/L — SIGNIFICANT CHANGE UP (ref 5–17)
BUN SERPL-MCNC: 30 MG/DL — HIGH (ref 7–23)
CALCIUM SERPL-MCNC: 9.1 MG/DL — SIGNIFICANT CHANGE UP (ref 8.4–10.5)
CHLORIDE SERPL-SCNC: 104 MMOL/L — SIGNIFICANT CHANGE UP (ref 96–108)
CO2 SERPL-SCNC: 24 MMOL/L — SIGNIFICANT CHANGE UP (ref 22–31)
CREAT SERPL-MCNC: 1.54 MG/DL — HIGH (ref 0.5–1.3)
GLUCOSE BLDC GLUCOMTR-MCNC: 120 MG/DL — HIGH (ref 70–99)
GLUCOSE BLDC GLUCOMTR-MCNC: 129 MG/DL — HIGH (ref 70–99)
GLUCOSE BLDC GLUCOMTR-MCNC: 134 MG/DL — HIGH (ref 70–99)
GLUCOSE BLDC GLUCOMTR-MCNC: 136 MG/DL — HIGH (ref 70–99)
GLUCOSE SERPL-MCNC: 120 MG/DL — HIGH (ref 70–99)
HCT VFR BLD CALC: 24.1 % — LOW (ref 39–50)
HGB BLD-MCNC: 7.9 G/DL — LOW (ref 13–17)
MCHC RBC-ENTMCNC: 31.2 PG — SIGNIFICANT CHANGE UP (ref 27–34)
MCHC RBC-ENTMCNC: 32.8 GM/DL — SIGNIFICANT CHANGE UP (ref 32–36)
MCV RBC AUTO: 95.3 FL — SIGNIFICANT CHANGE UP (ref 80–100)
NRBC # BLD: 0 /100 WBCS — SIGNIFICANT CHANGE UP (ref 0–0)
PLATELET # BLD AUTO: 181 K/UL — SIGNIFICANT CHANGE UP (ref 150–400)
POTASSIUM SERPL-MCNC: 4.9 MMOL/L — SIGNIFICANT CHANGE UP (ref 3.5–5.3)
POTASSIUM SERPL-SCNC: 4.9 MMOL/L — SIGNIFICANT CHANGE UP (ref 3.5–5.3)
RBC # BLD: 2.53 M/UL — LOW (ref 4.2–5.8)
RBC # FLD: 16.3 % — HIGH (ref 10.3–14.5)
SODIUM SERPL-SCNC: 136 MMOL/L — SIGNIFICANT CHANGE UP (ref 135–145)
WBC # BLD: 8.01 K/UL — SIGNIFICANT CHANGE UP (ref 3.8–10.5)
WBC # FLD AUTO: 8.01 K/UL — SIGNIFICANT CHANGE UP (ref 3.8–10.5)

## 2020-08-05 PROCEDURE — 70450 CT HEAD/BRAIN W/O DYE: CPT | Mod: 26

## 2020-08-05 PROCEDURE — 99232 SBSQ HOSP IP/OBS MODERATE 35: CPT

## 2020-08-05 PROCEDURE — 99233 SBSQ HOSP IP/OBS HIGH 50: CPT

## 2020-08-05 RX ORDER — METOPROLOL TARTRATE 50 MG
25 TABLET ORAL DAILY
Refills: 0 | Status: DISCONTINUED | OUTPATIENT
Start: 2020-08-05 | End: 2020-08-06

## 2020-08-05 RX ORDER — LOSARTAN POTASSIUM 100 MG/1
100 TABLET, FILM COATED ORAL DAILY
Refills: 0 | Status: DISCONTINUED | OUTPATIENT
Start: 2020-08-05 | End: 2020-08-07

## 2020-08-05 RX ORDER — FERROUS SULFATE 325(65) MG
325 TABLET ORAL DAILY
Refills: 0 | Status: DISCONTINUED | OUTPATIENT
Start: 2020-08-05 | End: 2020-08-07

## 2020-08-05 RX ADMIN — TAMSULOSIN HYDROCHLORIDE 0.4 MILLIGRAM(S): 0.4 CAPSULE ORAL at 12:13

## 2020-08-05 RX ADMIN — Medication 650 MILLIGRAM(S): at 12:13

## 2020-08-05 RX ADMIN — Medication 650 MILLIGRAM(S): at 13:25

## 2020-08-05 RX ADMIN — Medication 100 MILLIGRAM(S): at 03:30

## 2020-08-05 RX ADMIN — POLYETHYLENE GLYCOL 3350 17 GRAM(S): 17 POWDER, FOR SOLUTION ORAL at 12:14

## 2020-08-05 RX ADMIN — INSULIN GLARGINE 8 UNIT(S): 100 INJECTION, SOLUTION SUBCUTANEOUS at 22:01

## 2020-08-05 RX ADMIN — Medication 100 MILLIGRAM(S): at 19:12

## 2020-08-05 RX ADMIN — Medication 3 MILLIGRAM(S): at 21:06

## 2020-08-05 RX ADMIN — MAGNESIUM HYDROXIDE 30 MILLILITER(S): 400 TABLET, CHEWABLE ORAL at 08:25

## 2020-08-05 RX ADMIN — Medication 325 MILLIGRAM(S): at 17:48

## 2020-08-05 RX ADMIN — Medication 650 MILLIGRAM(S): at 19:19

## 2020-08-05 RX ADMIN — Medication 81 MILLIGRAM(S): at 06:04

## 2020-08-05 RX ADMIN — Medication 650 MILLIGRAM(S): at 00:00

## 2020-08-05 RX ADMIN — ATORVASTATIN CALCIUM 20 MILLIGRAM(S): 80 TABLET, FILM COATED ORAL at 21:06

## 2020-08-05 RX ADMIN — Medication 100 MILLIGRAM(S): at 12:14

## 2020-08-05 RX ADMIN — Medication 650 MILLIGRAM(S): at 06:30

## 2020-08-05 RX ADMIN — Medication 81 MILLIGRAM(S): at 17:48

## 2020-08-05 RX ADMIN — SENNA PLUS 2 TABLET(S): 8.6 TABLET ORAL at 08:24

## 2020-08-05 RX ADMIN — Medication 650 MILLIGRAM(S): at 17:49

## 2020-08-05 RX ADMIN — Medication 650 MILLIGRAM(S): at 06:05

## 2020-08-05 RX ADMIN — TAMSULOSIN HYDROCHLORIDE 0.4 MILLIGRAM(S): 0.4 CAPSULE ORAL at 21:06

## 2020-08-05 NOTE — CONSULT NOTE ADULT - SUBJECTIVE AND OBJECTIVE BOX
NEUROLOGY CONSULT    HPI:  80 yo M with right knee infection x after revision surgery. Patient had an antibiotic spacer placed. Patient states to pain but controlled. Patient denies any CP, SOB, fever, chills, numbness/tingling.     Presents today for revision right knee revision arthroplasty, possible cement spacer exchange. (31 Jul 2020 14:25)      SUBJECTIVE:    REVIEW OF SYSTEMS:  Constitutional: No fever, chills, fatigue, weakness  Eyes: no eye pain, visual disturbances, or discharge  ENT:  No difficulty hearing, tinnitus, vertigo; No sinus or throat pain  Neck: No pain or stiffness  Respiratory: No cough, dyspnea, wheezing   Cardiovascular: No chest pain, palpitations,   Gastrointestinal: No abdominal or epigastric pain. No nausea, vomiting  No diarrhea or constipation.   Genitourinary: No dysuria, frequency, hematuria or incontinence  Neurological: No headaches, lightheadedness, vertigo, numbness or tremors  Psychiatric: No depression, anxiety, mood swings or difficulty sleeping  Musculoskeletal: No joint pain or swelling; No muscle, back or extremity pain  Skin: No itching, burning, rashes or lesions   Lymph Nodes: No enlarged glands  Endocrine: No heat or cold intolerance; No hair loss, No h/o diabetes or thyroid dysfunction  Allergy and Immunologic: No hives or eczema    MEDICATIONS  Home Medications:  Co Q-10 100 mg oral capsule: 1 cap(s) orally once a day (03 Aug 2020 07:46)  Flomax 0.4 mg oral capsule: 1 cap(s) orally 2 times a day (03 Aug 2020 07:46)  Lipitor 20 mg oral tablet: 1 tab(s) orally once a day (03 Aug 2020 07:46)  losartan 100 mg oral tablet: 1 tab(s) orally once a day (03 Aug 2020 07:46)  metFORMIN 1000 mg oral tablet: 1 tab(s) orally 2 times a day (03 Aug 2020 07:46)  metoprolol succinate 25 mg oral tablet, extended release: 1 tab(s) orally once a day (03 Aug 2020 07:46)    MEDICATIONS  (STANDING):  acetaminophen   Tablet .. 650 milliGRAM(s) Oral every 6 hours  aspirin enteric coated 81 milliGRAM(s) Oral two times a day  atorvastatin 20 milliGRAM(s) Oral at bedtime  BUpivacaine liposome 1.3% Injectable (no eMAR) 20 milliLiter(s) Local Injection once  ceFAZolin   IVPB 2000 milliGRAM(s) IV Intermittent every 8 hours  dextrose 5%. 1000 milliLiter(s) (50 mL/Hr) IV Continuous <Continuous>  dextrose 50% Injectable 12.5 Gram(s) IV Push once  dextrose 50% Injectable 25 Gram(s) IV Push once  dextrose 50% Injectable 25 Gram(s) IV Push once  insulin glargine Injectable (LANTUS) 8 Unit(s) SubCutaneous at bedtime  insulin lispro (HumaLOG) corrective regimen sliding scale   SubCutaneous Before meals and at bedtime  losartan 100 milliGRAM(s) Oral daily  melatonin 3 milliGRAM(s) Oral at bedtime  metoprolol succinate ER 25 milliGRAM(s) Oral daily  polyethylene glycol 3350 17 Gram(s) Oral daily  povidone iodine 5% Nasal Swab 1 Application(s) Both Nostrils once  tamsulosin 0.4 milliGRAM(s) Oral two times a day    MEDICATIONS  (PRN):  aluminum hydroxide/magnesium hydroxide/simethicone Suspension 30 milliLiter(s) Oral four times a day PRN Indigestion  bisacodyl Suppository 10 milliGRAM(s) Rectal daily PRN If no bowel movement by postoperative day #2  dextrose 40% Gel 15 Gram(s) Oral once PRN Blood Glucose LESS THAN 70 milliGRAM(s)/deciliter  glucagon  Injectable 1 milliGRAM(s) IntraMuscular once PRN Glucose LESS THAN 70 milligrams/deciliter  HYDROmorphone  Injectable 0.5 milliGRAM(s) IV Push every 4 hours PRN Breakthrough  HYDROmorphone  Injectable 0.5 milliGRAM(s) IV Push every 15 minutes PRN Severe Pain (7 - 10)  magnesium hydroxide Suspension 30 milliLiter(s) Oral daily PRN Constipation  ondansetron Injectable 4 milliGRAM(s) IV Push every 6 hours PRN Nausea and/or Vomiting  oxyCODONE    IR 5 milliGRAM(s) Oral every 4 hours PRN Moderate Pain (4 - 6)  oxyCODONE    IR 10 milliGRAM(s) Oral every 4 hours PRN Severe Pain (7 - 10)  senna 2 Tablet(s) Oral at bedtime PRN Constipation      FAMILY HISTORY: CAD (coronary artery disease): mother and father  SOCIAL HISTORY: negative for tobacco, alcohol, or ilicit drug use.    Allergies: No Known Allergies    Vital Signs Last 24 Hrs  T(C): 36.7 (05 Aug 2020 09:26), Max: 36.9 (04 Aug 2020 16:30)  T(F): 98.1 (05 Aug 2020 09:26), Max: 98.5 (04 Aug 2020 16:30)  HR: 75 (05 Aug 2020 09:26) (52 - 75)  BP: 138/63 (05 Aug 2020 09:26) (121/54 - 140/67)  BP(mean): --  RR: 18 (05 Aug 2020 09:26) (14 - 23)  SpO2: 100% (05 Aug 2020 09:26) (96% - 100%)      PHYSICAL EXAMINATION:  General: Comfortable, pleasant, well-nourished/frail/cachectic  Neurologic:     -Mental Status: AAOx3. Speech is fluent with intact naming, repetition, and comprehension, no dysarthria. Able to remember 3 objects   Recent and remote memory intact. Follows commands. Attention/concentration intact. Fund of knowledge appropriate.     -Cranial Nerves:          II: Visual fields are full to confrontation.          III, IV, VI: EOMI without nystagmus. PERRL b/l          V:  Facial sensation V1-V3 equal and intact           VII: Face is symmetric with normal eye closure and smile          VIII: Hearing is bilaterally intact to finger rub          IX, X: Uvula is midline and soft palate rises symmetrically          XI: Head turning and shoulder shrug are intact.          XII: Tongue protrudes midline     -Motor: Normal bulk and tone. Strength bilateral upper extremity 5/5, bilateral lower extremities 5/5.                     No pronator drift. Rapid alternating movements intact and symmetric     -Sensation: Intact to light touch bilaterally. No neglect or extinction on double simultaneous testing.     -Coordination: No dysmetria on finger-to-nose and heel-to-shin intact bilaterally, rapid alternating hand movements intact     -Reflexes: 2+ and symmetric at the biceps, triceps, brachioradialis, knees, and ankles;  plantar reflexes downgoing bilaterally. Downgoing toes bilaterally      -Gait: Narrow gait and steady    LABS:                        7.9    8.01  )-----------( 181      ( 05 Aug 2020 07:21 )             24.1     08-05    136  |  104  |  30<H>  ----------------------------<  120<H>  4.9   |  24  |  1.54<H>    Ca    9.1      05 Aug 2020 07:21      Hemoglobin A1C:   Vitamin B12     CAPILLARY BLOOD GLUCOSE      POCT Blood Glucose.: 129 mg/dL (05 Aug 2020 11:42)              Microbiology:    Culture - Surgical Swab (collected 03 Aug 2020 11:05)  Source: .Surgical Swab or-right tibia periosteum culture #2  Gram Stain (03 Aug 2020 16:35):    No organisms seen    No WBC's seen.  Preliminary Report (04 Aug 2020 12:11):  No growth to date.    Culture - Surgical Swab (collected 03 Aug 2020 11:05)  Source: .Surgical Swab or-right knee synovium culture #1  Gram Stain (03 Aug 2020 16:37):    No organisms seen    No WBC's seen.  Preliminary Report (04 Aug 2020 12:10):  No growth to date.    Culture - Tissue with Gram Stain (collected 03 Aug 2020 11:04)  Source: .Tissue or- right femoral metaphysis culture#3  Gram Stain (03 Aug 2020 16:42):    No organisms seen    No WBC's seen.  Preliminary Report (04 Aug 2020 12:08):    No growth to date.        RADIOLOGY, EKG AND ADDITIONAL TESTS: Reviewed. INCOMPLETE    NEUROLOGY CONSULT    HPI: 80 yo M with right knee infection x after revision surgery. Patient had an antibiotic spacer placed. Patient states to pain but controlled. Patient denies any CP, SOB, fever, chills, numbness/tingling.   Presents for revision right knee revision arthroplasty, possible cement spacer exchange. (31 Jul 2020 14:25)    SUBJECTIVE: Pt     REVIEW OF SYSTEMS:  Constitutional: No fever, chills, fatigue, weakness  Eyes: no eye pain, visual disturbances, or discharge  ENT:  No difficulty hearing, tinnitus, vertigo; No sinus or throat pain  Neck: No pain or stiffness  Respiratory: No cough, dyspnea, wheezing   Cardiovascular: No chest pain, palpitations,   Gastrointestinal: No abdominal or epigastric pain. No nausea, vomiting  No diarrhea or constipation.   Genitourinary: No dysuria, frequency, hematuria or incontinence  Neurological: No headaches, lightheadedness, vertigo, numbness or tremors  Psychiatric: No depression, anxiety, mood swings or difficulty sleeping  Musculoskeletal: No joint pain or swelling; No muscle, back or extremity pain  Skin: No itching, burning, rashes or lesions   Lymph Nodes: No enlarged glands  Endocrine: No heat or cold intolerance; No hair loss, No h/o diabetes or thyroid dysfunction  Allergy and Immunologic: No hives or eczema    MEDICATIONS  Home Medications:  Co Q-10 100 mg oral capsule: 1 cap(s) orally once a day (03 Aug 2020 07:46)  Flomax 0.4 mg oral capsule: 1 cap(s) orally 2 times a day (03 Aug 2020 07:46)  Lipitor 20 mg oral tablet: 1 tab(s) orally once a day (03 Aug 2020 07:46)  losartan 100 mg oral tablet: 1 tab(s) orally once a day (03 Aug 2020 07:46)  metFORMIN 1000 mg oral tablet: 1 tab(s) orally 2 times a day (03 Aug 2020 07:46)  metoprolol succinate 25 mg oral tablet, extended release: 1 tab(s) orally once a day (03 Aug 2020 07:46)    MEDICATIONS  (STANDING):  acetaminophen   Tablet .. 650 milliGRAM(s) Oral every 6 hours  aspirin enteric coated 81 milliGRAM(s) Oral two times a day  atorvastatin 20 milliGRAM(s) Oral at bedtime  BUpivacaine liposome 1.3% Injectable (no eMAR) 20 milliLiter(s) Local Injection once  ceFAZolin   IVPB 2000 milliGRAM(s) IV Intermittent every 8 hours  dextrose 5%. 1000 milliLiter(s) (50 mL/Hr) IV Continuous <Continuous>  dextrose 50% Injectable 12.5 Gram(s) IV Push once  dextrose 50% Injectable 25 Gram(s) IV Push once  dextrose 50% Injectable 25 Gram(s) IV Push once  insulin glargine Injectable (LANTUS) 8 Unit(s) SubCutaneous at bedtime  insulin lispro (HumaLOG) corrective regimen sliding scale   SubCutaneous Before meals and at bedtime  losartan 100 milliGRAM(s) Oral daily  melatonin 3 milliGRAM(s) Oral at bedtime  metoprolol succinate ER 25 milliGRAM(s) Oral daily  polyethylene glycol 3350 17 Gram(s) Oral daily  povidone iodine 5% Nasal Swab 1 Application(s) Both Nostrils once  tamsulosin 0.4 milliGRAM(s) Oral two times a day    MEDICATIONS  (PRN):  aluminum hydroxide/magnesium hydroxide/simethicone Suspension 30 milliLiter(s) Oral four times a day PRN Indigestion  bisacodyl Suppository 10 milliGRAM(s) Rectal daily PRN If no bowel movement by postoperative day #2  dextrose 40% Gel 15 Gram(s) Oral once PRN Blood Glucose LESS THAN 70 milliGRAM(s)/deciliter  glucagon  Injectable 1 milliGRAM(s) IntraMuscular once PRN Glucose LESS THAN 70 milligrams/deciliter  HYDROmorphone  Injectable 0.5 milliGRAM(s) IV Push every 4 hours PRN Breakthrough  HYDROmorphone  Injectable 0.5 milliGRAM(s) IV Push every 15 minutes PRN Severe Pain (7 - 10)  magnesium hydroxide Suspension 30 milliLiter(s) Oral daily PRN Constipation  ondansetron Injectable 4 milliGRAM(s) IV Push every 6 hours PRN Nausea and/or Vomiting  oxyCODONE    IR 5 milliGRAM(s) Oral every 4 hours PRN Moderate Pain (4 - 6)  oxyCODONE    IR 10 milliGRAM(s) Oral every 4 hours PRN Severe Pain (7 - 10)  senna 2 Tablet(s) Oral at bedtime PRN Constipation      FAMILY HISTORY: CAD (coronary artery disease): mother and father  SOCIAL HISTORY: negative for tobacco, alcohol, or ilicit drug use.    Allergies: No Known Allergies    Vital Signs Last 24 Hrs  T(C): 36.7 (05 Aug 2020 09:26), Max: 36.9 (04 Aug 2020 16:30)  T(F): 98.1 (05 Aug 2020 09:26), Max: 98.5 (04 Aug 2020 16:30)  HR: 75 (05 Aug 2020 09:26) (52 - 75)  BP: 138/63 (05 Aug 2020 09:26) (121/54 - 140/67)  BP(mean): --  RR: 18 (05 Aug 2020 09:26) (14 - 23)  SpO2: 100% (05 Aug 2020 09:26) (96% - 100%)      PHYSICAL EXAMINATION:  General: Comfortable, pleasant, well-nourished  Neurologic:     -Mental Status: AAOx3. Speech is fluent with intact naming, repetition, and comprehension, no dysarthria. Able to remember 3 objects immediately however unable to recall after 5 minutes. Follows commands.  Attention/concentration intact. Fund of knowledge appropriate.     -Cranial Nerves:          II: Visual fields are full to confrontation.          III, IV, VI: EOMI without nystagmus. PERRL b/l          V:  Facial sensation V1-V3 equal and intact           VII: Face is symmetric with normal eye closure and smile          VIII: Hearing is bilaterally intact to finger rub          IX, X: Uvula is midline and soft palate rises symmetrically          XI: Head turning and shoulder shrug are intact.          XII: Tongue protrudes midline     -Motor: Normal bulk and tone. Strength bilateral upper extremity 5/5, R lower extremity 5/5, unable to assess strength in L lower extremity due to recent surgery.                    No pronator drift. Rapid alternating movements intact and symmetric     -Sensation: Intact to light touch bilaterally. No neglect or extinction on double simultaneous testing.     -Coordination: No dysmetria on finger-to-nose and heel-to-shin intact bilaterally, rapid alternating hand movements intact     -Reflexes: 2+ and symmetric at the biceps, triceps, brachioradialis, knees, and ankles;  plantar reflexes downgoing bilaterally. Downgoing toes bilaterally      -Gait: unable to assess      LABS:                        7.9    8.01  )-----------( 181      ( 05 Aug 2020 07:21 )             24.1     08-05    136  |  104  |  30<H>  ----------------------------<  120<H>  4.9   |  24  |  1.54<H>    Ca    9.1      05 Aug 2020 07:21      CAPILLARY BLOOD GLUCOSE  POCT Blood Glucose.: 129 mg/dL (05 Aug 2020 11:42)      Microbiology:  Culture - Surgical Swab (collected 03 Aug 2020 11:05)  Source: .Surgical Swab or-right tibia periosteum culture #2  Gram Stain (03 Aug 2020 16:35):    No organisms seen    No WBC's seen.  Preliminary Report (04 Aug 2020 12:11):  No growth to date.    Culture - Surgical Swab (collected 03 Aug 2020 11:05)  Source: .Surgical Swab or-right knee synovium culture #1  Gram Stain (03 Aug 2020 16:37):    No organisms seen    No WBC's seen.  Preliminary Report (04 Aug 2020 12:10):  No growth to date.    Culture - Tissue with Gram Stain (collected 03 Aug 2020 11:04)  Source: .Tissue or- right femoral metaphysis culture#3  Gram Stain (03 Aug 2020 16:42):    No organisms seen    No WBC's seen.  Preliminary Report (04 Aug 2020 12:08):  No growth to date.      RADIOLOGY, EKG AND ADDITIONAL TESTS: Reviewed.  < from: CT Head No Cont (07.27.20 @ 11:14) >  IMPRESSION: Interval evolution of a right frontotemporoparietal subdural hematoma with subacute density characteristics currently and with minor subjacent mass effect and stable minimal left-to-right shift order of 3mm. No new acute hemorrhage.  < end of copied text >    < from: CT Head No Cont (07.03.20 @ 10:42) >  IMPRESSION: Stable left hemispheric subdural hematoma.  < end of copied text >    < from: CT Head No Cont (07.02.20 @ 16:48) >  IMPRESSION: Stable left subdural hematoma.  < end of copied text >    < from: CT Brain Stroke Protocol (07.02.20 @ 12:54) >  FINDINGS:    VENTRICLES AND SULCI: There is age-appropriate parenchymal volume loss. No hydrocephalus. Mild effacement of the left parietal sulci.  INTRA-AXIAL: There is mild 1 to 2 mm left-to-right midline shift. No downward herniation. No acute transcortical infarction. There are a few scattered hypodensities throughout the periventricular white matter, likely the sequela of long-standing small vessel ischemic disease.  EXTRA-AXIAL: There is a hyperdense subdural fluid collection alongthe left frontoparietal convexity consistent with subdural hematoma, which measures 1.2 cm in maximal thickness (coronal image 38).    IMPRESSION:   -Acute subdural hematoma overlying the left frontoparietal convexity, as above. Mild left to right midline shift. No acute transcortical infarction.  -Age-appropriate volume loss with minimal small vessel ischemic disease.  < end of copied text > NEUROLOGY CONSULT  HPI: 78 yo M with right knee infection x after revision surgery. Patient had an antibiotic spacer placed. Patient states to pain but controlled. Patient denies any CP, SOB, fever, chills, numbness/tingling.   Presents for revision right knee revision arthroplasty, possible cement spacer exchange. (31 Jul 2020 14:25)    SUBJECTIVE: pt endorses concern after forgetting why he was in the hospital and that he had had surgery.  Regarding his recent subdural hemorrhage in 07/2020, he reports that the day prior to his presentation he tripped walking to the bathroom. He does not remember hitting his head, but the next day noted R hand weakness after dropping something. Due to the subtlety of his presentation, he is worried that there may be worsening subdural hemorrhage. He denies any deficits in strength or sensation. He denies headaches, nausea/vomiting, changes in vision, dizziness, weakness, or lightheadedness.     MEDICATIONS  Home Medications:  Co Q-10 100 mg oral capsule: 1 cap(s) orally once a day (03 Aug 2020 07:46)  Flomax 0.4 mg oral capsule: 1 cap(s) orally 2 times a day (03 Aug 2020 07:46)  Lipitor 20 mg oral tablet: 1 tab(s) orally once a day (03 Aug 2020 07:46)  losartan 100 mg oral tablet: 1 tab(s) orally once a day (03 Aug 2020 07:46)  metFORMIN 1000 mg oral tablet: 1 tab(s) orally 2 times a day (03 Aug 2020 07:46)  metoprolol succinate 25 mg oral tablet, extended release: 1 tab(s) orally once a day (03 Aug 2020 07:46)    MEDICATIONS  (STANDING):  acetaminophen   Tablet .. 650 milliGRAM(s) Oral every 6 hours  aspirin enteric coated 81 milliGRAM(s) Oral two times a day  atorvastatin 20 milliGRAM(s) Oral at bedtime  BUpivacaine liposome 1.3% Injectable (no eMAR) 20 milliLiter(s) Local Injection once  ceFAZolin   IVPB 2000 milliGRAM(s) IV Intermittent every 8 hours  dextrose 5%. 1000 milliLiter(s) (50 mL/Hr) IV Continuous <Continuous>  dextrose 50% Injectable 12.5 Gram(s) IV Push once  dextrose 50% Injectable 25 Gram(s) IV Push once  dextrose 50% Injectable 25 Gram(s) IV Push once  insulin glargine Injectable (LANTUS) 8 Unit(s) SubCutaneous at bedtime  insulin lispro (HumaLOG) corrective regimen sliding scale   SubCutaneous Before meals and at bedtime  losartan 100 milliGRAM(s) Oral daily  melatonin 3 milliGRAM(s) Oral at bedtime  metoprolol succinate ER 25 milliGRAM(s) Oral daily  polyethylene glycol 3350 17 Gram(s) Oral daily  povidone iodine 5% Nasal Swab 1 Application(s) Both Nostrils once  tamsulosin 0.4 milliGRAM(s) Oral two times a day    MEDICATIONS  (PRN):  aluminum hydroxide/magnesium hydroxide/simethicone Suspension 30 milliLiter(s) Oral four times a day PRN Indigestion  bisacodyl Suppository 10 milliGRAM(s) Rectal daily PRN If no bowel movement by postoperative day #2  dextrose 40% Gel 15 Gram(s) Oral once PRN Blood Glucose LESS THAN 70 milliGRAM(s)/deciliter  glucagon  Injectable 1 milliGRAM(s) IntraMuscular once PRN Glucose LESS THAN 70 milligrams/deciliter  HYDROmorphone  Injectable 0.5 milliGRAM(s) IV Push every 4 hours PRN Breakthrough  HYDROmorphone  Injectable 0.5 milliGRAM(s) IV Push every 15 minutes PRN Severe Pain (7 - 10)  magnesium hydroxide Suspension 30 milliLiter(s) Oral daily PRN Constipation  ondansetron Injectable 4 milliGRAM(s) IV Push every 6 hours PRN Nausea and/or Vomiting  oxyCODONE    IR 5 milliGRAM(s) Oral every 4 hours PRN Moderate Pain (4 - 6)  oxyCODONE    IR 10 milliGRAM(s) Oral every 4 hours PRN Severe Pain (7 - 10)  senna 2 Tablet(s) Oral at bedtime PRN Constipation      FAMILY HISTORY: CAD (coronary artery disease): mother and father  SOCIAL HISTORY: negative for tobacco, alcohol, or ilicit drug use.    Allergies: No Known Allergies    Vital Signs Last 24 Hrs  T(C): 36.7 (05 Aug 2020 09:26), Max: 36.9 (04 Aug 2020 16:30)  T(F): 98.1 (05 Aug 2020 09:26), Max: 98.5 (04 Aug 2020 16:30)  HR: 75 (05 Aug 2020 09:26) (52 - 75)  BP: 138/63 (05 Aug 2020 09:26) (121/54 - 140/67)  BP(mean): --  RR: 18 (05 Aug 2020 09:26) (14 - 23)  SpO2: 100% (05 Aug 2020 09:26) (96% - 100%)      PHYSICAL EXAMINATION:  General: Comfortable, pleasant, well-nourished  Neurologic:     -Mental Status: AAOx3. Speech is fluent with intact naming, repetition, and comprehension, no dysarthria. Able to remember 3 objects immediately however unable to recall after 5 minutes. Follows commands.  Attention/concentration intact. Fund of knowledge appropriate.     -Cranial Nerves:          II: Visual fields are full to confrontation.          III, IV, VI: EOMI without nystagmus. PERRL b/l          V:  Facial sensation V1-V3 equal and intact           VII: Face is symmetric with normal eye closure and smile          VIII: Hearing is bilaterally intact to finger rub          IX, X: Uvula is midline and soft palate rises symmetrically          XI: Head turning and shoulder shrug are intact.          XII: Tongue protrudes midline     -Motor: Normal bulk and tone. Strength bilateral upper extremity 5/5, R lower extremity 5/5, unable to assess strength in L lower extremity due to recent surgery.                    No pronator drift. Rapid alternating movements intact and symmetric     -Sensation: Intact to light touch bilaterally. No neglect or extinction on double simultaneous testing.     -Coordination: No dysmetria on finger-to-nose and heel-to-shin intact bilaterally, rapid alternating hand movements intact     -Reflexes: 2+ and symmetric at the biceps, triceps, brachioradialis, knees, and ankles;  plantar reflexes downgoing bilaterally. Downgoing toes bilaterally      -Gait: unable to assess      LABS:                        7.9    8.01  )-----------( 181      ( 05 Aug 2020 07:21 )             24.1     08-05    136  |  104  |  30<H>  ----------------------------<  120<H>  4.9   |  24  |  1.54<H>    Ca    9.1      05 Aug 2020 07:21      CAPILLARY BLOOD GLUCOSE  POCT Blood Glucose.: 129 mg/dL (05 Aug 2020 11:42)      Microbiology:  Culture - Surgical Swab (collected 03 Aug 2020 11:05)  Source: .Surgical Swab or-right tibia periosteum culture #2  Gram Stain (03 Aug 2020 16:35):    No organisms seen    No WBC's seen.  Preliminary Report (04 Aug 2020 12:11):  No growth to date.    Culture - Surgical Swab (collected 03 Aug 2020 11:05)  Source: .Surgical Swab or-right knee synovium culture #1  Gram Stain (03 Aug 2020 16:37):    No organisms seen    No WBC's seen.  Preliminary Report (04 Aug 2020 12:10):  No growth to date.    Culture - Tissue with Gram Stain (collected 03 Aug 2020 11:04)  Source: .Tissue or- right femoral metaphysis culture#3  Gram Stain (03 Aug 2020 16:42):    No organisms seen    No WBC's seen.  Preliminary Report (04 Aug 2020 12:08):  No growth to date.      RADIOLOGY, EKG AND ADDITIONAL TESTS: Reviewed.  < from: CT Head No Cont (07.27.20 @ 11:14) >  IMPRESSION: Interval evolution of a right frontotemporoparietal subdural hematoma with subacute density characteristics currently and with minor subjacent mass effect and stable minimal left-to-right shift order of 3mm. No new acute hemorrhage.  < end of copied text >    < from: CT Head No Cont (07.03.20 @ 10:42) >  IMPRESSION: Stable left hemispheric subdural hematoma.  < end of copied text >    < from: CT Head No Cont (07.02.20 @ 16:48) >  IMPRESSION: Stable left subdural hematoma.  < end of copied text >    < from: CT Brain Stroke Protocol (07.02.20 @ 12:54) >  FINDINGS:    VENTRICLES AND SULCI: There is age-appropriate parenchymal volume loss. No hydrocephalus. Mild effacement of the left parietal sulci.  INTRA-AXIAL: There is mild 1 to 2 mm left-to-right midline shift. No downward herniation. No acute transcortical infarction. There are a few scattered hypodensities throughout the periventricular white matter, likely the sequela of long-standing small vessel ischemic disease.  EXTRA-AXIAL: There is a hyperdense subdural fluid collection alongthe left frontoparietal convexity consistent with subdural hematoma, which measures 1.2 cm in maximal thickness (coronal image 38).    IMPRESSION:   -Acute subdural hematoma overlying the left frontoparietal convexity, as above. Mild left to right midline shift. No acute transcortical infarction.  -Age-appropriate volume loss with minimal small vessel ischemic disease.  < end of copied text >

## 2020-08-05 NOTE — PROGRESS NOTE ADULT - SUBJECTIVE AND OBJECTIVE BOX
SUBJECTIVE: Patient seen and examined. Pain controlled.  Pt did well o/n. No f/c/n/v/cp/sob. Worked with PT yesterday, states it went well      OBJECTIVE:  NAD  Vital Signs Last 24 Hrs  T(C): 36.3 (05 Aug 2020 06:01), Max: 36.9 (04 Aug 2020 14:20)  T(F): 97.4 (05 Aug 2020 06:01), Max: 98.5 (04 Aug 2020 14:20)  HR: 52 (05 Aug 2020 06:01) (52 - 88)  BP: 140/62 (05 Aug 2020 06:01) (117/57 - 159/71)  BP(mean): --  RR: 16 (05 Aug 2020 06:01) (14 - 23)  SpO2: 100% (05 Aug 2020 06:01) (96% - 100%)      Physical Exam:  General: Resting comfortably in bed. AAOx3. NAD.  Extremities:        LLE: No gross deformity. SILT L2-S1 distribution, symmetric. TA/EHL/FHL/GS motor intact. P       RLE: Dressing c/d/i with Sedgwick locked in extension. Prevena w/ good function. HVx1. SILT L2-S1 distribution, symmetric. TA/EHL/FHL/GS motor intact. [facility]          Labs:                              8.5    9.03  )-----------( 213      ( 04 Aug 2020 07:41 )             26.3     08-04    134<L>  |  101  |  34<H>  ----------------------------<  100<H>  4.6   |  22  |  1.61<H>    Ca    8.9      04 Aug 2020 07:41        A/P :  Pt is a 78yo Male s/p R knee antibiotic spacer removal, revision to R TKA 8/3  -    Pain control  -    DVT ppx: ASA, Plavix, SCD     -    Weight bearing status: WBAT w [place] locked in extension  -    Please f/u neurosurg recs for f/u re SDH last month  -    Monitor HV output  -    Physical Therapy  -    Cont standing ancef until cx negative  -    Dispo: HPT

## 2020-08-05 NOTE — PROGRESS NOTE ADULT - SUBJECTIVE AND OBJECTIVE BOX
Ortho Note    Pt comfortable without complaints, pain controlled  Denies CP, SOB, N/V, numbness/tingling     Vital Signs Last 24 Hrs  T(C): 36.7 (08-05-20 @ 09:26), Max: 36.7 (08-05-20 @ 09:26)  T(F): 98.1 (08-05-20 @ 09:26), Max: 98.1 (08-05-20 @ 09:26)  HR: 75 (08-05-20 @ 09:26) (75 - 75)  BP: 138/63 (08-05-20 @ 09:26) (138/63 - 138/63)  BP(mean): --  RR: 18 (08-05-20 @ 09:26) (18 - 18)  SpO2: 100% (08-05-20 @ 09:26) (100% - 100%)  AVSS    focused exam:  General: Pt Alert and oriented, NAD  DSG- prevena / ace CDI  Pulses: +2DP, WWP feet  Sensation: SILT BLE  Motor: 5/5 EHL/FHL/TA/GS                          7.9    8.01  )-----------( 181      ( 05 Aug 2020 07:21 )             24.1   05 Aug 2020 07:21    136    |  104    |  30     ----------------------------<  120    4.9     |  24     |  1.54     Ca    9.1        05 Aug 2020 07:21        A/P: 79yMale POD#2 s/p right knee explant of abx spacer and implant of TKR  - acute blood loss anemia- H+H 7.9/ 24.1; s/p 2u PRBCs in OR; asymptomatic with PT, continue to monitor; iron supplementation added  - Pain Control  - DVT ppx: ASA 81bid  - PT, WBS: WBAT with faisal in extension  - appreciate neurosurgery recs- OK with ASA 81bid with caution; will follow-up outpatient with neurosurgery on 8/18 regarding recent SDH  - OOB for meals, I/S  - continue bowel regimen  - continue HV  - dispo: home with HPT pending OR cultures and HV removal, likely Friday    Ortho Pager [phone]

## 2020-08-05 NOTE — CONSULT NOTE ADULT - SUBJECTIVE AND OBJECTIVE BOX
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 Arrington    Pt had primary TKA in 2002 w 2x I/D with polyexchanges on 3/6/2020 and 4/2/2020 w. Dr. Arrington. Pt completed 6wk course of IV ancef followed by TID keflex. Noted to have increased drainage in early June requiring explantation and abx spacer insertion. Pt experienced fall in early July and noted to have issues w R hand - recommended to report to ED and found to have LEFT SDH w 3mm shift. Follow-up CT Head 7/27 showed progression of SDH w stable midline shift 3mm.    This morning (approx 1045h) when patient was evaluated at bedside w orthopedics, pt states he felt overall well. States pain in R knee was well controlled and he had walked w PT to the end of the 8w hallway wo lightheadedness or dizziness, chest pain, or dyspnea. Denied any headache, vision changes, numbness, or tingling. No fever, chills, N/V/Abd pain. Voiding wo issue. +Flatus wo BM.     *Noted in afternoon to not remember reason for admission or time of surgery when seen by Dr. Arrington. Neuro called for further evaluation. Pt evaluated by me at end of Dr. Moore (neuro's) evaluation and pt able to recall [facility], reason for admission, mm/dd/yyyy.      ROS: 12 point ROS reviewed and otherwise negative  FHx: DM in mother  Allergies: NKDA  SH: former smoker 30y ago. No EtOH. Lives w wife      PAST MEDICAL & SURGICAL HISTORY:  H/O subdural hemorrhage: s/p fall on 7/2/2020  BPH (benign prostatic hyperplasia)  Giant cell arteritis  Anemia  Hyperlipidemia  CAD (coronary artery disease): 6 stents  DM (diabetes mellitus)  Hypertension  S/P hardware removal: 6/2, infected R prosthetic knee, replaced hardware  H/O shoulder surgery: Right  Umbilical hernia: Umbilical and inguinal hernia  H/O total knee replacement, bilateral  CAD (coronary artery disease): 6 stents placement    Home Meds: Home Medications:  Co Q-10 100 mg oral capsule: 1 cap(s) orally once a day (03 Aug 2020 07:46)  Flomax 0.4 mg oral capsule: 1 cap(s) orally 2 times a day (03 Aug 2020 07:46)  Lipitor 20 mg oral tablet: 1 tab(s) orally once a day (03 Aug 2020 07:46)  losartan 100 mg oral tablet: 1 tab(s) orally once a day (03 Aug 2020 07:46)  metFORMIN 1000 mg oral tablet: 1 tab(s) orally 2 times a day (03 Aug 2020 07:46)  metoprolol succinate 25 mg oral tablet, extended release: 1 tab(s) orally once a day (03 Aug 2020 07:46)    Allergies: Allergies    No Known Allergies    Intolerances      Soc:   Advanced Directives: Presumed Full Code     CURRENT MEDICATIONS:   --------------------------------------------------------------------------------------  Neurologic Medications  acetaminophen   Tablet .. 650 milliGRAM(s) Oral every 6 hours  HYDROmorphone  Injectable 0.5 milliGRAM(s) IV Push every 4 hours PRN Breakthrough  HYDROmorphone  Injectable 0.5 milliGRAM(s) IV Push every 15 minutes PRN Severe Pain (7 - 10)  melatonin 3 milliGRAM(s) Oral at bedtime  ondansetron Injectable 4 milliGRAM(s) IV Push every 6 hours PRN Nausea and/or Vomiting  oxyCODONE    IR 5 milliGRAM(s) Oral every 4 hours PRN Moderate Pain (4 - 6)  oxyCODONE    IR 10 milliGRAM(s) Oral every 4 hours PRN Severe Pain (7 - 10)    Respiratory Medications    Cardiovascular Medications  losartan 100 milliGRAM(s) Oral daily  metoprolol succinate ER 25 milliGRAM(s) Oral daily  tamsulosin 0.4 milliGRAM(s) Oral two times a day    Gastrointestinal Medications  aluminum hydroxide/magnesium hydroxide/simethicone Suspension 30 milliLiter(s) Oral four times a day PRN Indigestion  bisacodyl Suppository 10 milliGRAM(s) Rectal daily PRN If no bowel movement by postoperative day #2  dextrose 5%. 1000 milliLiter(s) IV Continuous <Continuous>  ferrous    sulfate 325 milliGRAM(s) Oral daily  magnesium hydroxide Suspension 30 milliLiter(s) Oral daily PRN Constipation  polyethylene glycol 3350 17 Gram(s) Oral daily  senna 2 Tablet(s) Oral at bedtime PRN Constipation    Genitourinary Medications    Hematologic/Oncologic Medications  aspirin enteric coated 81 milliGRAM(s) Oral two times a day    Antimicrobial/Immunologic Medications  ceFAZolin   IVPB 2000 milliGRAM(s) IV Intermittent every 8 hours    Endocrine/Metabolic Medications  atorvastatin 20 milliGRAM(s) Oral at bedtime  dextrose 40% Gel 15 Gram(s) Oral once PRN Blood Glucose LESS THAN 70 milliGRAM(s)/deciliter  dextrose 50% Injectable 12.5 Gram(s) IV Push once  dextrose 50% Injectable 25 Gram(s) IV Push once  dextrose 50% Injectable 25 Gram(s) IV Push once  glucagon  Injectable 1 milliGRAM(s) IntraMuscular once PRN Glucose LESS THAN 70 milligrams/deciliter  insulin glargine Injectable (LANTUS) 8 Unit(s) SubCutaneous at bedtime  insulin lispro (HumaLOG) corrective regimen sliding scale   SubCutaneous Before meals and at bedtime    Topical/Other Medications  BUpivacaine liposome 1.3% Injectable (no eMAR) 20 milliLiter(s) Local Injection once  povidone iodine 5% Nasal Swab 1 Application(s) Both Nostrils once    --------------------------------------------------------------------------------------    VITAL SIGNS, INS/OUTS (last 24 hours):  --------------------------------------------------------------------------------------  ICU Vital Signs Last 24 Hrs  T(C): 36.8 (05 Aug 2020 17:36), Max: 36.9 (04 Aug 2020 21:09)  T(F): 98.2 (05 Aug 2020 17:36), Max: 98.4 (04 Aug 2020 21:09)  HR: 88 (05 Aug 2020 17:36) (52 - 88)  BP: 153/70 (05 Aug 2020 17:36) (130/60 - 153/70)  BP(mean): --  ABP: --  ABP(mean): --  RR: 20 (05 Aug 2020 17:36) (14 - 20)  SpO2: 99% (05 Aug 2020 17:36) (96% - 100%)    I&O's Summary    04 Aug 2020 07:01  -  05 Aug 2020 07:00  --------------------------------------------------------  IN: 1710 mL / OUT: 3315 mL / NET: -1605 mL    05 Aug 2020 07:01  -  05 Aug 2020 18:14  --------------------------------------------------------  IN: 360 mL / OUT: 1080 mL / NET: -720 mL      --------------------------------------------------------------------------------------    EXAM:  GEN: male in NAD on RA  HEENT: NC/AT, MMM  CV: RRR, nml S1S2, no murmurs, no BLE edema  PULM: nml effort, CTAB  ABD: Soft, NABS, non-tender  NEURO: Alert, moving all extremities. 5/5 in BUE and BLE w sensation intact. EOMI  PSYCH: Appropriate, conversant  EXT: Dressing on R   LABS  --------------------------------------------------------------------------------------  Labs:  CAPILLARY BLOOD GLUCOSE      POCT Blood Glucose.: 134 mg/dL (05 Aug 2020 17:38)  POCT Blood Glucose.: 129 mg/dL (05 Aug 2020 11:42)  POCT Blood Glucose.: 120 mg/dL (05 Aug 2020 08:00)  POCT Blood Glucose.: 127 mg/dL (04 Aug 2020 22:49)                          7.9    8.01  )-----------( 181      ( 05 Aug 2020 07:21 )             24.1         08-05    136  |  104  |  30<H>  ----------------------------<  120<H>  4.9   |  24  |  1.54<H>      Calcium, Total Serum: 9.1 mg/dL (08-05-20 @ 07:21)      LFTs:       ABG - ( 03 Aug 2020 12:11 )  pH: 7.30  /  pCO2: 40    /  pO2: 476   / HCO3: 19    / Base Excess: -6.6  /  SaO2: x               ABG - ( 03 Aug 2020 10:23 )  pH: 7.27  /  pCO2: 42    /  pO2: 482   / HCO3: 19    / Base Excess: -7.3  /  SaO2: x                 Coags:                Culture - Surgical Swab (collected 03 Aug 2020 11:05)  Source: .Surgical Swab or-right tibia periosteum culture #2  Gram Stain (03 Aug 2020 16:35):    No organisms seen    No WBC's seen.  Preliminary Report (04 Aug 2020 12:11):    No growth to date.    Culture - Surgical Swab (collected 03 Aug 2020 11:05)  Source: .Surgical Swab or-right knee synovium culture #1  Gram Stain (03 Aug 2020 16:37):    No organisms seen    No WBC's seen.  Preliminary Report (04 Aug 2020 12:10):    No growth to date.    Culture - Tissue with Gram Stain (collected 03 Aug 2020 11:04)  Source: .Tissue or- right femoral metaphysis culture#3  Gram Stain (03 Aug 2020 16:42):    No organisms seen    No WBC's seen.  Preliminary Report (04 Aug 2020 12:08):    No growth to date.        --------------------------------------------------------------------------------------    OTHER LABS    IMAGING RESULTS  ****************      ASSESSMENT/ PLAN:  79y Male ***      Attending aware and agrees with plan

## 2020-08-05 NOTE — CONSULT NOTE ADULT - ATTENDING COMMENTS
Pt not seen on 8/5, seen on 8/6. Please see attestation from 8/6. I was physically present for the key portions of the evaluation and managemnent (E/M) service provided.  I agree with the above history, physical, and plan which I have reviewed and edited where appropriate, with the exceptions as per my note.

## 2020-08-05 NOTE — CONSULT NOTE ADULT - SUBJECTIVE AND OBJECTIVE BOX
HISTORY OF PRESENT ILLNESS:   80 y/o male with  PMH of BPH, giant cell arteritis, HLD, HTN, CAD w/ 6 stents placed >5 years ago and DM is POD2 from R total knee revision arthroplasty following infection. Patient was admitted to [facility] on 7/2/20 with a     PAST MEDICAL & SURGICAL HISTORY:  BPH (benign prostatic hyperplasia)  Giant cell arteritis  Anemia  Hyperlipidemia  CAD (coronary artery disease): 6 stents  DM (diabetes mellitus)  Hypertension  H/O subdural hemorrhage: s/p fall on 7/2/2020  S/P hardware removal: 6/2, infected R prosthetic knee, replaced hardware  H/O shoulder surgery: Right  Umbilical hernia: Umbilical and inguinal hernia  H/O total knee replacement, bilateral  CAD (coronary artery disease): 6 stents placement    FAMILY HISTORY:  FH: CAD (coronary artery disease): mother and father      SOCIAL HISTORY:  Tobacco Use:  EtOH use:   Substance:    Allergies    No Known Allergies    Intolerances        REVIEW OF SYSTEMS      General:	no recent illnesses, no recent wt gain/loss    Skin/Breast:  intact  	  Ophthalmologic:  negative, glasses for ***  	  ENMT:	negative    Respiratory and Thorax: no coughing, wheezing, recent URI  	  Cardiovascular: no chest pain, BRUNO    Gastrointestinal:	soft, non tender    Genitourinary: no frequency, dysuria    Musculoskeletal:	negative    Neurological:	see HPI    Psychiatric:	negative    Hematology/Lymphatics:	negative    Endocrine:  	negative    Allergic/Immunologic:  Negative      MEDICATIONS:  Antibiotics:  ceFAZolin   IVPB 2000 milliGRAM(s) IV Intermittent every 8 hours    Neuro:  acetaminophen   Tablet .. 650 milliGRAM(s) Oral every 6 hours  HYDROmorphone  Injectable 0.5 milliGRAM(s) IV Push every 4 hours PRN  HYDROmorphone  Injectable 0.5 milliGRAM(s) IV Push every 15 minutes PRN  melatonin 3 milliGRAM(s) Oral at bedtime  ondansetron Injectable 4 milliGRAM(s) IV Push every 6 hours PRN  oxyCODONE    IR 5 milliGRAM(s) Oral every 4 hours PRN  oxyCODONE    IR 10 milliGRAM(s) Oral every 4 hours PRN    Anticoagulation:  aspirin enteric coated 81 milliGRAM(s) Oral two times a day    OTHER:  aluminum hydroxide/magnesium hydroxide/simethicone Suspension 30 milliLiter(s) Oral four times a day PRN  atorvastatin 20 milliGRAM(s) Oral at bedtime  bisacodyl Suppository 10 milliGRAM(s) Rectal daily PRN  BUpivacaine liposome 1.3% Injectable (no eMAR) 20 milliLiter(s) Local Injection once  dextrose 40% Gel 15 Gram(s) Oral once PRN  dextrose 50% Injectable 12.5 Gram(s) IV Push once  dextrose 50% Injectable 25 Gram(s) IV Push once  dextrose 50% Injectable 25 Gram(s) IV Push once  glucagon  Injectable 1 milliGRAM(s) IntraMuscular once PRN  insulin glargine Injectable (LANTUS) 8 Unit(s) SubCutaneous at bedtime  insulin lispro (HumaLOG) corrective regimen sliding scale   SubCutaneous Before meals and at bedtime  losartan 100 milliGRAM(s) Oral daily  magnesium hydroxide Suspension 30 milliLiter(s) Oral daily PRN  metoprolol succinate ER 25 milliGRAM(s) Oral daily  polyethylene glycol 3350 17 Gram(s) Oral daily  povidone iodine 5% Nasal Swab 1 Application(s) Both Nostrils once  senna 2 Tablet(s) Oral at bedtime PRN  tamsulosin 0.4 milliGRAM(s) Oral two times a day    IVF:  dextrose 5%. 1000 milliLiter(s) IV Continuous <Continuous>      Vital Signs Last 24 Hrs  T(C): 36.7 (05 Aug 2020 09:26), Max: 36.9 (04 Aug 2020 16:30)  T(F): 98.1 (05 Aug 2020 09:26), Max: 98.5 (04 Aug 2020 16:30)  HR: 75 (05 Aug 2020 09:26) (52 - 75)  BP: 138/63 (05 Aug 2020 09:26) (121/54 - 140/67)  BP(mean): --  RR: 18 (05 Aug 2020 09:26) (14 - 23)  SpO2: 100% (05 Aug 2020 09:26) (96% - 100%)    PHYSICAL EXAM:  Constitutional:  Eyes:  ENMT:  Neck:  Back:  Respiratory:  Cardiovascular:  Gastrointestinal:  Genitourinary:  Rectal:  Vascular:  Neurological:  Skin:    LABS:                        7.9    8.01  )-----------( 181      ( 05 Aug 2020 07:21 )             24.1     08-05    136  |  104  |  30<H>  ----------------------------<  120<H>  4.9   |  24  |  1.54<H>    Ca    9.1      05 Aug 2020 07:21          CULTURES:  Culture Results:   No growth to date. (08-03 @ 11:05)  Culture Results:   No growth to date. (08-03 @ 11:05)      RADIOLOGY & ADDITIONAL STUDIES:    Assessment:      Plan: HISTORY OF PRESENT ILLNESS:   78 y/o male with  PMH of BPH, giant cell arteritis, HLD, HTN, CAD w/ 6 stents placed >5 years ago and DM is POD2 from R total knee revision arthroplasty following infection. Patient was admitted to [facility] on 7/2/20 with a     PAST MEDICAL & SURGICAL HISTORY:  BPH (benign prostatic hyperplasia)  Giant cell arteritis  Anemia  Hyperlipidemia  CAD (coronary artery disease): 6 stents  DM (diabetes mellitus)  Hypertension  H/O subdural hemorrhage: s/p fall on 7/2/2020  S/P hardware removal: 6/2, infected R prosthetic knee, replaced hardware  H/O shoulder surgery: Right  Umbilical hernia: Umbilical and inguinal hernia  H/O total knee replacement, bilateral  CAD (coronary artery disease): 6 stents placement    FAMILY HISTORY:  FH: CAD (coronary artery disease): mother and father      SOCIAL HISTORY:  Tobacco Use:  EtOH use:   Substance:    Allergies    No Known Allergies    Intolerances        REVIEW OF SYSTEMS      General:	no recent illnesses, no recent wt gain/loss    Skin/Breast:  intact  	  Ophthalmologic:  negative, glasses for ***  	  ENMT:	negative    Respiratory and Thorax: no coughing, wheezing, recent URI  	  Cardiovascular: no chest pain, BRUNO    Gastrointestinal:	soft, non tender    Genitourinary: no frequency, dysuria    Musculoskeletal:	negative    Neurological:	see HPI    Psychiatric:	negative    Hematology/Lymphatics:	negative    Endocrine:  	negative    Allergic/Immunologic:  Negative      MEDICATIONS:  Antibiotics:  ceFAZolin   IVPB 2000 milliGRAM(s) IV Intermittent every 8 hours    Neuro:  acetaminophen   Tablet .. 650 milliGRAM(s) Oral every 6 hours  HYDROmorphone  Injectable 0.5 milliGRAM(s) IV Push every 4 hours PRN  HYDROmorphone  Injectable 0.5 milliGRAM(s) IV Push every 15 minutes PRN  melatonin 3 milliGRAM(s) Oral at bedtime  ondansetron Injectable 4 milliGRAM(s) IV Push every 6 hours PRN  oxyCODONE    IR 5 milliGRAM(s) Oral every 4 hours PRN  oxyCODONE    IR 10 milliGRAM(s) Oral every 4 hours PRN    Anticoagulation:  aspirin enteric coated 81 milliGRAM(s) Oral two times a day    OTHER:  aluminum hydroxide/magnesium hydroxide/simethicone Suspension 30 milliLiter(s) Oral four times a day PRN  atorvastatin 20 milliGRAM(s) Oral at bedtime  bisacodyl Suppository 10 milliGRAM(s) Rectal daily PRN  BUpivacaine liposome 1.3% Injectable (no eMAR) 20 milliLiter(s) Local Injection once  dextrose 40% Gel 15 Gram(s) Oral once PRN  dextrose 50% Injectable 12.5 Gram(s) IV Push once  dextrose 50% Injectable 25 Gram(s) IV Push once  dextrose 50% Injectable 25 Gram(s) IV Push once  glucagon  Injectable 1 milliGRAM(s) IntraMuscular once PRN  insulin glargine Injectable (LANTUS) 8 Unit(s) SubCutaneous at bedtime  insulin lispro (HumaLOG) corrective regimen sliding scale   SubCutaneous Before meals and at bedtime  losartan 100 milliGRAM(s) Oral daily  magnesium hydroxide Suspension 30 milliLiter(s) Oral daily PRN  metoprolol succinate ER 25 milliGRAM(s) Oral daily  polyethylene glycol 3350 17 Gram(s) Oral daily  povidone iodine 5% Nasal Swab 1 Application(s) Both Nostrils once  senna 2 Tablet(s) Oral at bedtime PRN  tamsulosin 0.4 milliGRAM(s) Oral two times a day    IVF:  dextrose 5%. 1000 milliLiter(s) IV Continuous <Continuous>      Vital Signs Last 24 Hrs  T(C): 36.7 (05 Aug 2020 09:26), Max: 36.9 (04 Aug 2020 16:30)  T(F): 98.1 (05 Aug 2020 09:26), Max: 98.5 (04 Aug 2020 16:30)  HR: 75 (05 Aug 2020 09:26) (52 - 75)  BP: 138/63 (05 Aug 2020 09:26) (121/54 - 140/67)  BP(mean): --  RR: 18 (05 Aug 2020 09:26) (14 - 23)  SpO2: 100% (05 Aug 2020 09:26) (96% - 100%)    PHYSICAL EXAM:  Constitutional:  Eyes:  ENMT:  Neck:  Back:  Respiratory:  Cardiovascular:  Gastrointestinal:  Genitourinary:  Rectal:  Vascular:  Neurological:  Skin:    LABS:                        7.9    8.01  )-----------( 181      ( 05 Aug 2020 07:21 )             24.1     08-05    136  |  104  |  30<H>  ----------------------------<  120<H>  4.9   |  24  |  1.54<H>    Ca    9.1      05 Aug 2020 07:21          CULTURES:  Culture Results:   No growth to date. (08-03 @ 11:05)  Culture Results:   No growth to date. (08-03 @ 11:05)      RADIOLOGY & ADDITIONAL STUDIES:    Assessment:      Plan:  - Recommend CT head without contrast HISTORY OF PRESENT ILLNESS:   80 y/o male with  PMH of BPH, giant cell arteritis, HLD, HTN, CAD w/ 6 stents placed >5 years ago and DM is POD2 from R total knee revision arthroplasty following infection. Patient was admitted to [facility] on 7/2/20 with a left SDH. Patient was discharged on 7/4/20 after repeat CT was stable.     PAST MEDICAL & SURGICAL HISTORY:  BPH (benign prostatic hyperplasia)  Giant cell arteritis  Anemia  Hyperlipidemia  CAD (coronary artery disease): 6 stents  DM (diabetes mellitus)  Hypertension  H/O subdural hemorrhage: s/p fall on 7/2/2020  S/P hardware removal: 6/2, infected R prosthetic knee, replaced hardware  H/O shoulder surgery: Right  Umbilical hernia: Umbilical and inguinal hernia  H/O total knee replacement, bilateral  CAD (coronary artery disease): 6 stents placement    FAMILY HISTORY:  FH: CAD (coronary artery disease): mother and father      SOCIAL HISTORY:  Tobacco Use:  EtOH use:   Substance:    Allergies    No Known Allergies    Intolerances        REVIEW OF SYSTEMS      General:	no recent illnesses, no recent wt gain/loss    Skin/Breast:  intact  	  Ophthalmologic:  negative, glasses for ***  	  ENMT:	negative    Respiratory and Thorax: no coughing, wheezing, recent URI  	  Cardiovascular: no chest pain, BRUNO    Gastrointestinal:	soft, non tender    Genitourinary: no frequency, dysuria    Musculoskeletal:	negative    Neurological:	see HPI    Psychiatric:	negative    Hematology/Lymphatics:	negative    Endocrine:  	negative    Allergic/Immunologic:  Negative      MEDICATIONS:  Antibiotics:  ceFAZolin   IVPB 2000 milliGRAM(s) IV Intermittent every 8 hours    Neuro:  acetaminophen   Tablet .. 650 milliGRAM(s) Oral every 6 hours  HYDROmorphone  Injectable 0.5 milliGRAM(s) IV Push every 4 hours PRN  HYDROmorphone  Injectable 0.5 milliGRAM(s) IV Push every 15 minutes PRN  melatonin 3 milliGRAM(s) Oral at bedtime  ondansetron Injectable 4 milliGRAM(s) IV Push every 6 hours PRN  oxyCODONE    IR 5 milliGRAM(s) Oral every 4 hours PRN  oxyCODONE    IR 10 milliGRAM(s) Oral every 4 hours PRN    Anticoagulation:  aspirin enteric coated 81 milliGRAM(s) Oral two times a day    OTHER:  aluminum hydroxide/magnesium hydroxide/simethicone Suspension 30 milliLiter(s) Oral four times a day PRN  atorvastatin 20 milliGRAM(s) Oral at bedtime  bisacodyl Suppository 10 milliGRAM(s) Rectal daily PRN  BUpivacaine liposome 1.3% Injectable (no eMAR) 20 milliLiter(s) Local Injection once  dextrose 40% Gel 15 Gram(s) Oral once PRN  dextrose 50% Injectable 12.5 Gram(s) IV Push once  dextrose 50% Injectable 25 Gram(s) IV Push once  dextrose 50% Injectable 25 Gram(s) IV Push once  glucagon  Injectable 1 milliGRAM(s) IntraMuscular once PRN  insulin glargine Injectable (LANTUS) 8 Unit(s) SubCutaneous at bedtime  insulin lispro (HumaLOG) corrective regimen sliding scale   SubCutaneous Before meals and at bedtime  losartan 100 milliGRAM(s) Oral daily  magnesium hydroxide Suspension 30 milliLiter(s) Oral daily PRN  metoprolol succinate ER 25 milliGRAM(s) Oral daily  polyethylene glycol 3350 17 Gram(s) Oral daily  povidone iodine 5% Nasal Swab 1 Application(s) Both Nostrils once  senna 2 Tablet(s) Oral at bedtime PRN  tamsulosin 0.4 milliGRAM(s) Oral two times a day    IVF:  dextrose 5%. 1000 milliLiter(s) IV Continuous <Continuous>      Vital Signs Last 24 Hrs  T(C): 36.7 (05 Aug 2020 09:26), Max: 36.9 (04 Aug 2020 16:30)  T(F): 98.1 (05 Aug 2020 09:26), Max: 98.5 (04 Aug 2020 16:30)  HR: 75 (05 Aug 2020 09:26) (52 - 75)  BP: 138/63 (05 Aug 2020 09:26) (121/54 - 140/67)  BP(mean): --  RR: 18 (05 Aug 2020 09:26) (14 - 23)  SpO2: 100% (05 Aug 2020 09:26) (96% - 100%)    PHYSICAL EXAM:  Constitutional:  Eyes:  ENMT:  Neck:  Back:  Respiratory:  Cardiovascular:  Gastrointestinal:  Genitourinary:  Rectal:  Vascular:  Neurological:  Skin:    LABS:                        7.9    8.01  )-----------( 181      ( 05 Aug 2020 07:21 )             24.1     08-05    136  |  104  |  30<H>  ----------------------------<  120<H>  4.9   |  24  |  1.54<H>    Ca    9.1      05 Aug 2020 07:21          CULTURES:  Culture Results:   No growth to date. (08-03 @ 11:05)  Culture Results:   No growth to date. (08-03 @ 11:05)      RADIOLOGY & ADDITIONAL STUDIES:    Assessment:      Plan:  - Recommend CT head without contrast HISTORY OF PRESENT ILLNESS:   80 y/o male with  PMH of BPH, giant cell arteritis, HLD, HTN, CAD w/ 6 stents placed >5 years ago and DM is POD2 from R total knee revision arthroplasty following infection. Patient was previously admitted to [facility] on 7/2/20 with a left SDH s/p fall. Patient was discharged on 7/4/20 after repeat CT was stable. CTH on 7/27/20 showed evolution of L subacute SDH and stable left to right 3mm midline shift. Dr. Greer had discussed need for embolization with patient following his latest CTH. Patient's wife reports that pt has had memory loss since his fall that was improving but has worsened in the past couple of days. Patient admits to numbness in his right fingertips x 1 day and chronic LE numbness due to his h/o diabetic neuropathy. He also c/o his right hand falling while texting intermittently x 2-3 days. Patient denies headache, vision change, other new weakness, and bowel or bladder incontinence.       PAST MEDICAL & SURGICAL HISTORY:  BPH (benign prostatic hyperplasia)  Giant cell arteritis  Anemia  Hyperlipidemia  CAD (coronary artery disease): 6 stents  DM (diabetes mellitus)  Hypertension  H/O subdural hemorrhage: s/p fall on 7/2/2020  S/P hardware removal: 6/2, infected R prosthetic knee, replaced hardware  H/O shoulder surgery: Right  Umbilical hernia: Umbilical and inguinal hernia  H/O total knee replacement, bilateral  CAD (coronary artery disease): 6 stents placement    FAMILY HISTORY:  FH: CAD (coronary artery disease): mother and father      SOCIAL HISTORY:  Tobacco Use:  EtOH use:   Substance:    Allergies    No Known Allergies    Intolerances        REVIEW OF SYSTEMS      General:	no recent illnesses, no recent wt gain/loss    Skin/Breast:  intact  	  Ophthalmologic:  negative, glasses for ***  	  ENMT:	negative    Respiratory and Thorax: no coughing, wheezing, recent URI  	  Cardiovascular: no chest pain, BRUNO    Gastrointestinal:	soft, non tender    Genitourinary: no frequency, dysuria    Musculoskeletal:	negative    Neurological:	see HPI    Psychiatric:	negative    Hematology/Lymphatics:	negative    Endocrine:  	negative    Allergic/Immunologic:  Negative      MEDICATIONS:  Antibiotics:  ceFAZolin   IVPB 2000 milliGRAM(s) IV Intermittent every 8 hours    Neuro:  acetaminophen   Tablet .. 650 milliGRAM(s) Oral every 6 hours  HYDROmorphone  Injectable 0.5 milliGRAM(s) IV Push every 4 hours PRN  HYDROmorphone  Injectable 0.5 milliGRAM(s) IV Push every 15 minutes PRN  melatonin 3 milliGRAM(s) Oral at bedtime  ondansetron Injectable 4 milliGRAM(s) IV Push every 6 hours PRN  oxyCODONE    IR 5 milliGRAM(s) Oral every 4 hours PRN  oxyCODONE    IR 10 milliGRAM(s) Oral every 4 hours PRN    Anticoagulation:  aspirin enteric coated 81 milliGRAM(s) Oral two times a day    OTHER:  aluminum hydroxide/magnesium hydroxide/simethicone Suspension 30 milliLiter(s) Oral four times a day PRN  atorvastatin 20 milliGRAM(s) Oral at bedtime  bisacodyl Suppository 10 milliGRAM(s) Rectal daily PRN  BUpivacaine liposome 1.3% Injectable (no eMAR) 20 milliLiter(s) Local Injection once  dextrose 40% Gel 15 Gram(s) Oral once PRN  dextrose 50% Injectable 12.5 Gram(s) IV Push once  dextrose 50% Injectable 25 Gram(s) IV Push once  dextrose 50% Injectable 25 Gram(s) IV Push once  glucagon  Injectable 1 milliGRAM(s) IntraMuscular once PRN  insulin glargine Injectable (LANTUS) 8 Unit(s) SubCutaneous at bedtime  insulin lispro (HumaLOG) corrective regimen sliding scale   SubCutaneous Before meals and at bedtime  losartan 100 milliGRAM(s) Oral daily  magnesium hydroxide Suspension 30 milliLiter(s) Oral daily PRN  metoprolol succinate ER 25 milliGRAM(s) Oral daily  polyethylene glycol 3350 17 Gram(s) Oral daily  povidone iodine 5% Nasal Swab 1 Application(s) Both Nostrils once  senna 2 Tablet(s) Oral at bedtime PRN  tamsulosin 0.4 milliGRAM(s) Oral two times a day    IVF:  dextrose 5%. 1000 milliLiter(s) IV Continuous <Continuous>      Vital Signs Last 24 Hrs  T(C): 36.7 (05 Aug 2020 09:26), Max: 36.9 (04 Aug 2020 16:30)  T(F): 98.1 (05 Aug 2020 09:26), Max: 98.5 (04 Aug 2020 16:30)  HR: 75 (05 Aug 2020 09:26) (52 - 75)  BP: 138/63 (05 Aug 2020 09:26) (121/54 - 140/67)  BP(mean): --  RR: 18 (05 Aug 2020 09:26) (14 - 23)  SpO2: 100% (05 Aug 2020 09:26) (96% - 100%)    PHYSICAL EXAM:  General: patient seen sitting in bed in NAD  Neuro: AAOx3, FC, OE spontaneously, speech clear and fluent, CNII-XI grossly intact, face symmetric, no pronator drift, finger to nose intact, strength 5/5 b/l UE and LE, mild decreased sensation to right fingertips and b/l LEs. Sensation o/w intact.  HEENT: PERRL, EOMI, visual fields intact b/l  Neck: supple  Cardiac: RRR, S1S2  Pulmonary: chest rise symmetric  Abdomen: soft, nontender, nondistended  Ext: warm, perfusing well        LABS:                        7.9    8.01  )-----------( 181      ( 05 Aug 2020 07:21 )             24.1     08-05    136  |  104  |  30<H>  ----------------------------<  120<H>  4.9   |  24  |  1.54<H>    Ca    9.1      05 Aug 2020 07:21          CULTURES:  Culture Results:   No growth to date. (08-03 @ 11:05)  Culture Results:   No growth to date. (08-03 @ 11:05)      RADIOLOGY & ADDITIONAL STUDIES:    Assessment:      Recommendations:  - CT head without contrast  - Middle meningeal artery embolization with interventional radiology, scheduling pending  - Continue aspirin 81mg BID. Recommendations pending CTH results.     D/w Dr. Greer and Dr. D'Amico HISTORY OF PRESENT ILLNESS:   80 y/o male with  PMH of BPH, giant cell arteritis, HLD, HTN, CAD w/ 6 stents placed >5 years ago and DM is POD2 from R total knee revision arthroplasty following infection. Patient was previously admitted to [facility] on 7/2/20 with a left SDH s/p fall. Patient was discharged on 7/4/20 after repeat CT was stable. CTH on 7/27/20 showed evolution of L subacute SDH and stable left to right 3mm midline shift. Dr. Greer had discussed need for embolization with patient following his latest CTH. Patient's wife reports that pt has had memory loss since his fall that was improving but has worsened in the past couple of days. Patient admits to numbness in his right fingertips x 1 day and chronic LE numbness due to his h/o diabetic neuropathy. He also c/o his right hand falling while texting intermittently x 2-3 days. Patient denies headache, vision change, other new weakness, and bowel or bladder incontinence.       PAST MEDICAL & SURGICAL HISTORY:  BPH (benign prostatic hyperplasia)  Giant cell arteritis  Anemia  Hyperlipidemia  CAD (coronary artery disease): 6 stents  DM (diabetes mellitus)  Hypertension  H/O subdural hemorrhage: s/p fall on 7/2/2020  S/P hardware removal: 6/2, infected R prosthetic knee, replaced hardware  H/O shoulder surgery: Right  Umbilical hernia: Umbilical and inguinal hernia  H/O total knee replacement, bilateral  CAD (coronary artery disease): 6 stents placement    FAMILY HISTORY:  FH: CAD (coronary artery disease): mother and father      SOCIAL HISTORY:  Tobacco Use:  EtOH use:   Substance:    Allergies    No Known Allergies    Intolerances        REVIEW OF SYSTEMS      General:	no recent illnesses, no recent wt gain/loss    Skin/Breast:  intact  	  Ophthalmologic:  negative, glasses for ***  	  ENMT:	negative    Respiratory and Thorax: no coughing, wheezing, recent URI  	  Cardiovascular: no chest pain, BRUNO    Gastrointestinal:	soft, non tender    Genitourinary: no frequency, dysuria    Musculoskeletal:	negative    Neurological:	see HPI    Psychiatric:	negative    Hematology/Lymphatics:	negative    Endocrine:  	negative    Allergic/Immunologic:  Negative      MEDICATIONS:  Antibiotics:  ceFAZolin   IVPB 2000 milliGRAM(s) IV Intermittent every 8 hours    Neuro:  acetaminophen   Tablet .. 650 milliGRAM(s) Oral every 6 hours  HYDROmorphone  Injectable 0.5 milliGRAM(s) IV Push every 4 hours PRN  HYDROmorphone  Injectable 0.5 milliGRAM(s) IV Push every 15 minutes PRN  melatonin 3 milliGRAM(s) Oral at bedtime  ondansetron Injectable 4 milliGRAM(s) IV Push every 6 hours PRN  oxyCODONE    IR 5 milliGRAM(s) Oral every 4 hours PRN  oxyCODONE    IR 10 milliGRAM(s) Oral every 4 hours PRN    Anticoagulation:  aspirin enteric coated 81 milliGRAM(s) Oral two times a day    OTHER:  aluminum hydroxide/magnesium hydroxide/simethicone Suspension 30 milliLiter(s) Oral four times a day PRN  atorvastatin 20 milliGRAM(s) Oral at bedtime  bisacodyl Suppository 10 milliGRAM(s) Rectal daily PRN  BUpivacaine liposome 1.3% Injectable (no eMAR) 20 milliLiter(s) Local Injection once  dextrose 40% Gel 15 Gram(s) Oral once PRN  dextrose 50% Injectable 12.5 Gram(s) IV Push once  dextrose 50% Injectable 25 Gram(s) IV Push once  dextrose 50% Injectable 25 Gram(s) IV Push once  glucagon  Injectable 1 milliGRAM(s) IntraMuscular once PRN  insulin glargine Injectable (LANTUS) 8 Unit(s) SubCutaneous at bedtime  insulin lispro (HumaLOG) corrective regimen sliding scale   SubCutaneous Before meals and at bedtime  losartan 100 milliGRAM(s) Oral daily  magnesium hydroxide Suspension 30 milliLiter(s) Oral daily PRN  metoprolol succinate ER 25 milliGRAM(s) Oral daily  polyethylene glycol 3350 17 Gram(s) Oral daily  povidone iodine 5% Nasal Swab 1 Application(s) Both Nostrils once  senna 2 Tablet(s) Oral at bedtime PRN  tamsulosin 0.4 milliGRAM(s) Oral two times a day    IVF:  dextrose 5%. 1000 milliLiter(s) IV Continuous <Continuous>      Vital Signs Last 24 Hrs  T(C): 36.7 (05 Aug 2020 09:26), Max: 36.9 (04 Aug 2020 16:30)  T(F): 98.1 (05 Aug 2020 09:26), Max: 98.5 (04 Aug 2020 16:30)  HR: 75 (05 Aug 2020 09:26) (52 - 75)  BP: 138/63 (05 Aug 2020 09:26) (121/54 - 140/67)  BP(mean): --  RR: 18 (05 Aug 2020 09:26) (14 - 23)  SpO2: 100% (05 Aug 2020 09:26) (96% - 100%)    PHYSICAL EXAM:  General: patient seen sitting in bed in NAD  Neuro: AAOx3, FC, OE spontaneously, speech clear and fluent, CNII-XI grossly intact, face symmetric, no pronator drift, finger to nose intact, strength 5/5 b/l UE and LE, mild decreased sensation to right fingertips and b/l LEs. Sensation o/w intact.  HEENT: PERRL, EOMI, visual fields intact b/l  Neck: supple  Cardiac: RRR, S1S2  Pulmonary: chest rise symmetric  Abdomen: soft, nontender, nondistended  Ext: warm, perfusing well        LABS:                        7.9    8.01  )-----------( 181      ( 05 Aug 2020 07:21 )             24.1     08-05    136  |  104  |  30<H>  ----------------------------<  120<H>  4.9   |  24  |  1.54<H>    Ca    9.1      05 Aug 2020 07:21          CULTURES:  Culture Results:   No growth to date. (08-03 @ 11:05)  Culture Results:   No growth to date. (08-03 @ 11:05)      RADIOLOGY & ADDITIONAL STUDIES:    Assessment:  80 y/o male with h/o CAD with 6 stents, BPH, HTN, DM, and giant cell arteritis with L SDH s/p fall on 7/1/20.     Recommendations:  - CT head without contrast  - Middle meningeal artery embolization with interventional radiology, - can be scheduled for tomorrow at 6:30 am with Dr. Agee if patient is able to be fully heparinized per ortho standpoint  - Continue aspirin 81mg BID. Recommendations pending CTH results.     D/w Dr. Agee, Dr. Greer and Dr. D'Amico

## 2020-08-05 NOTE — PROGRESS NOTE ADULT - ATTENDING COMMENTS
Patient seen by me this afternoon. He reported feeling distressed that he could not recall why he had the most recent surgery. With some prompting from me, he was eventually able to retrace the chain of events that led him here. Although his wife does report some memory difficulties since the initial SDH event on 7/2/20, this episode feels different to him.    VSS  Knee dressings intact  Prevena functioning, nothing in the tube  Hemovac draining sanguinous, outputs > 30cc/shift  Observed ambulating with walker and Steve, stable    H/h 7.9/24  Lytes ok  BUN/Cr improving  Cultures negative so far    Repeat CTH was obtained as per Neuro/NSx recs. Read as slight interval decrease in size of SDH with no new bleed, infarct, or midline shift. NSx is now offering for the embolization that had previously been planned for 3-4 weeks postop to take place tomorrow morning. Will doublecheck with them regarding optimal timing. I am fine with keeping him heparinized if need be, though will need to continue tracking CBCs and drain outputs. Rest of plan as above. Patient seen by me this afternoon. He reported feeling distressed that he could not recall why he had the most recent surgery. With some prompting from me, he was eventually able to retrace the chain of events that led him here. Although his wife does report some memory difficulties since the initial SDH event on 7/2/20, this episode feels different to him.    VSS  Knee dressings intact  Prevena functioning, nothing in the tube  Hemovac draining sanguinous, outputs > 30cc/shift  Observed ambulating with walker and Steve, stable    H/h 7.9/24  Lytes ok  BUN/Cr improving  Cultures negative so far    Repeat CTH was obtained as per Neuro/NSx recs. Read as slight interval decrease in size of SDH with no new bleed, infarct, or midline shift. NSx is reportedly still planning for another repeat CT in a couple of weeks; no acute intervention this admission. Will follow up on Neuro recs and continue with serial neuro exams. Rest of plan as above.

## 2020-08-06 LAB
ANION GAP SERPL CALC-SCNC: 10 MMOL/L — SIGNIFICANT CHANGE UP (ref 5–17)
BUN SERPL-MCNC: 23 MG/DL — SIGNIFICANT CHANGE UP (ref 7–23)
CALCIUM SERPL-MCNC: 9.9 MG/DL — SIGNIFICANT CHANGE UP (ref 8.4–10.5)
CHLORIDE SERPL-SCNC: 100 MMOL/L — SIGNIFICANT CHANGE UP (ref 96–108)
CO2 SERPL-SCNC: 25 MMOL/L — SIGNIFICANT CHANGE UP (ref 22–31)
CREAT SERPL-MCNC: 1.4 MG/DL — HIGH (ref 0.5–1.3)
GLUCOSE BLDC GLUCOMTR-MCNC: 108 MG/DL — HIGH (ref 70–99)
GLUCOSE BLDC GLUCOMTR-MCNC: 121 MG/DL — HIGH (ref 70–99)
GLUCOSE BLDC GLUCOMTR-MCNC: 121 MG/DL — HIGH (ref 70–99)
GLUCOSE BLDC GLUCOMTR-MCNC: 139 MG/DL — HIGH (ref 70–99)
GLUCOSE SERPL-MCNC: 120 MG/DL — HIGH (ref 70–99)
HCT VFR BLD CALC: 28.7 % — LOW (ref 39–50)
HGB BLD-MCNC: 9.2 G/DL — LOW (ref 13–17)
MCHC RBC-ENTMCNC: 31.2 PG — SIGNIFICANT CHANGE UP (ref 27–34)
MCHC RBC-ENTMCNC: 32.1 GM/DL — SIGNIFICANT CHANGE UP (ref 32–36)
MCV RBC AUTO: 97.3 FL — SIGNIFICANT CHANGE UP (ref 80–100)
NRBC # BLD: 0 /100 WBCS — SIGNIFICANT CHANGE UP (ref 0–0)
PLATELET # BLD AUTO: 245 K/UL — SIGNIFICANT CHANGE UP (ref 150–400)
POTASSIUM SERPL-MCNC: 4.6 MMOL/L — SIGNIFICANT CHANGE UP (ref 3.5–5.3)
POTASSIUM SERPL-SCNC: 4.6 MMOL/L — SIGNIFICANT CHANGE UP (ref 3.5–5.3)
RBC # BLD: 2.95 M/UL — LOW (ref 4.2–5.8)
RBC # FLD: 15.8 % — HIGH (ref 10.3–14.5)
SODIUM SERPL-SCNC: 135 MMOL/L — SIGNIFICANT CHANGE UP (ref 135–145)
WBC # BLD: 7.96 K/UL — SIGNIFICANT CHANGE UP (ref 3.8–10.5)
WBC # FLD AUTO: 7.96 K/UL — SIGNIFICANT CHANGE UP (ref 3.8–10.5)

## 2020-08-06 PROCEDURE — 99222 1ST HOSP IP/OBS MODERATE 55: CPT

## 2020-08-06 PROCEDURE — 99233 SBSQ HOSP IP/OBS HIGH 50: CPT

## 2020-08-06 RX ORDER — METOPROLOL TARTRATE 50 MG
1 TABLET ORAL
Qty: 0 | Refills: 0 | DISCHARGE

## 2020-08-06 RX ORDER — AMLODIPINE BESYLATE 2.5 MG/1
2.5 TABLET ORAL DAILY
Refills: 0 | Status: DISCONTINUED | OUTPATIENT
Start: 2020-08-07 | End: 2020-08-07

## 2020-08-06 RX ORDER — AMLODIPINE BESYLATE 2.5 MG/1
2.5 TABLET ORAL ONCE
Refills: 0 | Status: COMPLETED | OUTPATIENT
Start: 2020-08-06 | End: 2020-08-06

## 2020-08-06 RX ORDER — AMLODIPINE BESYLATE 2.5 MG/1
1 TABLET ORAL
Qty: 0 | Refills: 0 | DISCHARGE

## 2020-08-06 RX ORDER — METOPROLOL TARTRATE 50 MG
50 TABLET ORAL DAILY
Refills: 0 | Status: DISCONTINUED | OUTPATIENT
Start: 2020-08-07 | End: 2020-08-07

## 2020-08-06 RX ORDER — METOPROLOL TARTRATE 50 MG
25 TABLET ORAL ONCE
Refills: 0 | Status: COMPLETED | OUTPATIENT
Start: 2020-08-06 | End: 2020-08-06

## 2020-08-06 RX ADMIN — Medication 650 MILLIGRAM(S): at 13:07

## 2020-08-06 RX ADMIN — Medication 81 MILLIGRAM(S): at 17:24

## 2020-08-06 RX ADMIN — Medication 650 MILLIGRAM(S): at 12:20

## 2020-08-06 RX ADMIN — Medication 650 MILLIGRAM(S): at 00:55

## 2020-08-06 RX ADMIN — Medication 25 MILLIGRAM(S): at 06:11

## 2020-08-06 RX ADMIN — Medication 650 MILLIGRAM(S): at 07:00

## 2020-08-06 RX ADMIN — Medication 325 MILLIGRAM(S): at 12:20

## 2020-08-06 RX ADMIN — INSULIN GLARGINE 8 UNIT(S): 100 INJECTION, SOLUTION SUBCUTANEOUS at 22:48

## 2020-08-06 RX ADMIN — LOSARTAN POTASSIUM 100 MILLIGRAM(S): 100 TABLET, FILM COATED ORAL at 06:11

## 2020-08-06 RX ADMIN — Medication 100 MILLIGRAM(S): at 03:30

## 2020-08-06 RX ADMIN — Medication 25 MILLIGRAM(S): at 09:57

## 2020-08-06 RX ADMIN — POLYETHYLENE GLYCOL 3350 17 GRAM(S): 17 POWDER, FOR SOLUTION ORAL at 12:21

## 2020-08-06 RX ADMIN — TAMSULOSIN HYDROCHLORIDE 0.4 MILLIGRAM(S): 0.4 CAPSULE ORAL at 21:20

## 2020-08-06 RX ADMIN — Medication 650 MILLIGRAM(S): at 19:24

## 2020-08-06 RX ADMIN — ATORVASTATIN CALCIUM 20 MILLIGRAM(S): 80 TABLET, FILM COATED ORAL at 21:20

## 2020-08-06 RX ADMIN — Medication 650 MILLIGRAM(S): at 17:24

## 2020-08-06 RX ADMIN — Medication 100 MILLIGRAM(S): at 12:20

## 2020-08-06 RX ADMIN — Medication 100 MILLIGRAM(S): at 19:31

## 2020-08-06 RX ADMIN — TAMSULOSIN HYDROCHLORIDE 0.4 MILLIGRAM(S): 0.4 CAPSULE ORAL at 09:58

## 2020-08-06 RX ADMIN — Medication 650 MILLIGRAM(S): at 06:13

## 2020-08-06 RX ADMIN — AMLODIPINE BESYLATE 2.5 MILLIGRAM(S): 2.5 TABLET ORAL at 09:58

## 2020-08-06 RX ADMIN — Medication 650 MILLIGRAM(S): at 01:30

## 2020-08-06 RX ADMIN — Medication 3 MILLIGRAM(S): at 21:20

## 2020-08-06 RX ADMIN — Medication 81 MILLIGRAM(S): at 06:11

## 2020-08-06 NOTE — PROGRESS NOTE ADULT - SUBJECTIVE AND OBJECTIVE BOX
INTERVAL HPI/OVERNIGHT EVENTS: SIMONE O/N    SUBJECTIVE: Patient seen and examined at bedside.   Pt feels memory issues better today - suspects it may have been emergency from anesthesia. Oriented to self, location, knows reason for surgery - redo R TKR w Dr. Arrington. Denies headache, vision changes, numbness, weakness. Pain controlled in R Knee. Ambulating w PT. Voiding wo issue. +Flatus wo BM. no N/V/Abd pain    Results of CT head discussed w patient    OBJECTIVE:    VITAL SIGNS:  ICU Vital Signs Last 24 Hrs  T(C): 36.8 (06 Aug 2020 13:29), Max: 36.9 (05 Aug 2020 21:04)  T(F): 98.2 (06 Aug 2020 13:29), Max: 98.5 (05 Aug 2020 21:04)  HR: 65 (06 Aug 2020 13:29) (65 - 88)  BP: 162/75 (06 Aug 2020 13:29) (150/66 - 164/70)  BP(mean): --  ABP: --  ABP(mean): --  RR: 14 (06 Aug 2020 13:29) (12 - 20)  SpO2: 100% (06 Aug 2020 13:29) (96% - 100%)        08-05 @ 07:01  -  08-06 @ 07:00  --------------------------------------------------------  IN: 410 mL / OUT: 1325 mL / NET: -915 mL    08-06 @ 07:01 - 08-06 @ 13:30  --------------------------------------------------------  IN: 0 mL / OUT: 200 mL / NET: -200 mL      CAPILLARY BLOOD GLUCOSE      POCT Blood Glucose.: 121 mg/dL (06 Aug 2020 11:47)      PHYSICAL EXAM:  GEN: male in NAD on RA  HEENT: NC/AT, MMM  CV: RRR, nml S1S2, no murmurs, no BLE edema  PULM: nml effort, CTAB  ABD: Soft, NABS, non-tender  NEURO: Alert, moving all extremities. 5/5 in BUE and BLE w sensation intact. EOMI  PSYCH: Appropriate, conversant  EXT: Dressing on R knee c/d/i    MEDICATIONS:  MEDICATIONS  (STANDING):  acetaminophen   Tablet .. 650 milliGRAM(s) Oral every 6 hours  aspirin enteric coated 81 milliGRAM(s) Oral two times a day  atorvastatin 20 milliGRAM(s) Oral at bedtime  BUpivacaine liposome 1.3% Injectable (no eMAR) 20 milliLiter(s) Local Injection once  ceFAZolin   IVPB 2000 milliGRAM(s) IV Intermittent every 8 hours  dextrose 5%. 1000 milliLiter(s) (50 mL/Hr) IV Continuous <Continuous>  dextrose 50% Injectable 12.5 Gram(s) IV Push once  dextrose 50% Injectable 25 Gram(s) IV Push once  dextrose 50% Injectable 25 Gram(s) IV Push once  ferrous    sulfate 325 milliGRAM(s) Oral daily  insulin glargine Injectable (LANTUS) 8 Unit(s) SubCutaneous at bedtime  insulin lispro (HumaLOG) corrective regimen sliding scale   SubCutaneous Before meals and at bedtime  losartan 100 milliGRAM(s) Oral daily  melatonin 3 milliGRAM(s) Oral at bedtime  polyethylene glycol 3350 17 Gram(s) Oral daily  povidone iodine 5% Nasal Swab 1 Application(s) Both Nostrils once  tamsulosin 0.4 milliGRAM(s) Oral two times a day    MEDICATIONS  (PRN):  aluminum hydroxide/magnesium hydroxide/simethicone Suspension 30 milliLiter(s) Oral four times a day PRN Indigestion  bisacodyl Suppository 10 milliGRAM(s) Rectal daily PRN If no bowel movement by postoperative day #2  dextrose 40% Gel 15 Gram(s) Oral once PRN Blood Glucose LESS THAN 70 milliGRAM(s)/deciliter  glucagon  Injectable 1 milliGRAM(s) IntraMuscular once PRN Glucose LESS THAN 70 milligrams/deciliter  HYDROmorphone  Injectable 0.5 milliGRAM(s) IV Push every 4 hours PRN Breakthrough  HYDROmorphone  Injectable 0.5 milliGRAM(s) IV Push every 15 minutes PRN Severe Pain (7 - 10)  magnesium hydroxide Suspension 30 milliLiter(s) Oral daily PRN Constipation  ondansetron Injectable 4 milliGRAM(s) IV Push every 6 hours PRN Nausea and/or Vomiting  oxyCODONE    IR 5 milliGRAM(s) Oral every 4 hours PRN Moderate Pain (4 - 6)  oxyCODONE    IR 10 milliGRAM(s) Oral every 4 hours PRN Severe Pain (7 - 10)  senna 2 Tablet(s) Oral at bedtime PRN Constipation      ALLERGIES:  Allergies    No Known Allergies    Intolerances        LABS:                        9.2    7.96  )-----------( 245      ( 06 Aug 2020 07:55 )             28.7     08-06    135  |  100  |  23  ----------------------------<  120<H>  4.6   |  25  |  1.40<H>    Ca    9.9      06 Aug 2020 07:55            RADIOLOGY & ADDITIONAL TESTS: Reviewed.  Impression:    Since prior CT head 7/27/2020: Mild decrease in size of left cerebral convexity subdural hematoma. Stable mild mass effect and midline shift.    No new intracranial hemorrhage or demarcated territorial infarction..

## 2020-08-06 NOTE — PROGRESS NOTE ADULT - SUBJECTIVE AND OBJECTIVE BOX
Ortho Note    Pt comfortable without complaints, pain controlled  Denies CP, SOB, N/V, numbness/tingling. Reports he feels his memory  is "better" and he thinks it may have been the anesthesia making him feel foggy yesterday.  Wife reported yesterday that patient has had some mild memory issues since SDH last month, but   have been improving.    Vital Signs Last 24 Hrs  T(C): 36.8 (08-06-20 @ 13:29), Max: 36.8 (08-06-20 @ 13:29)  T(F): 98.2 (08-06-20 @ 13:29), Max: 98.2 (08-06-20 @ 13:29)  HR: 65 (08-06-20 @ 13:29) (65 - 72)  BP: 162/75 (08-06-20 @ 13:29) (150/66 - 162/75)  BP(mean): --  RR: 14 (08-06-20 @ 13:29) (14 - 15)  SpO2: 100% (08-06-20 @ 13:29) (96% - 100%)  AVSS    General: Pt Alert and oriented, NAD  DSG- prevena CDI, hemovac removed  Pulses: +2DP, WWP feet  Sensation: SILT BLE  Motor: 5/5 EHL/FHL/TA/GS                          9.2    7.96  )-----------( 245      ( 06 Aug 2020 07:55 )             28.7   06 Aug 2020 07:55    135    |  100    |  23     ----------------------------<  120    4.6     |  25     |  1.40     Ca    9.9        06 Aug 2020 07:55        A/P: 79yMale POD#3 s/p right knee explant of abx spacer, implant of TKR  - CT head shows SDH stable- continue to f/u neurology/ neurosugery recommendations; for outpatient f/u 8/18 with Dr. Agee  - Labs WNL, mildly hypertensive- home meds verified and restarted  - Pain Control  - DVT ppx: ASA  - PT, WBS: WBAT in faisal brace  - OOB for meals, I/S  - continue bowel regimen, +BM yesterday  - dispo: home with [facility], likely tomorrow pending no growth on OR cx.    Ortho Pager [phone]

## 2020-08-06 NOTE — PROVIDER CONTACT NOTE (OTHER) - SITUATION
Pt had PAT 's. Tech tried to place patient on EEG monitoring but pt refuses monitoring. Patient appears to be anxious but denies chest pain.

## 2020-08-06 NOTE — PROGRESS NOTE ADULT - SUBJECTIVE AND OBJECTIVE BOX
SUBJECTIVE: Patient seen and examined. Pain controlled.  Pt did well o/n. No f/c/n/v/cp/sob. Cleared PT yesterday. C/o acute memory loss yesterday, evaluated by nsx and neuro. Pt states symptoms have since resolved, no issues overnight.      OBJECTIVE:  NAD  Vital Signs Last 24 Hrs  T(C): 36.9 (06 Aug 2020 06:09), Max: 36.9 (05 Aug 2020 21:04)  T(F): 98.4 (06 Aug 2020 06:09), Max: 98.5 (05 Aug 2020 21:04)  HR: 75 (06 Aug 2020 06:09) (68 - 88)  BP: 150/70 (06 Aug 2020 06:09) (138/63 - 164/70)  BP(mean): --  RR: 16 (06 Aug 2020 06:09) (12 - 20)  SpO2: 97% (06 Aug 2020 06:09) (97% - 100%)      Physical Exam:  General: Resting comfortably in bed. AAOx3. NAD.  Extremities:        LLE: No gross deformity. SILT L2-S1 distribution, symmetric. TA/EHL/FHL/GS motor intact. WWP       RLE: Dressing c/d/i with [place]ades locked in extension. Prevena w/ good function. HVx1 o/p 45/125. SILT L2-S1 distribution, symmetric. TA/EHL/FHL/GS motor intact. [facility]          Labs:                           7.9    8.01  )-----------( 181      ( 05 Aug 2020 07:21 )             24.1      08-05    136  |  104  |  30<H>  ----------------------------<  120<H>  4.9   |  24  |  1.54<H>    Ca    9.1      05 Aug 2020 07:21             A/P :  Pt is a 78yo Male s/p R knee antibiotic spacer removal, revision to R TKA 8/3  -    Pain control  -    DVT ppx: ASA, Plavix, SCD     -    Weight bearing status: WBAT w Steve locked in extension  -    Please f/u neurosurg recs for f/u re SDH last month  -    Monitor HV output  -    Physical Therapy  -    *Please f/u CBC this AM- hgb 7.9 yesterday, may need 1U PRBC*  -    Cont standing ancef until cx negative; f/u OR cxs, so far negative  -    Dispo: HPT; cleared PT 8/5

## 2020-08-06 NOTE — PROGRESS NOTE ADULT - SUBJECTIVE AND OBJECTIVE BOX
***NEUROLOGY CONSULT PROGRESS NOTE***  INTERVAL HISTORY:  repeat CTH negative for new intracranial pathology or worsening subdural hemorrhage    SUBJECTIVE:  patient feels well, endorsing that memory issues have persisted since he was found to have subdural hemorrhage last month. ROS negative for headaches, vision changes, nausea, lightheadedness.    MEDICATIONS:  acetaminophen   Tablet .. 650 milliGRAM(s) Oral every 6 hours  aluminum hydroxide/magnesium hydroxide/simethicone Suspension 30 milliLiter(s) Oral four times a day PRN  aspirin enteric coated 81 milliGRAM(s) Oral two times a day  atorvastatin 20 milliGRAM(s) Oral at bedtime  bisacodyl Suppository 10 milliGRAM(s) Rectal daily PRN  BUpivacaine liposome 1.3% Injectable (no eMAR) 20 milliLiter(s) Local Injection once  ceFAZolin   IVPB 2000 milliGRAM(s) IV Intermittent every 8 hours  dextrose 40% Gel 15 Gram(s) Oral once PRN  dextrose 5%. 1000 milliLiter(s) IV Continuous <Continuous>  dextrose 50% Injectable 12.5 Gram(s) IV Push once  dextrose 50% Injectable 25 Gram(s) IV Push once  dextrose 50% Injectable 25 Gram(s) IV Push once  ferrous    sulfate 325 milliGRAM(s) Oral daily  glucagon  Injectable 1 milliGRAM(s) IntraMuscular once PRN  HYDROmorphone  Injectable 0.5 milliGRAM(s) IV Push every 4 hours PRN  HYDROmorphone  Injectable 0.5 milliGRAM(s) IV Push every 15 minutes PRN  insulin glargine Injectable (LANTUS) 8 Unit(s) SubCutaneous at bedtime  insulin lispro (HumaLOG) corrective regimen sliding scale   SubCutaneous Before meals and at bedtime  losartan 100 milliGRAM(s) Oral daily  magnesium hydroxide Suspension 30 milliLiter(s) Oral daily PRN  melatonin 3 milliGRAM(s) Oral at bedtime  ondansetron Injectable 4 milliGRAM(s) IV Push every 6 hours PRN  oxyCODONE    IR 5 milliGRAM(s) Oral every 4 hours PRN  oxyCODONE    IR 10 milliGRAM(s) Oral every 4 hours PRN  polyethylene glycol 3350 17 Gram(s) Oral daily  povidone iodine 5% Nasal Swab 1 Application(s) Both Nostrils once  senna 2 Tablet(s) Oral at bedtime PRN  tamsulosin 0.4 milliGRAM(s) Oral two times a day    VITAL SIGNS:  Vital Signs Last 24 Hrs  T(C): 36.8 (06 Aug 2020 13:29), Max: 36.9 (05 Aug 2020 21:04)  T(F): 98.2 (06 Aug 2020 13:29), Max: 98.5 (05 Aug 2020 21:04)  HR: 65 (06 Aug 2020 13:29) (65 - 88)  BP: 162/75 (06 Aug 2020 13:29) (150/66 - 164/70)  BP(mean): --  RR: 14 (06 Aug 2020 13:29) (12 - 20)  SpO2: 100% (06 Aug 2020 13:29) (96% - 100%)      PHYSICAL EXAMINATION:  General: Comfortable, pleasant, well-nourished  Neurologic:     -Mental Status: AAOx3. Speech is fluent with intact naming, repetition, and comprehension, no dysarthria. Recent memory intact, intermittent difficulty with remote memory. Follows commands.  Attention/concentration intact. Fund of knowledge appropriate.     -Cranial Nerves:          II: Visual fields are full to confrontation.          III, IV, VI: EOMI without nystagmus. PERRL b/l          V:  Facial sensation V1-V3 equal and intact           VII: Face is symmetric with normal eye closure and smile          VIII: Hearing is bilaterally intact to finger rub          IX, X: Uvula is midline and soft palate rises symmetrically          XI: Head turning and shoulder shrug are intact.          XII: Tongue protrudes midline     -Motor: Normal bulk and tone. Strength bilateral upper extremity 5/5, R lower extremity 5/5, unable to assess strength in L lower extremity due to recent surgery.                    No pronator drift. Rapid alternating movements intact and symmetric     -Sensation: Intact to light touch bilaterally.     -Coordination: No dysmetria on finger-to-nose, rapid alternating hand movements intact     -Gait: unable to assess      LABS:                          9.2    7.96  )-----------( 245      ( 06 Aug 2020 07:55 )             28.7     08-06    135  |  100  |  23  ----------------------------<  120<H>  4.6   |  25  |  1.40<H>    Ca    9.9      06 Aug 2020 07:55        RADIOLOGY & ADDITIONAL STUDIES:  reviewed  < from: CT Head No Cont (08.05.20 @ 17:24) >  Impression:  Since prior CT head 7/27/2020: Mild decrease in size of left cerebral convexity subdural hematoma. Stable mild mass effect and midline shift.  No new intracranial hemorrhage or demarcated territorial infarction..  < end of copied text >

## 2020-08-06 NOTE — PROGRESS NOTE ADULT - ATTENDING COMMENTS
Memory and recall much better today, back to his baseline.     Knee dressings intact  Ambulating with walker and brace    OR cultures negative    Follow up rest of Neuro recs. Plan for discharge home tomorrow.

## 2020-08-07 VITALS
SYSTOLIC BLOOD PRESSURE: 143 MMHG | OXYGEN SATURATION: 96 % | TEMPERATURE: 98 F | DIASTOLIC BLOOD PRESSURE: 75 MMHG | HEART RATE: 73 BPM | RESPIRATION RATE: 17 BRPM

## 2020-08-07 LAB
ANION GAP SERPL CALC-SCNC: 13 MMOL/L — SIGNIFICANT CHANGE UP (ref 5–17)
BUN SERPL-MCNC: 23 MG/DL — SIGNIFICANT CHANGE UP (ref 7–23)
CALCIUM SERPL-MCNC: 10.3 MG/DL — SIGNIFICANT CHANGE UP (ref 8.4–10.5)
CHLORIDE SERPL-SCNC: 100 MMOL/L — SIGNIFICANT CHANGE UP (ref 96–108)
CO2 SERPL-SCNC: 23 MMOL/L — SIGNIFICANT CHANGE UP (ref 22–31)
CREAT SERPL-MCNC: 1.34 MG/DL — HIGH (ref 0.5–1.3)
GLUCOSE BLDC GLUCOMTR-MCNC: 116 MG/DL — HIGH (ref 70–99)
GLUCOSE BLDC GLUCOMTR-MCNC: 122 MG/DL — HIGH (ref 70–99)
GLUCOSE SERPL-MCNC: 145 MG/DL — HIGH (ref 70–99)
HCT VFR BLD CALC: 30.4 % — LOW (ref 39–50)
HGB BLD-MCNC: 9.6 G/DL — LOW (ref 13–17)
MCHC RBC-ENTMCNC: 31.3 PG — SIGNIFICANT CHANGE UP (ref 27–34)
MCHC RBC-ENTMCNC: 31.6 GM/DL — LOW (ref 32–36)
MCV RBC AUTO: 99 FL — SIGNIFICANT CHANGE UP (ref 80–100)
NRBC # BLD: 0 /100 WBCS — SIGNIFICANT CHANGE UP (ref 0–0)
PLATELET # BLD AUTO: 254 K/UL — SIGNIFICANT CHANGE UP (ref 150–400)
POTASSIUM SERPL-MCNC: 4.8 MMOL/L — SIGNIFICANT CHANGE UP (ref 3.5–5.3)
POTASSIUM SERPL-SCNC: 4.8 MMOL/L — SIGNIFICANT CHANGE UP (ref 3.5–5.3)
RBC # BLD: 3.07 M/UL — LOW (ref 4.2–5.8)
RBC # FLD: 15.3 % — HIGH (ref 10.3–14.5)
SODIUM SERPL-SCNC: 136 MMOL/L — SIGNIFICANT CHANGE UP (ref 135–145)
SURGICAL PATHOLOGY STUDY: SIGNIFICANT CHANGE UP
T3 SERPL-MCNC: 74 NG/DL — LOW (ref 80–200)
T4 AB SER-ACNC: 7.46 UG/DL — SIGNIFICANT CHANGE UP (ref 3.17–11.72)
TSH SERPL-MCNC: 2.27 UIU/ML — SIGNIFICANT CHANGE UP (ref 0.35–4.94)
WBC # BLD: 11.53 K/UL — HIGH (ref 3.8–10.5)
WBC # FLD AUTO: 11.53 K/UL — HIGH (ref 3.8–10.5)

## 2020-08-07 PROCEDURE — 88331 PATH CONSLTJ SURG 1 BLK 1SPC: CPT

## 2020-08-07 PROCEDURE — P9016: CPT

## 2020-08-07 PROCEDURE — 86850 RBC ANTIBODY SCREEN: CPT

## 2020-08-07 PROCEDURE — 84132 ASSAY OF SERUM POTASSIUM: CPT

## 2020-08-07 PROCEDURE — 88305 TISSUE EXAM BY PATHOLOGIST: CPT

## 2020-08-07 PROCEDURE — 97116 GAIT TRAINING THERAPY: CPT

## 2020-08-07 PROCEDURE — 80048 BASIC METABOLIC PNL TOTAL CA: CPT

## 2020-08-07 PROCEDURE — 99222 1ST HOSP IP/OBS MODERATE 55: CPT

## 2020-08-07 PROCEDURE — 99232 SBSQ HOSP IP/OBS MODERATE 35: CPT

## 2020-08-07 PROCEDURE — 86923 COMPATIBILITY TEST ELECTRIC: CPT

## 2020-08-07 PROCEDURE — 36415 COLL VENOUS BLD VENIPUNCTURE: CPT

## 2020-08-07 PROCEDURE — 36430 TRANSFUSION BLD/BLD COMPNT: CPT

## 2020-08-07 PROCEDURE — 84295 ASSAY OF SERUM SODIUM: CPT

## 2020-08-07 PROCEDURE — 84436 ASSAY OF TOTAL THYROXINE: CPT

## 2020-08-07 PROCEDURE — 85027 COMPLETE CBC AUTOMATED: CPT

## 2020-08-07 PROCEDURE — 82330 ASSAY OF CALCIUM: CPT

## 2020-08-07 PROCEDURE — 86901 BLOOD TYPING SEROLOGIC RH(D): CPT

## 2020-08-07 PROCEDURE — 85018 HEMOGLOBIN: CPT

## 2020-08-07 PROCEDURE — 88304 TISSUE EXAM BY PATHOLOGIST: CPT

## 2020-08-07 PROCEDURE — 87075 CULTR BACTERIA EXCEPT BLOOD: CPT

## 2020-08-07 PROCEDURE — 84480 ASSAY TRIIODOTHYRONINE (T3): CPT

## 2020-08-07 PROCEDURE — 82962 GLUCOSE BLOOD TEST: CPT

## 2020-08-07 PROCEDURE — C1713: CPT

## 2020-08-07 PROCEDURE — 70450 CT HEAD/BRAIN W/O DYE: CPT

## 2020-08-07 PROCEDURE — 84443 ASSAY THYROID STIM HORMONE: CPT

## 2020-08-07 PROCEDURE — 73560 X-RAY EXAM OF KNEE 1 OR 2: CPT

## 2020-08-07 PROCEDURE — 97530 THERAPEUTIC ACTIVITIES: CPT

## 2020-08-07 PROCEDURE — 87070 CULTURE OTHR SPECIMN AEROBIC: CPT

## 2020-08-07 PROCEDURE — C1776: CPT

## 2020-08-07 RX ORDER — ASPIRIN/CALCIUM CARB/MAGNESIUM 324 MG
1 TABLET ORAL
Qty: 60 | Refills: 0
Start: 2020-08-07 | End: 2020-09-05

## 2020-08-07 RX ORDER — CEPHALEXIN 500 MG
1 CAPSULE ORAL
Qty: 270 | Refills: 0
Start: 2020-08-07 | End: 2020-11-04

## 2020-08-07 RX ORDER — ACETAMINOPHEN 500 MG
2 TABLET ORAL
Qty: 0 | Refills: 0 | DISCHARGE
Start: 2020-08-07

## 2020-08-07 RX ORDER — POLYETHYLENE GLYCOL 3350 17 G/17G
17 POWDER, FOR SOLUTION ORAL
Qty: 0 | Refills: 0 | DISCHARGE
Start: 2020-08-07

## 2020-08-07 RX ORDER — TAMSULOSIN HYDROCHLORIDE 0.4 MG/1
1 CAPSULE ORAL
Qty: 0 | Refills: 0 | DISCHARGE
Start: 2020-08-07

## 2020-08-07 RX ORDER — SENNA PLUS 8.6 MG/1
2 TABLET ORAL
Qty: 0 | Refills: 0 | DISCHARGE
Start: 2020-08-07

## 2020-08-07 RX ORDER — TAMSULOSIN HYDROCHLORIDE 0.4 MG/1
1 CAPSULE ORAL
Qty: 0 | Refills: 0 | DISCHARGE

## 2020-08-07 RX ORDER — OXYCODONE HYDROCHLORIDE 5 MG/1
1 TABLET ORAL
Qty: 20 | Refills: 0
Start: 2020-08-07 | End: 2020-08-11

## 2020-08-07 RX ADMIN — Medication 650 MILLIGRAM(S): at 05:04

## 2020-08-07 RX ADMIN — Medication 81 MILLIGRAM(S): at 05:04

## 2020-08-07 RX ADMIN — TAMSULOSIN HYDROCHLORIDE 0.4 MILLIGRAM(S): 0.4 CAPSULE ORAL at 11:45

## 2020-08-07 RX ADMIN — Medication 650 MILLIGRAM(S): at 11:45

## 2020-08-07 RX ADMIN — LOSARTAN POTASSIUM 100 MILLIGRAM(S): 100 TABLET, FILM COATED ORAL at 05:04

## 2020-08-07 RX ADMIN — Medication 50 MILLIGRAM(S): at 05:04

## 2020-08-07 RX ADMIN — Medication 325 MILLIGRAM(S): at 11:45

## 2020-08-07 RX ADMIN — Medication 100 MILLIGRAM(S): at 11:45

## 2020-08-07 RX ADMIN — Medication 650 MILLIGRAM(S): at 01:15

## 2020-08-07 RX ADMIN — Medication 650 MILLIGRAM(S): at 05:57

## 2020-08-07 RX ADMIN — AMLODIPINE BESYLATE 2.5 MILLIGRAM(S): 2.5 TABLET ORAL at 05:04

## 2020-08-07 RX ADMIN — Medication 650 MILLIGRAM(S): at 00:16

## 2020-08-07 RX ADMIN — Medication 100 MILLIGRAM(S): at 05:04

## 2020-08-07 NOTE — PROGRESS NOTE ADULT - REASON FOR ADMISSION
Right knee infection

## 2020-08-07 NOTE — PROGRESS NOTE ADULT - PROVIDER SPECIALTY LIST ADULT
Hospitalist
Neurology
Neurology
Orthopedics
Hospitalist

## 2020-08-07 NOTE — PROGRESS NOTE ADULT - SUBJECTIVE AND OBJECTIVE BOX
Recommended EEG yesterday but pt refused.     Etiology of memory disturbance likely chronic from past TBI, acutely contributed to by anesthesia. Call with questions.

## 2020-08-07 NOTE — PROGRESS NOTE ADULT - SUBJECTIVE AND OBJECTIVE BOX
SUBJECTIVE: Patient seen and examined. Pain controlled.  Pt did well o/n. No f/c/n/v/cp/sob. Cleared PT 2 days ago. No new neuro sxs. N issues overnight.      OBJECTIVE:  NAD  Vital Signs Last 24 Hrs  T(C): 36.6 (07 Aug 2020 04:59), Max: 36.8 (06 Aug 2020 13:29)  T(F): 97.9 (07 Aug 2020 04:59), Max: 98.2 (06 Aug 2020 13:29)  HR: 59 (07 Aug 2020 04:59) (59 - 78)  BP: 161/56 (07 Aug 2020 04:59) (150/66 - 167/72)  BP(mean): --  RR: 19 (07 Aug 2020 04:59) (14 - 19)  SpO2: 100% (07 Aug 2020 04:59) (96% - 100%)      Physical Exam:  General: Resting comfortably in bed. AAOx3. NAD.  Extremities:        LLE: No gross deformity. SILT L2-S1 distribution, symmetric. TA/EHL/FHL/GS motor intact. WWP       RLE: Dressing c/d/i with Steve locked in extension. Prevena w/ good function. HV DC yesterday. SILT L2-S1 distribution, symmetric. TA/EHL/FHL/GS motor intact. [facility]          Labs:                                      9.2    7.96  )-----------( 245      ( 06 Aug 2020 07:55 )             28.7     08-06    135  |  100  |  23  ----------------------------<  120<H>  4.6   |  25  |  1.40<H>    Ca    9.9      06 Aug 2020 07:55               A/P :  Pt is a 80yo Male s/p R knee antibiotic spacer removal, revision to R TKA 8/3  -    Pain control  -    DVT ppx: ASA, Plavix, SCD     -    Weight bearing status: WBAT w Steve locked in extension  -    Please f/u neurosurg recs for f/u re SDH last month  -    Physical Therapy  -    Cont standing ancef until cx negative; f/u OR cxs, so far negative  -    Dispo: HPT; cleared PT 8/5

## 2020-08-07 NOTE — CONSULT NOTE ADULT - ASSESSMENT
80 yo male with hx CAD, DM, R TKR 2002, revision 3/6/20, initially p/w wound dehiscence and drainage from incision c/f SSI s/p washout 4/7 with purulence noted in joint space and concern for hardware involvement--OR cultures growing MSSA. Also found to be COVID-19+ during that admission (asymptomatic). He finished a six week course of cefazolin + rifampin on 5/18 and was transitioned to chronic suppressive therapy with cephalexin with plan for 3 month course however subsequently developed sinus tract formation/relapsed infection. s/p BRANDON and spacer placement 6/2. Course c/b SUBHASH (?AIN in setting of nafcillin (also with peripheral eosinophilia)) and acute L frontoparietal SDH. s/p cefazolin 2g IV q12h through 7/16--completed 6 week antibiotic course (inclusive of nafcillin 4 weeks).  R knee synovial fluid cx 7/30 negative; now s/p R knee spacer removal and TKA revision 8/3; OR cx negative. Upon discharge, recommend transition to cephalexin 500mg PO q8h for 3 month course (through early November 2020)    Will arrange post hospital f/u with me for next month    Please reconsult with ?    ID Team 2
Mr. Macias is a 78yo M with PMH of subdural hemorrhage (7/2/2020 while on DAPT, since then only on ASA), CAD (6 stents >5yrs ago), DM, HLD, HTN, presenting for R knee replacement and removal of knee spacer. This morning (POD2), patient noted to have memory deficits and neurology consulted for further evaluation.    #Memory deficit  Consulted by primary team after patient did not remember why he was in the hospital and that he had had surgery. Pt's history s/f subdural hemorrhage 2/2 mechanical fall in 07/2020, he initially presented with R hand weakness progressing to numbness. At the time pt had been on DAPT, since then has only been on ASA. Repeat CTH 7/27 showed evolution of subdural hemorrhage with stable midline shift. During this admission, ASA had been discontinued for surgery, restarted yesterday (8/4).  -There were no focal deficits on neurological exam, mini mental status exam revealed difficulty remembering 3 words after 5 minutes (otherwise normal findings). Per the patient's wife, the pt has had memory difficulties since his fall 7/2/2020.   -Etiology of memory deficit likely chronic from subdural hemorrhage in 07/2020. However in the setting of recent ASA re-initiation and known evolution of subdural hemorrhage, progression of subdural hemorrhage cannot be ruled out.  Recommendation:    -CTH without contrast to evaluate for progression of subdural hemorrhage    -q6hr neuro checks to evaluate worsening cognitive function or new focal deficits    #Subdural hemorrhage  07/2020 presented to [facility] after c/o R hand weakness 1 day after mechanical fall. Found to have subdural hemorrhage in left frontoparietal convexity (1.2cm at maximal thickness), with 1-2mm left-to-right midline shift. Pt had been on DAPT, discontinued plavix and continued aspirin. Repeat CTH 7/27 showing interval evolution of subdural hemorrhage with stable midline shift.  Recommendations:  -plan as above
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 Arrington    #Memory deficit - Appears possibly chronic per wife. In setting of SDH with midline shift, recent surgery, and noted memory deficit, acute process or progression of SDH cannot be ruled out. Agree with neurology and neurosurgery to obtain non-con CT Head to assess SDH   #Post-op state. Pain controlled. On bowel regimen and IS at bedside. PPx: ASA. DISPO: PT recommend HPT  #R TKR - s/p TKR s/p explantation of spacer. On continued IV Ancef. OR Cx NKDA  #SDH - diagnosed July 2020 - noted to have intermittent fine motor deficits in R hand (per pt) and memory (Per wife). Following w Neurosurgery.   #DM - BG at target. a1c 5.9 in 06/2020. Only on lantus 8 and SSI. Home on lantus 8 and metformin 1000mg daily  #CKDIII - Cr baseline 1.1 - 1.2 in june but Cr increased to 1.4 in July. Currently near baseline at 1.5. Caution w NSAIDs  #Normocytic anemia - baseline pre-op in June 10s however discharge was 7-8. Does not appear symptomatic at this time. Currently 7.9 from 8.9 pre-op.   #CAD - s/p PCI - on atorvastatin 20 and toprol 50. DAPT discontinued after SDH in 07/2020  #HTN - at target. home on norvasc 2.5, losartan 1000mg, toprol 50  #GERD - chronic. at baseline on omeprazole 20mg  #BPH - at baseline. Pt had retention at last admission. c/w flomax    Plan  Neuro  Overall, per wife - pt has exhibited memory issues since fall in early July and after SDH. Relatively non-focal neuro examination   - F/U CT Head non-con   - F/U Neurology, NSx recommendations   - Pt to f/u neurosurgery as outpatient  ID   - F/U OR Cultures  CV   - BP borderline target. Noted intermittent bradycardia. Would keep toprol at reduced dose - 25mg  Heme   - Continue monitoring CBC. Transfusion goal >7 or w sxs.    Dispo: Likely home w HPT pending further neuro w/u as above.

## 2020-08-07 NOTE — PROGRESS NOTE ADULT - SUBJECTIVE AND OBJECTIVE BOX
INTERVAL HPI/OVERNIGHT EVENTS: SIMONE O/N    SUBJECTIVE: Patient seen and examined at bedside.   Pt states he feels well and is eager to return home today. States pain was a bit worse yesterday but feeling better this AM. No issues ambulating. +Voiding urine wo issue. +Flatus wo BM; denies nausea, abd pain. No fever, chills, sweats, cough, dyspnea, chest pain.   Oriented to self, location, mm/dd/yyyy.    OBJECTIVE:    VITAL SIGNS:  ICU Vital Signs Last 24 Hrs  T(C): 36.6 (07 Aug 2020 13:16), Max: 36.7 (07 Aug 2020 09:27)  T(F): 97.9 (07 Aug 2020 13:16), Max: 98 (07 Aug 2020 09:27)  HR: 73 (07 Aug 2020 13:16) (59 - 78)  BP: 143/75 (07 Aug 2020 13:16) (124/60 - 167/72)  BP(mean): --  ABP: --  ABP(mean): --  RR: 17 (07 Aug 2020 13:16) (15 - 19)  SpO2: 96% (07 Aug 2020 13:16) (96% - 100%)        08-06 @ 07:01  -  08-07 @ 07:00  --------------------------------------------------------  IN: 1180 mL / OUT: 700 mL / NET: 480 mL    08-07 @ 07:01  -  08-07 @ 17:13  --------------------------------------------------------  IN: 600 mL / OUT: 200 mL / NET: 400 mL      CAPILLARY BLOOD GLUCOSE      POCT Blood Glucose.: 122 mg/dL (07 Aug 2020 11:53)      PHYSICAL EXAM:  GEN: male in NAD on RA  HEENT: NC/AT, MMM  CV: RRR, nml S1S2, no murmurs, no BLE edema  PULM: nml effort, CTAB  ABD: Soft, NABS, non-tender  NEURO: Alert, moving all extremities. 5/5 in BUE and BLE w sensation intact. EOMI  Oriented to self, location, mm/dd/yyyy  PSYCH: Appropriate, conversant  EXT: Dressing on R knee c/d/i      MEDICATIONS:  MEDICATIONS  (STANDING):  acetaminophen   Tablet .. 650 milliGRAM(s) Oral every 6 hours  amLODIPine   Tablet 2.5 milliGRAM(s) Oral daily  aspirin enteric coated 81 milliGRAM(s) Oral two times a day  atorvastatin 20 milliGRAM(s) Oral at bedtime  BUpivacaine liposome 1.3% Injectable (no eMAR) 20 milliLiter(s) Local Injection once  ceFAZolin   IVPB 2000 milliGRAM(s) IV Intermittent every 8 hours  dextrose 5%. 1000 milliLiter(s) (50 mL/Hr) IV Continuous <Continuous>  dextrose 50% Injectable 12.5 Gram(s) IV Push once  dextrose 50% Injectable 25 Gram(s) IV Push once  dextrose 50% Injectable 25 Gram(s) IV Push once  ferrous    sulfate 325 milliGRAM(s) Oral daily  insulin glargine Injectable (LANTUS) 8 Unit(s) SubCutaneous at bedtime  insulin lispro (HumaLOG) corrective regimen sliding scale   SubCutaneous Before meals and at bedtime  losartan 100 milliGRAM(s) Oral daily  melatonin 3 milliGRAM(s) Oral at bedtime  metoprolol succinate ER 50 milliGRAM(s) Oral daily  polyethylene glycol 3350 17 Gram(s) Oral daily  povidone iodine 5% Nasal Swab 1 Application(s) Both Nostrils once  tamsulosin 0.4 milliGRAM(s) Oral two times a day    MEDICATIONS  (PRN):  aluminum hydroxide/magnesium hydroxide/simethicone Suspension 30 milliLiter(s) Oral four times a day PRN Indigestion  bisacodyl Suppository 10 milliGRAM(s) Rectal daily PRN If no bowel movement by postoperative day #2  dextrose 40% Gel 15 Gram(s) Oral once PRN Blood Glucose LESS THAN 70 milliGRAM(s)/deciliter  glucagon  Injectable 1 milliGRAM(s) IntraMuscular once PRN Glucose LESS THAN 70 milligrams/deciliter  HYDROmorphone  Injectable 0.5 milliGRAM(s) IV Push every 4 hours PRN Breakthrough  HYDROmorphone  Injectable 0.5 milliGRAM(s) IV Push every 15 minutes PRN Severe Pain (7 - 10)  magnesium hydroxide Suspension 30 milliLiter(s) Oral daily PRN Constipation  ondansetron Injectable 4 milliGRAM(s) IV Push every 6 hours PRN Nausea and/or Vomiting  oxyCODONE    IR 5 milliGRAM(s) Oral every 4 hours PRN Moderate Pain (4 - 6)  oxyCODONE    IR 10 milliGRAM(s) Oral every 4 hours PRN Severe Pain (7 - 10)  senna 2 Tablet(s) Oral at bedtime PRN Constipation      ALLERGIES:  Allergies    No Known Allergies    Intolerances        LABS:                        9.6    11.53 )-----------( 254      ( 07 Aug 2020 08:36 )             30.4     08-07    136  |  100  |  23  ----------------------------<  145<H>  4.8   |  23  |  1.34<H>    Ca    10.3      07 Aug 2020 08:36            RADIOLOGY & ADDITIONAL TESTS: Reviewed.

## 2020-08-07 NOTE — PROGRESS NOTE ADULT - ASSESSMENT
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 Arrington    #Memory deficit - Appears possibly chronic per wife. CT Head showing small decrease in SDH, stable midline shift. Neuro and NSx following.  #Post-op state. Pain controlled. On bowel regimen and IS at bedside. PPx: ASA. DISPO: PT recommend HPT  #R TKR - s/p TKR s/p explantation of spacer. On continued IV Ancef. OR Cx NKDA  #SDH - diagnosed July 2020 - noted to have intermittent fine motor deficits in R hand (per pt) and memory (Per wife). Following w Neurosurgery.   #DM - BG at target. a1c 5.9 in 06/2020. Only on lantus 8 and SSI. Home on lantus 8 and metformin 1000mg daily  #CKDIII - Cr baseline 1.1 - 1.2 in june but Cr increased to 1.4 in July. Currently near baseline at 1.4. Caution w NSAIDs  #Normocytic anemia - baseline pre-op in June 10s however discharge was 7-8. Does not appear symptomatic at this time. Currently 9.2 from 7.9 from 8.9 pre-op.   #CAD - s/p PCI - on atorvastatin 20 and toprol 50. DAPT discontinued after SDH in 07/2020  #HTN - at target. home on norvasc 2.5, losartan 1000mg, toprol 50  #GERD - chronic. at baseline on omeprazole 20mg  #BPH - at baseline. Pt had retention at last admission. c/w flomax    Plan  Overall memory deficit and neuro examination today stable. CT head shows small decrease in SDH w stable mass effect. No plans for intervention by NSx at this time  F/U Neurology, NSx recs; to f/u NSx as outpatient  F/U OR Cultures - NGTD  Can restart home dose toprol 50mg      Dispo: Likely home w HPT pending further neuro w/u as above.
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 Arrington, noted to have memory deficit post-op - evaluated by neurology w Head CT non-con negative now improving    #Memory deficit - Appears possibly chronic per wife. CT Head showing small decrease in SDH, stable midline shift. Neuro and NSx following. Per Neurology baseline likely 2/2 TBI and acutely exacerbated by anesthesia.  #Post-op state. Pain controlled. On bowel regimen and IS at bedside. PPx: ASA. DISPO: PT recommend HPT  #R TKR - s/p TKR s/p explantation of spacer. On continued IV Ancef. OR Cx NKDA  #SDH - diagnosed July 2020 - noted to have intermittent fine motor deficits in R hand (per pt) and memory (Per wife). Following w Neurosurgery.   #DM - BG at target. a1c 5.9 in 06/2020. Only on lantus 8 and SSI. Home on lantus 8 and metformin 1000mg daily  #CKDIII - Cr baseline 1.1 - 1.2 in june but Cr increased to 1.4 in July. Currently near baseline at 1.3 Caution w NSAIDs  #Normocytic anemia - baseline pre-op in June 10s however discharge was 7-8. Does not appear symptomatic at this time. Currently 9.6 from 8.9 pre-op.   #CAD - s/p PCI - on atorvastatin 20 and toprol 50. DAPT discontinued after SDH in 07/2020  #HTN - slightly . home on norvasc 2.5, losartan 1000mg, toprol 50  #GERD - chronic. at baseline on omeprazole 20mg  #BPH - at baseline. Pt had retention at last admission. c/w flomax    Plan  Overall memory deficit appears improved.  Appreciate Neurology, NSx recs. (noted pt declined EEG); to f/u NSx as outpatient  Continue home HTN regimen as outpatient. Noted above target BPs - possibly 2/2 pain. Recommend f/u as outpatient and discussed w patient  From medical standpoint, patient optimized for dispo home today
Mr. Macias is a 78yo M with PMH of subdural hemorrhage (7/2/2020 while on DAPT, since then only on ASA), CAD (6 stents >5yrs ago), DM, HLD, HTN, presenting for R knee replacement and removal of knee spacer. This morning (POD2), patient noted to have memory deficits and neurology consulted for further evaluation.    #Memory deficit  Consulted by primary team after patient did not remember why he was in the hospital and that he had had surgery. Pt's history s/f subdural hemorrhage 2/2 mechanical fall in 07/2020, he initially presented with R hand weakness progressing to numbness. At the time pt had been on DAPT, since then has only been on ASA. Repeat CTH 7/27 showed evolution of subdural hemorrhage with stable midline shift. During this admission, ASA had been discontinued for surgery, restarted yesterday (8/4).  -There were no focal deficits on neurological exam, mini mental status exam revealed difficulty remembering 3 words after 5 minutes (otherwise normal findings). Per the patient's wife, the pt has had memory difficulties since his fall 7/2/2020.   -CTH 8/5 negative for new intracranial pathology or evolution of subdural hemorrhage.  -Etiology of memory deficit likely chronic from subdural hemorrhage in 07/2020.   Recommendations:  -TSH to rule out thyroid dysfunction  -vEEG for 2-3hours to evaluate for subclinical epileptiform activity    #Subdural hemorrhage  07/2020 presented to [facility] after c/o R hand weakness 1 day after mechanical fall. Found to have subdural hemorrhage in left frontoparietal convexity (1.2cm at maximal thickness), with 1-2mm left-to-right midline shift. Pt had been on DAPT, discontinued plavix and continued aspirin. Repeat CTH 7/27 showing interval evolution of subdural hemorrhage with stable midline shift.  Recommendations:  -plan as above    Case discussed with neurology attending (Dr. Fatima), primary team, and patient.

## 2020-08-07 NOTE — CONSULT NOTE ADULT - SUBJECTIVE AND OBJECTIVE BOX
INTERVAL HPI/OVERNIGHT EVENTS: Denies R knee pain. No fevers.    CONSTITUTIONAL:  Negative fever or chills, feels well, good appetite  EYES:  Negative  blurry vision or double vision  CARDIOVASCULAR:  Negative for chest pain or palpitations  RESPIRATORY:  Negative for cough, wheezing, or SOB   GASTROINTESTINAL:  Negative for nausea, vomiting, diarrhea, constipation, or abdominal pain  GENITOURINARY:  Negative frequency, urgency or dysuria  NEUROLOGIC:  No headache, confusion, dizziness, lightheadedness      ANTIBIOTICS/RELEVANT:    MEDICATIONS  (STANDING):  acetaminophen   Tablet .. 650 milliGRAM(s) Oral every 6 hours  amLODIPine   Tablet 2.5 milliGRAM(s) Oral daily  aspirin enteric coated 81 milliGRAM(s) Oral two times a day  atorvastatin 20 milliGRAM(s) Oral at bedtime  BUpivacaine liposome 1.3% Injectable (no eMAR) 20 milliLiter(s) Local Injection once  ceFAZolin   IVPB 2000 milliGRAM(s) IV Intermittent every 8 hours  dextrose 5%. 1000 milliLiter(s) (50 mL/Hr) IV Continuous <Continuous>  dextrose 50% Injectable 12.5 Gram(s) IV Push once  dextrose 50% Injectable 25 Gram(s) IV Push once  dextrose 50% Injectable 25 Gram(s) IV Push once  ferrous    sulfate 325 milliGRAM(s) Oral daily  insulin glargine Injectable (LANTUS) 8 Unit(s) SubCutaneous at bedtime  insulin lispro (HumaLOG) corrective regimen sliding scale   SubCutaneous Before meals and at bedtime  losartan 100 milliGRAM(s) Oral daily  melatonin 3 milliGRAM(s) Oral at bedtime  metoprolol succinate ER 50 milliGRAM(s) Oral daily  polyethylene glycol 3350 17 Gram(s) Oral daily  povidone iodine 5% Nasal Swab 1 Application(s) Both Nostrils once  tamsulosin 0.4 milliGRAM(s) Oral two times a day    MEDICATIONS  (PRN):  aluminum hydroxide/magnesium hydroxide/simethicone Suspension 30 milliLiter(s) Oral four times a day PRN Indigestion  bisacodyl Suppository 10 milliGRAM(s) Rectal daily PRN If no bowel movement by postoperative day #2  dextrose 40% Gel 15 Gram(s) Oral once PRN Blood Glucose LESS THAN 70 milliGRAM(s)/deciliter  glucagon  Injectable 1 milliGRAM(s) IntraMuscular once PRN Glucose LESS THAN 70 milligrams/deciliter  HYDROmorphone  Injectable 0.5 milliGRAM(s) IV Push every 4 hours PRN Breakthrough  HYDROmorphone  Injectable 0.5 milliGRAM(s) IV Push every 15 minutes PRN Severe Pain (7 - 10)  magnesium hydroxide Suspension 30 milliLiter(s) Oral daily PRN Constipation  ondansetron Injectable 4 milliGRAM(s) IV Push every 6 hours PRN Nausea and/or Vomiting  oxyCODONE    IR 5 milliGRAM(s) Oral every 4 hours PRN Moderate Pain (4 - 6)  oxyCODONE    IR 10 milliGRAM(s) Oral every 4 hours PRN Severe Pain (7 - 10)  senna 2 Tablet(s) Oral at bedtime PRN Constipation        Vital Signs Last 24 Hrs  T(C): 36.6 (07 Aug 2020 13:16), Max: 36.7 (07 Aug 2020 09:27)  T(F): 97.9 (07 Aug 2020 13:16), Max: 98 (07 Aug 2020 09:27)  HR: 73 (07 Aug 2020 13:16) (59 - 78)  BP: 143/75 (07 Aug 2020 13:16) (124/60 - 167/72)  BP(mean): --  RR: 17 (07 Aug 2020 13:16) (15 - 19)  SpO2: 96% (07 Aug 2020 13:16) (96% - 100%)    PHYSICAL EXAM:  Constitutional:Well-developed, well nourished  Eyes:DANA, EOMI  Ear/Nose/Throat: no oral lesion, no sinus tenderness on percussion	  Neck:no JVD, no lymphadenopathy, supple  Respiratory: CTA kris  Cardiovascular: S1S2 RRR, no murmurs  Gastrointestinal:soft, (+) BS, no HSM  Extremities:no e/e/c  Vascular: DP Pulse:	right normal; left normal      LABS:                        9.6    11.53 )-----------( 254      ( 07 Aug 2020 08:36 )             30.4     08-07    136  |  100  |  23  ----------------------------<  145<H>  4.8   |  23  |  1.34<H>    Ca    10.3      07 Aug 2020 08:36            MICROBIOLOGY: reviewed    RADIOLOGY & ADDITIONAL STUDIES: reviewed

## 2020-08-07 NOTE — PROGRESS NOTE ADULT - ATTENDING COMMENTS
Seen by me around lunchtime. Feeling good. Ready to go home. Pain well controlled.    Dressings changed  Incision dry, well approximated, no erythema/drainage, staples in place  Moderate effusion; I tapped at the bedside and obtained about 30cc hemarthrosis    Cultures negative.    Plan for d/c home this afternoon. 12 weeks of Keflex PO. Follow up with me in 2 weeks for staple removal.

## 2020-08-10 DIAGNOSIS — R41.3 OTHER AMNESIA: ICD-10-CM

## 2020-08-10 DIAGNOSIS — B95.61 METHICILLIN SUSCEPTIBLE STAPHYLOCOCCUS AUREUS INFECTION AS THE CAUSE OF DISEASES CLASSIFIED ELSEWHERE: ICD-10-CM

## 2020-08-10 DIAGNOSIS — I12.9 HYPERTENSIVE CHRONIC KIDNEY DISEASE WITH STAGE 1 THROUGH STAGE 4 CHRONIC KIDNEY DISEASE, OR UNSPECIFIED CHRONIC KIDNEY DISEASE: ICD-10-CM

## 2020-08-10 DIAGNOSIS — N40.0 BENIGN PROSTATIC HYPERPLASIA WITHOUT LOWER URINARY TRACT SYMPTOMS: ICD-10-CM

## 2020-08-10 DIAGNOSIS — T84.53XA INFECTION AND INFLAMMATORY REACTION DUE TO INTERNAL RIGHT KNEE PROSTHESIS, INITIAL ENCOUNTER: ICD-10-CM

## 2020-08-10 DIAGNOSIS — D62 ACUTE POSTHEMORRHAGIC ANEMIA: ICD-10-CM

## 2020-08-10 DIAGNOSIS — J45.909 UNSPECIFIED ASTHMA, UNCOMPLICATED: ICD-10-CM

## 2020-08-10 DIAGNOSIS — Z86.79 PERSONAL HISTORY OF OTHER DISEASES OF THE CIRCULATORY SYSTEM: ICD-10-CM

## 2020-08-10 DIAGNOSIS — Z95.5 PRESENCE OF CORONARY ANGIOPLASTY IMPLANT AND GRAFT: ICD-10-CM

## 2020-08-10 DIAGNOSIS — M25.561 PAIN IN RIGHT KNEE: ICD-10-CM

## 2020-08-10 DIAGNOSIS — E11.40 TYPE 2 DIABETES MELLITUS WITH DIABETIC NEUROPATHY, UNSPECIFIED: ICD-10-CM

## 2020-08-10 DIAGNOSIS — N18.3 CHRONIC KIDNEY DISEASE, STAGE 3 (MODERATE): ICD-10-CM

## 2020-08-10 DIAGNOSIS — I25.10 ATHEROSCLEROTIC HEART DISEASE OF NATIVE CORONARY ARTERY WITHOUT ANGINA PECTORIS: ICD-10-CM

## 2020-08-10 DIAGNOSIS — E11.22 TYPE 2 DIABETES MELLITUS WITH DIABETIC CHRONIC KIDNEY DISEASE: ICD-10-CM

## 2020-08-20 ENCOUNTER — APPOINTMENT (OUTPATIENT)
Dept: ORTHOPEDIC SURGERY | Facility: CLINIC | Age: 80
End: 2020-08-20
Payer: MEDICARE

## 2020-08-20 VITALS — HEIGHT: 66 IN | BODY MASS INDEX: 25.39 KG/M2 | WEIGHT: 158 LBS

## 2020-08-20 PROCEDURE — 99024 POSTOP FOLLOW-UP VISIT: CPT

## 2020-08-21 PROBLEM — Z86.2 HISTORY OF ANEMIA: Status: RESOLVED | Noted: 2020-08-21 | Resolved: 2020-08-21

## 2020-08-21 PROBLEM — Z82.49 FAMILY HISTORY OF CARDIAC DISORDER: Status: ACTIVE | Noted: 2020-08-21

## 2020-08-21 PROBLEM — Z86.79 HISTORY OF CORONARY ARTERY DISEASE: Status: RESOLVED | Noted: 2020-08-21 | Resolved: 2020-08-21

## 2020-08-21 RX ORDER — ATORVASTATIN CALCIUM 80 MG/1
TABLET, FILM COATED ORAL
Refills: 0 | Status: DISCONTINUED | COMMUNITY
End: 2020-08-21

## 2020-08-21 RX ORDER — INSULIN GLARGINE 100 [IU]/ML
INJECTION, SOLUTION SUBCUTANEOUS
Refills: 0 | Status: DISCONTINUED | COMMUNITY
End: 2020-08-21

## 2020-08-21 RX ORDER — ASPIRIN 81 MG/1
81 TABLET, CHEWABLE ORAL
Refills: 0 | Status: ACTIVE | COMMUNITY

## 2020-08-21 NOTE — HISTORY OF PRESENT ILLNESS
[de-identified] : Doing well at home. Pain minimal. No wound problems. Eager to be rid of the [place]. Taking antibiotics as directed. Appetite is slowly coming up. [de-identified] : First post op right knee removal of static antibiotic cement spacer and revision total knee arthroplasty surgical date 8/3/2020 [de-identified] : Right knee dressings removed. Incision healed without erythema, dehiscence, or drainage. Staples removed and exchanged with Steristrips. ROM 0-90, stable jonathan/yaneli at relaxed extension, less than 5mm ant/post translation on flexion drawers. Good quad strength. Ambulating with a cane. [de-identified] : All intraoperative cultures reviewed; negative [de-identified] : 78y/o male about 2.5 weeks s/p right knee revision arthroplasty [de-identified] : Start PT, WBAT/FROM right knee\par Cont antibiotics\par Follow up with Dr. Lorenzana\par Next visit with me in 4 weeks with new right knee XRs

## 2020-08-24 LAB
CULTURE RESULTS: NO GROWTH — SIGNIFICANT CHANGE UP
SPECIMEN SOURCE: SIGNIFICANT CHANGE UP

## 2020-08-25 ENCOUNTER — APPOINTMENT (OUTPATIENT)
Dept: NEUROSURGERY | Facility: CLINIC | Age: 80
End: 2020-08-25
Payer: MEDICARE

## 2020-08-25 DIAGNOSIS — Z86.2 PERSONAL HISTORY OF DISEASES OF THE BLOOD AND BLOOD-FORMING ORGANS AND CERTAIN DISORDERS INVOLVING THE IMMUNE MECHANISM: ICD-10-CM

## 2020-08-25 DIAGNOSIS — Z82.49 FAMILY HISTORY OF ISCHEMIC HEART DISEASE AND OTHER DISEASES OF THE CIRCULATORY SYSTEM: ICD-10-CM

## 2020-08-25 DIAGNOSIS — Z86.79 PERSONAL HISTORY OF OTHER DISEASES OF THE CIRCULATORY SYSTEM: ICD-10-CM

## 2020-08-25 LAB — BACTERIA FLD CULT: NORMAL

## 2020-08-25 PROCEDURE — 99215 OFFICE O/P EST HI 40 MIN: CPT | Mod: 95

## 2020-08-25 NOTE — ADDENDUM
[FreeTextEntry1] : 2020\par \par Rigo Greer MD\par Chair, Neurosurgery\par [facility]\par [address]\par [address] Floor\[place] [address]\par \par Patient’s Name:  Timothy Macias\par : 1940\par \par Dear Dr. Greer:\par \par We met Mr. Macias in tele health consultation at [facility].  As you know, he is a 79 years-old right handed man with history of coronary artery disease status post stenting, hypertension, hyperlipidemia, diabetes, and prostate hypertrophy who suffered a fall and head trauma on 2020.  At that time he was diagnosed with acute left subdural hematoma.  He has been followed conservatively since then.  We reviewed follow up CT scans from 2020 and August 3, 2020 that demonstrated resolving hematomas.  He is asymptomatic at this time.\par \par We will repeat a CT of the head in the upcoming 2 weeks.  If the hematoma continues to resolve then there is no need for further intervention.  If the hematoma is enlarged then we will consider performing endovascular embolization of the middle meningeal artery.\par \par I will keep you updated at all times.  Thank you for allowing us to participate in Mr. Macias’s care.\par \par Sincerely,\par \par \par Timothy Agee MD\par Chief\par Neuro-Endovascular Surgery and \par Interventional Neuroradiology \par [facility]\par [facility]\par [address]\par [address] Floor\[place] [address]\par T:  [phone]\par F:  [phone]\par \par cc:	Steve Anders MD\par 	 [address]\[place] [address]\par \par 	Scott Hernandez MD\par 	[address]\[place] [address]\par \par 	Mr. Timothy Macias\par 	[address]\par 	[address]\[place] [address]\par \par \par

## 2020-08-25 NOTE — REASON FOR VISIT
[Consultation] : a consultation visit [Referred By: _________] : Patient was referred by PAT [FreeTextEntry1] : for MMA embolization for hx of chronic LEFT subdural hematoma

## 2020-08-25 NOTE — PLAN
[FreeTextEntry1] : Plan:\par 1. One month follow up CTH from last CTH, on early Sept 2020 to re-assess stability of the SDH. If stable and patient remains asymptomatic, no need for MMA embolization of SDH.

## 2020-08-25 NOTE — HISTORY OF PRESENT ILLNESS
[Home] : at home, [unfilled] , at the time of the visit. [Medical Office: ([facility])___] : at the medical office located in  [Verbal consent obtained from patient] : the patient, [unfilled] [Spouse] : spouse [de-identified] : \par 78 y/o R-handed male with PMH of CAD w/ 6 stents placed >5 years ago, HTN, HLD, BPH, DM and anemia presents for MMA embolization for LEFT SDH. Pt was referred by Dr. Greer.\par \par Patient reported RIGHT hand weakness on July 2, 2020 s/p fall.  CTH showed an acute left subdural hemorrhage with 1-2mm midline shift. ASA and Plavix were stopped at at this time. Follow up CTH from 7/27/2020 showed interval evolution of a right SDH with subacute density and minor subjacent mass effect, and stable 3mm left-to-right shift. There was no acute bleed.  CTH from 8/5/2020 showed mild decrease in size of left cerebral convexity subdural hematoma. Stable mild mass effect and midline shift. No new intracranial hemorrhage or demarcated territorial infarction.\par \par Patient reports that his weakness has resolved. Patient denies headache, LOC, nausea, dizziness, numbness/tingling/weakness in all other extremities, diplopia. He was on Keppra until 7/16/2020 as he has not had seizures. He is only on ASA 81 mg BID.\par \par Patient had a septic knee, s/p IV abx managed by ID, and had to have his previously placed implant removed. He is s/p right total knee arthroplasty on 8/3/2020.\par \par \par Handedness: RIGHT\par \par Patient Address:\par [address]\[place] [address]\par \par Referring MD:\par Dr. Rigo Greer\par \par PCP:\par Dr. Steve Anders\par [address]\[place] [address]\par [phone]\par \par Cardiologist:\par Dr. Scott Hernandez\par [address] [address]\par [phone]

## 2020-08-25 NOTE — PHYSICAL EXAM
[General Appearance - Alert] : alert [General Appearance - In No Acute Distress] : in no acute distress [Oriented To Time, Place, And Person] : oriented to person, place, and time [Impaired Insight] : insight and judgment were intact [Affect] : the affect was normal [Person] : oriented to person [Place] : oriented to place [Cranial Nerves Oculomotor (III)] : extraocular motion intact [Cranial Nerves Optic (II)] : visual acuity intact bilaterally,  pupils equal round and reactive to light [Time] : oriented to time [Cranial Nerves Trigeminal (V)] : facial sensation intact symmetrically [Cranial Nerves Facial (VII)] : face symmetrical [Cranial Nerves Vestibulocochlear (VIII)] : hearing was intact bilaterally [Cranial Nerves Accessory (XI - Cranial And Spinal)] : head turning and shoulder shrug symmetric [Cranial Nerves Glossopharyngeal (IX)] : tongue and palate midline [Cranial Nerves Hypoglossal (XII)] : there was no tongue deviation with protrusion [Motor Tone] : muscle tone was normal in all four extremities [Motor Strength] : muscle strength was normal in all four extremities [Neck Appearance] : the appearance of the neck was normal [] : no respiratory distress [Respiration, Rhythm And Depth] : normal respiratory rhythm and effort [Exaggerated Use Of Accessory Muscles For Inspiration] : no accessory muscle use

## 2020-08-25 NOTE — ASSESSMENT
[FreeTextEntry1] : Patient suffered a fall and head trauma on July 2nd, 2020.  At that time he was diagnosed with acute left subdural hematoma.  He has been followed conservatively since then.  We reviewed follow up CT scans from July 27, 2020 and August 3, 2020 that demonstrated resolving hematomas.  He is asymptomatic at this time.\par \par We will repeat a CT of the head in the upcoming 2 weeks.  If the hematoma continues to resolve then there is no need for further intervention.  If the hematoma is enlarged then we will consider performing endovascular embolization of the middle meningeal artery.  If the hematoma is enlarged then we will consider performing endovascular embolization of the middle meningeal artery.\par

## 2020-09-04 ENCOUNTER — OUTPATIENT (OUTPATIENT)
Dept: OUTPATIENT SERVICES | Facility: HOSPITAL | Age: 80
LOS: 1 days | End: 2020-09-04

## 2020-09-04 ENCOUNTER — RESULT REVIEW (OUTPATIENT)
Age: 80
End: 2020-09-04

## 2020-09-04 ENCOUNTER — APPOINTMENT (OUTPATIENT)
Dept: CT IMAGING | Facility: CLINIC | Age: 80
End: 2020-09-04
Payer: MEDICARE

## 2020-09-04 DIAGNOSIS — I25.10 ATHEROSCLEROTIC HEART DISEASE OF NATIVE CORONARY ARTERY WITHOUT ANGINA PECTORIS: Chronic | ICD-10-CM

## 2020-09-04 DIAGNOSIS — Z96.653 PRESENCE OF ARTIFICIAL KNEE JOINT, BILATERAL: Chronic | ICD-10-CM

## 2020-09-04 DIAGNOSIS — Z98.890 OTHER SPECIFIED POSTPROCEDURAL STATES: Chronic | ICD-10-CM

## 2020-09-04 DIAGNOSIS — K42.9 UMBILICAL HERNIA WITHOUT OBSTRUCTION OR GANGRENE: Chronic | ICD-10-CM

## 2020-09-04 PROCEDURE — 70450 CT HEAD/BRAIN W/O DYE: CPT | Mod: 26

## 2020-09-14 ENCOUNTER — APPOINTMENT (OUTPATIENT)
Dept: INFECTIOUS DISEASE | Facility: CLINIC | Age: 80
End: 2020-09-14
Payer: MEDICARE

## 2020-09-14 VITALS
TEMPERATURE: 98.8 F | WEIGHT: 161.19 LBS | HEART RATE: 58 BPM | SYSTOLIC BLOOD PRESSURE: 138 MMHG | DIASTOLIC BLOOD PRESSURE: 57 MMHG | OXYGEN SATURATION: 99 % | HEIGHT: 66 IN | BODY MASS INDEX: 25.9 KG/M2

## 2020-09-14 DIAGNOSIS — T81.49XA INFECTION FOLLOWING A PROCEDURE, OTHER SURGICAL SITE, INITIAL ENCOUNTER: ICD-10-CM

## 2020-09-14 PROCEDURE — 99214 OFFICE O/P EST MOD 30 MIN: CPT

## 2020-09-14 NOTE — ASSESSMENT
[FreeTextEntry1] : 80 yo male with hx CAD, DM, R TKR 2002, revision 3/6/20, initially p/w wound dehiscence and drainage from incision c/f SSI s/p washout 4/7 with purulence noted in joint space and concern for hardware involvement--OR cultures growing MSSA. Also found to be COVID-19+ during that admission (asymptomatic). He finished a six week course of cefazolin + rifampin on 5/18 and was transitioned to chronic suppressive therapy with cephalexin with plan for 3 month course however subsequently developed sinus tract formation/relapsed infection. s/p BRANDON and spacer placement 6/2. Course c/b SUBHASH (?AIN in setting of nafcillin (also with peripheral eosinophilia)) and acute L frontoparietal SDH. s/p cefazolin 2g IV q12h through 7/16--completed 6 week antibiotic course (inclusive of nafcillin 4 weeks).\par R knee synovial fluid cx 7/30 negative; now s/p R knee spacer removal and TKA revision 8/3; OR cx negative. He was discharged on a 3 month course of cephalexin (anticipated through 11/4/20).\par - CBC, CMP, ESR, CRP\par - continue cephalexin secondary ppx likely through 11/4/20\par \par rtc 4 weeks [Treatment Education] : treatment education [Anticipatory Guidance] : anticipatory guidance [Treatment Adherence] : treatment adherence

## 2020-09-14 NOTE — HISTORY OF PRESENT ILLNESS
[FreeTextEntry1] : 80 yo male with hx CAD, DM, R TKR 2002, revision 3/6/20, initially p/w wound dehiscence and drainage from incision c/f SSI s/p washout 4/7 with purulence noted in joint space and concern for hardware involvement--OR cultures growing MSSA. Also found to be COVID-19+ during that admission (asymptomatic). He finished a six week course of cefazolin + rifampin on 5/18 and was transitioned to chronic suppressive therapy with cephalexin with plan for 3 month course however subsequently developed sinus tract formation/relapsed infection. s/p BRANDON and spacer placement 6/2. Course c/b SUBHASH (?AIN in setting of nafcillin (also with peripheral eosinophilia)) and acute L frontoparietal SDH. s/p cefazolin 2g IV q12h through 7/16--completed 6 week antibiotic course (inclusive of nafcillin 4 weeks).\par R knee synovial fluid cx 7/30 negative; now s/p R knee spacer removal and TKA revision 8/3; OR cx negative. He was discharged on a 3 month course of cephalexin (anticipated through 11/4/20).\par Doing well. Had sutures removed 2 weeks after OR. Doing PT twice weekly. R knee pain significantly improved. Appetite is good.\par

## 2020-09-14 NOTE — PHYSICAL EXAM
[General Appearance - Alert] : alert [General Appearance - In No Acute Distress] : in no acute distress [Heart Rate And Rhythm] : heart rate was normal and rhythm regular [Auscultation Breath Sounds / Voice Sounds] : lungs were clear to auscultation bilaterally [Heart Sounds] : normal S1 and S2 [Murmurs] : no murmurs [Heart Sounds Gallop] : no gallops [Heart Sounds Pericardial Friction Rub] : no pericardial rub [Abdomen Soft] : soft [Bowel Sounds] : normal bowel sounds [Abdomen Tenderness] : non-tender [Abdomen Mass (___ Cm)] : no abdominal mass palpated [Costovertebral Angle Tenderness] : no CVA tenderness [Musculoskeletal - Swelling] : no joint swelling [Nail Clubbing] : no clubbing  or cyanosis of the fingernails [Motor Tone] : muscle strength and tone were normal [FreeTextEntry1] : mild R knee swelling; able to extend ~120 degrees  [] : no rash [Skin Color & Pigmentation] : normal skin color and pigmentation [Deep Tendon Reflexes (DTR)] : deep tendon reflexes were 2+ and symmetric [Sensation] : the sensory exam was normal to light touch and pinprick [No Focal Deficits] : no focal deficits [Oriented To Time, Place, And Person] : oriented to person, place, and time [Affect] : the affect was normal

## 2020-09-15 ENCOUNTER — APPOINTMENT (OUTPATIENT)
Dept: NEUROSURGERY | Facility: CLINIC | Age: 80
End: 2020-09-15
Payer: MEDICARE

## 2020-09-15 DIAGNOSIS — S06.5X9A TRAUMATIC SUBDURAL HEMORRHAGE WITH LOSS OF CONSCIOUSNESS OF UNSPECIFIED DURATION, INITIAL ENCOUNTER: ICD-10-CM

## 2020-09-15 LAB
ALBUMIN SERPL ELPH-MCNC: 4.4 G/DL
ALP BLD-CCNC: 73 U/L
ALT SERPL-CCNC: 13 U/L
ANION GAP SERPL CALC-SCNC: 11 MMOL/L
AST SERPL-CCNC: 13 U/L
BASOPHILS # BLD AUTO: 0.06 K/UL
BASOPHILS NFR BLD AUTO: 0.9 %
BILIRUB SERPL-MCNC: 0.2 MG/DL
BUN SERPL-MCNC: 43 MG/DL
CALCIUM SERPL-MCNC: 9.9 MG/DL
CHLORIDE SERPL-SCNC: 109 MMOL/L
CO2 SERPL-SCNC: 21 MMOL/L
CREAT SERPL-MCNC: 1.5 MG/DL
CRP SERPL-MCNC: <0.1 MG/DL
EOSINOPHIL # BLD AUTO: 0.33 K/UL
EOSINOPHIL NFR BLD AUTO: 5 %
ERYTHROCYTE [SEDIMENTATION RATE] IN BLOOD BY WESTERGREN METHOD: 9 MM/HR
GLUCOSE SERPL-MCNC: 135 MG/DL
HCT VFR BLD CALC: 30.7 %
HGB BLD-MCNC: 9.5 G/DL
IMM GRANULOCYTES NFR BLD AUTO: 0.2 %
LYMPHOCYTES # BLD AUTO: 1.07 K/UL
LYMPHOCYTES NFR BLD AUTO: 16.2 %
MAN DIFF?: NORMAL
MCHC RBC-ENTMCNC: 30.9 GM/DL
MCHC RBC-ENTMCNC: 32 PG
MCV RBC AUTO: 103.4 FL
MONOCYTES # BLD AUTO: 0.47 K/UL
MONOCYTES NFR BLD AUTO: 7.1 %
NEUTROPHILS # BLD AUTO: 4.68 K/UL
NEUTROPHILS NFR BLD AUTO: 70.6 %
PLATELET # BLD AUTO: 285 K/UL
POTASSIUM SERPL-SCNC: 5.4 MMOL/L
PROT SERPL-MCNC: 6.5 G/DL
RBC # BLD: 2.97 M/UL
RBC # FLD: 14 %
SODIUM SERPL-SCNC: 141 MMOL/L
WBC # FLD AUTO: 6.62 K/UL

## 2020-09-15 PROCEDURE — 99214 OFFICE O/P EST MOD 30 MIN: CPT | Mod: 95

## 2020-09-15 NOTE — ASSESSMENT
[FreeTextEntry1] : Today we reviewed a CT of the head from September 4, 2020 that shows further decrease in the size of the chronic subdural collection.\par \par There is no need for neuro endovascular intervention at this time.  A follow up CT of the head is recommended in 6 months.  He will follow up with Dr. Agee at that time.  \par \par \par Plan:\par 1. No need for middle meningeal embolization of the SDH. Will repeat CTH in 6 months (March 2021). They were instructed to call the office to set up the appointment. Advised to call the office if he becomes symptomatic.

## 2020-09-15 NOTE — ADDENDUM
[FreeTextEntry1] : September 15, 2020\par \par Rigo Greer MD\par Chair, Neurosurgery\par [facility]\par [address]\par 3rd Floor\[place] [address]\par \par Patient’s Name:  Timothy Macias\par : 1940\par \par Dear Dr. Greer:\par \par We saw Mr. Macias in tele health consultation at [facility].  As you know, he is a 79 years-old right handed man with history of coronary artery disease status post stenting, hypertension, hyperlipidemia, diabetes, and prostate hypertrophy who suffered a fall and head trauma on 2020.  At that time he was diagnosed with acute left subdural hematoma.  He has been followed conservatively since then.  We had reviewed follow up CT scans from 2020 and August 3, 2020 that demonstrated resolving hematomas.  He is asymptomatic at this time.\par \par Today we reviewed a CT of the head from 2020 that shows further decrease in the size of the chronic subdural collection.\par \par There is no need for neuro endovascular intervention at this time.  A follow up CT of the head is recommended in 6 months.  He will follow up with us at that time.  \par \par I will keep you updated at all times.  Thank you for allowing us to participate in Mr. Macias’s care.\par \par Sincerely,\par \par \par Timothy Agee MD\par Chief\par Neuro-Endovascular Surgery and \par Interventional Neuroradiology \par [facility]\par [facility]\par [address]\par 3[address] Floor\[place] [address]\par T:  [phone]\par F:  [phone]\par \par cc:	Steve Anders MD\par 	[address]\[place] [address]\par \par 	Scott Hernandez MD\par 	[address]\[place] [address]\par \par 	Mr. Timothy Macias\par 	[address]\par 	[address]\[place] [address]\par \par \par

## 2020-09-15 NOTE — DATA REVIEWED
[de-identified] : [facility]\par    [facility] Department of Radiology\par   Radiology Report\par \par \par Patient Name: JUANITO CHILDERS  Report Date: 04-Sep-2020 13:01.00 \par Patient ID: [id number] ([id number]), [id number] ([id number])  Accession No.: [id number] \par Patient Birth Date: 1940  Report Status: F \par Referring Physician: [id number] OSEAS HEIN   Reason For Study: S06.5x9a  \par \par \par \par \par \par \par \par \par \par EXAM: CT BRAIN \par \par PROCEDURE DATE: 09/04/2020 \par \par \par \par \par INTERPRETATION: Subdural hemorrhage. \par \par Technique: CT head was performed utilizing axial images from the base of the skull through the vertex without the administration of intravenous contrast. Images were reviewed in the bone, brain and subdural windows. \par \par Findings: Comparison is made to a prior CT performed on 8/5/2020. \par \par There is no evidence of acute intracranial hemorrhage or acute transcortical infarct. Again noted is a small left frontal extra-axial collection of subacute to chronic hemorrhage that has decreased in size when compared to the prior study now measuring approximately 0.7 largest diameter. There is mild effacement of the left frontal sulci. There is minimal midline shift to the right. There is enlargement of sulci, cisterns and ventricles consistent with mild cerebral and cerebellar volume loss. The ventricles are unchanged in size. There is no evidence of an intra -axial fluid collection. There are marked calcifications along the bilateral V4 segments of the vertebral arteries as well as the cavernous and supraclinoid bilateral internal carotid arteries. \par \par Visualized paranasal sinuses and bilateral mastoid air cells are clear. 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  \par [facility]\par    [facility] Department of Radiology\par   Radiology Report\par \par \par Patient Name: JUANITO CHILDERS  Report Date: 04-Sep-2020 13:01.00 \par Patient ID: [id number] (EPI)  Accession No.: [id number] \par Patient Birth Date: 1940  Report Status: F \par Referring Physician: [id number] OSEAS HEIN   Reason For Study: S06.5x9a  \par \par \par \par \par \par \par \par \par \par EXAM: CT BRAIN \par \par PROCEDURE DATE: 09/04/2020 \par \par \par \par \par INTERPRETATION: Subdural hemorrhage. \par \par Technique: CT head was performed utilizing axial images from the base of the skull through the vertex without the administration of intravenous contrast. Images were reviewed in the bone, brain and subdural windows. \par \par Findings: Comparison is made to a prior CT performed on 8/5/2020. \par \par There is no evidence of acute intracranial hemorrhage or acute transcortical infarct. Again noted is a small left frontal extra-axial collection of subacute to chronic hemorrhage that has decreased in size when compared to the prior study now measuring approximately 0.7 largest diameter. There is mild effacement of the left frontal sulci. There is minimal midline shift to the right. There is enlargement of sulci, cisterns and ventricles consistent with mild cerebral and cerebellar volume loss. The ventricles are unchanged in size. There is no evidence of an intra -axial fluid collection. There are marked calcifications along the bilateral V4 segments of the vertebral arteries as well as the cavernous and supraclinoid bilateral internal carotid arteries. \par \par Visualized paranasal sinuses and bilateral mastoid air cells are clear. 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  \par

## 2020-09-15 NOTE — HISTORY OF PRESENT ILLNESS
[Home] : at home, [unfilled] , at the time of the visit. [place] [place] : at the medical office located in  [Verbal consent obtained from patient] : the patient, [unfilled] [FreeTextEntry1] : \par 78 y/o R-handed male with PMH of CAD w/ 6 stents placed >5 years ago, HTN, HLD, BPH, DM and anemia presents for MMA embolization for LEFT SDH. Pt was referred by Dr. Greer.\par \par Patient reported RIGHT hand weakness on July 2, 2020 s/p fall. CTH showed an acute left subdural hemorrhage with 1-2mm midline shift. ASA and Plavix were stopped at at this time. Follow up CTH from 7/27/2020 showed interval evolution of a right SDH with subacute density and minor subjacent mass effect, and stable 3mm left-to-right shift. There was no acute bleed. CTH from 8/5/2020 showed mild decrease in size of left cerebral convexity subdural hematoma. Stable mild mass effect and midline shift. No new intracranial hemorrhage or demarcated territorial infarction.\par \par Patient reports that his weakness has resolved. Patient denies headache, LOC, nausea, dizziness, numbness/tingling/weakness in all other extremities, diplopia. He was on Keppra until 7/16/2020 as he has not had seizures. He is only on ASA 81 mg BID.\par \par Patient had a septic knee, s/p IV abx managed by ID, and had to have his previously placed implant removed. He is s/p right total knee arthroplasty on 8/3/2020.\par \par \par Handedness: RIGHT\par \par Patient Address:\par [address]\[place] [address]\par \par Referring MD:\par Dr. Rigo Greer\par \par PCP:\par Dr. Steve Anders\par [address]\[place] [address]\par [phone]\par \par Cardiologist:\par Dr. Scott Hernandez\par [address] [address]\par [phone] \par

## 2020-09-15 NOTE — PHYSICAL EXAM
[General Appearance - Alert] : alert [General Appearance - In No Acute Distress] : in no acute distress [General Appearance - Well Nourished] : well nourished [Oriented To Time, Place, And Person] : oriented to person, place, and time [Impaired Insight] : insight and judgment were intact [Affect] : the affect was normal [Person] : oriented to person [Place] : oriented to place [Time] : oriented to time [Cranial Nerves Optic (II)] : visual acuity intact bilaterally,  pupils equal round and reactive to light [Cranial Nerves Oculomotor (III)] : extraocular motion intact [Cranial Nerves Trigeminal (V)] : facial sensation intact symmetrically [Cranial Nerves Facial (VII)] : face symmetrical [Cranial Nerves Vestibulocochlear (VIII)] : hearing was intact bilaterally [Cranial Nerves Glossopharyngeal (IX)] : tongue and palate midline [Cranial Nerves Accessory (XI - Cranial And Spinal)] : head turning and shoulder shrug symmetric [Cranial Nerves Hypoglossal (XII)] : there was no tongue deviation with protrusion [Neck Appearance] : the appearance of the neck was normal [] : no respiratory distress [Respiration, Rhythm And Depth] : normal respiratory rhythm and effort

## 2020-09-15 NOTE — REASON FOR VISIT
[Follow-Up: _____] : a [unfilled] follow-up visit [FreeTextEntry1] : LAST VISIT on 8/25/2020:\par CT scans from July 27, 2020 and August 3, 2020 demonstrated resolving hematomas. \par He was asymptomatic at this time.\par \par TODAY'S VISIT:\par Patient denies headache, LOC, nausea, dizziness, numbness/tingling/weakness in all other extremities, diplopia\par

## 2020-09-17 ENCOUNTER — APPOINTMENT (OUTPATIENT)
Dept: ORTHOPEDIC SURGERY | Facility: CLINIC | Age: 80
End: 2020-09-17
Payer: MEDICARE

## 2020-09-17 ENCOUNTER — OUTPATIENT (OUTPATIENT)
Dept: OUTPATIENT SERVICES | Facility: HOSPITAL | Age: 80
LOS: 1 days | End: 2020-09-17
Payer: MEDICARE

## 2020-09-17 ENCOUNTER — RESULT REVIEW (OUTPATIENT)
Age: 80
End: 2020-09-17

## 2020-09-17 DIAGNOSIS — K42.9 UMBILICAL HERNIA WITHOUT OBSTRUCTION OR GANGRENE: Chronic | ICD-10-CM

## 2020-09-17 DIAGNOSIS — Z98.890 OTHER SPECIFIED POSTPROCEDURAL STATES: Chronic | ICD-10-CM

## 2020-09-17 DIAGNOSIS — Z96.653 PRESENCE OF ARTIFICIAL KNEE JOINT, BILATERAL: Chronic | ICD-10-CM

## 2020-09-17 DIAGNOSIS — I25.10 ATHEROSCLEROTIC HEART DISEASE OF NATIVE CORONARY ARTERY WITHOUT ANGINA PECTORIS: Chronic | ICD-10-CM

## 2020-09-17 PROCEDURE — 73560 X-RAY EXAM OF KNEE 1 OR 2: CPT

## 2020-09-17 PROCEDURE — 73560 X-RAY EXAM OF KNEE 1 OR 2: CPT | Mod: 26,RT

## 2020-09-17 PROCEDURE — 99024 POSTOP FOLLOW-UP VISIT: CPT

## 2020-09-17 PROCEDURE — 20610 DRAIN/INJ JOINT/BURSA W/O US: CPT | Mod: 58,RT

## 2020-09-17 NOTE — PROCEDURE
[Aspiration] : Aspiration [Right] : of the right [Knee Joint] : knee joint [Effusion] : Effusion [Patient] : patient [Alcohol] : Alcohol [Betadine] : Betadine [Ethyl Chloride Spray] : ethyl chloride spray was used as a topical anesthetic [Lateral] : lateral [Superior] : superior [18] : an 18-gauge [___ mL Fluid] : [unfilled] mL of [Clear] : clear [Bloody] : bloody [Bandage Applied] : a bandage [Tolerated Well] : The patient tolerated the procedure well [None] : none

## 2020-09-17 NOTE — HISTORY OF PRESENT ILLNESS
[de-identified] : s/p right knee removal of static antibiotic cement spacer and revision total knee arthroplasty surgical date 8/3/2020.  [de-identified] : Doing well. Continuing with outpatient PT. On Keflex until 11/4/20, no adverse effects, appetite better; gaining weight. Mild right knee pain, persistent swelling without erythema, infrequent buckling. Uses cane.  [de-identified] : Right knee incision healed without erythema, drainage, or dehiscence. Large effusion. ROM 0-120, stable to jonathan/yaneli stress at relaxed extension, about 5mm ant/post translation in flexion drawers. Good quad strength. Ambulating with cane in left hand, cautious but not antalgic. [de-identified] : Right knee imaging taken today demonstrates stable position of the revision TKA as immediate postop. No fracture or loosening. Distal femoral bone loss consistent with prior; no new osteolysis. Patella sits at appropriate height.\par \par Recent labs reviewed; WBC/ESR/CRP WNL [de-identified] : 78y/o male about 6 weeks s/p revision right total knee arthroplasty for MSSA PJI [de-identified] : Cont PT/HEP\par Right knee aspirated with output of 48cc clear pink fluid\par Cont abx, serial markers\par Follow up in 2 months with repeat right knee XRs

## 2020-10-15 ENCOUNTER — APPOINTMENT (OUTPATIENT)
Dept: INFECTIOUS DISEASE | Facility: CLINIC | Age: 80
End: 2020-10-15
Payer: MEDICARE

## 2020-10-15 VITALS
BODY MASS INDEX: 26.26 KG/M2 | OXYGEN SATURATION: 99 % | TEMPERATURE: 97.8 F | HEIGHT: 66 IN | DIASTOLIC BLOOD PRESSURE: 71 MMHG | WEIGHT: 163.38 LBS | SYSTOLIC BLOOD PRESSURE: 134 MMHG | HEART RATE: 50 BPM

## 2020-10-15 PROCEDURE — 99214 OFFICE O/P EST MOD 30 MIN: CPT

## 2020-10-15 NOTE — PHYSICAL EXAM
[General Appearance - Alert] : alert [General Appearance - In No Acute Distress] : in no acute distress [Heart Rate And Rhythm] : heart rate was normal and rhythm regular [Auscultation Breath Sounds / Voice Sounds] : lungs were clear to auscultation bilaterally [Heart Sounds] : normal S1 and S2 [Heart Sounds Gallop] : no gallops [Murmurs] : no murmurs [Heart Sounds Pericardial Friction Rub] : no pericardial rub [Abdomen Soft] : soft [Abdomen Tenderness] : non-tender [Bowel Sounds] : normal bowel sounds [Abdomen Mass (___ Cm)] : no abdominal mass palpated [FreeTextEntry1] : small R knee effusion; min warmth; good extension and flexion  [Costovertebral Angle Tenderness] : no CVA tenderness [Skin Color & Pigmentation] : normal skin color and pigmentation [Sensation] : the sensory exam was normal to light touch and pinprick [] : no rash [Deep Tendon Reflexes (DTR)] : deep tendon reflexes were 2+ and symmetric [No Focal Deficits] : no focal deficits [Affect] : the affect was normal [Oriented To Time, Place, And Person] : oriented to person, place, and time

## 2020-10-15 NOTE — ASSESSMENT
[Treatment Education] : treatment education [FreeTextEntry1] : 80 yo male with hx CAD, DM, R TKR 2002, revision 3/6/20, initially p/w wound dehiscence and drainage from incision c/f SSI s/p washout 4/7 with purulence noted in joint space and concern for hardware involvement--OR cultures growing MSSA. Also found to be COVID-19+ during that admission (asymptomatic). He finished a six week course of cefazolin + rifampin on 5/18 and was transitioned to chronic suppressive therapy with cephalexin with plan for 3 month course however subsequently developed sinus tract formation/relapsed infection. s/p BRANDON and spacer placement 6/2. Course c/b SUBHASH (?AIN in setting of nafcillin (also with peripheral eosinophilia)) and acute L frontoparietal SDH. s/p cefazolin 2g IV q12h through 7/16--completed 6 week antibiotic course (inclusive of nafcillin 4 weeks).\par R knee synovial fluid cx 7/30 negative; now s/p R knee spacer removal and TKA revision 8/3; OR cx negative. He was discharged on a 3 month course of cephalexin (anticipated through 11/4/20).\par - CBC, CMP, ESR, CRP\par - continue cephalexin secondary ppx likely through 11/4/20--will f/u above labs and then d/w Dr. Arrington if extension desired \par \par rtc 4 weeks [Treatment Adherence] : treatment adherence [Anticipatory Guidance] : anticipatory guidance

## 2020-10-16 LAB
ALBUMIN SERPL ELPH-MCNC: 4.5 G/DL
ALP BLD-CCNC: 75 U/L
ALT SERPL-CCNC: 17 U/L
ANION GAP SERPL CALC-SCNC: 13 MMOL/L
AST SERPL-CCNC: 21 U/L
BASOPHILS # BLD AUTO: 0.06 K/UL
BASOPHILS NFR BLD AUTO: 0.9 %
BILIRUB SERPL-MCNC: 0.2 MG/DL
BUN SERPL-MCNC: 29 MG/DL
CALCIUM SERPL-MCNC: 9.6 MG/DL
CHLORIDE SERPL-SCNC: 107 MMOL/L
CO2 SERPL-SCNC: 19 MMOL/L
CREAT SERPL-MCNC: 1.43 MG/DL
EOSINOPHIL # BLD AUTO: 0.37 K/UL
EOSINOPHIL NFR BLD AUTO: 5.3 %
ERYTHROCYTE [SEDIMENTATION RATE] IN BLOOD BY WESTERGREN METHOD: 8 MM/HR
GLUCOSE SERPL-MCNC: 147 MG/DL
HCT VFR BLD CALC: 32.1 %
HGB BLD-MCNC: 10 G/DL
IMM GRANULOCYTES NFR BLD AUTO: 0.3 %
LYMPHOCYTES # BLD AUTO: 1.21 K/UL
LYMPHOCYTES NFR BLD AUTO: 17.4 %
MAN DIFF?: NORMAL
MCHC RBC-ENTMCNC: 31.2 GM/DL
MCHC RBC-ENTMCNC: 31.6 PG
MCV RBC AUTO: 101.6 FL
MONOCYTES # BLD AUTO: 0.54 K/UL
MONOCYTES NFR BLD AUTO: 7.7 %
NEUTROPHILS # BLD AUTO: 4.77 K/UL
NEUTROPHILS NFR BLD AUTO: 68.4 %
PLATELET # BLD AUTO: 231 K/UL
POTASSIUM SERPL-SCNC: 5.5 MMOL/L
PROT SERPL-MCNC: 6.9 G/DL
RBC # BLD: 3.16 M/UL
RBC # FLD: 13.3 %
SODIUM SERPL-SCNC: 139 MMOL/L
WBC # FLD AUTO: 6.97 K/UL

## 2020-10-22 LAB — CRP SERPL-MCNC: <0.1 MG/DL

## 2020-11-23 ENCOUNTER — OUTPATIENT (OUTPATIENT)
Dept: OUTPATIENT SERVICES | Facility: HOSPITAL | Age: 80
LOS: 1 days | End: 2020-11-23
Payer: MEDICARE

## 2020-11-23 ENCOUNTER — RESULT REVIEW (OUTPATIENT)
Age: 80
End: 2020-11-23

## 2020-11-23 ENCOUNTER — APPOINTMENT (OUTPATIENT)
Dept: ORTHOPEDIC SURGERY | Facility: CLINIC | Age: 80
End: 2020-11-23
Payer: MEDICARE

## 2020-11-23 DIAGNOSIS — Z98.890 OTHER SPECIFIED POSTPROCEDURAL STATES: Chronic | ICD-10-CM

## 2020-11-23 DIAGNOSIS — Z96.653 PRESENCE OF ARTIFICIAL KNEE JOINT, BILATERAL: Chronic | ICD-10-CM

## 2020-11-23 DIAGNOSIS — K42.9 UMBILICAL HERNIA WITHOUT OBSTRUCTION OR GANGRENE: Chronic | ICD-10-CM

## 2020-11-23 DIAGNOSIS — I25.10 ATHEROSCLEROTIC HEART DISEASE OF NATIVE CORONARY ARTERY WITHOUT ANGINA PECTORIS: Chronic | ICD-10-CM

## 2020-11-23 PROCEDURE — 73564 X-RAY EXAM KNEE 4 OR MORE: CPT

## 2020-11-23 PROCEDURE — 99213 OFFICE O/P EST LOW 20 MIN: CPT

## 2020-11-23 PROCEDURE — 73564 X-RAY EXAM KNEE 4 OR MORE: CPT | Mod: 26,RT

## 2020-11-23 NOTE — HISTORY OF PRESENT ILLNESS
[de-identified] : 79y/o male following up for right knee septic revision, last stage DOS 8/3/20. Has been getting better. Recently finished outpt PT. Walks with or without a cane; still with occasional right knee buckling, though this is getting more infrequent. They are taking a couple of trips to [place] and [place] soon.

## 2020-11-23 NOTE — PHYSICAL EXAM
[de-identified] : General appearance: well nourished and hydrated, pleasant, alert and oriented x 3, cooperative.  \par HEENT: normocephalic, EOM intact, wearing mask, external auditory canal clear.  \par Cardiovascular: no lower leg edema, no varicosities, dorsalis pedis pulses palpable and symmetric.  \par Lymphatics: no palpable lymphadenopathy, no lymphedema.  \par Neurologic: sensation is normal, no muscle weakness in upper or lower extremities, patella tendon reflexes present and symmetric.  \par Dermatologic: skin moist, warm, no rash.  \par Spine: cervical spine with normal lordosis and painless range of motion, thoracic spine with normal kyphosis and painless range of motion, lumbosacral spine with normal lordosis and painless range of motion.\par Gait: cautious, no right antalgia.\par \par Left knee:\par - Inspection: negative swelling, ecchymosis, and erythema.  \par - Wounds: healed midline incision.\par - Alignment: normal.  \par - Palpation: no specific tenderness on palpation.  \par - ROM active: 0-125, no pain on extremes of motion\par - Ligamentous laxity: stable to jonathan/yaneli stresses at relaxed extension, about 5mm ant/post translation on flexion drawers. \par - Popliteal angle: 60 degrees\par - Muscle Test: 5/5 quad strength.  \par \par Right knee:\par - Inspection: moderate anterior soft tissue swelling, negative ecchymosis and erythema.  \par - Wounds: healed anterior midline incision.\par - Alignment: normal.  \par - Palpation: no specific tenderness on palpation.  \par - ROM active: 0-120, discomfort with deep flexion.\par - Ligamentous laxity: stable to jonathan/yaneli stresses at relaxed extension, less than 5mm ant/post translation on flexion drawers.  \par - Popliteal angle: 60 degrees\par - Muscle Test: 5/5 quad strength. [de-identified] : XRs taken today demonstrate stable position of the revision right TKA. No evidence of new osteolysis, loosening, or fracture. There is some new ossification along the medial joint line. Patella baja is present.

## 2020-11-23 NOTE — DISCUSSION/SUMMARY
[de-identified] : 79y/o male about 3mo s/p revision right TKA\par - Cont HEP\par - RTC August 2021 for anniversary check with new bilateral knee XRs, or earlier as needed

## 2020-12-28 ENCOUNTER — APPOINTMENT (OUTPATIENT)
Dept: INFECTIOUS DISEASE | Facility: CLINIC | Age: 80
End: 2020-12-28
Payer: MEDICARE

## 2020-12-28 VITALS
SYSTOLIC BLOOD PRESSURE: 148 MMHG | TEMPERATURE: 98 F | HEIGHT: 66 IN | OXYGEN SATURATION: 99 % | WEIGHT: 175 LBS | DIASTOLIC BLOOD PRESSURE: 72 MMHG | HEART RATE: 65 BPM | BODY MASS INDEX: 28.12 KG/M2

## 2020-12-28 DIAGNOSIS — Z96.659 INFECTION AND INFLAMMATORY REACTION DUE TO OTHER INTERNAL JOINT PROSTHESIS, SUBSEQUENT ENCOUNTER: ICD-10-CM

## 2020-12-28 DIAGNOSIS — T84.59XD INFECTION AND INFLAMMATORY REACTION DUE TO OTHER INTERNAL JOINT PROSTHESIS, SUBSEQUENT ENCOUNTER: ICD-10-CM

## 2020-12-28 PROCEDURE — 99214 OFFICE O/P EST MOD 30 MIN: CPT

## 2020-12-28 RX ORDER — CEPHALEXIN 500 MG/1
500 CAPSULE ORAL
Refills: 0 | Status: DISCONTINUED | COMMUNITY
End: 2020-12-28

## 2020-12-28 NOTE — PHYSICAL EXAM
[General Appearance - Alert] : alert [General Appearance - In No Acute Distress] : in no acute distress [Auscultation Breath Sounds / Voice Sounds] : lungs were clear to auscultation bilaterally [Bowel Sounds] : normal bowel sounds [Abdomen Soft] : soft [Abdomen Tenderness] : non-tender [Abdomen Mass (___ Cm)] : no abdominal mass palpated [Costovertebral Angle Tenderness] : no CVA tenderness [Musculoskeletal - Swelling] : no joint swelling [Nail Clubbing] : no clubbing  or cyanosis of the fingernails [Motor Tone] : muscle strength and tone were normal [Skin Color & Pigmentation] : normal skin color and pigmentation [] : no rash [Deep Tendon Reflexes (DTR)] : deep tendon reflexes were 2+ and symmetric [Sensation] : the sensory exam was normal to light touch and pinprick [No Focal Deficits] : no focal deficits [Oriented To Time, Place, And Person] : oriented to person, place, and time [Affect] : the affect was normal

## 2020-12-28 NOTE — HISTORY OF PRESENT ILLNESS
[FreeTextEntry1] : Developed rash on b/l LE shortly after completing cephalexin 11/4/20. Subsequently developed diffuse pruritus; saw derm; rx topical corticosteroid and antihistamine with interval improvement. Patient denies new recent lotions, detergents, soaps. He also developed R wrist tendinitis during PT. Denies R knee pain. Able to bear weight; now sometimes ambulates without cane.

## 2020-12-28 NOTE — ASSESSMENT
[FreeTextEntry1] : 79 yo male with hx CAD, DM, R TKR 2002, revision 3/6/20, initially p/w wound dehiscence and drainage from incision c/f SSI s/p washout 4/7 with purulence noted in joint space and concern for hardware involvement--OR cultures growing MSSA. Also found to be COVID-19+ during that admission (asymptomatic). He finished a six week course of cefazolin + rifampin on 5/18 and was transitioned to chronic suppressive therapy with cephalexin with plan for 3 month course however subsequently developed sinus tract formation/relapsed infection. s/p BRANDON and spacer placement 6/2. Course c/b SUBHASH (?AIN in setting of nafcillin (also with peripheral eosinophilia)) and acute L frontoparietal SDH. s/p cefazolin 2g IV q12h through 7/16--completed 6 week antibiotic course (inclusive of nafcillin 4 weeks).\par R knee synovial fluid cx 7/30 negative; now s/p R knee spacer removal and TKA revision 8/3; OR cx negative. He was discharged on a 3 month course of cephalexin (completed 11/4/20). Course c/b possible delayed hypersensitivity reaction to cephalexin--now resolving. Continue to observe off antibiotics at this time.\par \par rtc prn [Anticipatory Guidance] : anticipatory guidance

## 2021-02-26 NOTE — PHYSICAL THERAPY INITIAL EVALUATION ADULT - TRANSFER SKILLS, REHAB EVAL
Restorative Specialist Mobility Note       Activity: Ambulate in room, Bathroom privileges, Chair, Dangle, Stand at bedside(Educated/encouraged pt to ambulate with assistance  Bed alarm on   Pt callbell, phone/tray within reach )     Assistive Device: Front wheel walker       Reina WILLIAMSON, Restorative Technician, [employer]
Restorative Specialist Mobility Note       Activity: Ambulate in saunders, Ambulate in room, Bathroom privileges, Chair, Dangle, Stand at bedside(Educated/encouraged pt to ambulate with assistance  Bed alarm on   Pt callbell, phone/tray within reach )     Assistive Device: Front wheel walker          Aaron WILLIAMSON, Restorative Technician, [employer]
Restorative Specialist Mobility Note       Activity: Ambulate in saunders, Ambulate in room, Bathroom privileges, Chair, Dangle, Stand at bedside(Educated/encouraged pt to ambulate with assistance  Bed alarm on   Pt callbell, phone/tray within reach )     Assistive Device: Front wheel walker    Christopher WILLIAMSON, Restorative Technician, [employer]
Restorative Specialist Mobility Note       Activity: Ambulate in saunders, Ambulate in room, Bathroom privileges, Chair, Dangle, Stand at bedside(Educated/encouraged pt to ambulate with assistance 3-4 x's/day  Bed alarm on   Pt callbell, phone/tray within reach )     Assistive Device: Front wheel walker          [employer] BS, Restorative Technician, [employer]
Restorative Specialist Mobility Note       Activity: Ambulate in saunders, Ambulate in room, Bathroom privileges, Chair, Dangle, Stand at bedside(Educated/encouraged pt to ambulate with assistance 3-4 x's/day  Bed alarm on   Pt callbell, phone/tray within reach )     Assistive Device: Front wheel walker          [employer] BS, Restorative Technician, [employer]
Restorative Specialist Mobility Note       Activity: Ambulate in saunders, Ambulate in room, Bathroom privileges, Chair, Dangle, Stand at bedside(Educated/encouraged pt to ambulate with assistance 3-4 x's/day  Bed alarm on   Pt callbell, phone/tray within reach )     Assistive Device: Front wheel walker       Aaron WILLIAMSON, Restorative Technician, [employer] [facility]
Restorative Specialist Mobility Note       Activity: Ambulate in saunders, Ambulate in room, Bathroom privileges, Chair, Dangle, Stand at bedside(Educated/encouraged pt to ambulate with assistance 3-4 x's/day  Bed alarm on   Pt callbell, phone/tray within reach )     Assistive Device: Front wheel walker       Beth WILLIAMSON, Restorative Technician, [employer]
Restorative Specialist Mobility Note       Activity: Ambulate in saunders, Ambulate in room, Bathroom privileges, Chair, Dangle, Stand at bedside(Educated/encouraged pt to ambulate with assistance 3-4 x's/day  Bed alarm on   Pt callbell, phone/tray within reach )     Assistive Device: Front wheel walker       Edwar WILLIAMSON, Restorative Technician, [employer] [facility]
Restorative Specialist Mobility Note       Activity: Ambulate in saunders, Ambulate in room, Bathroom privileges, Chair, Dangle, Stand at bedside(Educated/encouraged pt to ambulate with assistance 3-4 x's/day  Bed alarm on   Pt callbell, phone/tray within reach )     Assistive Device: Front wheel walker       Kimberly WILLIAMSON, Restorative Technician, [employer]
independent/needs device

## 2021-07-26 NOTE — PRE-OP CHECKLIST - HEART RATE (BEATS/MIN)
46 yo m with untreated htn presents with abscess with mild surrounding cellulitis of left thigh x 4 days.  will restart on bp meds, advise close f/u with pcp dr taylor, ekg wnl, and start on po antibiotics bactrim for abscess. patient would like antibiotics but unwiling to try I&D. abscess indurated, likely no purulent drainage anyway 51

## 2021-09-07 NOTE — PHYSICAL THERAPY INITIAL EVALUATION ADULT - LEVEL OF INDEPENDENCE: GAIT, REHAB EVAL
Requesting medication refill. Please approve or deny this request.    Rx requested:  Requested Prescriptions     Pending Prescriptions Disp Refills    doxazosin (CARDURA) 4 MG tablet [Pharmacy Med Name: DOXAZOSIN MESYLATE 4 MG TAB] 90 tablet 3     Sig: TAKE 1 TABLET AT BEDTIME         Last Office Visit:   1/7/2021      Next Visit Date:  Future Appointments   Date Time Provider Aiden Guzman   10/7/2021 10:00 AM [facility] ROOM 1 [facility]   10/14/2021 12:15 PM DO NATALY Jones CARDIO [facility]   1/27/2022  9:00 AM [facility]               Last refill 11/19/20. Please approve or deny. contact guard

## 2021-09-21 ENCOUNTER — APPOINTMENT (OUTPATIENT)
Dept: ORTHOPEDIC SURGERY | Facility: CLINIC | Age: 81
End: 2021-09-21
Payer: MEDICARE

## 2021-09-21 ENCOUNTER — OUTPATIENT (OUTPATIENT)
Dept: OUTPATIENT SERVICES | Facility: HOSPITAL | Age: 81
LOS: 1 days | End: 2021-09-21
Payer: MEDICARE

## 2021-09-21 ENCOUNTER — RESULT REVIEW (OUTPATIENT)
Age: 81
End: 2021-09-21

## 2021-09-21 DIAGNOSIS — Z98.890 OTHER SPECIFIED POSTPROCEDURAL STATES: Chronic | ICD-10-CM

## 2021-09-21 DIAGNOSIS — Z47.1 AFTERCARE FOLLOWING JOINT REPLACEMENT SURGERY: ICD-10-CM

## 2021-09-21 DIAGNOSIS — I25.10 ATHEROSCLEROTIC HEART DISEASE OF NATIVE CORONARY ARTERY WITHOUT ANGINA PECTORIS: Chronic | ICD-10-CM

## 2021-09-21 DIAGNOSIS — Z96.653 PRESENCE OF ARTIFICIAL KNEE JOINT, BILATERAL: Chronic | ICD-10-CM

## 2021-09-21 DIAGNOSIS — K42.9 UMBILICAL HERNIA WITHOUT OBSTRUCTION OR GANGRENE: Chronic | ICD-10-CM

## 2021-09-21 DIAGNOSIS — Z96.651 AFTERCARE FOLLOWING JOINT REPLACEMENT SURGERY: ICD-10-CM

## 2021-09-21 PROCEDURE — 99213 OFFICE O/P EST LOW 20 MIN: CPT

## 2021-09-21 PROCEDURE — 73564 X-RAY EXAM KNEE 4 OR MORE: CPT | Mod: 26,50

## 2021-09-21 PROCEDURE — 73564 X-RAY EXAM KNEE 4 OR MORE: CPT

## 2021-09-21 NOTE — PHYSICAL EXAM
[de-identified] : General appearance: well nourished and hydrated, pleasant, alert and oriented x 3, cooperative.  \par HEENT: normocephalic, EOM intact, wearing mask, external auditory canal clear.  \par Cardiovascular: no lower leg edema, no varicosities, dorsalis pedis pulses palpable and symmetric.  \par Lymphatics: no palpable lymphadenopathy, no lymphedema.  \par Neurologic: sensation is normal, no muscle weakness in upper or lower extremities, patella tendon reflexes present and symmetric.  \par Dermatologic: skin moist, warm, no rash.  \par Spine: cervical spine with normal lordosis and painless range of motion, thoracic spine with normal kyphosis and painless range of motion, lumbosacral spine with normal lordosis and painless range of motion.\par Gait: cautious, no right antalgia.\par \par Left knee:\par - Inspection: negative swelling, ecchymosis, and erythema.  \par - Wounds: healed midline incision.\par - Alignment: normal.  \par - Palpation: no specific tenderness on palpation.  \par - ROM active: 0-125, no pain on extremes of motion\par - Ligamentous laxity: stable to jonathan/yaneli stresses at relaxed extension, about 5mm ant/post translation on flexion drawers. \par - Popliteal angle: 60 degrees\par - Muscle Test: 5/5 quad strength.  \par \par Right knee:\par - Inspection: moderate anterior soft tissue swelling, negative ecchymosis and erythema.  \par - Wounds: healed anterior midline incision.\par - Alignment: normal.  \par - Palpation: no specific tenderness on palpation.  \par - ROM active: 0-120, discomfort with deep flexion.\par - Ligamentous laxity: stable to jonathan/yaneli stresses at relaxed extension, less than 5mm ant/post translation on flexion drawers.  \par - Popliteal angle: 60 degrees\par - Muscle Test: 5/5 quad strength. [de-identified] : Bilateral knee XRs taken today demonstrate stable position of the implants without evidence of mechanical complication. Left patella sits at normal height and right patella sits baja. Both track centrally.

## 2021-09-21 NOTE — HISTORY OF PRESENT ILLNESS
[___ yrs] : [unfilled] year(s) ago [6] : a current pain level of 6/10 [Walking] : worsened by walking [Acetaminophen] : relieved by acetaminophen [NSAIDs] : relieved by nonsteroidal anti-inflammatory drugs [Rest] : relieved by rest [de-identified] : 9/21/21: Here for his 1yr anniversary of the right knee revision. Planning to go to [place] next month. Feels fine. Does experience pain up to 1-2/10 intermittently in both knees, which does not lead him to take medications nor interfere with any normal daily activities. Keeps up with HEP. No wound or systemic complaints.\par \par 11/23/20: 81y/o male following up for right knee septic revision, last stage DOS 8/3/20. Has been getting better. Recently finished outpt PT. Walks with or without a cane; still with occasional right knee buckling, though this is getting more infrequent. They are taking a couple of trips to [place] and [place] soon.  [de-identified] : patient describes pain as localized, sharp, shooting and if constant throbbing

## 2021-09-21 NOTE — REVIEW OF SYSTEMS
[Joint Pain] : joint pain [Urinary Urgency] : urinary urgency [Anxiety] : anxiety [Negative] : Heme/Lymph

## 2021-09-21 NOTE — DISCUSSION/SUMMARY
[de-identified] : 81y/o male about 1yr s/p revision right TKA, with well-functioning left TKA\par - Cont HEP\par - RTC annually with repeat bilateral knee XRs or earlier as needed

## 2022-01-01 NOTE — PROVIDER CONTACT NOTE (OTHER) - ACTION/TREATMENT ORDERED:
Order give to straight cath px if px has not voided with bladder scan > 700 by midnight.
No straight cath at this time. Instruction given to have px ambulate with PT this am to hopefully stimulate voiding.
Okay per MD that patient refuses EEG monitoring
[id number]

## 2022-08-22 NOTE — DISCHARGE NOTE NURSING/CASE MANAGEMENT/SOCIAL WORK - PATIENT PORTAL LINK FT
"Akhil"Bandar Thrasher was seen and treated in our emergency department on 8/22/2022.     COVID-19 is present in our communities across the state. There is limited testing for COVID at this time, so not all patients can be tested. In this situation, your employee meets the following criteria:    Akhil Thrasher has met the criteria for COVID-19 testing based upon symptoms, travel, and/or potential exposure. The test has been completed and is pending results at this time. During this time the employee is not able to work and should be quarantined per the Centers for Disease Control timelines.     If you have any questions or concerns, or if I can be of further assistance, please do not hesitate to contact me.    Sincerely,              RN" You can access the FollowMyHealth Patient Portal offered by [facility] by registering at the following website: [email or web address]. By joining [employer]’s FollowMyHealth portal, you will also be able to view your health information using other applications (apps) compatible with our system.

## 2022-08-25 NOTE — ED PROVIDER NOTE - NEUROLOGICAL SENSATION
decreased   3/5  right - no pronator drift/present and normal in 4 extremities High Dose Vitamin A Counseling: Side effects reviewed, pt to contact office should one occur.

## 2022-11-03 NOTE — H&P ADULT - PROBLEM SELECTOR PROBLEM 1
Recommend ATs TID OU, routinely. Continue Restasis BID OU (will consider switching to generic due to cost). Infection of total right knee replacement

## 2023-09-25 NOTE — H&P ADULT - NSICDXPASTMEDICALHX_GEN_ALL_CORE_FT
<<--- Click to launch
PAST MEDICAL HISTORY:  Anemia     CAD (coronary artery disease)     DM (diabetes mellitus)     Hyperlipidemia     Hypertension

## 2023-11-21 NOTE — PATIENT PROFILE ADULT - NSASFALLNEEDSASSIST_GEN_A_NUR
Contact Lens Fitting Policy    You will need a contact lens check 1-2 weeks after receiving your trial contact lenses.  Please make sure to come in wearing your new trial lenses to your appointment for at least 2 hours.  If something happens to one or both of your trial lenses or if you are unable to wear them due to comfort, please contact our office prior to your check up appointment at [phone].  If you do not come in wearing your new trial lenses you will be asked to reschedule your contact lens check appointment.     Your contact lens fitting will be billed to insurance automatically.  Please let us know if you would like to pay for your contact lens fitting at the time of service.  So, if you prefer you may use your benefits to order glasses or contacts if needed.  We advise you to contact your vision insurance if you have any questions regarding how billing your contact lens fitting will affect your materials benefit.        Please wear your new trial lenses every day up until your contact lens check to ensure that you are happy with them prior to receiving your final contact lens prescription.    Follow up contact lens visits, up to 90 days after the initial fitting, are included in the contact lens fitting fee without additional cost.      You will receive a prescription for a one year supply of contact lenses after you have had a follow up visit and it is determined that the trial lenses fit appropriately.    Follow up care is the responsibility of the patient.  Failure to schedule or keep appointments to check the fit of the contact lenses within the initial 90 day fitting period will require an additional fitting and incur additional charges.  In some cases, a complete eye exam may be required.    Reordering of gas permeable contact lenses due to change in material or lens design during the contact lens fitting period may incur additional costs than what was paid or covered by insurance for the  initial pair of lenses.    Your contact lens prescription is valid for one (1) year from the date it was written.  FDA regulations require contact lens fittings be done on an annual basis.      yes

## 2024-06-13 NOTE — STROKE CODE NOTE - ABSOLUTE EXCLUSION OTHER CRITERIA
Hemostasis started on the right femoral vein. Figure 8 suturing was used in achieving hemostasis. Closure device deployed in the vessel. Hemostasis achieved successfully. Closure device additional comment: Sheaths removed by MD with figure of 8 stitch and stopcock bolster. CT scan w/ SDH

## 2025-05-29 NOTE — PROGRESS NOTE ADULT - SUBJECTIVE AND OBJECTIVE BOX
--S: No events overnight    O:   Vital Signs Last 24 Hrs  T(C): 36.4 (08 Mar 2020 08:31), Max: 37 (07 Mar 2020 21:15)  T(F): 97.6 (08 Mar 2020 08:31), Max: 98.6 (07 Mar 2020 21:15)  HR: 58 (08 Mar 2020 08:31) (58 - 67)  BP: 148/3 (08 Mar 2020 08:31) (135/60 - 166/70)  BP(mean): 67 (08 Mar 2020 08:31) (67 - 67)  RR: 18 (08 Mar 2020 08:31) (13 - 18)  SpO2: 97% (08 Mar 2020 08:31) (97% - 99%)    03-07 @ 06:01  -  03-08 @ 07:00  --------------------------------------------------------  IN:  Total IN: 0 mL    OUT:    Voided: 250 mL  Total OUT: 250 mL    Total NET: -250 mL        PE:  RLE DSG CDI  Sensation to light touch intact S/S/DP/SP/Tib, Strength EHL/FHL/TA/GS 5/5, DP 2+, Ext WWP                            9.6    8.93  )-----------( 249      ( 08 Mar 2020 06:53 )             29.7     03-08    135  |  99  |  26<H>  ----------------------------<  157<H>  4.8   |  27  |  1.18    Ca    8.8      08 Mar 2020 06:53